# Patient Record
Sex: FEMALE | Race: WHITE | NOT HISPANIC OR LATINO | Employment: FULL TIME | URBAN - METROPOLITAN AREA
[De-identification: names, ages, dates, MRNs, and addresses within clinical notes are randomized per-mention and may not be internally consistent; named-entity substitution may affect disease eponyms.]

---

## 2017-02-20 ENCOUNTER — OFFICE VISIT (OUTPATIENT)
Dept: OBSTETRICS AND GYNECOLOGY | Facility: CLINIC | Age: 42
End: 2017-02-20
Attending: OBSTETRICS & GYNECOLOGY
Payer: COMMERCIAL

## 2017-02-20 VITALS
SYSTOLIC BLOOD PRESSURE: 116 MMHG | DIASTOLIC BLOOD PRESSURE: 80 MMHG | BODY MASS INDEX: 24.53 KG/M2 | HEIGHT: 65 IN | WEIGHT: 147.25 LBS

## 2017-02-20 DIAGNOSIS — N92.0 MENORRHAGIA WITH REGULAR CYCLE: Primary | ICD-10-CM

## 2017-02-20 DIAGNOSIS — N97.9 INFERTILITY, FEMALE: ICD-10-CM

## 2017-02-20 PROCEDURE — 99999 PR PBB SHADOW E&M-EST. PATIENT-LVL II: CPT | Mod: PBBFAC,,, | Performed by: OBSTETRICS & GYNECOLOGY

## 2017-02-20 PROCEDURE — 99213 OFFICE O/P EST LOW 20 MIN: CPT | Mod: S$GLB,,, | Performed by: OBSTETRICS & GYNECOLOGY

## 2017-02-20 RX ORDER — ALPRAZOLAM 0.5 MG/1
0.5 TABLET ORAL NIGHTLY PRN
Qty: 30 TABLET | Refills: 0 | Status: SHIPPED | OUTPATIENT
Start: 2017-02-20 | End: 2018-05-29 | Stop reason: SDUPTHER

## 2017-02-20 RX ORDER — ALPRAZOLAM 0.25 MG/1
0.25 TABLET ORAL 3 TIMES DAILY
COMMUNITY
End: 2017-02-20

## 2017-02-20 NOTE — MR AVS SNAPSHOT
[facility]  [address]  [address]  [facility] [address]  Phone: [phone]  Fax: [phone]                  Kim Padilla   2017 2:45 PM   Office Visit    Description:  Female : 1975   Provider:  Kun Pena MD   Department:  [facility]           Diagnoses this Visit        Comments    Menorrhagia with regular cycle    -  Primary     Infertility, female                To Do List           Future Appointments        Provider Department Dept Phone    2017 11:00 AM Kun Pena MD [facility] [phone]      Goals (5 Years of Data)     None       These Medications        Disp Refills Start End    norethindrone-e.estradiol-iron 1 mg-10 mcg (24)/10 mcg (2) Tab 84 tablet 3 2017    Take 1 tablet by mouth once daily. BIN#:[id number] CGP:[id number] PCN:CN ID:[id number] - Oral    [facility] [employer] [facility] [address] [place] [address] [address] Ph #: [phone]       alprazolam (XANAX) 0.5 MG tablet 30 tablet 0 2017    Take 1 tablet (0.5 mg total) by mouth nightly as needed for Insomnia or Anxiety. - Oral    Pharmacy: [facility] [employer] [facility] [address] [place] [address] [address] Ph #: [phone]         [facility]     [facility] Care [facility] -  Assistance  Registered nurses in the [facility] provide clinical advisement, health education, appointment booking, and other advisory services.  Call for this free service at [phone].             Medications           START taking these NEW medications        Refills    norethindrone-e.estradiol-iron 1 mg-10 mcg (24)/10 mcg (2) Tab 3    Sig: Take 1 tablet by mouth once daily. BIN#:[id number] CGP:[id number] PCN:CN ID:[id number]    Class: Normal    Route: Oral    alprazolam (XANAX) 0.5 MG tablet 0    Sig: Take 1 tablet (0.5 mg total) by mouth nightly as needed for Insomnia or  "Anxiety.    Class: Normal    Route: Oral      STOP taking these medications     phendimetrazine tartrate 35 mg Tab TK 1 T PO TID           Verify that the below list of medications is an accurate representation of the medications you are currently taking.  If none reported, the list may be blank. If incorrect, please contact your healthcare provider. Carry this list with you in case of emergency.           Current Medications     alprazolam (XANAX) 0.5 MG tablet Take 1 tablet (0.5 mg total) by mouth nightly as needed for Insomnia or Anxiety.    norethindrone-e.estradiol-iron 1 mg-10 mcg (24)/10 mcg (2) Tab Take 1 tablet by mouth once daily.    norethindrone-e.estradiol-iron 1 mg-10 mcg (24)/10 mcg (2) Tab Take 1 tablet by mouth once daily. BIN#:[id number] CGP:[id number] PCN:CN ID:[id number]           Clinical Reference Information           Your Vitals Were     BP Height Weight Last Period BMI    116/80 5' 5" (1.651 m) 66.8 kg (147 lb 4.3 oz) 02/16/2017 24.51 kg/m2      Blood Pressure          Most Recent Value    BP  116/80      Allergies as of 2/20/2017     Penicillins      Immunizations Administered on Date of Encounter - 2/20/2017     None      [facility] Sign-Up     Activating your [facility] account is as easy as 1-2-3!     1) Visit [email or web address], select Sign Up Now, enter this activation code and your date of birth, then select Next.  [email or web address]  Expires: 4/6/2017  3:50 PM      2) Create a username and password to use when you visit [facility] in the future and select a security question in case you lose your password and select Next.    3) Enter your e-mail address and click Sign Up!    Additional Information  If you have questions, please e-mail [email or web address] or call [phone] to talk to our [facility] staff. Remember, [facility] is NOT to be used for urgent needs. For medical emergencies, dial 911.         Language Assistance Services     ATTENTION: Language assistance services are available, " free of charge. Please call [phone].      ATENCIÓN: Si habla español, tiene a guthrie disposición servicios gratuitos de asistencia lingüística. Llame al [phone].     CHÚ Ý: N?u b?n nói Ti?ng Vi?t, có các d?ch v? h? tr? ngôn ng? mi?n phí dành cho b?n. G?i s? [phone].         [facility] complies with applicable Federal civil rights laws and does not discriminate on the basis of race, color, national origin, age, disability, or sex.

## 2017-02-20 NOTE — PROGRESS NOTES
"CC: follow up    Kim Padilla is a 41 y.o. female  follow up menorrhagia.  Didn't start the loloestrin due to cost menses are still heavy, saturating through super plus tampon.  Feels cueva week prior to menses. Tried prozac in the past but made her drowsy.  Having stress at work.     Past Medical History   Diagnosis Date    Infertility, female        Past Surgical History   Procedure Laterality Date    Breast lumpectomy Left      Dr Brown - benign    Dilation and curettage of uterus       for SAB       OB History    Para Term  AB SAB TAB Ectopic Multiple Living   1    1 1          # Outcome Date GA Lbr Calixto/2nd Weight Sex Delivery Anes PTL Lv   1 SAB                   History reviewed. No pertinent family history.    Social History   Substance Use Topics    Smoking status: Never Smoker    Smokeless tobacco: Never Used    Alcohol use No      Comment: rarely       Visit Vitals    /80    Ht 5' 5" (1.651 m)    Wt 66.8 kg (147 lb 4.3 oz)    LMP 2017    BMI 24.51 kg/m2       ROS:  GENERAL: Denies weight gain or weight loss. Feeling well overall.   SKIN: Denies rash or lesions.   HEAD: Denies head injury or headache.   NODES: Denies enlarged lymph nodes.   CHEST: Denies chest pain or shortness of breath.   CARDIOVASCULAR: Denies palpitations or left sided chest pain.   ABDOMEN: No abdominal pain, constipation, diarrhea, nausea, vomiting or rectal bleeding.   URINARY: No frequency, dysuria, hematuria, or burning on urination.  REPRODUCTIVE: See HPI.   BREASTS: denies pain, lumps, or nipple discharge.   HEMATOLOGIC: No easy bruisability or excessive bleeding.  MUSCULOSKELETAL: Denies joint pain or swelling.   NEUROLOGIC: Denies syncope or weakness.   PSYCHIATRIC: Denies depression, anxiety or mood swings.    Physical Exam:    APPEARANCE: Well nourished, well developed, in no acute distress.  AFFECT: WNL, alert and oriented x 3  SKIN: No acne or hirsutism  NECK: Neck " symmetric without masses or thyromegaly  NODES: No inguinal, cervical, axillary, or femoral lymph node enlargement  CHEST: Good respiratory effect  ABDOMEN: Soft.  No tenderness or masses.  No hepatosplenomegaly.  No hernias.  PELVIC: Normal external genitalia without lesions.  Normal hair distribution.  Adequate perineal body, normal urethral meatus.  Vagina moist and well rugated without lesions or discharge.  Cervix pink, without lesions, discharge or tenderness.  No significant cystocele or rectocele.  Bimanual exam shows uterus to be normal size, regular, mobile and nontender.  Adnexa without masses or tenderness.    EXTREMITIES: No edema.    ASSESSMENT AND PLAN    Diagnoses and all orders for this visit:    Menorrhagia with regular cycle  -     norethindrone-e.estradiol-iron 1 mg-10 mcg (24)/10 mcg (2) Tab; Take 1 tablet by mouth once daily. BIN#:[id number] CGP:[id number] PCN:CN ID:[id number]  -     alprazolam (XANAX) 0.5 MG tablet; Take 1 tablet (0.5 mg total) by mouth nightly as needed for Insomnia or Anxiety.    Infertility, female    try loloestrin as she is having menstrual migraines and pms.  If that doesn't work can do taytulla vs lysteda.  Wants lower dose options.    No Follow-up on file.

## 2017-02-21 ENCOUNTER — TELEPHONE (OUTPATIENT)
Dept: OBSTETRICS AND GYNECOLOGY | Facility: CLINIC | Age: 42
End: 2017-02-21

## 2017-02-21 RX ORDER — NORETHINDRONE ACETATE AND ETHINYL ESTRADIOL .02; 1 MG/1; MG/1
1 TABLET ORAL DAILY
Qty: 30 TABLET | Refills: 11 | Status: SHIPPED | OUTPATIENT
Start: 2017-02-21 | End: 2017-06-28

## 2017-02-21 NOTE — TELEPHONE ENCOUNTER
Dr Pena pt calling, came in yesterday got b.c. Pills sent into the pharmacy and insurance is not covering rx. 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

## 2017-03-22 ENCOUNTER — TELEPHONE (OUTPATIENT)
Dept: OBSTETRICS AND GYNECOLOGY | Facility: CLINIC | Age: 42
End: 2017-03-22

## 2017-03-22 NOTE — TELEPHONE ENCOUNTER
Pt states she started Microgestin on Thursday when she started her period;   on Monday she started with a migraine that continued until yesterday.  Her normal migraine medication wouldn't help with the pain and she was on the verge of having to go to the ER.  She said it was different than her normal migraines; first time she had an aura.   She has a history of hormonal headaches right before her period but said this one felt different.  Dr. Pena wanted to try her on Lo loestrin for irregular periods because she is sensitive to OCPs but it is not covered by her insurance.  She did not take it last night and nervous to take it again.      I advised her that the longer she takes them the less the side effects should be.      Should she stop the OCPs for now and restart again in the summer when she doesn't have to go to work (she is )?     She is aware Dr. Pena is off today, she is requesting I ask someone else so she doesn't get too off track with pills.

## 2017-03-22 NOTE — TELEPHONE ENCOUNTER
I would probably not take the pill if the HA was so severe that she almost went to ER. Send this message to Wax to look at later to decide on another option for menorrhagia. So basically stop OCP until she hears back from us.

## 2017-03-22 NOTE — TELEPHONE ENCOUNTER
Dr Pena pt calling, she just switched birth control pills and started them on Thursday. Pt is just now getting over a 2 day migraine and not sure what to do and wanted to touch base.Pt # [phone]

## 2017-03-23 ENCOUNTER — TELEPHONE (OUTPATIENT)
Dept: OBSTETRICS AND GYNECOLOGY | Facility: CLINIC | Age: 42
End: 2017-03-23

## 2017-03-23 RX ORDER — TRANEXAMIC ACID 650 MG/1
1300 TABLET ORAL 3 TIMES DAILY
Qty: 30 TABLET | Refills: 11 | Status: SHIPPED | OUTPATIENT
Start: 2017-03-23 | End: 2017-03-28

## 2017-03-23 NOTE — TELEPHONE ENCOUNTER
She takes the meds only when she starts her cycles it's 2 pills TID up to 5 days max.  Can stop at 3 days if that is sufficient.  Can turn menstrual blood black like color but that is normal.  rx sent

## 2017-03-23 NOTE — TELEPHONE ENCOUNTER
Call patient, if has developed migraine with AURA absolutely needs to stop ocps because she could have a stroke.  Her only other option for contraception/heavy menses is progestin only of which the best one is IUD.  Let her know.  See what she wants to do or she can come in and discuss with me

## 2017-03-23 NOTE — TELEPHONE ENCOUNTER
Pt has stopped the birth control.  Contraception is not a problem for her, just the heavy periods.  She would like to try the Lysteda.   Offered her an appt to discuss options/answer questions, declines for now.     Allergies and pharmacy UTD

## 2017-06-28 ENCOUNTER — OFFICE VISIT (OUTPATIENT)
Dept: OBSTETRICS AND GYNECOLOGY | Facility: CLINIC | Age: 42
End: 2017-06-28
Payer: COMMERCIAL

## 2017-06-28 VITALS
BODY MASS INDEX: 26.35 KG/M2 | SYSTOLIC BLOOD PRESSURE: 108 MMHG | HEIGHT: 65 IN | DIASTOLIC BLOOD PRESSURE: 72 MMHG | WEIGHT: 158.19 LBS

## 2017-06-28 DIAGNOSIS — Z11.51 SCREENING FOR HUMAN PAPILLOMAVIRUS (HPV): ICD-10-CM

## 2017-06-28 DIAGNOSIS — Z01.419 ROUTINE GYNECOLOGICAL EXAMINATION: Primary | ICD-10-CM

## 2017-06-28 PROCEDURE — 99999 PR PBB SHADOW E&M-EST. PATIENT-LVL III: CPT | Mod: PBBFAC,,, | Performed by: NURSE PRACTITIONER

## 2017-06-28 PROCEDURE — 99396 PREV VISIT EST AGE 40-64: CPT | Mod: S$GLB,,, | Performed by: NURSE PRACTITIONER

## 2017-06-28 RX ORDER — ELETRIPTAN HYDROBROMIDE 40 MG/1
TABLET, FILM COATED ORAL
Refills: 2 | COMMUNITY
Start: 2017-06-06 | End: 2018-06-05

## 2017-06-28 NOTE — PROGRESS NOTES
Chief Complaint: Well Woman Exam     HPI:     (Wax pt)    Last Pap:  2015 (normal, HPV neg)  Last Mammo:  2017 (per pt at [facility]) - ordered/managed by Dr. Ruiz; states her mammograms are now done every year, and u/s of breasts done every 6 months.    Kim Padilla is a 41 y.o.  who presents today for well woman exam.    LMP: Patient's last menstrual period was 2017.  States for past several months, her menses have been every 28 days, lasting 4 days; she is NOT taking OCP's as they cause her to have intense migraines.  She sees neurologist/headache specialist who monitors & treats her migraines.  No issues, problems, or complaints. Specifically, patient denies abnormal vaginal bleeding, discharge, pelvic pain, urinary problems, or changes in appetite.   Ms. Padilla is currently  & sexually active with a single male partner.   She declines STD screening today.  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      Past Medical History:   Diagnosis Date    Infertility, female      Past Surgical History:   Procedure Laterality Date    BREAST LUMPECTOMY Left     Dr Brown - benign    DILATION AND CURETTAGE OF UTERUS      for SAB     Social History     Social History    Marital status:      Spouse name: N/A    Number of children: N/A    Years of education: N/A     Occupational History    Not on file.     Social History Main Topics    Smoking status: Never Smoker    Smokeless tobacco: Never Used    Alcohol use No      Comment: rarely    Drug use: No    Sexual activity: Yes     Partners: Male     Birth control/ protection: None     Other Topics Concern    Not on file     Social History Narrative    No narrative on file     Family History   Problem Relation Age of Onset    Breast cancer Neg Hx     Colon cancer Neg Hx     Ovarian cancer Neg Hx      OB History      Para Term  AB Living    1       1      SAB TAB Ectopic Multiple Live Births    1  "                 ROS:     GENERAL: Denies unintentional weight gain or weight loss. Feeling well overall.   SKIN: Denies rash or lesions.   HEENT: Denies headaches, or vision changes.   CARDIOVASCULAR: Denies palpitations or chest pain.   RESPIRATORY: Denies shortness of breath or dyspnea on exertion.  BREASTS: Denies pain, lumps, or nipple discharge.   ABDOMEN: Denies abdominal pain, constipation, diarrhea, nausea, vomiting, change in appetite.  URINARY: Denies frequency, dysuria, hematuria.  NEUROLOGIC: Denies syncope or weakness.   PSYCHIATRIC: Denies depression, anxiety or mood swings.      Physical Exam:      PHYSICAL EXAM:  /72   Ht 5' 5" (1.651 m)   Wt 71.7 kg (158 lb 2.9 oz)   LMP 06/24/2017   BMI 26.32 kg/m²   Body mass index is 26.32 kg/m².     APPEARANCE: Well nourished, well developed, in no acute distress.  PSYCH: Appropriate mood and affect.  SKIN: No acne or hirsutism.  NECK: Neck symmetric without masses or thyromegaly  NODES: No inguinal, axillary, or supraclavicular lymph node enlargement  CHEST: Normal respiratory effort.  ABDOMEN: Soft.  No tenderness or masses.    BREASTS: Symmetrical, no skin changes or visible lesions.  No palpable masses or nipple discharge bilaterally.  PELVIC: Normal external genitalia without lesions.  Normal hair distribution.  Adequate perineal body, normal urethral meatus.  Vagina moist and well rugated without lesions or discharge.  Cervix pink, without lesions, discharge or tenderness.  No significant cystocele or rectocele.  Bimanual exam shows uterus to be normal size, regular, mobile and nontender.  Adnexa without masses or tenderness.    EXTREMITIES: No edema.    Assessment/Plan:     Routine gynecological examination  -     Cancel: Liquid-based pap smear, screening    Screening for human papillomavirus (HPV)  -     Cancel: HPV High Risk Genotypes, PCR    **Due for next pap:  03/2018      Counseling:     Patient was counseled today on current ASCCP pap " guidelines, the recommendation for yearly pelvic exams, healthy diet and exercise routines, annual mammograms and breast self awareness. She is to see her PCP for other health maintenance.     Return in about 1 year (around 6/28/2018) for Annual.

## 2017-09-28 ENCOUNTER — TELEPHONE (OUTPATIENT)
Dept: OBSTETRICS AND GYNECOLOGY | Facility: CLINIC | Age: 42
End: 2017-09-28

## 2017-09-28 RX ORDER — FLUOXETINE 10 MG/1
10 CAPSULE ORAL DAILY
Qty: 30 CAPSULE | Refills: 11 | Status: SHIPPED | OUTPATIENT
Start: 2017-09-28 | End: 2018-06-05

## 2017-09-28 NOTE — TELEPHONE ENCOUNTER
Pt thinks she needs to go back on Prozac the week before her period or the whole month.  1 week before her period starts she has a short temper and insomnia.  States she doesn't like the person she has become.  She has a rx for Xanax but states she can't remember the last time she took one.      Prozac 10mg pended    I saw that she was last prescribed Prozac 20mg only once in 2015.

## 2017-09-28 NOTE — TELEPHONE ENCOUNTER
Dr Pena pt calling, pt was on Prozac years ago and thinks she needs to get back on it. Pt said she would get crazy right before her period would start.William # [phone] Mikaela # [phone]

## 2017-11-15 ENCOUNTER — OFFICE VISIT (OUTPATIENT)
Dept: URGENT CARE | Facility: CLINIC | Age: 42
End: 2017-11-15
Payer: COMMERCIAL

## 2017-11-15 VITALS
HEIGHT: 65 IN | BODY MASS INDEX: 25.83 KG/M2 | RESPIRATION RATE: 18 BRPM | TEMPERATURE: 98 F | WEIGHT: 155 LBS | OXYGEN SATURATION: 98 % | SYSTOLIC BLOOD PRESSURE: 130 MMHG | HEART RATE: 93 BPM | DIASTOLIC BLOOD PRESSURE: 90 MMHG

## 2017-11-15 DIAGNOSIS — J32.9 SINUSITIS, UNSPECIFIED CHRONICITY, UNSPECIFIED LOCATION: Primary | ICD-10-CM

## 2017-11-15 PROCEDURE — 96372 THER/PROPH/DIAG INJ SC/IM: CPT | Mod: S$GLB,,, | Performed by: FAMILY MEDICINE

## 2017-11-15 PROCEDURE — 99203 OFFICE O/P NEW LOW 30 MIN: CPT | Mod: 25,S$GLB,, | Performed by: FAMILY MEDICINE

## 2017-11-15 RX ORDER — AZITHROMYCIN 250 MG/1
TABLET, FILM COATED ORAL
Qty: 6 TABLET | Refills: 0 | Status: SHIPPED | OUTPATIENT
Start: 2017-11-15 | End: 2018-06-05

## 2017-11-15 RX ORDER — FLUTICASONE PROPIONATE 50 MCG
2 SPRAY, SUSPENSION (ML) NASAL DAILY
Qty: 1 BOTTLE | Refills: 0 | Status: SHIPPED | OUTPATIENT
Start: 2017-11-15 | End: 2017-12-15

## 2017-11-15 RX ORDER — BETAMETHASONE SODIUM PHOSPHATE AND BETAMETHASONE ACETATE 3; 3 MG/ML; MG/ML
9 INJECTION, SUSPENSION INTRA-ARTICULAR; INTRALESIONAL; INTRAMUSCULAR; SOFT TISSUE
Status: COMPLETED | OUTPATIENT
Start: 2017-11-15 | End: 2017-11-15

## 2017-11-15 RX ADMIN — BETAMETHASONE SODIUM PHOSPHATE AND BETAMETHASONE ACETATE 9 MG: 3; 3 INJECTION, SUSPENSION INTRA-ARTICULAR; INTRALESIONAL; INTRAMUSCULAR; SOFT TISSUE at 04:11

## 2017-11-15 NOTE — PROGRESS NOTES
"Subjective:       Patient ID: Kim Padilla is a 42 y.o. female.    Vitals:  height is 5' 5" (1.651 m) and weight is 70.3 kg (155 lb). Her temperature is 98.2 °F (36.8 °C). Her blood pressure is 130/90 (abnormal) and her pulse is 93. Her respiration is 18 and oxygen saturation is 98%.     Chief Complaint: Sinus Problem    Sinus Problem   This is a new problem. The current episode started in the past 7 days. The problem has been gradually worsening since onset. There has been no fever. She is experiencing no pain. Associated symptoms include chills, congestion, ear pain, headaches, sinus pressure, sneezing and a sore throat. Pertinent negatives include no coughing, hoarse voice or shortness of breath. Treatments tried: advil sinus and flonase. The treatment provided mild relief.     Review of Systems   Constitution: Positive for chills. Negative for fever and malaise/fatigue.   HENT: Positive for congestion, ear pain, sinus pressure, sneezing and sore throat. Negative for hoarse voice.    Eyes: Negative for discharge and redness.   Cardiovascular: Negative for chest pain, dyspnea on exertion and leg swelling.   Respiratory: Negative for cough, shortness of breath, sputum production and wheezing.    Musculoskeletal: Negative for myalgias.   Gastrointestinal: Positive for nausea. Negative for abdominal pain.   Neurological: Positive for headaches.       Objective:      Physical Exam   Constitutional: She is oriented to person, place, and time. She appears well-developed and well-nourished.   HENT:   Head: Normocephalic and atraumatic.   Right Ear: External ear normal.   Left Ear: External ear normal.   Bilateral frontal sinus tenderness   Eyes: EOM are normal. Pupils are equal, round, and reactive to light.   Neck: Normal range of motion. Neck supple. No JVD present. No tracheal deviation present. No thyromegaly present.   Cardiovascular: Normal rate, regular rhythm and normal heart sounds.  Exam reveals no gallop and " no friction rub.    No murmur heard.  Pulmonary/Chest: Breath sounds normal. No respiratory distress. She has no wheezes. She has no rales. She exhibits no tenderness.   Abdominal: Soft. Bowel sounds are normal. She exhibits no distension and no mass. There is no tenderness. There is no rebound and no guarding. No hernia.   Musculoskeletal: Normal range of motion. She exhibits no edema, tenderness or deformity.   Lymphadenopathy:     She has no cervical adenopathy.   Neurological: She is alert and oriented to person, place, and time. She displays normal reflexes. No cranial nerve deficit. She exhibits normal muscle tone. Coordination normal.   Skin: Skin is warm. Capillary refill takes less than 2 seconds. No rash noted. No erythema. No pallor.   Psychiatric: She has a normal mood and affect. Her behavior is normal. Judgment and thought content normal.   Vitals reviewed.      Assessment:       1. Sinusitis, unspecified chronicity, unspecified location        Plan:         Sinusitis, unspecified chronicity, unspecified location  -     azithromycin (Z-SUSHMA) 250 MG tablet; Take 2 tablets by mouth x 1 for day 1 Then take 1 tablet by mouth daily for day 2 - 5  Dispense: 6 tablet; Refill: 0  -     betamethasone acetate-betamethasone sodium phosphate injection 9 mg; Inject 1.5 mLs (9 mg total) into the muscle one time.  -     fluticasone (FLONASE) 50 mcg/actuation nasal spray; 2 sprays by Each Nare route once daily.  Dispense: 1 Bottle; Refill: 0      Follow up with your doctor in a few days as needed.  Return to the urgent care or go to the ER if symptoms get worse.    Willian Palm MD

## 2017-11-15 NOTE — PATIENT INSTRUCTIONS
Sinusitis (Antibiotic Treatment)    The sinuses are air-filled spaces within the bones of the face. They connect to the inside of the nose. Sinusitis is an inflammation of the tissue lining the sinus cavity. Sinus inflammation can occur during a cold. It can also be due to allergies to pollens and other particles in the air. Sinusitis can cause symptoms of sinus congestion and fullness. A sinus infection causes fever, headache and facial pain. There is often green or yellow drainage from the nose or into the back of the throat (post-nasal drip). You have been given antibiotics to treat this condition.  Home care:  · Take the full course of antibiotics as instructed. Do not stop taking them, even if you feel better.  · Drink plenty of water, hot tea, and other liquids. This may help thin mucus. It also may promote sinus drainage.  · Heat may help soothe painful areas of the face. Use a towel soaked in hot water. Or,  the shower and direct the hot spray onto your face. Using a vaporizer along with a menthol rub at night may also help.   · An expectorant containing guaifenesin may help thin the mucus and promote drainage from the sinuses.  · Over-the-counter decongestants may be used unless a similar medicine was prescribed. Nasal sprays work the fastest. Use one that contains phenylephrine or oxymetazoline. First blow the nose gently. Then use the spray. Do not use these medicines more often than directed on the label or symptoms may get worse. You may also use tablets containing pseudoephedrine. Avoid products that combine ingredients, because side effects may be increased. Read labels. You can also ask the pharmacist for help. (NOTE: Persons with high blood pressure should not use decongestants. They can raise blood pressure.)  · Over-the-counter antihistamines may help if allergies contributed to your sinusitis.    · Do not use nasal rinses or irrigation during an acute sinus infection, unless told to by  your health care provider. Rinsing may spread the infection to other sinuses.  · Use acetaminophen or ibuprofen to control pain, unless another pain medicine was prescribed. (If you have chronic liver or kidney disease or ever had a stomach ulcer, talk with your doctor before using these medicines. Aspirin should never be used in anyone under 18 years of age who is ill with a fever. It may cause severe liver damage.)  · Don't smoke. This can worsen symptoms.  Follow-up care  Follow up with your healthcare provider or our staff if you are not improving within the next week.  When to seek medical advice  Call your healthcare provider if any of these occur:  · Facial pain or headache becoming more severe  · Stiff neck  · Unusual drowsiness or confusion  · Swelling of the forehead or eyelids  · Vision problems, including blurred or double vision  · Fever of 100.4ºF (38ºC) or higher, or as directed by your healthcare provider  · Seizure  · Breathing problems  · Symptoms not resolving within 10 days  Date Last Reviewed: 4/13/2015  © 3657-5421 [employer]. [address] [place] [address]. All rights reserved. This information is not intended as a substitute for professional medical care. Always follow your healthcare professional's instructions.      Follow up with your doctor in a few days as needed.  Return to the urgent care or go to the ER if symptoms get worse.    Willian Palm MD

## 2018-05-03 ENCOUNTER — HOSPITAL ENCOUNTER (OUTPATIENT)
Dept: RADIOLOGY | Facility: HOSPITAL | Age: 43
Discharge: HOME | End: 2018-05-03
Attending: PHYSICIAN ASSISTANT
Payer: COMMERCIAL

## 2018-05-03 ENCOUNTER — OFFICE VISIT (OUTPATIENT)
Dept: SPORTS MEDICINE | Facility: CLINIC | Age: 43
End: 2018-05-03
Payer: COMMERCIAL

## 2018-05-03 VITALS
HEART RATE: 99 BPM | SYSTOLIC BLOOD PRESSURE: 124 MMHG | DIASTOLIC BLOOD PRESSURE: 93 MMHG | HEIGHT: 65 IN | BODY MASS INDEX: 25.83 KG/M2 | WEIGHT: 155 LBS

## 2018-05-03 DIAGNOSIS — M25.512 LEFT SHOULDER PAIN, UNSPECIFIED CHRONICITY: Primary | ICD-10-CM

## 2018-05-03 DIAGNOSIS — M25.512 LEFT SHOULDER PAIN, UNSPECIFIED CHRONICITY: ICD-10-CM

## 2018-05-03 PROCEDURE — 99203 OFFICE O/P NEW LOW 30 MIN: CPT | Mod: S$GLB,,, | Performed by: PHYSICIAN ASSISTANT

## 2018-05-03 PROCEDURE — 73030 X-RAY EXAM OF SHOULDER: CPT | Mod: 26,LT,, | Performed by: RADIOLOGY

## 2018-05-03 PROCEDURE — 99999 PR PBB SHADOW E&M-EST. PATIENT-LVL III: CPT | Mod: PBBFAC,,, | Performed by: PHYSICIAN ASSISTANT

## 2018-05-03 PROCEDURE — 3008F BODY MASS INDEX DOCD: CPT | Mod: CPTII,S$GLB,, | Performed by: PHYSICIAN ASSISTANT

## 2018-05-03 PROCEDURE — 73030 X-RAY EXAM OF SHOULDER: CPT | Mod: TC,FY,PO,LT

## 2018-05-03 RX ORDER — SPIRONOLACTONE 100 MG/1
100 TABLET, FILM COATED ORAL DAILY
COMMUNITY
End: 2018-10-24 | Stop reason: ALTCHOICE

## 2018-05-03 RX ORDER — DOXYCYCLINE HYCLATE 100 MG
100 TABLET ORAL 2 TIMES DAILY
COMMUNITY
End: 2018-06-05

## 2018-05-03 RX ORDER — ISOTRETINOIN 10 MG/1
10 CAPSULE ORAL 2 TIMES DAILY
COMMUNITY
End: 2018-08-16

## 2018-05-03 NOTE — Clinical Note
May 3, 2018      [facility]            [facility]  [address]  [facility] [address]  Phone: [phone]          Patient: Kim Padilla   MR Number: [id number]   YOB: 1975   Date of Visit: 5/3/2018       Dear [facility] :    Thank you for referring Kim Padilla to me for evaluation. Attached you will find relevant portions of my assessment and plan of care.    If you have questions, please do not hesitate to call me. I look forward to following Kim Padilla along with you.    Sincerely,    Louise Reed PA-C    Enclosure  CC:  No Recipients    If you would like to receive this communication electronically, please contact [email or web address] or [phone] to request more information on [employer] [facility] access.    For providers and/or their staff who would like to refer a patient to [facility], please contact us through our one-stop-shop provider referral line, James Gallagher, at [phone].    If you feel you have received this communication in error or would no longer like to receive these types of communications, please e-mail [email or web address]

## 2018-05-03 NOTE — PROGRESS NOTES
CC: LEFT shoulder pain     42 y.o. Female RHD with a history of left shoulder pain x 1.5 years. She describes the pain as burning and sharp, intermittent. She has pain with taking a shirt off over her head. Pain wakes her up almost every night. She has started dropping things due to pain. She can't lift things up in to the top cabinets overhead.  She states that the pain is severe and not responding to any conservative care.  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    She reports that the pain and weakness is worse with overhead activity. It also bothers her at night.    Is affecting ADLs.  Pain is 6/10 at it's worst.      Past Medical History:   Diagnosis Date    Infertility, female        Past Surgical History:   Procedure Laterality Date    BREAST LUMPECTOMY Left 2014    Dr Brown - benign    DILATION AND CURETTAGE OF UTERUS      for SAB       Family History   Problem Relation Age of Onset    Breast cancer Neg Hx     Colon cancer Neg Hx     Ovarian cancer Neg Hx          Current Outpatient Prescriptions:     azithromycin (Z-SUSHMA) 250 MG tablet, Take 2 tablets by mouth x 1 for day 1 Then take 1 tablet by mouth daily for day 2 - 5, Disp: 6 tablet, Rfl: 0    doxycycline (VIBRA-TABS) 100 MG tablet, Take 100 mg by mouth 2 (two) times daily., Disp: , Rfl:     fluoxetine (PROZAC) 10 MG capsule, Take 1 capsule (10 mg total) by mouth once daily., Disp: 30 capsule, Rfl: 11    ISOtretinoin (ACCUTANE) 10 MG capsule, Take 10 mg by mouth 2 (two) times daily., Disp: , Rfl:     RELPAX 40 mg tablet, TK 1 T PO PRN  Q 2 TO 4 H. MAX OF 2 PER DAY OR 6 PER WEEK., Disp: , Rfl: 2    spironolactone (ALDACTONE) 100 MG tablet, Take 100 mg by mouth once daily., Disp: , Rfl:     alprazolam (XANAX) 0.5 MG tablet, Take 1 tablet (0.5 mg total) by mouth nightly as needed for Insomnia or Anxiety., Disp: 30 tablet, Rfl: 0    Review of patient's allergies indicates:   Allergen Reactions    Penicillins  "Hives          REVIEW OF SYSTEMS:  Constitution: Negative. Negative for chills, fever and night sweats.   HENT: Negative for congestion and headaches.    Eyes: Negative for blurred vision, left vision loss and right vision loss.   Cardiovascular: Negative for chest pain and syncope.   Respiratory: Negative for cough and shortness of breath.    Endocrine: Negative for polydipsia, polyphagia and polyuria.   Hematologic/Lymphatic: Negative for bleeding problem. Does not bruise/bleed easily.   Skin: Negative for dry skin, itching and rash.   Musculoskeletal: Negative for falls.  Positive for left shoulder pain and muscle weakness.   Gastrointestinal: Negative for abdominal pain and bowel incontinence.   Genitourinary: Negative for bladder incontinence and nocturia.   Neurological: Negative for disturbances in coordination, loss of balance and seizures.   Psychiatric/Behavioral: Negative for depression. The patient does not have insomnia.    Allergic/Immunologic: Negative for hives and persistent infections.      PHYSICAL EXAMINATION:  Vitals:  BP (!) 124/93   Pulse 99   Ht 5' 5" (1.651 m)   Wt 70.3 kg (155 lb)   LMP 04/14/2018   BMI 25.79 kg/m²    General: The patient is alert and oriented x 3.  Mood is pleasant.  Observation of ears, eyes and nose reveal no gross abnormalities.  No labored breathing observed.  Gait is coordinated. Patient can toe walk and heel walk without difficulty.      LEFT Shoulder / Upper Extremity Exam    OBSERVATION:     Swelling  none  Deformity  none   Discoloration  none   Scapular winging none   Scars   none  Atrophy  none    TENDERNESS / CREPITUS (T/C):          T/C      T/C   Clavicle   -/-  SUPRAspinatus    -/-     AC Jt.    -/-  INFRAspinatus  -/-    SC Jt.    -/-  Deltoid    -/-      G. Tuberosity  -/-  LH BICEP groove  -/-   Acromion:  -/-  Midline Neck   -/-     Scapular Spine -/-  Trapezium   -/-   SMA Scapula  -/-  GH jt. line - post  -/-     Scapulothoracic "  -/-         ROM: (* = with pain)  Right shoulder   Left shoulder        AROM (PROM)   AROM (PROM)   FE    170° (175°)     170° (175°)     ER at 0°    80°  (85°)    70°  (75°)   ER at 90° ABD  90°  (90°)    90°  (90°)   IR at 90°  ABD   NA  (40°)     NA  (40°)      IR (spine level)   T10     T10    STRENGTH: (* = with pain) Right shoulder   Left shoulder    SCAPTION   5/5    4/5    IR    5/5    4/5   ER    5/5    4/5   BICEPS   5/5    4/5   Deltoid    5/5    4/5     SIGNS:  Painful side       NEER   +    ODAVONS  neg    GARCAI   +   SPEEDS  neg     DROP ARM   -   BELLY PRESS neg   Superior escape none    LIFT-OFF  neg   X-Body ADD    neg    MOVING VALGUS neg        EXTREMITY NEURO-VASCULAR EXAM:    Sensation grossly intact to light touch all dermatomal regions.    DTR 2+ Biceps, Triceps, BR and Negative Jacks sign   Grossly intact motor function at Elbow, Wrist and Hand   Distal pulses radial and ulnar 2+, brisk cap refill, symmetric.      NECK:  Painless FROM and spinous processes non-tender. Negative Spurlings sign.      OTHER FINDINGS:  Pain with left lateral bending of neck  Painful arc    XRAYS:  Xrays including AP, Outlet and Axillary Lateral of Left shoulder are ordered / images reviewed by me:   No fracture dislocation or other pathology   Proximal migration of humeral head: None   GH arthritis: None          ASSESSMENT:   Left shoulder pain, r/o rotator cuff tear    PLAN:      1. MRI to rule out rotator cuff tear  2. RTC after MRI to review results.    All questions were answered, patient will contact us for questions or concerns in the interim.

## 2018-05-09 ENCOUNTER — TELEPHONE (OUTPATIENT)
Dept: SPORTS MEDICINE | Facility: CLINIC | Age: 43
End: 2018-05-09

## 2018-05-09 NOTE — TELEPHONE ENCOUNTER
Called patient and got her appointment rescheduled. I let her know the reason we had to reschedule was because the results won't be in at the time her original appointment was scheduled for.

## 2018-05-09 NOTE — TELEPHONE ENCOUNTER
----- Message from Germán Duran sent at 5/9/2018 11:59 AM CDT -----  Contact: Pt  Returning Maria R call regarding rescheduling appt,call

## 2018-05-10 ENCOUNTER — HOSPITAL ENCOUNTER (OUTPATIENT)
Dept: RADIOLOGY | Facility: HOSPITAL | Age: 43
Discharge: HOME | End: 2018-05-10
Attending: PHYSICIAN ASSISTANT
Payer: COMMERCIAL

## 2018-05-10 DIAGNOSIS — M25.512 LEFT SHOULDER PAIN, UNSPECIFIED CHRONICITY: ICD-10-CM

## 2018-05-10 PROCEDURE — 73221 MRI JOINT UPR EXTREM W/O DYE: CPT | Mod: 26,LT,, | Performed by: RADIOLOGY

## 2018-05-10 PROCEDURE — 73221 MRI JOINT UPR EXTREM W/O DYE: CPT | Mod: TC,LT

## 2018-05-11 ENCOUNTER — OFFICE VISIT (OUTPATIENT)
Dept: SPORTS MEDICINE | Facility: CLINIC | Age: 43
End: 2018-05-11
Payer: COMMERCIAL

## 2018-05-11 VITALS
HEART RATE: 78 BPM | BODY MASS INDEX: 25.83 KG/M2 | HEIGHT: 65 IN | SYSTOLIC BLOOD PRESSURE: 124 MMHG | WEIGHT: 155 LBS | DIASTOLIC BLOOD PRESSURE: 82 MMHG

## 2018-05-11 DIAGNOSIS — M25.512 LEFT SHOULDER PAIN, UNSPECIFIED CHRONICITY: Primary | ICD-10-CM

## 2018-05-11 PROCEDURE — 3008F BODY MASS INDEX DOCD: CPT | Mod: CPTII,S$GLB,, | Performed by: PHYSICIAN ASSISTANT

## 2018-05-11 PROCEDURE — 20610 DRAIN/INJ JOINT/BURSA W/O US: CPT | Mod: LT,S$GLB,, | Performed by: PHYSICIAN ASSISTANT

## 2018-05-11 PROCEDURE — 99214 OFFICE O/P EST MOD 30 MIN: CPT | Mod: 25,S$GLB,, | Performed by: PHYSICIAN ASSISTANT

## 2018-05-11 PROCEDURE — 99999 PR PBB SHADOW E&M-EST. PATIENT-LVL III: CPT | Mod: PBBFAC,,, | Performed by: PHYSICIAN ASSISTANT

## 2018-05-11 RX ORDER — BUPIVACAINE HYDROCHLORIDE 2.5 MG/ML
3 INJECTION, SOLUTION INFILTRATION; PERINEURAL
Status: COMPLETED | OUTPATIENT
Start: 2018-05-11 | End: 2018-05-11

## 2018-05-11 RX ORDER — TRIAMCINOLONE ACETONIDE 40 MG/ML
40 INJECTION, SUSPENSION INTRA-ARTICULAR; INTRAMUSCULAR
Status: COMPLETED | OUTPATIENT
Start: 2018-05-11 | End: 2018-05-11

## 2018-05-11 RX ADMIN — BUPIVACAINE HYDROCHLORIDE 7.5 MG: 2.5 INJECTION, SOLUTION INFILTRATION; PERINEURAL at 04:05

## 2018-05-11 RX ADMIN — TRIAMCINOLONE ACETONIDE 40 MG: 40 INJECTION, SUSPENSION INTRA-ARTICULAR; INTRAMUSCULAR at 04:05

## 2018-05-11 NOTE — PROGRESS NOTES
CC: LEFT shoulder pain     42 y.o. Female RHD with a history of left shoulder pain x 2 years. She describes the pain as burning and sharp, intermittent. She has pain with taking a shirt off over her head. Pain wakes her up almost every night. She has started dropping things due to pain. She can't lift things up in to the top cabinets overhead.  She states that the pain is severe and not responding to any conservative care.  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    She reports that the pain and weakness is worse with overhead activity. It also bothers her at night.    Is affecting ADLs.  Pain is 6/10 at it's worst.      Past Medical History:   Diagnosis Date    Infertility, female        Past Surgical History:   Procedure Laterality Date    BREAST LUMPECTOMY Left 2014    Dr Brown - benign    DILATION AND CURETTAGE OF UTERUS      for SAB       Family History   Problem Relation Age of Onset    Breast cancer Neg Hx     Colon cancer Neg Hx     Ovarian cancer Neg Hx          Current Outpatient Prescriptions:     azithromycin (Z-SUSHMA) 250 MG tablet, Take 2 tablets by mouth x 1 for day 1 Then take 1 tablet by mouth daily for day 2 - 5, Disp: 6 tablet, Rfl: 0    doxycycline (VIBRA-TABS) 100 MG tablet, Take 100 mg by mouth 2 (two) times daily., Disp: , Rfl:     fluoxetine (PROZAC) 10 MG capsule, Take 1 capsule (10 mg total) by mouth once daily., Disp: 30 capsule, Rfl: 11    ISOtretinoin (ACCUTANE) 10 MG capsule, Take 10 mg by mouth 2 (two) times daily., Disp: , Rfl:     RELPAX 40 mg tablet, TK 1 T PO PRN  Q 2 TO 4 H. MAX OF 2 PER DAY OR 6 PER WEEK., Disp: , Rfl: 2    spironolactone (ALDACTONE) 100 MG tablet, Take 100 mg by mouth once daily., Disp: , Rfl:     alprazolam (XANAX) 0.5 MG tablet, Take 1 tablet (0.5 mg total) by mouth nightly as needed for Insomnia or Anxiety., Disp: 30 tablet, Rfl: 0    Review of patient's allergies indicates:   Allergen Reactions    Penicillins  "Hives          REVIEW OF SYSTEMS:  Constitution: Negative. Negative for chills, fever and night sweats.   HENT: Negative for congestion and headaches.    Eyes: Negative for blurred vision, left vision loss and right vision loss.   Cardiovascular: Negative for chest pain and syncope.   Respiratory: Negative for cough and shortness of breath.    Endocrine: Negative for polydipsia, polyphagia and polyuria.   Hematologic/Lymphatic: Negative for bleeding problem. Does not bruise/bleed easily.   Skin: Negative for dry skin, itching and rash.   Musculoskeletal: Negative for falls.  Positive for left shoulder pain and muscle weakness.   Gastrointestinal: Negative for abdominal pain and bowel incontinence.   Genitourinary: Negative for bladder incontinence and nocturia.   Neurological: Negative for disturbances in coordination, loss of balance and seizures.   Psychiatric/Behavioral: Negative for depression. The patient does not have insomnia.    Allergic/Immunologic: Negative for hives and persistent infections.      PHYSICAL EXAMINATION:  Vitals:  /82   Pulse 78   Ht 5' 5" (1.651 m)   Wt 70.3 kg (155 lb)   LMP 04/14/2018   BMI 25.79 kg/m²    General: The patient is alert and oriented x 3.  Mood is pleasant.  Observation of ears, eyes and nose reveal no gross abnormalities.  No labored breathing observed.  Gait is coordinated. Patient can toe walk and heel walk without difficulty.      LEFT Shoulder / Upper Extremity Exam    OBSERVATION:     Swelling  none  Deformity  none   Discoloration  none   Scapular winging none   Scars   none  Atrophy  none    TENDERNESS / CREPITUS (T/C):          T/C      T/C   Clavicle   -/-  SUPRAspinatus    -/-     AC Jt.    -/-  INFRAspinatus  -/-    SC Jt.    -/-  Deltoid    -/-      G. Tuberosity  -/-  LH BICEP groove  -/-   Acromion:  -/-  Midline Neck   -/-     Scapular Spine -/-  Trapezium   -/-   SMA Scapula  -/-  GH jt. line - post  -/-     Scapulothoracic  -/-         ROM: (* " = with pain)  Right shoulder   Left shoulder        AROM (PROM)   AROM (PROM)   FE    170° (175°)     170° (175°)     ER at 0°    80°  (85°)    70°  (75°)   ER at 90° ABD  90°  (90°)    90°  (90°)   IR at 90°  ABD   NA  (40°)     NA  (40°)      IR (spine level)   T10     T10    STRENGTH: (* = with pain) Right shoulder   Left shoulder    SCAPTION   5/5    4/5    IR    5/5    4/5   ER    5/5    4/5   BICEPS   5/5    4/5   Deltoid    5/5    4/5     SIGNS:  Painful side       NEER   +    ODAVONS  neg    GARCIA   +   SPEEDS  neg     DROP ARM   -   BELLY PRESS neg   Superior escape none    LIFT-OFF  neg   X-Body ADD    neg    MOVING VALGUS neg        EXTREMITY NEURO-VASCULAR EXAM:    Sensation grossly intact to light touch all dermatomal regions.    DTR 2+ Biceps, Triceps, BR and Negative Jacks sign   Grossly intact motor function at Elbow, Wrist and Hand   Distal pulses radial and ulnar 2+, brisk cap refill, symmetric.      NECK:  Painless FROM and spinous processes non-tender. Negative Spurlings sign.      OTHER FINDINGS:  Pain with left lateral bending of neck  Painful arc    XRAYS:  Xrays including AP, Outlet and Axillary Lateral of Left shoulder are ordered / images reviewed by me:   No fracture dislocation or other pathology   Proximal migration of humeral head: None   GH arthritis: None    MRI LEFT SHOULDER 5/10/18  1. Moderate supraspinatus and mild subscapularis and infraspinatus tendinosis without partial or full-thickness tear.  2. Mild degenerative changes of the anterior-superior and posterior-superior segments of the glenoid labrum without definite focal tear noting limitation due to non-arthrographic technique.  3. Biceps tendinosis without partial or full-thickness tear.      ASSESSMENT:   Left shoulder pain, tendinosis    PLAN:      1. PT at [facility]  2. CSI left subacromial space  Injection Procedure  A time out was performed, including verification of patient ID, procedure, site and  side, availability of information and equipment, review of safety issues, and agreement with consent, the procedure site was marked.    After time out was performed, the patient was prepped aseptically with povidone-iodine swabsticks. A diagnostic and therapeutic injection of 3:1cc Marcaine:Kenalog was given under sterile technique using a 22.5 gauge needle from the Posterior aspect of the left Subacromial in the sitting position.      Kim Padilla had no adverse reactions to the medication. Pain decreased. She was instructed to apply ice to the joint for 20 minutes and avoid strenuous activities for 24-36 hours following the injection. She was warned of possible blood sugar and/or blood pressure changes during that time. Following that time, she can resume regular activities.    She was reminded to call the clinic immediately for any adverse side effects as explained in clinic today.    3. RTC in 2 months for follow up      All questions were answered, patient will contact us for questions or concerns in the interim.

## 2018-05-29 ENCOUNTER — CLINICAL SUPPORT (OUTPATIENT)
Dept: REHABILITATION | Facility: HOSPITAL | Age: 43
End: 2018-05-29
Attending: PHYSICIAN ASSISTANT
Payer: COMMERCIAL

## 2018-05-29 DIAGNOSIS — N92.0 MENORRHAGIA WITH REGULAR CYCLE: ICD-10-CM

## 2018-05-29 DIAGNOSIS — R53.1 WEAKNESS: ICD-10-CM

## 2018-05-29 DIAGNOSIS — R29.3 POSTURE IMBALANCE: ICD-10-CM

## 2018-05-29 PROCEDURE — 97162 PT EVAL MOD COMPLEX 30 MIN: CPT | Mod: PO

## 2018-05-29 PROCEDURE — 97110 THERAPEUTIC EXERCISES: CPT | Mod: PO

## 2018-05-29 RX ORDER — ALPRAZOLAM 0.5 MG/1
0.5 TABLET ORAL NIGHTLY PRN
Qty: 30 TABLET | Refills: 1 | Status: SHIPPED | OUTPATIENT
Start: 2018-05-29 | End: 2022-02-21 | Stop reason: SDUPTHER

## 2018-05-29 NOTE — PLAN OF CARE
Physical Therapy Evaluation    Name: Kim Padilla  Clinic Number: [id number]    Diagnosis:   Encounter Diagnoses   Name Primary?    Weakness     Posture imbalance      Physician: Louise Reed PA-C  Treatment Orders: PT Eval and Treat    History     Past Medical History:   Diagnosis Date    Infertility, female      Current Outpatient Prescriptions   Medication Sig    alprazolam (XANAX) 0.5 MG tablet Take 1 tablet (0.5 mg total) by mouth nightly as needed for Insomnia or Anxiety.    azithromycin (Z-SUSHMA) 250 MG tablet Take 2 tablets by mouth x 1 for day 1 Then take 1 tablet by mouth daily for day 2 - 5    doxycycline (VIBRA-TABS) 100 MG tablet Take 100 mg by mouth 2 (two) times daily.    fluoxetine (PROZAC) 10 MG capsule Take 1 capsule (10 mg total) by mouth once daily.    ISOtretinoin (ACCUTANE) 10 MG capsule Take 10 mg by mouth 2 (two) times daily.    RELPAX 40 mg tablet TK 1 T PO PRN  Q 2 TO 4 H. MAX OF 2 PER DAY OR 6 PER WEEK.    spironolactone (ALDACTONE) 100 MG tablet Take 100 mg by mouth once daily.     No current facility-administered medications for this visit.      Review of patient's allergies indicates:   Allergen Reactions    Penicillins Hives       Evaluation Date: 5/29/18  Visit # authorized: 1/  Authorization period: 12/3/18  Plan of care expiration: 7/28/18    Subjective     History of present condition:  Kim is a 42 y.o. female that presents to [facility] secondary to left shoulder pain. Injury/surgery occurred approximately: ~2 years. Pt. presents with the following co-morbidities and personal factors that directly impact her plan of care: cervical dysfunction and chronicity of her condition.        Precautions: PMH of chronic migraines (environmental and hormonal); PMH of chronic neck stiffness and bilateral UE paresthesia ulnar/median nerve distribution    Onset/ESTHER: sudden: ~ 2 years ago pt. woke up with left shoulder pain of insidious onset. Pt. went for  chiropractic tx at that time for neck and shoulder with temporary relief. Approximately two months pt. noted her greatest limitation when she couldn't remove her shirt. Pain has gotten progressively worse in the last three weeks noting that she couldn't lift a binder OH. Pt. had an Orthopedic consult on 5/10/18: imaging and injection noting a great reduction in pain. Pt.'s greatest concern at this time is shoulder weakness.   Red Flags:  · Bowel/bladder symptoms (urinary retention/fecal incontinence)? No  · Recent weight loss? No.  · Constant/Night pain that is unchanging with change of position? No.  · PMH of CA? No.   · Numbness or Tingling? bilateral UE paresthesia median/ulnar distribution.    Aggravating factors: reaching OH, lifting OH, dressing: donning/doffing shirt, decreased left  strength, unloading , and lifting/pulling backwards at chest height, driving (turning head and neck behind her), reaching to the back seat of car  Relieving factors: kenalog injection  Pain Scale: Kim rates pain on a scale of 0-10 to be 3 at currently; 2 at best; 9 at worst.    Diagnostic tests: MRI revealed 1. Moderate supraspinatus and mild subscapularis and infraspinatus tendinosis without partial or full-thickness tear.  2. Mild degenerative changes of the anterior-superior and posterior-superior segments of the glenoid labrum without definite focal tear noting limitation due to non-arthrographic technique.  3. Biceps tendinosis without partial or full-thickness tear.    PLOF: prolonged sitting, computer/phone usage, short distance walking, caring for her son (11 years old), and household activities  DME own/use: none  Hand dominance: right handed  Occupation: Pt works as a  () and job related duties include prolonged sitting/typing and occasional lifting binders/files    Previous treatment: chiropractor for neck and TENS unit for shoulder, kenalog.  ADLs: Pt has a  decrease ability to perform ADLs such as see above.  Patient Goals: Pt would like to decrease pain and increase function so she can return to her normal daily activities such as that she can perform ADLs with increased strength    Objective     Observation: see posture    Posture: subcranial hyperextension, cervical flattened lordosis, FHP, mild Tspine kyphosis, rolled anterior/IR shoulders, shoulder protraction, and excessive hip/knee flexion    Sensation: Intact bilateral UE    Cervical ROM: (measured in degrees)    Degrees Quality   Flexion 45 left cervical ERP   Right SB 30 left cervical ERP   Left SB 30 left cervical ERP   Right rotation 60 left cervical ERP   Left rotation 55 left cervical ERP     Active/Passive ROM: (measured in degrees)   Shoulder RUE LUE   Flexion 180/ 145/140   Abduction 180/ 125 left neck/superior shoulder pain/90   ER C7/100 C2/60   IR T12/65 T12 with pain and difficulty/65     Scapular Control/Dyskinesis:        Normal / Subtle / Obvious Comments   Left     Right       Strength: (measured 1 out of 5)   Right UE Left UE   Shoulder Flexion: 4+/5 3-/5   Shoulder Abduction: 4+/5 3-/5   Serratus Anterior 4+/5 3-/5   Shoulder ER: by side 4+/5 3-/5   Shoulder IR: by side 4+/5 3/5        Elbow Flexion: 4+/5 3-/5   Elbow Extension: 4+/5 3-/5         70# 15#    65# 20#    70# 15#   Lower Trap: seated 4/5 NT   Middle Trap: seated 4/5 3-/5   Rhomboids: seated 4/5 3-/5     Cervical Spine Special Tests: ((+): positive; (-): negative)   Compression +   Spurling's A + left   Spurling's B + left   Distraction + increased pain left   Deep neck flexor test +   First Rib + left     Special Tests:((+) pos.; (-): neg.)  Test ANAE   Lamine's Ramez -   Neer's Impingment -       Full Can/Empty Can Test (supraspinatus) +   Drop Arm test -   Dropping sign (infraspinatus) -   Belly Press (subscap) -   Lift Off test (subscap) +       Speed's test (biceps) -       Horizontal adduction test -   Joint Mobility:  "(graded 0-6 out of 6) left lower cervical/first rib: 2/6    Palpation: TTP left UT, levator scapula, and paraspinals    Flexibility: mod. restriction of the above musculature    Functional Status Measures:    Intake Score     Pts Physical FS Primary Measure      55                          Risk Adjustment Statistical FOTO     57        PT reviewed FOTO scores for Kim Padilla on 05/29/2018.   FOTO scores were entered into [employer] - see media section.  History  Co-morbidities and personal factors that may impact the plan of care Examination  Body Structures and Functions, activity limitations and participation restrictions that may impact the plan of care Clinical Presentation   Decision Making/ Complexity Score   Co-morbidities:   cervical mechanical dysfunction and chronicity of condition            Personal Factors:   low pain tolerance Body Regions: cervical/shoulder    Body Systems: Decreased cervical and bilateral shoulder AROM; shoulder/yuniel-scapular weakness; poor posture; pain with transitional activities, decrease exercise ability, and pain with ADLs.     Activity limitations: prolonged sitting, lifting/reaching OH, household activities, self care skills, sleep disturbance, and work/leisur activities    Participation Restrictions: leisure work out   evolving with changing clinical characteristics   moderate     Clinical Presentation/complexity category  Moderate complexity category: pt. has 1-2 personal factors and/or co-morbidities directly  impacting POC, 3 or more body system impairments/functional limitations/participation restrictions; as well as, condition is evolving with changing clinical characteristics.    PT Evaluation Completed? Yes  Discussed Plan of Care with patient: Yes    TREATMENT:  Therapeutic exercise: Kim received therapeutic exercises to develop strength and endurance, flexibility for 10 minutes including:View at "[employer]" using code: [id number]  *Pt verbally understood the " instructions and demonstrated proper form/technique. Pt was advised to perform these exercises free of pain and to discontinue use if symptoms persist/worsen.    Pt. education:   Instructed pt. regarding: Posture reeducation, body mechanics, activity modification/avoidance, POC/goals/outcomes, and proper technique with all exercises. Kim julia good understanding and return demonstration of the education provided. No cultural, environmental, or spiritual barriers identified to treatment or learning.    Medical necessity is demonstrated by the following IMPAIRMENTS/PROBLEM LIST:   1) Function is limited by pain   2) Posture dysfunction   3) RC/yuniel-scapular/serratus anterior weakness   4) Pectoralis major/minor tightness   5) Decreased shoulder A/PROM   6) Lack of HEP    GOALS:   Short Term Goals:  4 weeks  1. Pt. to report decreased cervical/shoulder pain </= 5/10 at worst to increase tolerance for self care skills  2. Pt. to demonstrate proper cervical and scapula retraction requiring min. to no verbal cues from PT  3. Pt. to demonstrate an increase in left sh. elevation A/PROM to 160 deg. to promote greater ease with reaching tasks.  4. Pt. to demonstrate an increase in left sh. ER AROM to 80 deg. to promote greater ease self care skills  5. Pt. to demonstrate increased MMT for left shoulder flexion to 3/5 to improve tolerance for OH reach  6. Pt. to demonstrate increased MMT to serratus anterior to 3/5 to improve scapula stability during repetitive UE tasks  7. Pt to tolerate HEP to improve ROM and independence with ADL's    Long Term Goals: 8 weeks  1. Pt. to report decreased cervical/shoulder pain </=2/10 at worst to increase tolerance for self care skills/OH reach  2. Pt. to demonstrate proper cervical and scapula retraction requiring no verbal cues from PT  3. Pt. to demonstrate an increase in left sh. elevation AROM to 160 deg. to promote greater ease with reaching tasks.  4. Pt. to demonstrate an increase in  left sh. ER/IR AROM to 90/70 deg. to promote greater ease self care skills  5. Pt. to demonstrate increased MMT for left shoulder abduction to 4/5 to improve tolerance for ADL and work activities.  6. Pt. to demonstrate increased MMT for mid-trap/lower trap strength to 3+/5 to improve scapula stability during OH reach/lifting tasks.   7. Pt. to demonstrate increased MMT to serratus anterior to 4/5 to improve scapula stability during OH UE tasks  8. Pt to be Independent with HEP to improve ROM and independence with ADL's    Assessment     This is a 42 y.o. female referred to outpatient physical therapy who presents with a medical diagnosis of chronic left shoulder pain and PT diagnosis of weakness and posture imbalance demonstrating joint dysfunction and functional limitation as described above. Level of complexity is moderate;  based on patient's past medical history including the above co-morbidities and personal factors; functional limitations, and clinical presentation directly impacting his/her plan of care. Pt demonstrates good rehab potential.    Patient's signs and symptoms are consistent with left cervical mechanical dysfunction with concomitant shoulder tendonosis. The goals were discussed and agreed upon with the patient. Pt will benefit from physcial therapy services in order to maximize painfree functional independence.    Plan     Pt will be treated by physical therapy 1-3 times a week for 8 weeks for Pt education, HEP, therapeutic exercises, neuromuscular re-education, soft tissue and joint mobilizations, modalities, including but not exclusive to dry needling, prn to achieve established goals. Kim may at times be seen by a PTA as part of the Rehab Team.     I certify the need for these services furnished under this plan of treatment and while under my care.______________________________ Physician/Referring Practitioner  Date of Signature

## 2018-06-04 ENCOUNTER — CLINICAL SUPPORT (OUTPATIENT)
Dept: REHABILITATION | Facility: HOSPITAL | Age: 43
End: 2018-06-04
Attending: PHYSICIAN ASSISTANT
Payer: COMMERCIAL

## 2018-06-04 DIAGNOSIS — R29.3 POSTURE IMBALANCE: ICD-10-CM

## 2018-06-04 DIAGNOSIS — R53.1 WEAKNESS: Primary | ICD-10-CM

## 2018-06-04 PROCEDURE — 97110 THERAPEUTIC EXERCISES: CPT | Mod: PO

## 2018-06-04 NOTE — PROGRESS NOTES
Physical Therapy Progress Note     Name: Kim Padilla  Clinic Number: [id number]  Diagnosis:   Encounter Diagnoses   Name Primary?    Weakness Yes    Posture imbalance      Physician: Louise Reed PA-C     Past Medical History:   Diagnosis Date    Infertility, female        Precautions: standard    Evaluation Date: 5/29/18  Visit # authorized: 2/20  Authorization period: 12/3/18  Plan of care expiration: 7/28/18  Time in: 8:05  Time out:9:00  Treatment time: 50      Subjective     Pt reports: chronic (L) shoulder pain. States that the symptoms are always there but worse at night and with attempts at overhead lifting. SHe reports slight decrease in symptoms since recent injection.     Pain Scale: before treatment: 3 /10currently; after treatment: 1-2/10    Objective       Therapeutic exercise: Kim received therapeutic exercises to develop UE/Postural strength, endurance, ROM, flexibility and posture for 45 minutes including:      Standing:  · Corner pec stretch 3 x 30 sec  · brueggers ex 2 x 10 ( No resistance)  · Scap retract with YTB 2 x 8  · SHoulder ext with YTB 2 x 8  · Isometric ER/IR walk outs with YTB x 10 each  · scaption to 90 degrees 2 x 8      Sitting:  · 1st rib mobilization with belt assist ( L) 3 x 30 sec    Supine:   · Supine pec stretch over towel roll x 2 minutes  · Scap protract 2 x 10    Sidelying:   · sidelying ER 2 x 10    ( Next visit: Prone row, shoulder ext with ER, h-shoulder abd with er, sidelying flex to 90 degrees)     Manual therapy: Kim received the following manual therapy techniques: Joint mobilizations were applied to: (L) GH joint for 5 minutes.    Manual techniques include:  · Soft tissue mobilization:  · Joint mobilization:  GH oscillations, Posterior/inferior GH glides grade ll       Written Home Exercises Provided: Patient educated to continue with previously issued HEP to tolerance along with updated copy of HEP to  include:  See patient instructions Exercise [id number]  Pt demo good understanding of the education provided. Kim demonstrated good return demonstration of activities.     Pt. education:  · Posture reeducation, body mechanics, HEP,    · No spiritual or educational barriers to learning provided  · Pt has no cultural, educational or language barriers to learning      Assessment     Patient kash TX fairly well. She was able to perform the above ex's/activities within tolerable level of muscular fatigue and discomfort. Min verbal and tactile cues requires for scap engagement and postural awareness with ex's. WIll monitor and attempt to progress as tolerated.  Pt will continue to benefit from skilled outpatient physical therapy to address the remaining functional deficits, provide pt/family education, and to maximize pt's level of independence in the home and community environment.        GOALS:   Short Term Goals:  4 weeks  1. Pt. to report decreased cervical/shoulder pain </= 5/10 at worst to increase tolerance for self care skills  2. Pt. to demonstrate proper cervical and scapula retraction requiring min. to no verbal cues from PT  3. Pt. to demonstrate an increase in left sh. elevation A/PROM to 160 deg. to promote greater ease with reaching tasks.  4. Pt. to demonstrate an increase in left sh. ER AROM to 80 deg. to promote greater ease self care skills  5. Pt. to demonstrate increased MMT for left shoulder flexion to 3/5 to improve tolerance for OH reach  6. Pt. to demonstrate increased MMT to serratus anterior to 3/5 to improve scapula stability during repetitive UE tasks  7. Pt to tolerate HEP to improve ROM and independence with ADL's     Long Term Goals: 8 weeks  1. Pt. to report decreased cervical/shoulder pain </=2/10 at worst to increase tolerance for self care skills/OH reach  2. Pt. to demonstrate proper cervical and scapula retraction requiring no verbal cues from PT  3. Pt. to demonstrate an increase in  left sh. elevation AROM to 160 deg. to promote greater ease with reaching tasks.  4. Pt. to demonstrate an increase in left sh. ER/IR AROM to 90/70 deg. to promote greater ease self care skills  5. Pt. to demonstrate increased MMT for left shoulder abduction to 4/5 to improve tolerance for ADL and work activities.  6. Pt. to demonstrate increased MMT for mid-trap/lower trap strength to 3+/5 to improve scapula stability during OH reach/lifting tasks.   7. Pt. to demonstrate increased MMT to serratus anterior to 4/5 to improve scapula stability during OH UE tasks  8. Pt to be Independent with HEP to improve ROM and independence with ADL's       Plan   Continue with established Plan of Care towards PT goals.

## 2018-06-05 ENCOUNTER — OFFICE VISIT (OUTPATIENT)
Dept: OBSTETRICS AND GYNECOLOGY | Facility: CLINIC | Age: 43
End: 2018-06-05
Payer: COMMERCIAL

## 2018-06-05 VITALS
HEIGHT: 65 IN | DIASTOLIC BLOOD PRESSURE: 70 MMHG | SYSTOLIC BLOOD PRESSURE: 110 MMHG | WEIGHT: 157.63 LBS | BODY MASS INDEX: 26.26 KG/M2

## 2018-06-05 DIAGNOSIS — Z12.4 ENCOUNTER FOR PAPANICOLAOU SMEAR FOR CERVICAL CANCER SCREENING: ICD-10-CM

## 2018-06-05 DIAGNOSIS — Z01.419 ENCOUNTER FOR GYNECOLOGICAL EXAMINATION: Primary | ICD-10-CM

## 2018-06-05 DIAGNOSIS — Z11.51 SCREENING FOR HPV (HUMAN PAPILLOMAVIRUS): ICD-10-CM

## 2018-06-05 PROCEDURE — 87624 HPV HI-RISK TYP POOLED RSLT: CPT

## 2018-06-05 PROCEDURE — 88175 CYTOPATH C/V AUTO FLUID REDO: CPT

## 2018-06-05 PROCEDURE — 99396 PREV VISIT EST AGE 40-64: CPT | Mod: S$GLB,,, | Performed by: NURSE PRACTITIONER

## 2018-06-05 PROCEDURE — 99999 PR PBB SHADOW E&M-EST. PATIENT-LVL III: CPT | Mod: PBBFAC,,, | Performed by: NURSE PRACTITIONER

## 2018-06-05 RX ORDER — DICLOFENAC POTASSIUM 50 MG/1
POWDER, FOR SOLUTION ORAL
COMMUNITY
End: 2018-10-24 | Stop reason: ALTCHOICE

## 2018-06-05 RX ORDER — LAMOTRIGINE 25 MG/1
TABLET ORAL
COMMUNITY
End: 2018-10-24 | Stop reason: ALTCHOICE

## 2018-06-05 RX ORDER — BACLOFEN 10 MG/1
TABLET ORAL
COMMUNITY
End: 2018-08-16

## 2018-06-05 RX ORDER — ELETRIPTAN HYDROBROMIDE 40 MG/1
TABLET, FILM COATED ORAL
COMMUNITY

## 2018-06-05 RX ORDER — ZOLMITRIPTAN 5 MG/1
SPRAY NASAL
COMMUNITY
End: 2022-02-21

## 2018-06-12 ENCOUNTER — CLINICAL SUPPORT (OUTPATIENT)
Dept: REHABILITATION | Facility: HOSPITAL | Age: 43
End: 2018-06-12
Attending: PHYSICIAN ASSISTANT
Payer: COMMERCIAL

## 2018-06-12 DIAGNOSIS — R29.3 POSTURE IMBALANCE: ICD-10-CM

## 2018-06-12 DIAGNOSIS — R53.1 WEAKNESS: ICD-10-CM

## 2018-06-12 LAB
HPV HR 12 DNA CVX QL NAA+PROBE: NEGATIVE
HPV16 AG SPEC QL: NEGATIVE
HPV18 DNA SPEC QL NAA+PROBE: NEGATIVE

## 2018-06-12 PROCEDURE — 97110 THERAPEUTIC EXERCISES: CPT | Mod: PO

## 2018-06-12 NOTE — PROGRESS NOTES
Good news Kim!    Your pap smear came back and it was normal.  I also tested it for HPV - Human Papilloma Virus - and that came back normal also.  The new recommendations are to check everyone over the age of 30 for HPV.  Because both of these came back negative, you have a very low risk for developing cervical cancer.  Based on current guidelines you don't need another pap smear for years.   We do still recommend that you come back to clinic each year for your annual exam. That gives us a chance to make sure you're doing well and no problems have developed since we saw you last.     Let me know if you have any questions,  IDA Ko

## 2018-06-12 NOTE — PROGRESS NOTES
Physical Therapy Progress Note     Name: Kim Padilla  Clinic Number: [id number]  Diagnosis:   Encounter Diagnoses   Name Primary?    Weakness     Posture imbalance      Physician: Louise Reed PA-C     Past Medical History:   Diagnosis Date    Infertility, female        Precautions: standard    Evaluation Date: 5/29/18  Visit # authorized: 3/20  Authorization period: 12/3/18  Plan of care expiration: 7/28/18  Time in: 8:00  Time out:8:50  Treatment time: 45    Subjective     Pt reports: no problems after her last session. States that symptoms appear improved and she has better awareness of her posture now.    Pain Scale: before treatment: 1-2 /10currently; after treatment:0/10    Objective     Therapeutic exercise: Kim received therapeutic exercises to develop UE/Postural strength, endurance, ROM, flexibility and posture for 40 minutes including:      Standing:  · Corner pec stretch 3 x 30 sec  · brueggers ex 2 x 10 with YTB   · Scap retract with YTB 2 x 10  · SHoulder ext with YTB 2 x 10  · Isometric ER/IR walk outs with YTB x 10 each  · scaption to 90 degrees 2 x 10    Sitting:  · 1st rib mobilization with belt assist ( L) 3 x 30 sec    Supine:   · Supine pec stretch over towel roll x 3 minutes  · Scap protract with 1# 2 x 10  · Rhythmic stabilization 2 x 30 sec    Sidelying:   · sidelying ER with 1# 2 x 10  · Shoulder flex to 90 degrees 2 x 8    Prone ex's  · Prone row 2 x 10  · Prone shoulder ext with ER 2 x 10  · Prone H- abd with er 2 x 10        Manual therapy: Kim received the following manual therapy techniques: Joint mobilizations were applied to: (L) GH joint for 5 minutes.    Manual techniques include:  · Soft tissue mobilization:  · Joint mobilization:  GH oscillations, Posterior/inferior GH glides grade ll       Written Home Exercises Provided: Patient educated to continue with previously issued HEP to tolerance along with updated copy of HEP to  include:  See patient instructions Exercise code [id number]  Pt demo good understanding of the education provided. Kim demonstrated good return demonstration of activities.     Pt. education:  · Posture reeducation, body mechanics, HEP,    · No spiritual or educational barriers to learning provided  · Pt has no cultural, educational or language barriers to learning      Assessment     Patient kash TX well. She was able to perform and progress slightly with ex's/activities within tolerable level of muscular fatigue and without increased pain symptoms. . Min weakness evident with rhythmic stabilization ex.  Good performance of ex's and improved overall postural awareness. . WIll monitor and attempt to progress as tolerated.  Pt will continue to benefit from skilled outpatient physical therapy to address the remaining functional deficits, provide pt/family education, and to maximize pt's level of independence in the home and community environment.        GOALS:   Short Term Goals:  4 weeks  1. Pt. to report decreased cervical/shoulder pain </= 5/10 at worst to increase tolerance for self care skills  2. Pt. to demonstrate proper cervical and scapula retraction requiring min. to no verbal cues from PT  3. Pt. to demonstrate an increase in left sh. elevation A/PROM to 160 deg. to promote greater ease with reaching tasks.  4. Pt. to demonstrate an increase in left sh. ER AROM to 80 deg. to promote greater ease self care skills  5. Pt. to demonstrate increased MMT for left shoulder flexion to 3/5 to improve tolerance for OH reach  6. Pt. to demonstrate increased MMT to serratus anterior to 3/5 to improve scapula stability during repetitive UE tasks  7. Pt to tolerate HEP to improve ROM and independence with ADL's     Long Term Goals: 8 weeks  1. Pt. to report decreased cervical/shoulder pain </=2/10 at worst to increase tolerance for self care skills/OH reach  2. Pt. to demonstrate proper cervical and scapula retraction  requiring no verbal cues from PT  3. Pt. to demonstrate an increase in left sh. elevation AROM to 160 deg. to promote greater ease with reaching tasks.  4. Pt. to demonstrate an increase in left sh. ER/IR AROM to 90/70 deg. to promote greater ease self care skills  5. Pt. to demonstrate increased MMT for left shoulder abduction to 4/5 to improve tolerance for ADL and work activities.  6. Pt. to demonstrate increased MMT for mid-trap/lower trap strength to 3+/5 to improve scapula stability during OH reach/lifting tasks.   7. Pt. to demonstrate increased MMT to serratus anterior to 4/5 to improve scapula stability during OH UE tasks  8. Pt to be Independent with HEP to improve ROM and independence with ADL's       Plan   Continue with established Plan of Care towards PT goals.

## 2018-06-14 ENCOUNTER — CLINICAL SUPPORT (OUTPATIENT)
Dept: REHABILITATION | Facility: HOSPITAL | Age: 43
End: 2018-06-14
Attending: PHYSICIAN ASSISTANT
Payer: COMMERCIAL

## 2018-06-14 DIAGNOSIS — R53.1 WEAKNESS: ICD-10-CM

## 2018-06-14 DIAGNOSIS — R29.3 POSTURE IMBALANCE: ICD-10-CM

## 2018-06-14 PROCEDURE — 97110 THERAPEUTIC EXERCISES: CPT | Mod: PO

## 2018-06-14 NOTE — PROGRESS NOTES
Physical Therapy Progress Note     Name: Kim Padilla  Clinic Number: [id number]  Diagnosis:   Encounter Diagnoses   Name Primary?    Weakness     Posture imbalance      Physician: Louise Reed PA-C     Past Medical History:   Diagnosis Date    Infertility, female        Precautions: standard    Evaluation Date: 5/29/18  Visit # authorized: 4/20  Authorization period: 12/3/18  Plan of care expiration: 7/28/18  Time in: 9:00  Time out:9:50  Treatment time: 50    Subjective     Pt reports  that symptoms are improved and she has better awareness of her posture now.    Pain Scale: before treatment: 0 /10currently; after treatment:0/10    Objective     Therapeutic exercise: Kim received therapeutic exercises to develop UE/Postural strength, endurance, ROM, flexibility and posture for 45 minutes including:      UBE x 4 minutes level 1 ( 2 mintues forward/backward)     Standing:  · Corner pec stretch 3 x 30 sec  · brueggers ex 2 x 10 with OTB  · Scap retract with OTB 2 x 10  · SHoulder ext with OTB 2 x 10  · Isometric ER/IR walk outs with OTB x 10 each  · scaption to 90 degrees with 1# 2 x 10  · Serratus wall slides 2 x 10  · Closed chain scap stabilization with ABC's on the wall with a ball    Sitting:  · 1st rib mobilization with belt assist ( L) 3 x 30 sec    Supine:   · Supine pec stretch over 1/2 foam roll x 3 minutes  · Scap protract with 2# 2 x 10  · Rhythmic stabilization 2 x 30 sec--NP    Sidelying:   · sidelying ER with 1# 2 x 12  · Shoulder flex to 90 degrees 2 x 10    Prone ex's  · Prone row with 2# 2 x 10  · Prone shoulder ext with ER   2 x 10  · Prone H- abd with er with 1# 2 x 10        Manual therapy: Kim received the following manual therapy techniques: Joint mobilizations were applied to: (L) GH joint for 5 minutes.    Manual techniques include:  · Soft tissue mobilization:  · Joint mobilization:  GH oscillations, Posterior/inferior GH glides grade  ll       Written Home Exercises Provided: Patient educated to continue with previously issued HEP to tolerance. Patient provided with an upgraded OTB for home use with Tband ex's.   Pt demo good understanding of the education provided. Kim demonstrated good return demonstration of activities.     Pt. education:  · Posture reeducation, body mechanics, HEP,    · No spiritual or educational barriers to learning provided  · Pt has no cultural, educational or language barriers to learning      Assessment     Patient kash [place] well. She was able to perform and progress slightly with ex's/activities within tolerable level of muscular fatigue and without increased pain symptoms. . She was challenged with new serratus wall slides and closed chain scap stabilization on the wall but able to perform without increased pain.  Good performance of ex's and improved overall postural awareness. . WIll monitor and attempt to progress as tolerated.  Pt will continue to benefit from skilled outpatient physical therapy to address the remaining functional deficits, provide pt/family education, and to maximize pt's level of independence in the home and community environment.        GOALS:   Short Term Goals:  4 weeks  1. Pt. to report decreased cervical/shoulder pain </= 5/10 at worst to increase tolerance for self care skills  2. Pt. to demonstrate proper cervical and scapula retraction requiring min. to no verbal cues from PT  3. Pt. to demonstrate an increase in left sh. elevation A/PROM to 160 deg. to promote greater ease with reaching tasks.  4. Pt. to demonstrate an increase in left sh. ER AROM to 80 deg. to promote greater ease self care skills  5. Pt. to demonstrate increased MMT for left shoulder flexion to 3/5 to improve tolerance for OH reach  6. Pt. to demonstrate increased MMT to serratus anterior to 3/5 to improve scapula stability during repetitive UE tasks  7. Pt to tolerate HEP to improve ROM and independence with  ADL's     Long Term Goals: 8 weeks  1. Pt. to report decreased cervical/shoulder pain </=2/10 at worst to increase tolerance for self care skills/OH reach  2. Pt. to demonstrate proper cervical and scapula retraction requiring no verbal cues from PT  3. Pt. to demonstrate an increase in left sh. elevation AROM to 160 deg. to promote greater ease with reaching tasks.  4. Pt. to demonstrate an increase in left sh. ER/IR AROM to 90/70 deg. to promote greater ease self care skills  5. Pt. to demonstrate increased MMT for left shoulder abduction to 4/5 to improve tolerance for ADL and work activities.  6. Pt. to demonstrate increased MMT for mid-trap/lower trap strength to 3+/5 to improve scapula stability during OH reach/lifting tasks.   7. Pt. to demonstrate increased MMT to serratus anterior to 4/5 to improve scapula stability during OH UE tasks  8. Pt to be Independent with HEP to improve ROM and independence with ADL's       Plan   Continue with established Plan of Care towards PT goals.

## 2018-06-19 NOTE — PROGRESS NOTES
Chief Complaint: Well Woman Exam     (Dr. Pena patient)    Last Pap:  2015 Normal,  HPV negative    Last Mammo:  Done yearly per Dr. Ruiz    HPI:      Kim Padilla is a 42 y.o.  who presents today for well woman exam.    LMP: Patient's last menstrual period was 2018.   No issues, problems, or complaints. Specifically, patient denies abnormal vaginal bleeding, discharge, pelvic pain, urinary problems, or changes in appetite.   Ms. Padilla is currently sexually active with a single male partner.  She declines STD screening today.      Past Medical History:   Diagnosis Date    Infertility, female      Past Surgical History:   Procedure Laterality Date    BREAST LUMPECTOMY Left     Dr Brown - benign    DILATION AND CURETTAGE OF UTERUS      for SAB     Social History     Social History    Marital status:      Spouse name: N/A    Number of children: N/A    Years of education: N/A     Occupational History    Not on file.     Social History Main Topics    Smoking status: Never Smoker    Smokeless tobacco: Never Used    Alcohol use No      Comment: rarely    Drug use: No    Sexual activity: Yes     Partners: Male     Birth control/ protection: None     Other Topics Concern    Not on file     Social History Narrative    No narrative on file     Family History   Problem Relation Age of Onset    Breast cancer Maternal Cousin     Colon cancer Neg Hx     Ovarian cancer Neg Hx      OB History      Para Term  AB Living    1       1      SAB TAB Ectopic Multiple Live Births    1                  ROS:     GENERAL: Denies unintentional weight gain or weight loss. Feeling well overall.   SKIN: Denies rash or lesions.   HEENT: Denies headaches, or vision changes.   CARDIOVASCULAR: Denies palpitations or chest pain.   RESPIRATORY: Denies shortness of breath or dyspnea on exertion.  BREASTS: The patient performs breast self-examination and denies pain, lumps, or nipple  "discharge.   ABDOMEN: Denies abdominal pain, constipation, diarrhea, nausea, vomiting, change in appetite.  URINARY: Denies frequency, dysuria, hematuria.  NEUROLOGIC: Denies syncope or weakness.   PSYCHIATRIC: Denies depression, anxiety or mood swings.  VULVAR: No pain, no lesions and no itching.  VAGINAL: No relaxation, no itching, no abnormal bleeding and no lesions.    Physical Exam:     PHYSICAL EXAM:  /70   Ht 5' 5" (1.651 m)   Wt 71.5 kg (157 lb 10.1 oz)   LMP 06/04/2018   BMI 26.23 kg/m²   Body mass index is 26.23 kg/m².     APPEARANCE: Well nourished, well developed, in no acute distress.  PSYCH: Appropriate mood and affect.  SKIN: No acne or hirsutism.  NECK: Neck symmetric without masses or thyromegaly  NODES: No inguinal, axillary, or supraclavicular lymph node enlargement  CHEST: Normal respiratory effort.  ABDOMEN: Soft.  No tenderness or masses.    BREASTS: Symmetrical, no skin changes or visible lesions.  No palpable masses or nipple discharge bilaterally.  PELVIC: External genitalia: normal external genitalia, no erythema, no discharge  and urethra: normal appearing urethra with no masses, tenderness or lesions.  Normal hair distribution.  Vagina normal appearing vagina with normal color and scant amount bloody discharge (c/w menses), no lesions.  Cervix normal appearing cervix without discharge or lesions, cervical motion tenderness absent.  No significant cystocele or rectocele.  Bimanual exam shows uterus to be uterus is normal size, shape, consistency and nontender, mobile.  Adnexa normal adnexa and no mass, fullness, tenderness.    EXTREMITIES: No edema.       Assessment/Plan:     Encounter for gynecological examination    Encounter for Papanicolaou smear for cervical cancer screening  -     Liquid-based pap smear, screening  -     HPV High Risk Genotypes, PCR    Screening for HPV (human papillomavirus)          Counseling:     Patient was counseled today on current ASCCP pap " guidelines, the recommendation for yearly pelvic exams, healthy diet and exercise routines, annual mammograms starting at age 40, and breast self awareness. She is to see her PCP for other health maintenance.     Use of the [facility] Portal discussed and encouraged during today's visit.     Follow-up in about 1 year (around 6/5/2019) for Annual.

## 2018-06-21 ENCOUNTER — CLINICAL SUPPORT (OUTPATIENT)
Dept: REHABILITATION | Facility: HOSPITAL | Age: 43
End: 2018-06-21
Attending: PHYSICIAN ASSISTANT
Payer: COMMERCIAL

## 2018-06-21 DIAGNOSIS — R29.3 POSTURE IMBALANCE: ICD-10-CM

## 2018-06-21 DIAGNOSIS — R53.1 WEAKNESS: ICD-10-CM

## 2018-06-21 PROCEDURE — 97110 THERAPEUTIC EXERCISES: CPT | Mod: PO

## 2018-06-21 NOTE — PROGRESS NOTES
Physical Therapy Progress Note     Name: Kim Padilla  Clinic Number: [id number]  Diagnosis:   Encounter Diagnoses   Name Primary?    Weakness     Posture imbalance      Physician: Louise Reed PA-C     Past Medical History:   Diagnosis Date    Infertility, female        Precautions: standard    Evaluation Date: 5/29/18  Visit # authorized: 5/20  Authorization period: 12/3/18  Plan of care expiration: 7/28/18  Time in: 9:00  Time out:9:50  Treatment time: 50    Subjective     Pt reports  that symptoms are improved and she has better awareness of her posture now.    Pain Scale: before treatment: 0 /10currently; after treatment:0/10    Objective   Functional Status Measures:    Intake Score   6/21/18  Pts Physical FS Primary Measure     55    58                           Risk Adjustment Statistical FOTO  57        PT reviewed FOTO scores for Kim Padilla on 6/21/18.   FOTO scores were entered into [employer] - see media section.    Therapeutic exercise: Kim received therapeutic exercises to develop UE/Postural strength, endurance, ROM, flexibility and posture for 45 minutes including:      UBE x 4 minutes level 1 ( 2 mintues forward/backward)     Standing:  · Corner pec stretch 3 x 30 sec  · Scap retract with OTB 2 x 10  · SHoulder ext with OTB 2 x 10  · Isometric ER/IR walk outs with OTB x 10 each  · brueggers ex 2 x 10 with OTB  · scaption to 90 degrees with 1# 2 x 10  · Serratus wall slides 2 x 10  · Closed chain scap stabilization with ABC's on the wall with a ball  Sitting:  · 1st rib mobilization with belt assist ( L) 3 x 30 sec    Supine:   · Supine pec stretch over 1/2 foam roll x 3 minutes  · Scap protract with 2# 2 x 10  · Rhythmic stabilization 2 x 30 sec--NP    Sidelying:   · sidelying ER with 1# 2 x 12  · Shoulder flex to 90 degrees 2 x 10    Prone ex's  · Prone row with 2# 2 x 10 NP  · Prone shoulder ext with ER   2 x 10 NP  · Prone H- abd with er with  1# 2 x 10 NP        Manual therapy: Kim received the following manual therapy techniques: Joint mobilizations were applied to: (L) GH joint for 15 minutes.    Manual techniques include:  Soft tissue mobilization:  · MFR left pectoralis major/UT  · SOR  Joint mobilization:  · left GH posterolateral glides  · mid to lower cervical upslides/downslides (general/specific)  · left first rib mobilization       Written Home Exercises Provided: Patient educated to continue with previously issued HEP to tolerance. Patient provided with an upgraded OTB for home use with Melissa sousa's.   Pt demo good understanding of the education provided. Kim demonstrated good return demonstration of activities.     Pt. education:  · Posture reeducation, body mechanics, HEP,    · No spiritual or educational barriers to learning provided  · Pt has no cultural, educational or language barriers to learning      Assessment     Patient had good tolerance to manual therapy and exercise denying increased pain. Pt. denied lingering pain only muscle soreness due to exercise. Pt. notes regular functional improvement. Pt. is progressing well towards goals. WIll monitor and attempt to progress as tolerated.  Pt will continue to benefit from skilled outpatient physical therapy to address the remaining functional deficits, provide pt/family education, and to maximize pt's level of independence in the home and community environment.        GOALS:   Short Term Goals:  4 weeks  1. Pt. to report decreased cervical/shoulder pain </= 5/10 at worst to increase tolerance for self care skills  2. Pt. to demonstrate proper cervical and scapula retraction requiring min. to no verbal cues from PT  3. Pt. to demonstrate an increase in left sh. elevation A/PROM to 160 deg. to promote greater ease with reaching tasks.  4. Pt. to demonstrate an increase in left sh. ER AROM to 80 deg. to promote greater ease self care skills  5. Pt. to demonstrate increased MMT for left  shoulder flexion to 3/5 to improve tolerance for OH reach  6. Pt. to demonstrate increased MMT to serratus anterior to 3/5 to improve scapula stability during repetitive UE tasks  7. Pt to tolerate HEP to improve ROM and independence with ADL's     Long Term Goals: 8 weeks  1. Pt. to report decreased cervical/shoulder pain </=2/10 at worst to increase tolerance for self care skills/OH reach  2. Pt. to demonstrate proper cervical and scapula retraction requiring no verbal cues from PT  3. Pt. to demonstrate an increase in left sh. elevation AROM to 160 deg. to promote greater ease with reaching tasks.  4. Pt. to demonstrate an increase in left sh. ER/IR AROM to 90/70 deg. to promote greater ease self care skills  5. Pt. to demonstrate increased MMT for left shoulder abduction to 4/5 to improve tolerance for ADL and work activities.  6. Pt. to demonstrate increased MMT for mid-trap/lower trap strength to 3+/5 to improve scapula stability during OH reach/lifting tasks.   7. Pt. to demonstrate increased MMT to serratus anterior to 4/5 to improve scapula stability during OH UE tasks  8. Pt to be Independent with HEP to improve ROM and independence with ADL's       Plan   Continue with established Plan of Care towards PT goals.

## 2018-06-25 ENCOUNTER — CLINICAL SUPPORT (OUTPATIENT)
Dept: REHABILITATION | Facility: HOSPITAL | Age: 43
End: 2018-06-25
Attending: PHYSICIAN ASSISTANT
Payer: COMMERCIAL

## 2018-06-25 DIAGNOSIS — R53.1 WEAKNESS: ICD-10-CM

## 2018-06-25 DIAGNOSIS — R29.3 POSTURE IMBALANCE: ICD-10-CM

## 2018-06-25 PROCEDURE — 97110 THERAPEUTIC EXERCISES: CPT | Mod: PO

## 2018-06-25 PROCEDURE — 97140 MANUAL THERAPY 1/> REGIONS: CPT | Mod: PO

## 2018-06-25 NOTE — PROGRESS NOTES
Physical Therapy Progress Note     Name: Kim Padilla  Clinic Number: [id number]  Diagnosis:   Encounter Diagnoses   Name Primary?    Weakness     Posture imbalance      Physician: Louise Reed PA-C     Past Medical History:   Diagnosis Date    Infertility, female        Precautions: standard    Evaluation Date: 5/29/18  Visit # authorized: 6/20  Authorization period: 12/3/18  Plan of care expiration: 7/28/18  Time in: 10  Time out:11  Treatment time: 50    Subjective     Pt reports  that she is feeling a lot better; did a lot of physical work this weekend moving boxes etc. and notes muscle fatigue and not neck/shoulder pain.     Pain Scale: before treatment: 0 /10currently; after treatment:0/10    Objective   Functional Status Measures:    Intake Score   6/21/18  Pts Physical FS Primary Measure     55    58                           Risk Adjustment Statistical FOTO  57        PT reviewed FOTO scores for Kim Padilla on 6/21/18.   FOTO scores were entered into [employer] - see media section.    Therapeutic exercise: Kim received therapeutic exercises to develop UE/Postural strength, endurance, ROM, flexibility and posture for 45 minutes including:      UBE x 4 minutes level 1 ( 2 mintues forward/backward)     Standing:  · Corner pec stretch 3 x 30 sec  · Scap retract with OTB 2 x 10 NP  · SHoulder ext with OTB 2 x 10 NP  · Isometric ER/IR walk outs with OTB x 10 each  · brueggers ex 2 x 10 with OTB  · scaption to 90 degrees with 1# 2 x 10 held due to pain  · Serratus wall slides 3x8  · Closed chain scap stabilization with ABC's on the wall with a ball  Sitting:  · 1st rib mobilization with belt assist ( L) 3 x 30 sec    Supine:   · Supine pec stretch over 1/2 foam roll x 5 minutes  · Rhythmic stabilization 2 x 30 sec--NP    Sidelying:   · sidelying ER with 1# 2 x 15  · Shoulder flex to 90 degrees 3x8    Prone ex's  · ANY with serratus press up with a plus:  2x8  · Prone row with scapula retraction: 2# 2 x 10   · Prone shoulder ext with ER: 1# 2 x 8   · Prone H- abd with er with 1# 2 x 10         Manual therapy: Kim received the following manual therapy techniques: Joint mobilizations were applied to: (L) GH joint for 15 minutes.    Manual techniques include:  Soft tissue mobilization:  · SOR  Joint mobilization:  · left GH posterolateral glides  · mid to lower cervical upslides/downslides (general/specific) bilaterally  · left first rib mobilization     Written Home Exercises Provided: Patient educated to continue with previously issued HEP to tolerance. Patient provided with an upgraded OTB for home use with Tband ex's.   Pt demo good understanding of the education provided. Kim demonstrated good return demonstration of activities.     Pt. education:  · Posture reeducation, body mechanics, HEP,  activity modification/avoidance  · No spiritual or educational barriers to learning provided  · Pt has no cultural, educational or language barriers to learning      Assessment     Patient had good tolerance to manual therapy and exercise denying any pain only fatigue. Pt. had some clicking in her right shoulder after performing serratus slides. PT had pt. perform scaption with her back supported without weight and with decreased speed of repetition. Pt. noted resolution of popping and elimination of pain. Will continue to focus on yuniel-scapular recruitment/retraction during exercises to improve scapula/posture stability. Pt. is progressing well towards goals. WIll perform a formal reassessment next visit. Pt will continue to benefit from skilled outpatient physical therapy to address the remaining functional deficits, provide pt/family education, and to maximize pt's level of independence in the home and community environment.        GOALS:   Short Term Goals:  4 weeks  1. Pt. to report decreased cervical/shoulder pain </= 5/10 at worst to increase tolerance for self care  skills  2. Pt. to demonstrate proper cervical and scapula retraction requiring min. to no verbal cues from PT  3. Pt. to demonstrate an increase in left sh. elevation A/PROM to 160 deg. to promote greater ease with reaching tasks.  4. Pt. to demonstrate an increase in left sh. ER AROM to 80 deg. to promote greater ease self care skills  5. Pt. to demonstrate increased MMT for left shoulder flexion to 3/5 to improve tolerance for OH reach  6. Pt. to demonstrate increased MMT to serratus anterior to 3/5 to improve scapula stability during repetitive UE tasks  7. Pt to tolerate HEP to improve ROM and independence with ADL's     Long Term Goals: 8 weeks  1. Pt. to report decreased cervical/shoulder pain </=2/10 at worst to increase tolerance for self care skills/OH reach  2. Pt. to demonstrate proper cervical and scapula retraction requiring no verbal cues from PT  3. Pt. to demonstrate an increase in left sh. elevation AROM to 160 deg. to promote greater ease with reaching tasks.  4. Pt. to demonstrate an increase in left sh. ER/IR AROM to 90/70 deg. to promote greater ease self care skills  5. Pt. to demonstrate increased MMT for left shoulder abduction to 4/5 to improve tolerance for ADL and work activities.  6. Pt. to demonstrate increased MMT for mid-trap/lower trap strength to 3+/5 to improve scapula stability during OH reach/lifting tasks.   7. Pt. to demonstrate increased MMT to serratus anterior to 4/5 to improve scapula stability during OH UE tasks  8. Pt to be Independent with HEP to improve ROM and independence with ADL's       Plan   Continue with established Plan of Care towards PT goals.

## 2018-06-27 ENCOUNTER — CLINICAL SUPPORT (OUTPATIENT)
Dept: REHABILITATION | Facility: HOSPITAL | Age: 43
End: 2018-06-27
Attending: PHYSICIAN ASSISTANT
Payer: COMMERCIAL

## 2018-06-27 DIAGNOSIS — R29.3 POSTURE IMBALANCE: ICD-10-CM

## 2018-06-27 DIAGNOSIS — R53.1 WEAKNESS: ICD-10-CM

## 2018-06-27 PROCEDURE — 97110 THERAPEUTIC EXERCISES: CPT | Mod: PO

## 2018-06-27 PROCEDURE — 97140 MANUAL THERAPY 1/> REGIONS: CPT | Mod: PO

## 2018-06-27 NOTE — PROGRESS NOTES
Physical Therapy Reassessment     Name: Kim Padilla  Clinic Number: [id number]  Diagnosis:   Encounter Diagnoses   Name Primary?    Weakness     Posture imbalance      Physician: Louise Reed PA-C     Past Medical History:   Diagnosis Date    Infertility, female        Precautions: standard    Evaluation Date: 5/29/18  Visit # authorized: 6/20  Authorization period: 12/3/18  Plan of care expiration: 7/28/18  Time in: 10  Time out:11:15  Treatment time: 60    Subjective     Pt reports  that she is feeling pretty good; denies any pain in left shoulder since last tx session. Noted she had mild right shoulder pain afterwards that has since resolved. Mild neck stiffness in the AM.    Pain Scale: before treatment: 0 /10currently; after treatment:0/10    Objective   Functional Status Measures:    Intake Score   6/21/18  Pts Physical FS Primary Measure     55    58                           Risk Adjustment Statistical FOTO  57        PT reviewed FOTO scores for Kim Padilla on 6/21/18.   FOTO scores were entered into [employer] - see media section.    Cervical ROM: (measured in degrees)     Degrees Quality   Flexion 56    Right SB 35    Left SB 40    Right rotation 65    Left rotation 63       Active/Passive ROM: (measured in degrees)   Shoulder RUE LUE   Flexion 180/ 180/140   Abduction 180/ 180/90   ER C7/100 C7/60   IR T12/65 C7/65      Strength: (measured 1 out of 5)    Right UE Left UE   Shoulder Flexion: 5/5 4+/5   Shoulder Abduction: 5/5 4+/5   Serratus Anterior 4+/5 5/5   Shoulder ER: by side 5/5 5/5   Shoulder IR: by side 5/5 5/5           Elbow Flexion: 5/5 5/5   Elbow Extension: 5/5 4+/5            70# 80#     65# 76#     66# 75#   Lower Trap: seated 4+/5 4+/5   Middle Trap: seated 4+/5 4+/5   Rhomboids: seated 4+/5 4+/5      Cervical Spine Special Tests: ((+): positive; (-): negative)   Compression +   Spurling's A -   Spurling's B + R & L   Distraction +  for relief   Deep neck flexor test +   First Rib + left      Special Tests:((+) pos.; (-): neg.)  Test LUE   Hawkin's Ramez -   Neer's Impingment -         Full Can/Empty Can Test (supraspinatus) -   Drop Arm test -   Dropping sign (infraspinatus) -   Belly Press (subscap) -   Lift Off test (subscap) -         Speed's test (biceps) -         Horizontal adduction test -   Joint Mobility: (graded 0-6 out of 6) left lower cervical/first rib: 2/6     Palpation: TTP left UT, levator scapula, and paraspinals    Therapeutic exercise: Kim received therapeutic exercises to develop UE/Postural strength, endurance, ROM, flexibility and posture for 45 minutes including:      UBE x 4 minutes level 1 ( 2 mintues forward/backward)     Standing:  · brueggers ex 2 x 10 with OTB  · scaption to full range with back against wall for scapula stability: 2 x 10   · Serratus wall slides 3x9  · Closed chain scap stabilization with ABC's on the wall with a ball  Sitting:  · 1st rib mobilization with belt assist ( L) 3 x 30 sec    Supine:   · Supine pec stretch over 1/2 foam roll x 5 minutes  · Rhythmic stabilization 2 x 30 sec--NP    Sidelying:   · sidelying ER with 1# 2 x 15    Prone ex's  · ANY with serratus press up with a plus: 2x8  · Prone row with scapula retraction: 2# 2 x 10   · Prone T and I on SB place on 2nd tallest plyobox: 1#: 2x8      Manual therapy: Kim received the following manual therapy techniques: Joint mobilizations were applied to: (L) GH joint for 15 minutes.    Manual techniques include:  Soft tissue mobilization:  · SOR  Joint mobilization:  · mid to lower cervical upslides/downslides (general/specific) left  · left first rib mobilization     Written Home Exercises Provided: Patient educated to continue with previously issued HEP to tolerance. Patient provided with an upgraded OTB for home use with Tband ex's.   Pt demo good understanding of the education provided. Kim demonstrated good return demonstration of  activities.     Pt. education:  · Posture reeducation, body mechanics, HEP,  activity modification/avoidance  · No spiritual or educational barriers to learning provided  · Pt has no cultural, educational or language barriers to learning      Assessment     Patient has made significant improvement in AROM, strength, and function denying any left shoulder pain. She is occasionally experiencing right shoulder pain and neck stiffness/pain upon waking. Pt. agreed with plan to focus attention to right shoulder and neck with continuance of current exercise regimen with progression scapula stability and cervicothoracic paraspinal strength/endurance.     Pt. will continue to benefit from skilled outpatient physical therapy to address the remaining functional deficits, provide pt/family education, and to maximize pt's level of independence in the home and community environment.        GOALS:   Short Term Goals:  4 weeks  1. Pt. to report decreased cervical/shoulder pain </= 5/10 at worst to increase tolerance for self care skills MET  2. Pt. to demonstrate proper cervical and scapula retraction requiring min. to no verbal cues from PT  3. Pt. to demonstrate an increase in left sh. elevation A/PROM to 160 deg. to promote greater ease with reaching tasks.  4. Pt. to demonstrate an increase in left sh. ER AROM to 80 deg. to promote greater ease self care skills  5. Pt. to demonstrate increased MMT for left shoulder flexion to 3/5 to improve tolerance for OH reach  6. Pt. to demonstrate increased MMT to serratus anterior to 3/5 to improve scapula stability during repetitive UE tasks  7. Pt to tolerate HEP to improve ROM and independence with ADL's     Long Term Goals: 8 weeks  1. Pt. to report decreased cervical/shoulder pain </=2/10 at worst to increase tolerance for self care skills/OH reach  2. Pt. to demonstrate proper cervical and scapula retraction requiring no verbal cues from PT  3. Pt. to demonstrate an increase in left  sh. elevation AROM to 160 deg. to promote greater ease with reaching tasks.  4. Pt. to demonstrate an increase in left sh. ER/IR AROM to 90/70 deg. to promote greater ease self care skills  5. Pt. to demonstrate increased MMT for left shoulder abduction to 4/5 to improve tolerance for ADL and work activities.  6. Pt. to demonstrate increased MMT for mid-trap/lower trap strength to 3+/5 to improve scapula stability during OH reach/lifting tasks.   7. Pt. to demonstrate increased MMT to serratus anterior to 4/5 to improve scapula stability during OH UE tasks  8. Pt to be Independent with HEP to improve ROM and independence with ADL's       Plan   Continue with established Plan of Care towards PT goals.

## 2018-07-11 ENCOUNTER — CLINICAL SUPPORT (OUTPATIENT)
Dept: REHABILITATION | Facility: HOSPITAL | Age: 43
End: 2018-07-11
Attending: PHYSICIAN ASSISTANT
Payer: COMMERCIAL

## 2018-07-11 DIAGNOSIS — R53.1 WEAKNESS: ICD-10-CM

## 2018-07-11 DIAGNOSIS — R29.3 POSTURE IMBALANCE: ICD-10-CM

## 2018-07-11 PROCEDURE — 97110 THERAPEUTIC EXERCISES: CPT | Mod: PO

## 2018-07-11 NOTE — PROGRESS NOTES
CC: LEFT shoulder pain     42 y.o. Female RHD with a history of left shoulder pain x 2 years. She has been doing PT at [facility] and received a CSI subacromial on 5/11/18.  She reports great improvement in her shoulder. She is now pain free but wishes to continue PT.  She works a desk job.    Pain no longer affecting ADLs.  Pain is 0/10 at it's worst.      Past Medical History:   Diagnosis Date    Infertility, female        Past Surgical History:   Procedure Laterality Date    BREAST LUMPECTOMY Left 2014    Dr Brown - benign    DILATION AND CURETTAGE OF UTERUS      for SAB       Family History   Problem Relation Age of Onset    Breast cancer Maternal Cousin     Colon cancer Neg Hx     Ovarian cancer Neg Hx          Current Outpatient Prescriptions:     ALPRAZolam (XANAX) 0.5 MG tablet, Take 1 tablet (0.5 mg total) by mouth nightly as needed for Insomnia or Anxiety. KEEP APPT FOR FURTHER REFILLS., Disp: 30 tablet, Rfl: 1    baclofen (LIORESAL) 10 MG tablet, baclofen 10 mg tablet  Take 1 tablet twice a day by oral route as directed for 30 days., Disp: , Rfl:     diclofenac potassium (CAMBIA) 50 mg PwPk, Cambia 50 mg oral powder packet  Take 1 packet as needed by oral route as needed for 30 days., Disp: , Rfl:     eletriptan (RELPAX) 40 MG tablet, Relpax 40 mg tablet  Take 1 tablet as needed by oral route as needed for 30 days., Disp: , Rfl:     ISOtretinoin (ACCUTANE) 10 MG capsule, Take 10 mg by mouth 2 (two) times daily., Disp: , Rfl:     lamoTRIgine (LAMICTAL) 25 MG tablet, lamotrigine 25 mg tablet  TAKE 1 TABLET BY MOUTH EVERY EVENING FOR 10 DAYS, 2 TABLETS EVERY EVENING FOR 10 DAYS, THEN 3 TABLETS EVERY EVENING, Disp: , Rfl:     spironolactone (ALDACTONE) 100 MG tablet, Take 100 mg by mouth once daily., Disp: , Rfl:     ZOLMitriptan (ZOMIG) 5 mg Spry, Zomig 5 mg nasal spray  Take by nasal route @ onset of headache, may repeat in 2-4hrs if needed, not more than 2/d or 6/wk, Disp: , Rfl:      Review of patient's allergies indicates:   Allergen Reactions    Penicillins Hives          REVIEW OF SYSTEMS:  Constitution: Negative. Negative for chills, fever and night sweats.   HENT: Negative for congestion and headaches.    Eyes: Negative for blurred vision, left vision loss and right vision loss.   Cardiovascular: Negative for chest pain and syncope.   Respiratory: Negative for cough and shortness of breath.    Endocrine: Negative for polydipsia, polyphagia and polyuria.   Hematologic/Lymphatic: Negative for bleeding problem. Does not bruise/bleed easily.   Skin: Negative for dry skin, itching and rash.   Musculoskeletal: Negative for falls.  Positive for left shoulder pain and muscle weakness.   Gastrointestinal: Negative for abdominal pain and bowel incontinence.   Genitourinary: Negative for bladder incontinence and nocturia.   Neurological: Negative for disturbances in coordination, loss of balance and seizures.   Psychiatric/Behavioral: Negative for depression. The patient does not have insomnia.    Allergic/Immunologic: Negative for hives and persistent infections.      PHYSICAL EXAMINATION:  Vitals:  There were no vitals taken for this visit.   General: The patient is alert and oriented x 3.  Mood is pleasant.  Observation of ears, eyes and nose reveal no gross abnormalities.  No labored breathing observed.  Gait is coordinated. Patient can toe walk and heel walk without difficulty.      LEFT Shoulder / Upper Extremity Exam    OBSERVATION:     Swelling  none  Deformity  none   Discoloration  none   Scapular winging none   Scars   none  Atrophy  none    TENDERNESS / CREPITUS (T/C):          T/C      T/C   Clavicle   -/-  SUPRAspinatus    -/-     AC Jt.    -/-  INFRAspinatus  -/-    SC Jt.    -/-  Deltoid    -/-      G. Tuberosity  -/-  LH BICEP groove  -/-   Acromion:  -/-  Midline Neck   -/-     Scapular Spine -/-  Trapezium   -/-   SMA Scapula  -/-  GH jt. line - post  -/-     Scapulothoracic   -/-         ROM: (* = with pain)  Right shoulder   Left shoulder        AROM (PROM)   AROM (PROM)   FE    170° (175°)     170° (175°)     ER at 0°    80°  (85°)    80°  (85°)   ER at 90° ABD  90°  (90°)    90°  (90°)   IR at 90°  ABD   NA  (40°)     NA  (40°)      IR (spine level)   T10     T10    STRENGTH: (* = with pain) Right shoulder   Left shoulder     SCAPTION   5/5    5/5    IR    5/5    5/5   ER    5/5    5/5   BICEPS   5/5    5/5   Deltoid    5/5    5/5     SIGNS:  Painful side       NEER   -   ODAVONS  neg    GARCIA   -   SPEEDS  neg     DROP ARM   -   BELLY PRESS neg   Superior escape none    LIFT-OFF  neg   X-Body ADD    neg    MOVING VALGUS neg        EXTREMITY NEURO-VASCULAR EXAM:    Sensation grossly intact to light touch all dermatomal regions.    DTR 2+ Biceps, Triceps, BR and Negative Jacks sign   Grossly intact motor function at Elbow, Wrist and Hand   Distal pulses radial and ulnar 2+, brisk cap refill, symmetric.      NECK:  Painless FROM and spinous processes non-tender. Negative Spurlings sign.      XRAYS:  Xrays including AP, Outlet and Axillary Lateral of Left shoulder are ordered / images reviewed by me:   No fracture dislocation or other pathology   Proximal migration of humeral head: None   GH arthritis: None    MRI LEFT SHOULDER 5/10/18  1. Moderate supraspinatus and mild subscapularis and infraspinatus tendinosis without partial or full-thickness tear.  2. Mild degenerative changes of the anterior-superior and posterior-superior segments of the glenoid labrum without definite focal tear noting limitation due to non-arthrographic technique.  3. Biceps tendinosis without partial or full-thickness tear.      ASSESSMENT:   Left shoulder tendinosis, resolved    PLAN:      1. New Pt referral sent to [facility]  2. RTC as needed for follow up    All questions were answered, patient will contact us for questions or concerns in the interim.

## 2018-07-11 NOTE — PROGRESS NOTES
Physical Therapy Progress Note     Name: Kim Padilla  Clinic Number: [id number]  Diagnosis:   Encounter Diagnoses   Name Primary?    Weakness     Posture imbalance      Physician: Louise Reed PA-C     Past Medical History:   Diagnosis Date    Infertility, female        Precautions: standard    Evaluation Date: 5/29/18  Visit # authorized: 6/20  Authorization period: 12/3/18  Plan of care expiration: 7/28/18  Time in: 10  Time out:11:15  Treatment time: 60    Subjective     Pt reports  that she is feeling pretty good; denies any pain in left shoulder since last tx session. Noted she had mild right shoulder pain afterwards that has since resolved. Mild neck stiffness in the AM.    Pain Scale: before treatment: 0 /10currently; after treatment:0/10    Objective   Functional Status Measures:    Intake Score   6/21/18  Pts Physical FS Primary Measure     55    58                           Risk Adjustment Statistical FOTO  57        PT reviewed FOTO scores for Kim Padilla on 6/21/18.   FOTO scores were entered into [employer] - see media section.    Cervical ROM: (measured in degrees) measured 6/27/18    Degrees Quality   Flexion 56    Right SB 35    Left SB 40    Right rotation 65    Left rotation 63       Active/Passive ROM: (measured in degrees)   Shoulder RUE LUE   Flexion 180/ 180/140   Abduction 180/ 180/90   ER C7/100 C7/60   IR T12/65 C7/65      Strength: (measured 1 out of 5)    Right UE Left UE   Shoulder Flexion: 5/5 4+/5   Shoulder Abduction: 5/5 4+/5   Serratus Anterior 4+/5 5/5   Shoulder ER: by side 5/5 5/5   Shoulder IR: by side 5/5 5/5           Elbow Flexion: 5/5 5/5   Elbow Extension: 5/5 4+/5            70# 80#     65# 76#     66# 75#   Lower Trap: seated 4+/5 4+/5   Middle Trap: seated 4+/5 4+/5   Rhomboids: seated 4+/5 4+/5      Cervical Spine Special Tests: ((+): positive; (-): negative)   Compression +   Spurling's A -   Spurling's B + R & L    Distraction + for relief   Deep neck flexor test +   First Rib + left      Special Tests:((+) pos.; (-): neg.)  Test ANAE   Lamine's Ramez -   Neer's Impingment -         Full Can/Empty Can Test (supraspinatus) -   Drop Arm test -   Dropping sign (infraspinatus) -   Belly Press (subscap) -   Lift Off test (subscap) -         Speed's test (biceps) -         Horizontal adduction test -   Joint Mobility: (graded 0-6 out of 6) left lower cervical/first rib: 2/6     Palpation: TTP left UT, levator scapula, and paraspinals    Therapeutic exercise: Kim received therapeutic exercises to develop UE/Postural strength, endurance, ROM, flexibility and posture for 45 minutes including:      UBE x 4 minutes level 1 ( 5 minutes backward)     Standing:  · brueggers ex 2 x 10 with OTB  · scaption to full range with back against wall for scapula stability: 2 x 10   · Serratus wall slides 3x9  · Closed chain scap stabilization with ABC's on the wall with a ball  Sitting:  · 1st rib mobilization with belt assist ( L) 3 x 30 sec    Supine:   · Supine pec stretch over 1/2 foam roll x 5 minutes  · Rhythmic stabilization 2 x 30 sec--NP    Sidelying:   · sidelying ER with 1# 2 x 15    Prone ex's  · ANY with serratus press up with a plus: 2x8  · Prone row with scapula retraction: 2# 2 x 10   · Prone T and I on SB place on 2nd tallest plyobox: 1#: 2x8      Manual therapy: Kim received the following manual therapy techniques: Joint mobilizations were applied to: (L) GH joint for 15 minutes.    Manual techniques include:  Soft tissue mobilization:  · SOR  Joint mobilization:  · Thoracic p/a  · mid to lower cervical upslides/downslides (general/specific) left  · left first rib mobilization     Written Home Exercises Provided: Patient educated to continue with previously issued HEP to tolerance. Patient provided with an upgraded OTB for home use with Tband ex's.   Pt demo good understanding of the education provided. Kim demonstrated good  return demonstration of activities.     Pt. education:  · Posture reeducation, body mechanics, HEP,  activity modification/avoidance  · No spiritual or educational barriers to learning provided  · Pt has no cultural, educational or language barriers to learning      Assessment     Patient had neck stiffness again today's visit, which is more than likely related to sleeping on different pillows while out of town. Pt. had much improved joint mobility and reduction in pain after manual therapy and exercises. Pt. denies pain with shoulder/yuniel-scapular strengthening only fatigue. Pt. continues to progress well towards goals.     Pt. will continue to benefit from skilled outpatient physical therapy to address the remaining functional deficits, provide pt/family education, and to maximize pt's level of independence in the home and community environment.        GOALS:   Short Term Goals:  4 weeks  1. Pt. to report decreased cervical/shoulder pain </= 5/10 at worst to increase tolerance for self care skills MET  2. Pt. to demonstrate proper cervical and scapula retraction requiring min. to no verbal cues from PT MET  3. Pt. to demonstrate an increase in left sh. elevation A/PROM to 160 deg. to promote greater ease with reaching tasks. MET  4. Pt. to demonstrate an increase in left sh. ER AROM to 80 deg. to promote greater ease self care skills MET  5. Pt. to demonstrate increased MMT for left shoulder flexion to 3/5 to improve tolerance for OH reach MET  6. Pt. to demonstrate increased MMT to serratus anterior to 3/5 to improve scapula stability during repetitive UE tasks MET  7. Pt to tolerate HEP to improve ROM and independence with ADL's MET     Long Term Goals: 8 weeks  1. Pt. to report decreased cervical/shoulder pain </=2/10 at worst to increase tolerance for self care skills/OH reach MET  2. Pt. to demonstrate proper cervical and scapula retraction requiring no verbal cues from PT MET  3. Pt. to demonstrate an  increase in left sh. elevation AROM to 160 deg. to promote greater ease with reaching tasks. MET  4. Pt. to demonstrate an increase in left sh. ER/IR AROM to 90/70 deg. to promote greater ease self care skills   5. Pt. to demonstrate increased MMT for left shoulder abduction to 4/5 to improve tolerance for ADL and work activities.  6. Pt. to demonstrate increased MMT for mid-trap/lower trap strength to 3+/5 to improve scapula stability during OH reach/lifting tasks.   7. Pt. to demonstrate increased MMT to serratus anterior to 4/5 to improve scapula stability during OH UE tasks  8. Pt to be Independent with HEP to improve ROM and independence with ADL's    Plan   Continue with established Plan of Care towards PT goals.

## 2018-07-12 ENCOUNTER — OFFICE VISIT (OUTPATIENT)
Dept: SPORTS MEDICINE | Facility: CLINIC | Age: 43
End: 2018-07-12
Payer: COMMERCIAL

## 2018-07-12 VITALS
HEIGHT: 65 IN | BODY MASS INDEX: 26.16 KG/M2 | WEIGHT: 157 LBS | SYSTOLIC BLOOD PRESSURE: 116 MMHG | DIASTOLIC BLOOD PRESSURE: 79 MMHG | HEART RATE: 85 BPM

## 2018-07-12 DIAGNOSIS — M25.512 LEFT SHOULDER PAIN, UNSPECIFIED CHRONICITY: Primary | ICD-10-CM

## 2018-07-12 PROCEDURE — 99214 OFFICE O/P EST MOD 30 MIN: CPT | Mod: S$GLB,,, | Performed by: PHYSICIAN ASSISTANT

## 2018-07-12 PROCEDURE — 3008F BODY MASS INDEX DOCD: CPT | Mod: CPTII,S$GLB,, | Performed by: PHYSICIAN ASSISTANT

## 2018-07-12 PROCEDURE — 99999 PR PBB SHADOW E&M-EST. PATIENT-LVL III: CPT | Mod: PBBFAC,,, | Performed by: PHYSICIAN ASSISTANT

## 2018-07-16 ENCOUNTER — CLINICAL SUPPORT (OUTPATIENT)
Dept: REHABILITATION | Facility: HOSPITAL | Age: 43
End: 2018-07-16
Attending: PHYSICIAN ASSISTANT
Payer: COMMERCIAL

## 2018-07-16 DIAGNOSIS — R29.3 POSTURE IMBALANCE: ICD-10-CM

## 2018-07-16 DIAGNOSIS — R53.1 WEAKNESS: ICD-10-CM

## 2018-07-16 PROCEDURE — 97110 THERAPEUTIC EXERCISES: CPT | Mod: PO

## 2018-07-16 PROCEDURE — 97140 MANUAL THERAPY 1/> REGIONS: CPT | Mod: PO

## 2018-07-16 NOTE — PROGRESS NOTES
Physical Therapy Progress Note     Name: Kim Padilla  Clinic Number: [id number]  Diagnosis:   Encounter Diagnoses   Name Primary?    Weakness     Posture imbalance      Physician: Louise Reed PA-C     Past Medical History:   Diagnosis Date    Infertility, female        Precautions: standard    Evaluation Date: 5/29/18  Visit # authorized: 9/20  Authorization period: 12/3/18  Plan of care expiration: 7/28/18  Time in: 3  Time out:4:15  Treatment time: 60    Subjective     Pt reports  she is doing a lot better; just some neck/UT pain     Pain Scale: before treatment: 0 /10currently; after treatment:0/10    Objective   Functional Status Measures:    Intake Score   6/21/18  Pts Physical FS Primary Measure     55    58                           Risk Adjustment Statistical FOTO  57        PT reviewed FOTO scores for Kim Padilla on 6/21/18.   FOTO scores were entered into [employer] - see media section.    Therapeutic exercise: Kim received therapeutic exercises to develop UE/Postural strength, endurance, ROM, flexibility and posture for 45 minutes including:      UBE x 4 minutes level 1 ( 5 minutes backward)     Standing:  · brueggers ex 2 x 10 with OTB  · scaption to full range with back against wall for scapula stability: 2 x 10   · Serratus wall slides 3x9  · Closed chain scap stabilization with ABC's on the wall with a ball  Sitting:  · 1st rib mobilization with belt assist ( L) 3 x 30 sec    Supine:   · Supine pec stretch over 1/2 foam roll x 5 minutes  · Rhythmic stabilization 2 x 30 sec--NP    Sidelying:   · sidelying ER with 1# 2 x 15    Prone ex's  · ANY with serratus press up with a plus: 2x8  · Prone row with scapula retraction: 2# 2 x 10   · Prone T and I on SB place on 2nd tallest plyobox: 1#: 2x8      Manual therapy: Kim received the following manual therapy techniques: Joint mobilizations were applied to: neck joints for 15 minutes.    Manual techniques  include:  Soft tissue mobilization:  · SOR  Joint mobilization:  · Thoracic p/a  · mid to lower cervical upslides/downslides (general/specific) left  · left first rib mobilization     Written Home Exercises Provided: Patient educated to continue with previously issued HEP to tolerance.   Pt demo good understanding of the education provided. Kim demonstrated good return demonstration of activities.     Pt. education:  · Posture reeducation, body mechanics, HEP,  activity modification/avoidance  · No spiritual or educational barriers to learning provided  · Pt has no cultural, educational or language barriers to learning      Assessment     Patient had much improved cervical/thoracic joint mobility after manual therapy. Good tolerance to exercises denying increased pain. Pt. continues to progress well towards goals.     Pt. will continue to benefit from skilled outpatient physical therapy to address the remaining functional deficits, provide pt/family education, and to maximize pt's level of independence in the home and community environment.        GOALS:   Short Term Goals:  4 weeks  1. Pt. to report decreased cervical/shoulder pain </= 5/10 at worst to increase tolerance for self care skills MET  2. Pt. to demonstrate proper cervical and scapula retraction requiring min. to no verbal cues from PT MET  3. Pt. to demonstrate an increase in left sh. elevation A/PROM to 160 deg. to promote greater ease with reaching tasks. MET  4. Pt. to demonstrate an increase in left sh. ER AROM to 80 deg. to promote greater ease self care skills MET  5. Pt. to demonstrate increased MMT for left shoulder flexion to 3/5 to improve tolerance for OH reach MET  6. Pt. to demonstrate increased MMT to serratus anterior to 3/5 to improve scapula stability during repetitive UE tasks MET  7. Pt to tolerate HEP to improve ROM and independence with ADL's MET     Long Term Goals: 8 weeks  1. Pt. to report decreased cervical/shoulder pain  </=2/10 at worst to increase tolerance for self care skills/OH reach MET  2. Pt. to demonstrate proper cervical and scapula retraction requiring no verbal cues from PT MET  3. Pt. to demonstrate an increase in left sh. elevation AROM to 160 deg. to promote greater ease with reaching tasks. MET  4. Pt. to demonstrate an increase in left sh. ER/IR AROM to 90/70 deg. to promote greater ease self care skills   5. Pt. to demonstrate increased MMT for left shoulder abduction to 4/5 to improve tolerance for ADL and work activities.  6. Pt. to demonstrate increased MMT for mid-trap/lower trap strength to 3+/5 to improve scapula stability during OH reach/lifting tasks.   7. Pt. to demonstrate increased MMT to serratus anterior to 4/5 to improve scapula stability during OH UE tasks  8. Pt to be Independent with HEP to improve ROM and independence with ADL's    Plan   Continue with established Plan of Care towards PT goals.

## 2018-07-24 ENCOUNTER — CLINICAL SUPPORT (OUTPATIENT)
Dept: REHABILITATION | Facility: HOSPITAL | Age: 43
End: 2018-07-24
Attending: PHYSICIAN ASSISTANT
Payer: COMMERCIAL

## 2018-07-24 DIAGNOSIS — R53.1 WEAKNESS: Primary | ICD-10-CM

## 2018-07-24 DIAGNOSIS — R29.3 POSTURE IMBALANCE: ICD-10-CM

## 2018-07-24 PROCEDURE — 97140 MANUAL THERAPY 1/> REGIONS: CPT | Mod: PO

## 2018-07-24 PROCEDURE — 97110 THERAPEUTIC EXERCISES: CPT | Mod: PO

## 2018-07-24 NOTE — PROGRESS NOTES
Physical Therapy Progress Note     Name: Kim Padilla  Clinic Number: [id number]  Diagnosis:   Encounter Diagnoses   Name Primary?    Weakness Yes    Posture imbalance      Physician: Louise Reed PA-C     Past Medical History:   Diagnosis Date    Infertility, female        Precautions: standard    Evaluation Date: 5/29/18  Visit # authorized: 10/20  Authorization period: 12/3/18  Plan of care expiration: 7/28/18  Time in: 3:05  Time out:4:00  Treatment time:55    Subjective     Pt reports her (L) shoulder is doing well. Mild neck/upper back pain at times     Pain Scale: before treatment: 0 /10currently; after treatment:0/10    Objective        Therapeutic exercise: Kim received therapeutic exercises to develop UE/Postural strength, endurance, ROM, flexibility and posture for 45 minutes including:      UBE x 5 minutes level 1 ( Retro)    Standing:  · brueggers ex 2 x 10 with OTB  · scaption to full range with back against wall for scapula stability with 1# : 2 x 8   · Serratus wall slides 3x10  · Closed chain scap stabilization with ABC's on the wall with a ball  Sitting:  · 1st rib mobilization with belt assist ( L) 3 x 30 sec    Supine:   · Supine pec stretch over 1/2 foam roll x 5 minutes  · Rhythmic stabilization 2 x 30 sec--NP    Sidelying:   · sidelying ER with 1# 2 x 15    Prone ex's  · ANY with serratus press up with a plus: 2x10  · Prone row with scapula retraction: 2# 2 x 10   · Prone T and I on SB place on 2nd tallest plyobox: 1#: 2x10      Manual therapy: Kim received the following manual therapy techniques: Joint mobilizations were applied to: neck joints for 15 minutes.    Manual techniques include:  Soft tissue mobilization:  · STM to thoracic/cervical paraspinals  Joint mobilization: (NP)  · Thoracic p/a  · mid to lower cervical upslides/downslides (general/specific) left--NP  · left first rib mobilization--NP     Written Home Exercises  Provided: Patient educated to continue with previously issued HEP to tolerance.   Pt demo good understanding of the education provided. Kim demonstrated good return demonstration of activities.     Pt. education:  · Posture reeducation, body mechanics, HEP,  activity modification/avoidance  · No spiritual or educational barriers to learning provided  · Pt has no cultural, educational or language barriers to learning      Assessment     Patient kash Tx well. ABle to perform and progress slightly with ex's/activities without increased pain symptoms. Subjective report of improved pain control and activity tolerance overall. Pt. continues to progress well towards goals.     Pt. will continue to benefit from skilled outpatient physical therapy to address the remaining functional deficits, provide pt/family education, and to maximize pt's level of independence in the home and community environment.        GOALS:   Short Term Goals:  4 weeks  1. Pt. to report decreased cervical/shoulder pain </= 5/10 at worst to increase tolerance for self care skills MET  2. Pt. to demonstrate proper cervical and scapula retraction requiring min. to no verbal cues from PT MET  3. Pt. to demonstrate an increase in left sh. elevation A/PROM to 160 deg. to promote greater ease with reaching tasks. MET  4. Pt. to demonstrate an increase in left sh. ER AROM to 80 deg. to promote greater ease self care skills MET  5. Pt. to demonstrate increased MMT for left shoulder flexion to 3/5 to improve tolerance for OH reach MET  6. Pt. to demonstrate increased MMT to serratus anterior to 3/5 to improve scapula stability during repetitive UE tasks MET  7. Pt to tolerate HEP to improve ROM and independence with ADL's MET     Long Term Goals: 8 weeks  1. Pt. to report decreased cervical/shoulder pain </=2/10 at worst to increase tolerance for self care skills/OH reach MET  2. Pt. to demonstrate proper cervical and scapula retraction requiring no verbal  cues from PT MET  3. Pt. to demonstrate an increase in left sh. elevation AROM to 160 deg. to promote greater ease with reaching tasks. MET  4. Pt. to demonstrate an increase in left sh. ER/IR AROM to 90/70 deg. to promote greater ease self care skills   5. Pt. to demonstrate increased MMT for left shoulder abduction to 4/5 to improve tolerance for ADL and work activities.  6. Pt. to demonstrate increased MMT for mid-trap/lower trap strength to 3+/5 to improve scapula stability during OH reach/lifting tasks.   7. Pt. to demonstrate increased MMT to serratus anterior to 4/5 to improve scapula stability during OH UE tasks  8. Pt to be Independent with HEP to improve ROM and independence with ADL's    Plan   Continue with established Plan of Care towards PT goals.

## 2018-08-14 ENCOUNTER — PATIENT MESSAGE (OUTPATIENT)
Dept: OBSTETRICS AND GYNECOLOGY | Facility: CLINIC | Age: 43
End: 2018-08-14

## 2018-08-16 ENCOUNTER — OFFICE VISIT (OUTPATIENT)
Dept: OBSTETRICS AND GYNECOLOGY | Facility: CLINIC | Age: 43
End: 2018-08-16
Payer: COMMERCIAL

## 2018-08-16 VITALS
SYSTOLIC BLOOD PRESSURE: 100 MMHG | BODY MASS INDEX: 26.15 KG/M2 | HEIGHT: 65 IN | DIASTOLIC BLOOD PRESSURE: 60 MMHG | WEIGHT: 156.94 LBS

## 2018-08-16 DIAGNOSIS — R39.15 URINARY URGENCY: Primary | ICD-10-CM

## 2018-08-16 LAB
BILIRUB SERPL-MCNC: NORMAL MG/DL
BLOOD URINE, POC: NORMAL
COLOR, POC UA: YELLOW
GLUCOSE UR QL STRIP: NORMAL
KETONES UR QL STRIP: NORMAL
LEUKOCYTE ESTERASE URINE, POC: NORMAL
NITRITE, POC UA: NORMAL
PH, POC UA: NORMAL
PROTEIN, POC: NORMAL
SPECIFIC GRAVITY, POC UA: 1005
UROBILINOGEN, POC UA: NORMAL

## 2018-08-16 PROCEDURE — 99999 PR PBB SHADOW E&M-EST. PATIENT-LVL III: CPT | Mod: PBBFAC,,, | Performed by: OBSTETRICS & GYNECOLOGY

## 2018-08-16 PROCEDURE — 99213 OFFICE O/P EST LOW 20 MIN: CPT | Mod: 25,S$GLB,, | Performed by: OBSTETRICS & GYNECOLOGY

## 2018-08-16 PROCEDURE — 3008F BODY MASS INDEX DOCD: CPT | Mod: CPTII,S$GLB,, | Performed by: OBSTETRICS & GYNECOLOGY

## 2018-08-16 PROCEDURE — 81002 URINALYSIS NONAUTO W/O SCOPE: CPT | Mod: S$GLB,,, | Performed by: OBSTETRICS & GYNECOLOGY

## 2018-08-16 RX ORDER — SOLIFENACIN SUCCINATE 5 MG/1
5 TABLET, FILM COATED ORAL NIGHTLY
Qty: 30 TABLET | Refills: 1 | Status: SHIPPED | OUTPATIENT
Start: 2018-08-16 | End: 2019-04-25

## 2018-08-16 NOTE — PROGRESS NOTES
Subjective:       Patient ID: Kim Padilla is a 42 y.o. female.    Chief Complaint:  Advice Only (last pap 6/18 neg hpv neg  )      History of Present Illness  - patient presents with 4 - 5 month history of nocturia, spontaneous urine leakage upon standing, urgency, frequency. She reports that prior to then, she would have occasional leakage only with cough/sneeze. She denies dysuria, suprapubic pressure, hematuria.  - she reports that she voids fairly regularly during the day, the bathroom is outside her office.  - no history of glaucoma    Past Medical History:   Diagnosis Date    Infertility, female        Past Surgical History:   Procedure Laterality Date    BREAST LUMPECTOMY Left 2014    Dr Brown - benign    DILATION AND CURETTAGE OF UTERUS      for SAB         Current Outpatient Medications:     ALPRAZolam (XANAX) 0.5 MG tablet, Take 1 tablet (0.5 mg total) by mouth nightly as needed for Insomnia or Anxiety. KEEP APPT FOR FURTHER REFILLS., Disp: 30 tablet, Rfl: 1    diclofenac potassium (CAMBIA) 50 mg PwPk, Cambia 50 mg oral powder packet  Take 1 packet as needed by oral route as needed for 30 days., Disp: , Rfl:     eletriptan (RELPAX) 40 MG tablet, Relpax 40 mg tablet  Take 1 tablet as needed by oral route as needed for 30 days., Disp: , Rfl:     lamoTRIgine (LAMICTAL) 25 MG tablet, lamotrigine 25 mg tablet  TAKE 1 TABLET BY MOUTH EVERY EVENING FOR 10 DAYS, 2 TABLETS EVERY EVENING FOR 10 DAYS, THEN 3 TABLETS EVERY EVENING, Disp: , Rfl:     spironolactone (ALDACTONE) 100 MG tablet, Take 100 mg by mouth once daily., Disp: , Rfl:     ZOLMitriptan (ZOMIG) 5 mg Spry, Zomig 5 mg nasal spray  Take by nasal route @ onset of headache, may repeat in 2-4hrs if needed, not more than 2/d or 6/wk, Disp: , Rfl:     solifenacin (VESICARE) 5 MG tablet, Take 1 tablet (5 mg total) by mouth nightly., Disp: 30 tablet, Rfl: 1    Review of patient's allergies indicates:   Allergen Reactions    Penicillins Hives  "      GYN & OB History  Patient's last menstrual period was 2018 (exact date).   Date of Last Pap: 2018    OB History    Para Term  AB Living   1       1     SAB TAB Ectopic Multiple Live Births   1              # Outcome Date GA Lbr Calixto/2nd Weight Sex Delivery Anes PTL Lv   1 SAB                   Social History     Socioeconomic History    Marital status:      Spouse name: Not on file    Number of children: Not on file    Years of education: Not on file    Highest education level: Not on file   Social Needs    Financial resource strain: Not on file    Food insecurity - worry: Not on file    Food insecurity - inability: Not on file    Transportation needs - medical: Not on file    Transportation needs - non-medical: Not on file   Occupational History    Not on file   Tobacco Use    Smoking status: Never Smoker    Smokeless tobacco: Never Used   Substance and Sexual Activity    Alcohol use: No     Comment: rarely    Drug use: No    Sexual activity: Yes     Partners: Male     Birth control/protection: None   Other Topics Concern    Not on file   Social History Narrative    Not on file       Family History   Problem Relation Age of Onset    Breast cancer Maternal Cousin     Colon cancer Neg Hx     Ovarian cancer Neg Hx        Review of Systems  Review of Systems   All other systems reviewed and are negative.       Objective:     Vitals:    18 1054   BP: 100/60   Weight: 71.2 kg (156 lb 15.5 oz)   Height: 5' 5" (1.651 m)       Physical Exam:   Constitutional: She appears well-developed and well-nourished. She is cooperative. No distress.             Abdominal: Soft. Normal appearance. There is no tenderness.     Genitourinary: Vagina normal and uterus normal. There is no rash, tenderness or lesion on the right labia. There is no rash, tenderness or lesion on the left labia. Cervix is normal. Right adnexum displays no mass, no tenderness and no fullness. Left " adnexum displays no mass, no tenderness and no fullness.               Neurological: She is alert.          Assessment/ Plan:     Orders Placed This Encounter    solifenacin (VESICARE) 5 MG tablet       Kim was seen today for advice only.    Diagnoses and all orders for this visit:    Urinary urgency    Other orders  -     solifenacin (VESICARE) 5 MG tablet; Take 1 tablet (5 mg total) by mouth nightly.    - u/a: negative  - discussed bladder training and double voiding at length  - will start low dose med nightly for urinary urgency/nocturia. Discussed possible adverse effects including dry mouth and constipation. Recommend taking it at night. Patient will call with issues or, if is not having adverse effects, may need higher dose. Will call before refills to discuss.    Follow-up if symptoms worsen or fail to improve.

## 2018-08-21 ENCOUNTER — OFFICE VISIT (OUTPATIENT)
Dept: SPORTS MEDICINE | Facility: CLINIC | Age: 43
End: 2018-08-21
Payer: COMMERCIAL

## 2018-08-21 VITALS
DIASTOLIC BLOOD PRESSURE: 81 MMHG | SYSTOLIC BLOOD PRESSURE: 116 MMHG | HEIGHT: 65 IN | HEART RATE: 95 BPM | BODY MASS INDEX: 25.99 KG/M2 | WEIGHT: 156 LBS

## 2018-08-21 DIAGNOSIS — M54.2 NECK PAIN ON LEFT SIDE: Primary | ICD-10-CM

## 2018-08-21 PROCEDURE — 99214 OFFICE O/P EST MOD 30 MIN: CPT | Mod: S$GLB,,, | Performed by: PHYSICIAN ASSISTANT

## 2018-08-21 PROCEDURE — 3008F BODY MASS INDEX DOCD: CPT | Mod: CPTII,S$GLB,, | Performed by: PHYSICIAN ASSISTANT

## 2018-08-21 PROCEDURE — 99999 PR PBB SHADOW E&M-EST. PATIENT-LVL III: CPT | Mod: PBBFAC,,, | Performed by: PHYSICIAN ASSISTANT

## 2018-08-21 RX ORDER — METHYLPREDNISOLONE 4 MG/1
TABLET ORAL
Qty: 1 PACKAGE | Refills: 0 | Status: SHIPPED | OUTPATIENT
Start: 2018-08-21 | End: 2018-09-12 | Stop reason: ALTCHOICE

## 2018-08-21 NOTE — PROGRESS NOTES
"CC: LEFT shoulder pain     42 y.o. Female RHD with a history of left neck pain x 1 week. She was leaning forward on the washing machine with her arms pressing into it when she developed left sided neck pain. She reports a tingling sensation in her left neck and muscle tightness.    Previously she had left shoulder pain x 2 years. She has been doing PT at [facility] and received a CSI subacromial on 5/11/18.  She reports great improvement in her shoulder. She is now pain free in her shoulder. She works a desk job.    Vitals:    08/21/18 1525   BP: 116/81   Pulse: 95   Weight: 70.8 kg (156 lb)   Height: 5' 5" (1.651 m)   PainSc:   3       Past Medical History:   Diagnosis Date    Infertility, female        Past Surgical History:   Procedure Laterality Date    BREAST LUMPECTOMY Left 2014    Dr Brown - benign    DILATION AND CURETTAGE OF UTERUS      for SAB       Family History   Problem Relation Age of Onset    Breast cancer Maternal Cousin     Colon cancer Neg Hx     Ovarian cancer Neg Hx          Current Outpatient Medications:     ALPRAZolam (XANAX) 0.5 MG tablet, Take 1 tablet (0.5 mg total) by mouth nightly as needed for Insomnia or Anxiety. KEEP APPT FOR FURTHER REFILLS., Disp: 30 tablet, Rfl: 1    diclofenac potassium (CAMBIA) 50 mg PwPk, Cambia 50 mg oral powder packet  Take 1 packet as needed by oral route as needed for 30 days., Disp: , Rfl:     eletriptan (RELPAX) 40 MG tablet, Relpax 40 mg tablet  Take 1 tablet as needed by oral route as needed for 30 days., Disp: , Rfl:     lamoTRIgine (LAMICTAL) 25 MG tablet, lamotrigine 25 mg tablet  TAKE 1 TABLET BY MOUTH EVERY EVENING FOR 10 DAYS, 2 TABLETS EVERY EVENING FOR 10 DAYS, THEN 3 TABLETS EVERY EVENING, Disp: , Rfl:     solifenacin (VESICARE) 5 MG tablet, Take 1 tablet (5 mg total) by mouth nightly., Disp: 30 tablet, Rfl: 1    spironolactone (ALDACTONE) 100 MG tablet, Take 100 mg by mouth once daily., Disp: , Rfl:     ZOLMitriptan (ZOMIG) 5 " "mg Spry, Zomig 5 mg nasal spray  Take by nasal route @ onset of headache, may repeat in 2-4hrs if needed, not more than 2/d or 6/wk, Disp: , Rfl:     methylPREDNISolone (MEDROL DOSEPACK) 4 mg tablet, use as directed, Disp: 1 Package, Rfl: 0    Review of patient's allergies indicates:   Allergen Reactions    Penicillins Hives          REVIEW OF SYSTEMS:  Constitution: Negative. Negative for chills, fever and night sweats.   HENT: Negative for congestion and headaches.    Eyes: Negative for blurred vision, left vision loss and right vision loss.   Cardiovascular: Negative for chest pain and syncope.   Respiratory: Negative for cough and shortness of breath.    Endocrine: Negative for polydipsia, polyphagia and polyuria.   Hematologic/Lymphatic: Negative for bleeding problem. Does not bruise/bleed easily.   Skin: Negative for dry skin, itching and rash.   Musculoskeletal: Negative for falls.  Positive for left shoulder pain and muscle weakness.   Gastrointestinal: Negative for abdominal pain and bowel incontinence.   Genitourinary: Negative for bladder incontinence and nocturia.   Neurological: Negative for disturbances in coordination, loss of balance and seizures.   Psychiatric/Behavioral: Negative for depression. The patient does not have insomnia.    Allergic/Immunologic: Negative for hives and persistent infections.      PHYSICAL EXAMINATION:  Vitals:  /81   Pulse 95   Ht 5' 5" (1.651 m)   Wt 70.8 kg (156 lb)   LMP 08/02/2018 (Exact Date)   BMI 25.96 kg/m²    General: The patient is alert and oriented x 3.  Mood is pleasant.  Observation of ears, eyes and nose reveal no gross abnormalities.  No labored breathing observed.  Gait is coordinated. Patient can toe walk and heel walk without difficulty.      LEFT Shoulder / Upper Extremity Exam    OBSERVATION:     Swelling  none  Deformity  none   Discoloration  none   Scapular winging none   Scars   none  Atrophy  none    TENDERNESS / CREPITUS (T/C):      "     T/C      T/C   Clavicle   -/-  SUPRAspinatus    -/-     AC Jt.    -/-  INFRAspinatus  -/-    SC Jt.    -/-  Deltoid    -/-      G. Tuberosity  -/-  LH BICEP groove  -/-   Acromion:  -/-  Midline Neck   -/-     Scapular Spine -/-  Trapezium   -/-   SMA Scapula  -/-  GH jt. line - post  -/-     Scapulothoracic  -/-         ROM: (* = with pain)  Right shoulder   Left shoulder        AROM (PROM)   AROM (PROM)   FE    170° (175°)     170° (175°)     ER at 0°    80°  (85°)    80°  (85°)   ER at 90° ABD  90°  (90°)    90°  (90°)   IR at 90°  ABD   NA  (40°)     NA  (40°)      IR (spine level)   T10     T10    STRENGTH: (* = with pain) Right shoulder   Left shoulder     SCAPTION   5/5    5/5    IR    5/5    5/5   ER    5/5    5/5   BICEPS   5/5    5/5   Deltoid    5/5    5/5     SIGNS:  Painful side       NEER   -   ODAVONS  neg    GARCIA   -   SPEEDS  neg     DROP ARM   -   BELLY PRESS neg   Superior escape none    LIFT-OFF  neg   X-Body ADD    neg    MOVING VALGUS neg        EXTREMITY NEURO-VASCULAR EXAM:    Sensation grossly intact to light touch all dermatomal regions.    DTR 2+ Biceps, Triceps, BR and Negative Jacks sign   Grossly intact motor function at Elbow, Wrist and Hand   Distal pulses radial and ulnar 2+, brisk cap refill, symmetric.      NECK:  Painless FROM and spinous processes non-tender. + Spurlings sign.      XRAYS:  Xrays including AP, Outlet and Axillary Lateral of Left shoulder are ordered / images reviewed by me:   No fracture dislocation or other pathology   Proximal migration of humeral head: None   GH arthritis: None    MRI LEFT SHOULDER 5/10/18  1. Moderate supraspinatus and mild subscapularis and infraspinatus tendinosis without partial or full-thickness tear.  2. Mild degenerative changes of the anterior-superior and posterior-superior segments of the glenoid labrum without definite focal tear noting limitation due to non-arthrographic technique.  3. Biceps tendinosis without  partial or full-thickness tear.      ASSESSMENT:   Left shoulder tendinosis, resolved    PLAN:      1. Continue Pt at [facility]  2. Medrol dose shelia for cervical radiculopathy  3. Referral sent to spine center  4. Heat  5. RTC as needed for follow up    All questions were answered, patient will contact us for questions or concerns in the interim.

## 2018-08-22 DIAGNOSIS — M54.2 CERVICAL SPINE PAIN: Primary | ICD-10-CM

## 2018-09-04 ENCOUNTER — HOSPITAL ENCOUNTER (OUTPATIENT)
Dept: RADIOLOGY | Facility: HOSPITAL | Age: 43
Discharge: HOME | End: 2018-09-04
Attending: PHYSICIAN ASSISTANT
Payer: COMMERCIAL

## 2018-09-04 DIAGNOSIS — M54.2 CERVICAL SPINE PAIN: ICD-10-CM

## 2018-09-04 PROCEDURE — 72050 X-RAY EXAM NECK SPINE 4/5VWS: CPT | Mod: TC

## 2018-09-04 PROCEDURE — 72050 X-RAY EXAM NECK SPINE 4/5VWS: CPT | Mod: 26,,, | Performed by: RADIOLOGY

## 2018-09-12 ENCOUNTER — OFFICE VISIT (OUTPATIENT)
Dept: ORTHOPEDICS | Facility: CLINIC | Age: 43
End: 2018-09-12
Payer: COMMERCIAL

## 2018-09-12 VITALS
TEMPERATURE: 98 F | DIASTOLIC BLOOD PRESSURE: 74 MMHG | SYSTOLIC BLOOD PRESSURE: 115 MMHG | HEART RATE: 94 BPM | WEIGHT: 161.69 LBS | HEIGHT: 65 IN | BODY MASS INDEX: 26.94 KG/M2

## 2018-09-12 DIAGNOSIS — M54.12 CERVICAL RADICULOPATHY: Primary | ICD-10-CM

## 2018-09-12 PROCEDURE — 99204 OFFICE O/P NEW MOD 45 MIN: CPT | Mod: S$GLB,,, | Performed by: PHYSICIAN ASSISTANT

## 2018-09-12 PROCEDURE — 3008F BODY MASS INDEX DOCD: CPT | Mod: CPTII,S$GLB,, | Performed by: PHYSICIAN ASSISTANT

## 2018-09-12 PROCEDURE — 99999 PR PBB SHADOW E&M-EST. PATIENT-LVL III: CPT | Mod: PBBFAC,,, | Performed by: PHYSICIAN ASSISTANT

## 2018-09-12 RX ORDER — DIAZEPAM 5 MG/1
5 TABLET ORAL
Qty: 2 TABLET | Refills: 0 | Status: SHIPPED | OUTPATIENT
Start: 2018-09-12 | End: 2018-10-24 | Stop reason: ALTCHOICE

## 2018-09-12 RX ORDER — DICLOFENAC SODIUM 75 MG/1
75 TABLET, DELAYED RELEASE ORAL 2 TIMES DAILY
Qty: 60 TABLET | Refills: 0 | Status: SHIPPED | OUTPATIENT
Start: 2018-09-12 | End: 2018-10-08 | Stop reason: SDUPTHER

## 2018-09-12 RX ORDER — METHOCARBAMOL 750 MG/1
750 TABLET, FILM COATED ORAL 3 TIMES DAILY PRN
Qty: 30 TABLET | Refills: 0 | Status: SHIPPED | OUTPATIENT
Start: 2018-09-12 | End: 2019-04-25

## 2018-09-12 NOTE — PROGRESS NOTES
DATE: 9/12/2018  PATIENT: Kim Padilla    Supervising Physician: Manfred Boudreaux M.D.    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 The pain has been present for about a month. The patient describes the pain as aching. The pain is worse with sitting and lying down and improved by nothing in particular. There is associated numbness and tingling in the third and fourth fingers bilaterally. There is no subjective weakness. Prior treatments have included advil, a medrol dosepack, baclofen, a chiropractor and physical therapy, but no ESIs or surgery.     The patient denies myelopathic symptoms such as handwriting changes or difficulty with buttons/coins/keys. Denies perineal paresthesias, bowel/bladder dysfunction.    PAST MEDICAL/SURGICAL HISTORY:  Past Medical History:   Diagnosis Date    Infertility, female      Past Surgical History:   Procedure Laterality Date    BREAST LUMPECTOMY Left 2014    Dr Brown - benign    DILATION AND CURETTAGE OF UTERUS      for SAB       Medications:  Current Outpatient Medications on File Prior to Visit   Medication Sig Dispense Refill    ALPRAZolam (XANAX) 0.5 MG tablet Take 1 tablet (0.5 mg total) by mouth nightly as needed for Insomnia or Anxiety. KEEP APPT FOR FURTHER REFILLS. 30 tablet 1    diclofenac potassium (CAMBIA) 50 mg PwPk Cambia 50 mg oral powder packet   Take 1 packet as needed by oral route as needed for 30 days.      eletriptan (RELPAX) 40 MG tablet Relpax 40 mg tablet   Take 1 tablet as needed by oral route as needed for 30 days.      lamoTRIgine (LAMICTAL) 25 MG tablet lamotrigine 25 mg tablet   TAKE 1 TABLET BY MOUTH EVERY EVENING FOR 10 DAYS, 2 TABLETS EVERY EVENING FOR 10 DAYS, THEN 3 TABLETS EVERY EVENING      solifenacin (VESICARE) 5 MG tablet Take 1 tablet (5 mg total) by mouth nightly. 30 tablet 1    spironolactone (ALDACTONE) 100  MG tablet Take 100 mg by mouth once daily.      ZOLMitriptan (ZOMIG) 5 mg Spry Zomig 5 mg nasal spray   Take by nasal route @ onset of headache, may repeat in 2-4hrs if needed, not more than 2/d or 6/wk      [DISCONTINUED] methylPREDNISolone (MEDROL DOSEPACK) 4 mg tablet use as directed 1 Package 0     No current facility-administered medications on file prior to visit.        Social History:   Social History     Socioeconomic History    Marital status:      Spouse name: Not on file    Number of children: Not on file    Years of education: Not on file    Highest education level: Not on file   Social Needs    Financial resource strain: Not on file    Food insecurity - worry: Not on file    Food insecurity - inability: Not on file    Transportation needs - medical: Not on file    Transportation needs - non-medical: Not on file   Occupational History    Not on file   Tobacco Use    Smoking status: Never Smoker    Smokeless tobacco: Never Used   Substance and Sexual Activity    Alcohol use: No     Comment: rarely    Drug use: No    Sexual activity: Yes     Partners: Male     Birth control/protection: None   Other Topics Concern    Not on file   Social History Narrative    Not on file       REVIEW OF SYSTEMS:  Constitution: Negative. Negative for chills, fever and night sweats.   Cardiovascular: Negative for chest pain and syncope.   Respiratory: Negative for cough and shortness of breath.   Gastrointestinal: See HPI. Negative for nausea/vomiting. Negative for abdominal pain.  Genitourinary: See HPI. Negative for discoloration or dysuria.  Skin: Negative for dry skin, itching and rash.   Hematologic/Lymphatic: Negative for bleeding problem. Does not bruise/bleed easily.   Musculoskeletal: Negative for falls and muscle weakness.   Neurological: See HPI. No seizures.   Endocrine: Negative for polydipsia, polyphagia and polyuria.   Allergic/Immunologic: Negative for hives and persistent  "infections.  Psychiatric/Behavioral: Negative for depression and insomnia.         EXAM:  /74 (BP Location: Left arm, Patient Position: Sitting, BP Method: Medium (Automatic))   Pulse 94   Temp 97.6 °F (36.4 °C) (Oral)   Ht 5' 5" (1.651 m)   Wt 73.4 kg (161 lb 11.3 oz)   BMI 26.91 kg/m²     General: The patient is a very pleasant 42 y.o. female in no apparent distress, the patient is oriented to person, place and time.  Psych: Normal mood and affect  HEENT: Vision grossly intact, hearing intact to the spoken word.  Lungs: Respirations unlabored.  Gait: Normal station and gait, no difficulty with toe or heel walk.   Skin: Cervical skin negative for rashes, lesions, hairy patches and surgical scars.  Range of motion: Cervical range of motion is acceptable. There is mild tenderness to palpation.  There is pain with cervical rotation toward the left.   Spinal Balance: Global saggital and coronal spinal balance acceptable, no significant for scoliosis and kyphosis.  Musculoskeletal: No pain with the range of motion of the bilateral shoulders and elbows. Normal bulk and contour of the bilateral hands.  Vascular: Bilateral hands warm and well perfused, radial pulses 2+ bilaterally.  Neurological: Normal strength and tone in all major motor groups in the bilateral upper and lower extremities. Normal sensation to light touch in the C5-T1 and L2-S1 dermatomes bilaterally with the exception of decreased sensation in he C6 dermatome on the left.  Deep tendon reflexes symmetric 2+ in the bilateral upper and lower extremities.  Negative Inverted Radial Reflex and Scott's bilaterally. Negative Babinski bilaterally.     IMAGING:   Today I personally reviewed AP, Lat and Flex/Ex  upright C-spine films that demonstrate mild C5/6 and C6/7 disc space narrowing.       Body mass index is 26.91 kg/m².    No results found for: HGBA1C        ASSESSMENT/PLAN:    Diagnoses and all orders for this visit:    Cervical " radiculopathy  -     MRI Cervical Spine Without Contrast; Future    Other orders  -     diclofenac (VOLTAREN) 75 MG EC tablet; Take 1 tablet (75 mg total) by mouth 2 (two) times daily.  -     methocarbamol (ROBAXIN) 750 MG Tab; Take 1 tablet (750 mg total) by mouth 3 (three) times daily as needed. As needed for muscle spasms  -     diazePAM (VALIUM) 5 MG tablet; Take 1 tablet (5 mg total) by mouth On call Procedure for Anxiety (take 30 min prior to MRI.  may take second if needed).        The patient's pain has failed to improve with medications and physical therapy.  MRI cervical spine.  Follow up after the MRI to discuss results and further treatment including possibly ESIs.      Follow-up if symptoms worsen or fail to improve.

## 2018-09-28 ENCOUNTER — PATIENT MESSAGE (OUTPATIENT)
Dept: ORTHOPEDICS | Facility: CLINIC | Age: 43
End: 2018-09-28

## 2018-09-28 DIAGNOSIS — M54.12 CERVICAL RADICULOPATHY: Primary | ICD-10-CM

## 2018-10-03 ENCOUNTER — TELEPHONE (OUTPATIENT)
Dept: ORTHOPEDICS | Facility: CLINIC | Age: 43
End: 2018-10-03

## 2018-10-08 ENCOUNTER — HOSPITAL ENCOUNTER (OUTPATIENT)
Dept: RADIOLOGY | Facility: HOSPITAL | Age: 43
Discharge: HOME | End: 2018-10-08
Attending: PHYSICIAN ASSISTANT
Payer: COMMERCIAL

## 2018-10-08 DIAGNOSIS — M54.12 CERVICAL RADICULOPATHY: ICD-10-CM

## 2018-10-08 PROCEDURE — 72141 MRI NECK SPINE W/O DYE: CPT | Mod: 26,,, | Performed by: RADIOLOGY

## 2018-10-08 PROCEDURE — 72141 MRI NECK SPINE W/O DYE: CPT | Mod: TC

## 2018-10-08 RX ORDER — DICLOFENAC SODIUM 75 MG/1
TABLET, DELAYED RELEASE ORAL
Qty: 60 TABLET | Refills: 0 | Status: SHIPPED | OUTPATIENT
Start: 2018-10-08 | End: 2018-10-24

## 2018-10-16 ENCOUNTER — CLINICAL SUPPORT (OUTPATIENT)
Dept: REHABILITATION | Facility: HOSPITAL | Age: 43
End: 2018-10-16
Attending: PHYSICIAN ASSISTANT
Payer: COMMERCIAL

## 2018-10-16 DIAGNOSIS — M54.2 NECK PAIN: ICD-10-CM

## 2018-10-16 PROCEDURE — 97530 THERAPEUTIC ACTIVITIES: CPT | Mod: PO

## 2018-10-16 PROCEDURE — 97161 PT EVAL LOW COMPLEX 20 MIN: CPT | Mod: PO

## 2018-10-16 NOTE — PROGRESS NOTES
[facility]  Physical Therapy Initial Evaluation    Name: Kim Padilla  Clinic Number: [id number]    Therapy Diagnosis:   Encounter Diagnosis   Name Primary?    Neck pain      Physician: Mel Rondon PA-C    Physician Orders: PT Eval and Treat   Medical Diagnosis: Cx radiculopathy  Evaluation Date: 10/16/2018  Authorization period Expiration: 12/31/2018  Plan of Care Certification Period: 12/16/2018    Visit #: 1/ Visits authorized: 40  Time In:8:00  Time Out: 9:15  Total Billable Time: 60 minutes    Precautions: Standard  Subjective   Date of onset: August of '18 reaching into the dryer began having pain shooting across her shoulders.  Numbness down the middle of her back  History of current condition - Kim reports: Pt reported 2 months of neck and upper back pain       Past Medical History:   Diagnosis Date    Infertility, female      Kim Padilla  has a past surgical history that includes Breast lumpectomy (Left, 2014) and Dilation and curettage of uterus.    Kim has a current medication list which includes the following prescription(s): alprazolam, diazepam, diclofenac, diclofenac potassium, eletriptan, lamotrigine, methocarbamol, solifenacin, spironolactone, and zolmitriptan.    Review of patient's allergies indicates:   Allergen Reactions    Penicillins Hives        Imaging, MRI studies:     Prior Therapy: yes [facility]OGI dankjie  Social History: Pt with one son who is 10 y/o.  Pt lives with their family  Occupation:   Prior Level of Function: Independent in all ADL's with L shoudler pain  Current Level of Function:  Independent in all ADL's with neck and upper back pain    Pain:  Current 5/10, worst 5/10, best 2/10   Location: back  and neck  bilateral  Description: Tingling  Aggravating Factors: sittine standing  Easing Factors: not sure    Pts goals: Decrease L upper 1/4 pain        Objective     Mental status :alert  Posture Alignment :WNL  Sensation: Light  Touch: C4 on R,  C6 B, C7 on L             ROM:  UPPER EXTREMITY--AROM/PROM  (R) UE: WFLs  (L) UE: WFLs           RANGE OF MOTION--LOWER EXTREMITIES - nt     Cx spine  Flexion WNL  Extension 75%  Rotation 60% B  Sdie bending  75%B  Upper Extremity Strength  (R) UE  (L) UE    Shoulder flexion: 4+/5 Shoulder flexion: 4/5   Shoulder Abduction: 4+/5 Shoulder abduction: 4/5   Elbow flexion: 4+/5 Elbow flexion: 4+/5   Elbow extension: 4/5 Elbow extension: 4+/5   Wrist flexion: 4+/5 Wrist flexion: 4+/5   Wrist extension: 4+/5 Wrist extension: 4+/5    4+/5 : 4+/5       Lower Extremity Strength - nt  GAIT: iKm ambulates 200 feet with no assistive device independently.     GAIT DEVIATIONS: Kim displays steady gait        TREATMENT   Treatment Time In: 8:30  Treatment Time Out: 9:00  Total Treatment time separate from Evaluation time:30    Kim received therapeutic exercises to develop strength, endurance, posture and core stabilization for 10 minutes including:  Scapular retractions  Chin retractions    Kim received the following manual therapy techniques: Joint mobilizations, Manual traction, Soft tissue Mobilization and dry needling were applied to the: Cx and Thoracic spine for 45  minutes.   STM L  Uppertrap, Cx and Thoracic paraspinals  Passive mobilization Thoracic spine and rib cage  Cx Traction - manual  Dry needling   Greater Occipital - B  R C6-7  R Spinal Accessory  R Dorsal scapular    Home Exercises and Patient Education Provided    Education provided re: yes  - progress towards goals   - role of therapy in multi - disciplinary team, goals for therapy  No spiritual or educational barriers to learning provided    Written Home Exercises Provided: no.  Exercises were reviewed and Kim was able to demonstrate them prior to the end of the session.   Pt received a written copy of exercises to perform at home. Kim demonstrated good  understanding of the education provided.       Assessment   Kim is a 42  y.o. female referred to outpatient Physical Therapy with a medical diagnosis of Cx rqdiculopathy. Pt presents with neck and upper back tingling and numbness    Pt prognosis is Good.   Pt will benefit from skilled outpatient Physical Therapy to address the deficits stated above and in the chart below, provide pt/family education, and to maximize pt's level of independence.     Plan of care discussed with patient: Yes  Pt's spiritual, cultural and educational needs considered and pt agreeable to plan of care and goals as stated below:     Anticipated Barriers for therapy: none    Medical Necessity is demonstrated by the following  History  Co-morbidities and personal factors that may impact the plan of care Examination  Body Structures and Functions, activity limitations and participation restrictions that may impact the plan of care    Clinical Presentation   Co-morbidities:   Lumpectomy        Personal Factors:   no deficits Body Regions:   neck  upper extremities  trunk    Body Systems:    ROM  strength  sensation            Participation Restrictions:   lifting     Activity limitations:   Learning and applying knowledge  no deficits    General Tasks and Commands  no deficits    Communication  no deficits    Mobility  lifting and carrying objects    Self care  no deficits    Domestic Life  doing house work (cleaning house, washing dishes, laundry)    Interactions/Relationships  no deficits    Life Areas  no deficits    Community and Social Life  no deficits         stable and uncomplicated                      low   low  low Decision Making/ Complexity Score:  low       Short Term GOALS: 3 weeks. Pt agrees with goals set.  Pt independent in a HEP to address deficits  Pt with reports of decreased L upper 1/4   Sx with ADL's  Pt to report decreased pain in L upper 1/4 at a level of >/= 4/10 at worse    Long Term GOALS: 6  weeks. Pt agrees with goals set.  Pt to report decreased pain in L upper 1/4 at a level of >/=  2/10 at worse  Pt with full passive ROM in Cx spine  Pt to report that she is able to manage her L upper 1/4 sx through a HEP      Plan     Certification Period: 10/16/2018 to 12/16/2018.    Outpatient Physical Therapy 2 times weekly for 6 weeks to include the following interventions: Cervical/Lumbar Traction, Electrical Stimulation IFC, Manual Therapy, Moist Heat/ Ice, Patient Education, Therapeutic Activites, Therapeutic Exercise and Dry Needling.     Bienvenido Fam, PT

## 2018-10-18 ENCOUNTER — CLINICAL SUPPORT (OUTPATIENT)
Dept: REHABILITATION | Facility: HOSPITAL | Age: 43
End: 2018-10-18
Attending: PHYSICIAN ASSISTANT
Payer: COMMERCIAL

## 2018-10-18 DIAGNOSIS — M54.2 NECK PAIN: ICD-10-CM

## 2018-10-18 PROCEDURE — 97140 MANUAL THERAPY 1/> REGIONS: CPT | Mod: PO

## 2018-10-18 PROCEDURE — 97110 THERAPEUTIC EXERCISES: CPT | Mod: PO

## 2018-10-18 NOTE — PLAN OF CARE
[facility]  Physical Therapy Initial Evaluation    Name: Kim Padilla  Clinic Number: [id number]    Therapy Diagnosis:   Encounter Diagnosis   Name Primary?    Neck pain      Physician: Mel Rondon PA-C    Physician Orders: PT Eval and Treat   Medical Diagnosis: Cx radiculopathy  Evaluation Date: 10/16/2018  Authorization period Expiration: 12/31/2018  Plan of Care Certification Period: 12/16/2018    Visit #: 1/ Visits authorized: 40  Time In:8:00  Time Out: 9:15  Total Billable Time: 60 minutes    Precautions: Standard  Subjective   Date of onset: August of '18 reaching into the dryer began having pain shooting across her shoulders.  Numbness down the middle of her back  History of current condition - Kim reports: Pt reported 2 months of neck and upper back pain       Past Medical History:   Diagnosis Date    Infertility, female      Kim Padilla  has a past surgical history that includes Breast lumpectomy (Left, 2014) and Dilation and curettage of uterus.    Kim has a current medication list which includes the following prescription(s): alprazolam, diazepam, diclofenac, diclofenac potassium, eletriptan, lamotrigine, methocarbamol, solifenacin, spironolactone, and zolmitriptan.    Review of patient's allergies indicates:   Allergen Reactions    Penicillins Hives        Imaging, MRI studies:     Prior Therapy: yes [facility] dankjie  Social History: Pt with one son who is 10 y/o.  Pt lives with their family  Occupation:   Prior Level of Function: Independent in all ADL's with L shoudler pain  Current Level of Function:  Independent in all ADL's with neck and upper back pain    Pain:  Current 5/10, worst 5/10, best 2/10   Location: back  and neck  bilateral  Description: Tingling  Aggravating Factors: sittine standing  Easing Factors: not sure    Pts goals: Decrease L upper 1/4 pain        Objective     Mental status :alert  Posture Alignment :WNL  Sensation: Light  Touch: C4 on R,  C6 B, C7 on L             ROM:  UPPER EXTREMITY--AROM/PROM  (R) UE: WFLs  (L) UE: WFLs           RANGE OF MOTION--LOWER EXTREMITIES - nt     Cx spine  Flexion WNL  Extension 75%  Rotation 60% B  Sdie bending  75%B  Upper Extremity Strength  (R) UE  (L) UE    Shoulder flexion: 4+/5 Shoulder flexion: 4/5   Shoulder Abduction: 4+/5 Shoulder abduction: 4/5   Elbow flexion: 4+/5 Elbow flexion: 4+/5   Elbow extension: 4/5 Elbow extension: 4+/5   Wrist flexion: 4+/5 Wrist flexion: 4+/5   Wrist extension: 4+/5 Wrist extension: 4+/5    4+/5 : 4+/5       Lower Extremity Strength - nt  GAIT: Kim ambulates 200 feet with no assistive device independently.     GAIT DEVIATIONS: Kim displays steady gait        TREATMENT   Treatment Time In: 8:30  Treatment Time Out: 9:00  Total Treatment time separate from Evaluation time:30    Kim received therapeutic exercises to develop strength, endurance, posture and core stabilization for 10 minutes including:  Scapular retractions  Chin retractions    Kim received the following manual therapy techniques: Joint mobilizations, Manual traction, Soft tissue Mobilization and dry needling were applied to the: Cx and Thoracic spine for 45  minutes.   STM L  Uppertrap, Cx and Thoracic paraspinals  Passive mobilization Thoracic spine and rib cage  Cx Traction - manual  Dry needling   Greater Occipital - B  R C6-7  R Spinal Accessory  R Dorsal scapular    Home Exercises and Patient Education Provided    Education provided re: yes  - progress towards goals   - role of therapy in multi - disciplinary team, goals for therapy  No spiritual or educational barriers to learning provided    Written Home Exercises Provided: no.  Exercises were reviewed and Kim was able to demonstrate them prior to the end of the session.   Pt received a written copy of exercises to perform at home. Kim demonstrated good  understanding of the education provided.       Assessment   Kim is a 42  y.o. female referred to outpatient Physical Therapy with a medical diagnosis of Cx rqdiculopathy. Pt presents with neck and upper back tingling and numbness    Pt prognosis is Good.   Pt will benefit from skilled outpatient Physical Therapy to address the deficits stated above and in the chart below, provide pt/family education, and to maximize pt's level of independence.     Plan of care discussed with patient: Yes  Pt's spiritual, cultural and educational needs considered and pt agreeable to plan of care and goals as stated below:     Anticipated Barriers for therapy: none    Medical Necessity is demonstrated by the following  History  Co-morbidities and personal factors that may impact the plan of care Examination  Body Structures and Functions, activity limitations and participation restrictions that may impact the plan of care    Clinical Presentation   Co-morbidities:   Lumpectomy        Personal Factors:   no deficits Body Regions:   neck  upper extremities  trunk    Body Systems:    ROM  strength  sensation            Participation Restrictions:   lifting     Activity limitations:   Learning and applying knowledge  no deficits    General Tasks and Commands  no deficits    Communication  no deficits    Mobility  lifting and carrying objects    Self care  no deficits    Domestic Life  doing house work (cleaning house, washing dishes, laundry)    Interactions/Relationships  no deficits    Life Areas  no deficits    Community and Social Life  no deficits         stable and uncomplicated                      low   low  low Decision Making/ Complexity Score:  low       Short Term GOALS: 3 weeks. Pt agrees with goals set.  Pt independent in a HEP to address deficits  Pt with reports of decreased L upper 1/4   Sx with ADL's  Pt to report decreased pain in L upper 1/4 at a level of >/= 4/10 at worse    Long Term GOALS: 6  weeks. Pt agrees with goals set.  Pt to report decreased pain in L upper 1/4 at a level of >/=  2/10 at worse  Pt with full passive ROM in Cx spine  Pt to report that she is able to manage her L upper 1/4 sx through a HEP      Plan     Certification Period: 10/16/2018 to 12/16/2018.    Outpatient Physical Therapy 2 times weekly for 6 weeks to include the following interventions: Cervical/Lumbar Traction, Electrical Stimulation IFC, Manual Therapy, Moist Heat/ Ice, Patient Education, Therapeutic Activites, Therapeutic Exercise and Dry Needling.     Bienvenido Fam, PT

## 2018-10-18 NOTE — PROGRESS NOTES
[facility]  Physical Therapy Daily Note    Name: Kim Padilla  Clinic Number: [id number]    Therapy Diagnosis:   Encounter Diagnosis   Name Primary?    Neck pain      Physician: Mel Rondon PA-C    Physician Orders: PT Eval and Treat   Medical Diagnosis: Cx radiculopathy  Evaluation Date: 10/18/2018  Authorization period Expiration: 12/31/2018  Plan of Care Certification Period: 12/16/2018    Visit #: 1/ Visits authorized: 40  Time In:8:00  Time Out: 9:15  Total Billable Time: 60 minutes    Precautions: Standard  Subjective   Date of onset: August of '18 reaching into the dryer began having pain shooting across her shoulders.  Numbness down the middle of her back  History of current condition - Kim reports: Pt reported 2 months of neck and upper back pain       Past Medical History:   Diagnosis Date    Infertility, female      Kim Padilla  has a past surgical history that includes Breast lumpectomy (Left, 2014) and Dilation and curettage of uterus.    Kim has a current medication list which includes the following prescription(s): alprazolam, diazepam, diclofenac, diclofenac potassium, eletriptan, lamotrigine, methocarbamol, solifenacin, spironolactone, and zolmitriptan.    Review of patient's allergies indicates:   Allergen Reactions    Penicillins Hives        Imaging, MRI studies:     Prior Therapy: yes [facility]OGI clark  Social History: Pt with one son who is 10 y/o.  Pt lives with their family  Occupation:   Prior Level of Function: Independent in all ADL's with L shoudler pain  Current Level of Function:  Independent in all ADL's with neck and upper back pain    Pain:  Current 5/10, worst 5/10, best 2/10   Location: back  and neck  bilateral  Description: Tingling  Aggravating Factors: sittine standing  Easing Factors: not sure    Pts goals: Decrease L upper 1/4 pain  Pt reports that she is feeling much better.  She continues to c/o tingling into her lower  thoracic region.  She also reported feeling fatigued following last Tx    Objective     Mental status :alert  Posture Alignment :WNL  Sensation: Light Touch: C4 on R,  C6 B, C7 on L             ROM:  UPPER EXTREMITY--AROM/PROM  (R) UE: WFLs  (L) UE: WFLs           RANGE OF MOTION--LOWER EXTREMITIES - nt     Cx spine  Flexion WNL  Extension 75%  Rotation 60% B  Sdie bending  75%B  Upper Extremity Strength  (R) UE  (L) UE    Shoulder flexion: 4+/5 Shoulder flexion: 4/5   Shoulder Abduction: 4+/5 Shoulder abduction: 4/5   Elbow flexion: 4+/5 Elbow flexion: 4+/5   Elbow extension: 4/5 Elbow extension: 4+/5   Wrist flexion: 4+/5 Wrist flexion: 4+/5   Wrist extension: 4+/5 Wrist extension: 4+/5    4+/5 : 4+/5       Lower Extremity Strength - nt  GAIT: Kim ambulates 200 feet with no assistive device independently.     GAIT DEVIATIONS: Kim displays steady gait        TREATMENT       Kim received therapeutic exercises to develop strength, endurance, posture and core stabilization for 20 minutes including:  Prone L shoudler horizontal abd (reverse flys) 10 x 3  Prone B shoulder extension 10 x 3  UBE x 6 min      Kim received the following manual therapy techniques: Joint mobilizations, Manual traction, Soft tissue Mobilization and dry needling were applied to the: Cx and Thoracic spine for 30  minutes.   STM L  Uppertrap, Cx and Thoracic paraspinals  Passive mobilization Thoracic spine and rib cage  Cx Traction - manual    Tx not performed today  Dry needling   Greater Occipital - B  R C6-7  R Spinal Accessory  R Dorsal scapular    Home Exercises and Patient Education Provided    Education provided re: yes  - progress towards goals   - role of therapy in multi - disciplinary team, goals for therapy  No spiritual or educational barriers to learning provided    Written Home Exercises Provided: no.  Exercises were reviewed and Kim was able to demonstrate them prior to the end of the session.   Pt received a written  copy of exercises to perform at home. Kim demonstrated good  understanding of the education provided.       Assessment   Kim is a 42 y.o. female referred to outpatient Physical Therapy with a medical diagnosis of Cx rqdiculopathy. Pt presents with neck and upper back tingling and numbness.  Pt tolerated all exercises and manual therapy activities well today.  Overall, her condition is improving.    Pt prognosis is Good.   Pt will benefit from skilled outpatient Physical Therapy to address the deficits stated above and in the chart below, provide pt/family education, and to maximize pt's level of independence.     Plan of care discussed with patient: Yes  Pt's spiritual, cultural and educational needs considered and pt agreeable to plan of care and goals as stated below:     Anticipated Barriers for therapy: none    Medical Necessity is demonstrated by the following  History  Co-morbidities and personal factors that may impact the plan of care Examination  Body Structures and Functions, activity limitations and participation restrictions that may impact the plan of care    Clinical Presentation   Co-morbidities:   Lumpectomy        Personal Factors:   no deficits Body Regions:   neck  upper extremities  trunk    Body Systems:    ROM  strength  sensation            Participation Restrictions:   lifting     Activity limitations:   Learning and applying knowledge  no deficits    General Tasks and Commands  no deficits    Communication  no deficits    Mobility  lifting and carrying objects    Self care  no deficits    Domestic Life  doing house work (cleaning house, washing dishes, laundry)    Interactions/Relationships  no deficits    Life Areas  no deficits    Community and Social Life  no deficits         stable and uncomplicated                      low   low  low Decision Making/ Complexity Score:  low       Short Term GOALS: 3 weeks. Pt agrees with goals set.  Pt independent in a HEP to address deficits  Pt with  reports of decreased L upper 1/4   Sx with ADL's  Pt to report decreased pain in L upper 1/4 at a level of >/= 4/10 at worse    Long Term GOALS: 6  weeks. Pt agrees with goals set.  Pt to report decreased pain in L upper 1/4 at a level of >/= 2/10 at worse  Pt with full passive ROM in Cx spine  Pt to report that she is able to manage her L upper 1/4 sx through a HEP      Plan     Certification Period: 10/18/2018 to 12/16/2018.    Outpatient Physical Therapy 2 times weekly for 6 weeks to include the following interventions: Cervical/Lumbar Traction, Electrical Stimulation IFC, Manual Therapy, Moist Heat/ Ice, Patient Education, Therapeutic Activites, Therapeutic Exercise and Dry Needling.     Bienvenido Fam, PT

## 2018-10-24 ENCOUNTER — OFFICE VISIT (OUTPATIENT)
Dept: ORTHOPEDICS | Facility: CLINIC | Age: 43
End: 2018-10-24
Payer: COMMERCIAL

## 2018-10-24 VITALS — HEIGHT: 65 IN | BODY MASS INDEX: 26.96 KG/M2 | WEIGHT: 161.81 LBS

## 2018-10-24 DIAGNOSIS — M54.2 NECK PAIN: Primary | ICD-10-CM

## 2018-10-24 DIAGNOSIS — M54.12 CERVICAL RADICULOPATHY: ICD-10-CM

## 2018-10-24 PROCEDURE — 99213 OFFICE O/P EST LOW 20 MIN: CPT | Mod: S$GLB,,, | Performed by: PHYSICIAN ASSISTANT

## 2018-10-24 PROCEDURE — 99999 PR PBB SHADOW E&M-EST. PATIENT-LVL III: CPT | Mod: PBBFAC,,, | Performed by: PHYSICIAN ASSISTANT

## 2018-10-24 PROCEDURE — 3008F BODY MASS INDEX DOCD: CPT | Mod: CPTII,S$GLB,, | Performed by: PHYSICIAN ASSISTANT

## 2018-10-24 RX ORDER — MELOXICAM 15 MG/1
15 TABLET ORAL DAILY
Qty: 30 TABLET | Refills: 0 | Status: SHIPPED | OUTPATIENT
Start: 2018-10-24 | End: 2018-11-29 | Stop reason: SDUPTHER

## 2018-10-24 NOTE — PROGRESS NOTES
"DATE: 10/24/2018  PATIENT: Kim Padilla    Attending Physician: Manfred Boudreaux M.D.    HISTORY:  Kim Padilla is a 42 y.o. female who returns to me today for MRI results.  She was last seen by me 9/12/2018.  Today she is doing well but notes she started PT and is doing well.  She thinks it is helping.    The Patient denies myelopathic symptoms such as handwriting changes or difficulty with buttons/coins/keys. Denies perineal paresthesias, bowel/bladder dysfunction.    PMH/PSH/FamHx/SocHx:  Unchanged from prior visit    ROS:  REVIEW OF SYSTEMS:  Constitution: Negative. Negative for chills, fever and night sweats.   HENT: Negative for congestion and headaches.    Eyes: Negative for blurred vision, left vision loss and right vision loss.   Cardiovascular: Negative for chest pain and syncope.   Respiratory: Negative for cough and shortness of breath.    Endocrine: Negative for polydipsia, polyphagia and polyuria.   Hematologic/Lymphatic: Negative for bleeding problem. Does not bruise/bleed easily.   Skin: Negative for dry skin, itching and rash.   Musculoskeletal: Negative for falls and muscle weakness.   Gastrointestinal: Negative for abdominal pain and bowel incontinence.   Allergic/Immunologic: Negative for hives and persistent infections.  Genitourinary: Negative for urinary retention/incontinence and nocturia.   Neurological: Negative for disturbances in coordination, no myelopathic symptoms such as handwriting changes or difficulty with buttons, coins, keys or small objects. No loss of balance and seizures.   Psychiatric/Behavioral: Negative for depression. The patient does not have insomnia.   Denies myelopathic symptoms, perineal paresthesias, bowel or bladder incontinence    EXAM:  Ht 5' 5" (1.651 m)   Wt 73.4 kg (161 lb 13.1 oz)   BMI 26.93 kg/m²     Physical exam stable.  Neuro exam stable.       IMAGING:  No new imaging today.    Today I personally re-reviewed AP, Lat and Flex/Ex  upright C-spine " films that demonstrate mild C5/6 and C6/7 disc space narrowing.    MRI cervical spine demonstrates mild spondylosis at C6/7 without significant stenosis.      Body mass index is 26.93 kg/m².    No results found for: HGBA1C      ASSESSMENT/PLAN:    Diagnoses and all orders for this visit:    Neck pain  -     Ambulatory Referral to Physical/Occupational Therapy    Cervical radiculopathy    Other orders  -     meloxicam (MOBIC) 15 MG tablet; Take 1 tablet (15 mg total) by mouth once daily.        There is no surgical intervention indicated.  Continue PT.  Will switch to mobic.  Follow up as needed.      Follow-up if symptoms worsen or fail to improve.

## 2018-10-31 ENCOUNTER — CLINICAL SUPPORT (OUTPATIENT)
Dept: REHABILITATION | Facility: HOSPITAL | Age: 43
End: 2018-10-31
Attending: PHYSICIAN ASSISTANT
Payer: COMMERCIAL

## 2018-10-31 DIAGNOSIS — M54.2 NECK PAIN: ICD-10-CM

## 2018-10-31 PROCEDURE — 97140 MANUAL THERAPY 1/> REGIONS: CPT | Mod: PO

## 2018-10-31 PROCEDURE — 97110 THERAPEUTIC EXERCISES: CPT | Mod: PO

## 2018-10-31 NOTE — PROGRESS NOTES
[facility]  Physical Therapy Daily Note    Name: Kim Padilla  Clinic Number: [id number]    Therapy Diagnosis:   Encounter Diagnosis   Name Primary?    Neck pain      Physician: Mel Rondon PA-C    Physician Orders: PT Eval and Treat   Medical Diagnosis: Cx radiculopathy  Evaluation Date: 10/31/2018  Authorization period Expiration: 12/31/2018  Plan of Care Certification Period: 12/16/2018    Visit #: 1/ Visits authorized: 40  Time In:7:30  Time Out: 8:30  Total Billable Time: 45 minutes    Precautions: Standard  Subjective   Date of onset: August of '18 reaching into the dryer began having pain shooting across her shoulders.  Numbness down the middle of her back  History of current condition - Kim reports: Pt reported 2 months of neck and upper back pain       Past Medical History:   Diagnosis Date    Infertility, female      Kim Padilla  has a past surgical history that includes Breast lumpectomy (Left, 2014) and Dilation and curettage of uterus.    Kim has a current medication list which includes the following prescription(s): alprazolam, eletriptan, meloxicam, methocarbamol, solifenacin, and zolmitriptan.    Review of patient's allergies indicates:   Allergen Reactions    Penicillins Hives        Imaging, MRI studies:     Prior Therapy: yes [facility]  Social History: Pt with one son who is 10 y/o.  Pt lives with their family  Occupation:   Prior Level of Function: Independent in all ADL's with L shoudler pain  Current Level of Function:  Independent in all ADL's with neck and upper back pain    Pain:  Current 5/10, worst 5/10, best 2/10   Location: back  and neck  bilateral  Description: Tingling  Aggravating Factors: sittine standing  Easing Factors: not sure    Pts goals: Decrease L upper 1/4 pain  Pt reports that she is feeling much better.  She continues with tingling in her mid back    Objective     Mental status :alert  Posture Alignment  :WNL  Sensation: Light Touch: C4 on R,  C6 B, C7 on L             ROM:  UPPER EXTREMITY--AROM/PROM  (R) UE: WFLs  (L) UE: WFLs           RANGE OF MOTION--LOWER EXTREMITIES - nt     Cx spine  Flexion WNL  Extension 75%  Rotation 60% B  Sdie bending  75%B  Upper Extremity Strength  (R) UE  (L) UE    Shoulder flexion: 4+/5 Shoulder flexion: 4/5   Shoulder Abduction: 4+/5 Shoulder abduction: 4/5   Elbow flexion: 4+/5 Elbow flexion: 4+/5   Elbow extension: 4/5 Elbow extension: 4+/5   Wrist flexion: 4+/5 Wrist flexion: 4+/5   Wrist extension: 4+/5 Wrist extension: 4+/5    4+/5 : 4+/5       Lower Extremity Strength - nt  GAIT: Kim ambulates 200 feet with no assistive device independently.     GAIT DEVIATIONS: Kim displays steady gait        TREATMENT       Kim received therapeutic exercises to develop strength, endurance, posture and core stabilization for 20 minutes including:  Prone L shoudler horizontal abd (reverse flys) 10 x 3 - np  Prone B shoulder extension 10 x 3 - np  UBE x 6 min - np  Scapular bridges 10 x 2  Isometric Cx spine side bending and rotation 5 x 5 sec to R & L    Kim received the following manual therapy techniques: Joint mobilizations, Manual traction, Soft tissue Mobilization and dry needling were applied to the: Cx and Thoracic spine for 30  minutes.   STM L  Uppertrap, Cx and Thoracic paraspinals  Passive mobilization Thoracic spine and rib cage  Cx Traction - manual      Dry needling   Greater Occipital - B  C 1-2-3, 6-7  R Spinal Accessory  R Dorsal scapular  T 7  B T6  Home Exercises and Patient Education Provided    Education provided re: yes  - progress towards goals   - role of therapy in multi - disciplinary team, goals for therapy  No spiritual or educational barriers to learning provided    Written Home Exercises Provided: no.  Exercises were reviewed and Kim was able to demonstrate them prior to the end of the session.   Pt received a written copy of exercises to perform at  home. Kim demonstrated good  understanding of the education provided.       Assessment   Kim is a 42 y.o. female referred to outpatient Physical Therapy with a medical diagnosis of Cx rqdiculopathy. Pt presents with neck and upper back tingling and numbness.  Pt tolerated all exercises and manual therapy activities well as well dry needling today.  Overall, her condition is improving.    Pt prognosis is Good.   Pt will benefit from skilled outpatient Physical Therapy to address the deficits stated above and in the chart below, provide pt/family education, and to maximize pt's level of independence.     Plan of care discussed with patient: Yes  Pt's spiritual, cultural and educational needs considered and pt agreeable to plan of care and goals as stated below:     Anticipated Barriers for therapy: none    Medical Necessity is demonstrated by the following  History  Co-morbidities and personal factors that may impact the plan of care Examination  Body Structures and Functions, activity limitations and participation restrictions that may impact the plan of care    Clinical Presentation   Co-morbidities:   Lumpectomy        Personal Factors:   no deficits Body Regions:   neck  upper extremities  trunk    Body Systems:    ROM  strength  sensation            Participation Restrictions:   lifting     Activity limitations:   Learning and applying knowledge  no deficits    General Tasks and Commands  no deficits    Communication  no deficits    Mobility  lifting and carrying objects    Self care  no deficits    Domestic Life  doing house work (cleaning house, washing dishes, laundry)    Interactions/Relationships  no deficits    Life Areas  no deficits    Community and Social Life  no deficits         stable and uncomplicated                      low   low  low Decision Making/ Complexity Score:  low       Short Term GOALS: 3 weeks. Pt agrees with goals set.  Pt independent in a HEP to address deficits  Pt with reports of  decreased L upper 1/4   Sx with ADL's  Pt to report decreased pain in L upper 1/4 at a level of >/= 4/10 at worse    Long Term GOALS: 6  weeks. Pt agrees with goals set.  Pt to report decreased pain in L upper 1/4 at a level of >/= 2/10 at worse  Pt with full passive ROM in Cx spine  Pt to report that she is able to manage her L upper 1/4 sx through a HEP      Plan     Certification Period: 10/31/2018 to 12/16/2018.    Outpatient Physical Therapy 2 times weekly for 6 weeks to include the following interventions: Cervical/Lumbar Traction, Electrical Stimulation IFC, Manual Therapy, Moist Heat/ Ice, Patient Education, Therapeutic Activites, Therapeutic Exercise and Dry Needling.     Bienvenido Fam, PT

## 2018-11-01 ENCOUNTER — CLINICAL SUPPORT (OUTPATIENT)
Dept: REHABILITATION | Facility: HOSPITAL | Age: 43
End: 2018-11-01
Attending: PHYSICIAN ASSISTANT
Payer: COMMERCIAL

## 2018-11-01 DIAGNOSIS — M54.2 NECK PAIN: ICD-10-CM

## 2018-11-01 PROCEDURE — 97140 MANUAL THERAPY 1/> REGIONS: CPT | Mod: PO

## 2018-11-01 PROCEDURE — 97110 THERAPEUTIC EXERCISES: CPT | Mod: PO

## 2018-11-01 NOTE — PROGRESS NOTES
[facility]  Physical Therapy Daily Note    Name: Kim Padilla  Clinic Number: [id number]    Therapy Diagnosis:   Encounter Diagnosis   Name Primary?    Neck pain      Physician: Mel Rondon PA-C    Physician Orders: PT Eval and Treat   Medical Diagnosis: Cx radiculopathy  Evaluation Date: 11/1/2018  Authorization period Expiration: 12/31/2018  Plan of Care Certification Period: 12/16/2018    Visit #: 3/ Visits authorized: 40  Time In:8:10  Time Out: 9:00  Total Billable Time: 45 minutes    Precautions: Standard  Subjective   Date of onset: August of '18 reaching into the dryer began having pain shooting across her shoulders.  Numbness down the middle of her back  History of current condition - Kim reports: Pt reported 2 months of neck and upper back pain       Past Medical History:   Diagnosis Date    Infertility, female      Kim Padilla  has a past surgical history that includes Breast lumpectomy (Left, 2014) and Dilation and curettage of uterus.    Kim has a current medication list which includes the following prescription(s): alprazolam, eletriptan, meloxicam, methocarbamol, solifenacin, and zolmitriptan.    Review of patient's allergies indicates:   Allergen Reactions    Penicillins Hives        Imaging, MRI studies:     Prior Therapy: yes [facility]  Social History: Pt with one son who is 10 y/o.  Pt lives with their family  Occupation:   Prior Level of Function: Independent in all ADL's with L shoudler pain  Current Level of Function:  Independent in all ADL's with neck and upper back pain    Pain:  Current 5/10, worst 5/10, best 2/10   Location: back  and neck  bilateral  Description: Tingling  Aggravating Factors: sittine standing  Easing Factors: not sure    Pts goals: Decrease L upper 1/4 pain  Pt reports that she feels tight on the L side of her neck this morning.  She continues with tingling in her mid back    Objective     Mental status  :alert  Posture Alignment :WNL  Sensation: Light Touch: C4 on R,  C6 B, C7 on L             ROM:  UPPER EXTREMITY--AROM/PROM  (R) UE: WFLs  (L) UE: WFLs           RANGE OF MOTION--LOWER EXTREMITIES - nt     Cx spine  Flexion WNL  Extension 75%  Rotation 60% B  Sdie bending  75%B  Upper Extremity Strength  (R) UE  (L) UE    Shoulder flexion: 4+/5 Shoulder flexion: 4/5   Shoulder Abduction: 4+/5 Shoulder abduction: 4/5   Elbow flexion: 4+/5 Elbow flexion: 4+/5   Elbow extension: 4/5 Elbow extension: 4+/5   Wrist flexion: 4+/5 Wrist flexion: 4+/5   Wrist extension: 4+/5 Wrist extension: 4+/5    4+/5 : 4+/5       Lower Extremity Strength - nt  GAIT: Kim ambulates 200 feet with no assistive device independently.     GAIT DEVIATIONS: Kim displays steady gait        TREATMENT       Kim received therapeutic exercises to develop strength, endurance, posture and core stabilization for 20 minutes including:  Prone L shoudler horizontal abd (reverse flys) 10 x 3 - np  Prone B shoulder extension 10 x 3 - np  UBE x 6 min   Scapular bridges 10 x 2 - np  Isometric Cx spine side bending and rotation 5 x 5 sec to R & L - np  Chin retractions 5 x 2  Subociptal stretch 10 sec x 5    Kim received the following manual therapy techniques: Joint mobilizations, Manual traction, Soft tissue Mobilization and dry needling were applied to the: Cx and Thoracic spine for 30  minutes.   STM L  Uppertrap, Cx and Thoracic paraspinals  Passive mobilization Thoracic spine and rib cage  Cx Traction - manual      Dry needling - not performed today  Greater Occipital - B  C 1-2-3, 6-7  R Spinal Accessory  R Dorsal scapular  T 7  B T6  Home Exercises and Patient Education Provided    Education provided re: yes  - progress towards goals   - role of therapy in multi - disciplinary team, goals for therapy  No spiritual or educational barriers to learning provided    Written Home Exercises Provided: no.  Exercises were reviewed and Kim was able  to demonstrate them prior to the end of the session.   Pt received a written copy of exercises to perform at home. Kim demonstrated good  understanding of the education provided.       Assessment   Kim is a 42 y.o. female referred to outpatient Physical Therapy with a medical diagnosis of Cx rqdiculopathy. Pt presents with neck and upper back tingling and numbness.  Pt tolerated all exercises and manual therapy activities well.  Decreased Sx of L side tightness and tingling in her mid back reported following Tx today.    Pt prognosis is Good.   Pt will benefit from skilled outpatient Physical Therapy to address the deficits stated above and in the chart below, provide pt/family education, and to maximize pt's level of independence.     Plan of care discussed with patient: Yes  Pt's spiritual, cultural and educational needs considered and pt agreeable to plan of care and goals as stated below:     Anticipated Barriers for therapy: none    Medical Necessity is demonstrated by the following  History  Co-morbidities and personal factors that may impact the plan of care Examination  Body Structures and Functions, activity limitations and participation restrictions that may impact the plan of care    Clinical Presentation   Co-morbidities:   Lumpectomy        Personal Factors:   no deficits Body Regions:   neck  upper extremities  trunk    Body Systems:    ROM  strength  sensation            Participation Restrictions:   lifting     Activity limitations:   Learning and applying knowledge  no deficits    General Tasks and Commands  no deficits    Communication  no deficits    Mobility  lifting and carrying objects    Self care  no deficits    Domestic Life  doing house work (cleaning house, washing dishes, laundry)    Interactions/Relationships  no deficits    Life Areas  no deficits    Community and Social Life  no deficits         stable and uncomplicated                      low   low  low Decision Making/ Complexity  Score:  low       Short Term GOALS: 3 weeks. Pt agrees with goals set.  Pt independent in a HEP to address deficits  Pt with reports of decreased L upper 1/4   Sx with ADL's  Pt to report decreased pain in L upper 1/4 at a level of >/= 4/10 at worse    Long Term GOALS: 6  weeks. Pt agrees with goals set.  Pt to report decreased pain in L upper 1/4 at a level of >/= 2/10 at worse  Pt with full passive ROM in Cx spine  Pt to report that she is able to manage her L upper 1/4 sx through a HEP      Plan     Certification Period: 11/1/2018 to 12/16/2018.    Outpatient Physical Therapy 2 times weekly for 6 weeks to include the following interventions: Cervical/Lumbar Traction, Electrical Stimulation IFC, Manual Therapy, Moist Heat/ Ice, Patient Education, Therapeutic Activites, Therapeutic Exercise and Dry Needling.     Bienvenido Fam, PT

## 2018-11-06 ENCOUNTER — CLINICAL SUPPORT (OUTPATIENT)
Dept: REHABILITATION | Facility: HOSPITAL | Age: 43
End: 2018-11-06
Attending: PHYSICIAN ASSISTANT
Payer: COMMERCIAL

## 2018-11-06 DIAGNOSIS — M54.2 NECK PAIN: ICD-10-CM

## 2018-11-06 PROCEDURE — 97530 THERAPEUTIC ACTIVITIES: CPT | Mod: PO

## 2018-11-06 PROCEDURE — 97110 THERAPEUTIC EXERCISES: CPT | Mod: PO

## 2018-11-06 NOTE — PROGRESS NOTES
"[facility]  Physical Therapy Daily Note    Name: Kim Padilla  Clinic Number: [id number]    Therapy Diagnosis:   Encounter Diagnosis   Name Primary?    Neck pain      Physician: Mel Rondon PA-C    Physician Orders: PT Eval and Treat   Medical Diagnosis: Cx radiculopathy  Evaluation Date: 11/6/2018  Authorization period Expiration: 12/31/2018  Plan of Care Certification Period: 12/16/2018    Visit #: 3/ Visits authorized: 40  Time In:8:00  Time Out: 8:45  Total Billable Time: 30 minutes    Precautions: Standard  Subjective   Date of onset: August of '18 reaching into the dryer began having pain shooting across her shoulders.  Numbness down the middle of her back  History of current condition - Kim reports: Pt reported 2 months of neck and upper back pain       Past Medical History:   Diagnosis Date    Infertility, female      Kim Padilla  has a past surgical history that includes Breast lumpectomy (Left, 2014) and Dilation and curettage of uterus.    Kim has a current medication list which includes the following prescription(s): alprazolam, eletriptan, meloxicam, methocarbamol, solifenacin, and zolmitriptan.    Review of patient's allergies indicates:   Allergen Reactions    Penicillins Hives        Imaging, MRI studies:     Prior Therapy: yes [facility]  Social History: Pt with one son who is 10 y/o.  Pt lives with their family  Occupation:   Prior Level of Function: Independent in all ADL's with L shoudler pain  Current Level of Function:  Independent in all ADL's with neck and upper back pain    Pain:  Current 5/10, worst 5/10, best 2/10   Location: back  and neck  bilateral  Description: Tingling  Aggravating Factors: sittine standing  Easing Factors: not sure    Pts goals: Decrease L upper 1/4 pain  Pt reports that she is feeling good today with no "tingling" in her mid back.  She is reporting lower back discomfort this mornging.    Objective "     Mental status :alert  Posture Alignment :WNL  Sensation: Light Touch: C4 on R,  C6 B, C7 on L             ROM:  UPPER EXTREMITY--AROM/PROM  (R) UE: WFLs  (L) UE: WFLs           RANGE OF MOTION--LOWER EXTREMITIES - nt     Cx spine  Flexion WNL  Extension 75%  Rotation 60% B  Sdie bending  75%B  Upper Extremity Strength  (R) UE  (L) UE    Shoulder flexion: 4+/5 Shoulder flexion: 4/5   Shoulder Abduction: 4+/5 Shoulder abduction: 4/5   Elbow flexion: 4+/5 Elbow flexion: 4+/5   Elbow extension: 4/5 Elbow extension: 4+/5   Wrist flexion: 4+/5 Wrist flexion: 4+/5   Wrist extension: 4+/5 Wrist extension: 4+/5    4+/5 : 4+/5       Lower Extremity Strength - nt  GAIT: Kim ambulates 200 feet with no assistive device independently.     GAIT DEVIATIONS: Kim displays steady gait        TREATMENT       Kim received therapeutic exercises to develop strength, endurance, posture and core stabilization for 30 minutes including:  UBE x 6 min   Chin retractions 5 x 2  Subociptal stretch 10 sec x 5  Cable cross B shoulder extension 10 x 3 w 3#  Cable cross rows 10 x 3 with 3#  Supine Cx spine rotation x 5 to L & R  Side lying shoulder horizontal abd 10 x 3 to L & R    Tx not performed today:  Scapular bridges 10 x 2 - np  Prone L shoudler horizontal abd (reverse flys) 10 x 3 - np  Prone B shoulder extension 10 x 3 - np  Isometric Cx spine side bending and rotation 5 x 5 sec to R & L - np  Kim received the following manual therapy techniques: Joint mobilizations, Manual traction, Soft tissue Mobilization and dry needling were applied to the: Cx and Thoracic spine for 30  minutes.   STM L  Uppertrap, Cx and Thoracic paraspinals  Passive mobilization Thoracic spine and rib cage  Cx Traction - manual      Dry needling - not performed today  Greater Occipital - B  C 1-2-3, 6-7  R Spinal Accessory  R Dorsal scapular  T 7  B T6  Home Exercises and Patient Education Provided    Education provided re: yes  - progress towards  goals   - role of therapy in multi - disciplinary team, goals for therapy  No spiritual or educational barriers to learning provided    Written Home Exercises Provided: no.  Exercises were reviewed and Kim was able to demonstrate them prior to the end of the session.   Pt received a written copy of exercises to perform at home. Kim demonstrated good  understanding of the education provided.       Assessment   Kim is a 42 y.o. female referred to outpatient Physical Therapy with a medical diagnosis of Cx rqdiculopathy. Pt presents with neck and upper back tingling and numbness.  Pt tolerated all exercises activities well.  Pt with improved soft tissue mobilization in B upper traps.    Pt prognosis is Good.   Pt will benefit from skilled outpatient Physical Therapy to address the deficits stated above and in the chart below, provide pt/family education, and to maximize pt's level of independence.     Plan of care discussed with patient: Yes  Pt's spiritual, cultural and educational needs considered and pt agreeable to plan of care and goals as stated below:     Anticipated Barriers for therapy: none    Medical Necessity is demonstrated by the following  History  Co-morbidities and personal factors that may impact the plan of care Examination  Body Structures and Functions, activity limitations and participation restrictions that may impact the plan of care    Clinical Presentation   Co-morbidities:   Lumpectomy        Personal Factors:   no deficits Body Regions:   neck  upper extremities  trunk    Body Systems:    ROM  strength  sensation            Participation Restrictions:   lifting     Activity limitations:   Learning and applying knowledge  no deficits    General Tasks and Commands  no deficits    Communication  no deficits    Mobility  lifting and carrying objects    Self care  no deficits    Domestic Life  doing house work (cleaning house, washing dishes, laundry)    Interactions/Relationships  no  deficits    Life Areas  no deficits    Community and Social Life  no deficits         stable and uncomplicated                      low   low  low Decision Making/ Complexity Score:  low       Short Term GOALS: 3 weeks. Pt agrees with goals set.  Pt independent in a HEP to address deficits  Pt with reports of decreased L upper 1/4   Sx with ADL's  Pt to report decreased pain in L upper 1/4 at a level of >/= 4/10 at worse    Long Term GOALS: 6  weeks. Pt agrees with goals set.  Pt to report decreased pain in L upper 1/4 at a level of >/= 2/10 at worse  Pt with full passive ROM in Cx spine  Pt to report that she is able to manage her L upper 1/4 sx through a HEP      Plan     Certification Period: 11/6/2018 to 12/16/2018.    Outpatient Physical Therapy 2 times weekly for 6 weeks to include the following interventions: Cervical/Lumbar Traction, Electrical Stimulation IFC, Manual Therapy, Moist Heat/ Ice, Patient Education, Therapeutic Activites, Therapeutic Exercise and Dry Needling.     Bienvenido Fam, PT

## 2018-11-08 ENCOUNTER — CLINICAL SUPPORT (OUTPATIENT)
Dept: REHABILITATION | Facility: HOSPITAL | Age: 43
End: 2018-11-08
Attending: PHYSICIAN ASSISTANT
Payer: COMMERCIAL

## 2018-11-08 DIAGNOSIS — M54.2 NECK PAIN: ICD-10-CM

## 2018-11-08 PROCEDURE — 97110 THERAPEUTIC EXERCISES: CPT | Mod: PO

## 2018-11-08 NOTE — PROGRESS NOTES
[facility]  Physical Therapy Daily Note    Name: Kim Padilla  Clinic Number: [id number]    Therapy Diagnosis:   Encounter Diagnosis   Name Primary?    Neck pain      Physician: Mel Rondon PA-C    Physician Orders: PT Eval and Treat   Medical Diagnosis: Cx radiculopathy  Evaluation Date: 11/8/2018  Authorization period Expiration: 12/31/2018  Plan of Care Certification Period: 12/16/2018    Visit #: 3/ Visits authorized: 40  Time In:8:00  Time Out: 8:45  Total Billable Time: 30 minutes    Precautions: Standard  Subjective   Date of onset: August of '18 reaching into the dryer began having pain shooting across her shoulders.  Numbness down the middle of her back  History of current condition - Kim reports: Pt reported 2 months of neck and upper back pain       Past Medical History:   Diagnosis Date    Infertility, female      Kim Padilla  has a past surgical history that includes Breast lumpectomy (Left, 2014) and Dilation and curettage of uterus.    Kim has a current medication list which includes the following prescription(s): alprazolam, eletriptan, meloxicam, methocarbamol, solifenacin, and zolmitriptan.    Review of patient's allergies indicates:   Allergen Reactions    Penicillins Hives        Imaging, MRI studies:     Prior Therapy: yes YOGI dankjie  Social History: Pt with one son who is 12 y/o.  Pt lives with their family  Occupation:   Prior Level of Function: Independent in all ADL's with L shoudler pain  Current Level of Function:  Independent in all ADL's with neck and upper back pain    Pain:  Current 5/10, worst 5/10, best 2/10   Location: back  and neck  bilateral  Description: Tingling  Aggravating Factors: sittine standing  Easing Factors: not sure    Pts goals: Decrease L upper 1/4 pain  Pt reports no pain and no tingling in her neck or upper back today    Objective     Mental status :alert  Posture Alignment :WNL  Sensation: Light Touch:  C4 on R,  C6 B, C7 on L             ROM:  UPPER EXTREMITY--AROM/PROM  (R) UE: WFLs  (L) UE: WFLs           RANGE OF MOTION--LOWER EXTREMITIES - nt     Cx spine  Flexion WNL  Extension WNL  Rotation 80% B  Sdie bending  75%B  Upper Extremity Strength  (R) UE  (L) UE    Shoulder flexion: 4+/5 Shoulder flexion: 4/5   Shoulder Abduction: 4+/5 Shoulder abduction: 4/5   Elbow flexion: 4+/5 Elbow flexion: 4+/5   Elbow extension: 4/5 Elbow extension: 4+/5   Wrist flexion: 4+/5 Wrist flexion: 4+/5   Wrist extension: 4+/5 Wrist extension: 4+/5    4+/5 : 4+/5       Lower Extremity Strength - nt  GAIT: Kim ambulates 200 feet with no assistive device independently.     GAIT DEVIATIONS: Kim displays steady gait        TREATMENT       Kim received therapeutic exercises to develop strength, endurance, posture and core stabilization for 30 minutes including:  UBE x 6 min   Chin retractions 5 x 2  Subociptal stretch 10 sec x 5  Cable cross B shoulder extension 10 x 3 w 3#  Cable cross rows 10 x 3 with 3#  Supine Cx spine rotation x 5 to L & R  Side lying shoulder horizontal abd 10 x 3 to L & R    Tx not performed today:  Scapular bridges 10 x 2 - np  Prone L shoudler horizontal abd (reverse flys) 10 x 3 - np  Prone B shoulder extension 10 x 3 - np  Isometric Cx spine side bending and rotation 5 x 5 sec to R & L - np  Kim received the following manual therapy techniques: Joint mobilizations, Manual traction, Soft tissue Mobilization and dry needling were applied to the: Cx and Thoracic spine for 30  minutes.   STM L  Uppertrap, Cx and Thoracic paraspinals  Passive mobilization Thoracic spine and rib cage  Cx Traction - manual      Dry needling - not performed today  Greater Occipital - B  C 1-2-3, 6-7  R Spinal Accessory  R Dorsal scapular  T 7  B T6  Home Exercises and Patient Education Provided    Education provided re: yes  - progress towards goals   - role of therapy in multi - disciplinary team, goals for therapy  No  spiritual or educational barriers to learning provided    Written Home Exercises Provided: no.  Exercises were reviewed and Kim was able to demonstrate them prior to the end of the session.   Pt received a written copy of exercises to perform at home. Kim demonstrated good  understanding of the education provided.       Assessment   Kim is a 42 y.o. female referred to outpatient Physical Therapy with a medical diagnosis of Cx rqdiculopathy. Pt presents with neck and upper back tingling and numbness.  Pt tolerated all exercises activities well.  Pt with improved soft tissue mobilization in B upper traps.  Improved Cx spine flexion, extension and B rotation.  She has limited mobility in L side glide of the mid and lower Cx spine without pain    Pt prognosis is Good.   Pt will benefit from skilled outpatient Physical Therapy to address the deficits stated above and in the chart below, provide pt/family education, and to maximize pt's level of independence.     Plan of care discussed with patient: Yes  Pt's spiritual, cultural and educational needs considered and pt agreeable to plan of care and goals as stated below:     Anticipated Barriers for therapy: none    Medical Necessity is demonstrated by the following  History  Co-morbidities and personal factors that may impact the plan of care Examination  Body Structures and Functions, activity limitations and participation restrictions that may impact the plan of care    Clinical Presentation   Co-morbidities:   Lumpectomy        Personal Factors:   no deficits Body Regions:   neck  upper extremities  trunk    Body Systems:    ROM  strength  sensation            Participation Restrictions:   lifting     Activity limitations:   Learning and applying knowledge  no deficits    General Tasks and Commands  no deficits    Communication  no deficits    Mobility  lifting and carrying objects    Self care  no deficits    Domestic Life  doing house work (cleaning house, washing  dishes, laundry)    Interactions/Relationships  no deficits    Life Areas  no deficits    Community and Social Life  no deficits         stable and uncomplicated                      low   low  low Decision Making/ Complexity Score:  low       Short Term GOALS: 3 weeks. Pt agrees with goals set.  Pt independent in a HEP to address deficits - met 11/8/18  Pt with reports of decreased L upper 1/4   Sx with ADL's - met 11/8/2018  Pt to report decreased pain in L upper 1/4 at a level of >/= 4/10 at worse - met 11/8/18    Long Term GOALS: 6  weeks. Pt agrees with goals set.  Pt to report decreased pain in L upper 1/4 at a level of >/= 2/10 at worse  Pt with full passive ROM in Cx spine  Pt to report that she is able to manage her L upper 1/4 sx through a HEP      Plan     Certification Period: 11/8/2018 to 12/16/2018.    Outpatient Physical Therapy 1 times weekly for 3 weeks to include the following interventions: Cervical/Lumbar Traction, Electrical Stimulation IFC, Manual Therapy, Moist Heat/ Ice, Patient Education, Therapeutic Activites, Therapeutic Exercise and Dry Needling.     Bienvenido Fam, PT

## 2018-11-29 RX ORDER — MELOXICAM 15 MG/1
TABLET ORAL
Qty: 30 TABLET | Refills: 0 | Status: SHIPPED | OUTPATIENT
Start: 2018-11-29 | End: 2020-03-17 | Stop reason: DRUGHIGH

## 2019-04-23 ENCOUNTER — PATIENT MESSAGE (OUTPATIENT)
Dept: OBSTETRICS AND GYNECOLOGY | Facility: CLINIC | Age: 44
End: 2019-04-23

## 2019-04-23 NOTE — TELEPHONE ENCOUNTER
L/M to call me back, would like to ask her , how is she feeling and to offer her an appt. Today at 1:30 pm with dr. Pena

## 2019-04-25 ENCOUNTER — OFFICE VISIT (OUTPATIENT)
Dept: OBSTETRICS AND GYNECOLOGY | Facility: CLINIC | Age: 44
End: 2019-04-25
Attending: OBSTETRICS & GYNECOLOGY
Payer: COMMERCIAL

## 2019-04-25 ENCOUNTER — TELEPHONE (OUTPATIENT)
Dept: OBSTETRICS AND GYNECOLOGY | Facility: CLINIC | Age: 44
End: 2019-04-25

## 2019-04-25 VITALS — HEIGHT: 65 IN | BODY MASS INDEX: 28.25 KG/M2 | WEIGHT: 169.56 LBS

## 2019-04-25 DIAGNOSIS — N93.8 DUB (DYSFUNCTIONAL UTERINE BLEEDING): Primary | ICD-10-CM

## 2019-04-25 PROCEDURE — 99999 PR PBB SHADOW E&M-EST. PATIENT-LVL III: CPT | Mod: PBBFAC,,, | Performed by: OBSTETRICS & GYNECOLOGY

## 2019-04-25 PROCEDURE — 58100 PR BIOPSY OF UTERUS LINING: ICD-10-PCS | Mod: S$GLB,,, | Performed by: OBSTETRICS & GYNECOLOGY

## 2019-04-25 PROCEDURE — 58100 BIOPSY OF UTERUS LINING: CPT | Mod: S$GLB,,, | Performed by: OBSTETRICS & GYNECOLOGY

## 2019-04-25 PROCEDURE — 3008F BODY MASS INDEX DOCD: CPT | Mod: CPTII,S$GLB,, | Performed by: OBSTETRICS & GYNECOLOGY

## 2019-04-25 PROCEDURE — 3008F PR BODY MASS INDEX (BMI) DOCUMENTED: ICD-10-PCS | Mod: CPTII,S$GLB,, | Performed by: OBSTETRICS & GYNECOLOGY

## 2019-04-25 PROCEDURE — 99213 PR OFFICE/OUTPT VISIT, EST, LEVL III, 20-29 MIN: ICD-10-PCS | Mod: 25,S$GLB,, | Performed by: OBSTETRICS & GYNECOLOGY

## 2019-04-25 PROCEDURE — 88305 TISSUE SPECIMEN TO PATHOLOGY, OBSTETRICS/GYNECOLOGY: ICD-10-PCS | Mod: 26,,, | Performed by: PATHOLOGY

## 2019-04-25 PROCEDURE — 99213 OFFICE O/P EST LOW 20 MIN: CPT | Mod: 25,S$GLB,, | Performed by: OBSTETRICS & GYNECOLOGY

## 2019-04-25 PROCEDURE — 88305 TISSUE EXAM BY PATHOLOGIST: CPT | Performed by: PATHOLOGY

## 2019-04-25 PROCEDURE — 99999 PR PBB SHADOW E&M-EST. PATIENT-LVL III: ICD-10-PCS | Mod: PBBFAC,,, | Performed by: OBSTETRICS & GYNECOLOGY

## 2019-04-25 RX ORDER — OXYCODONE AND ACETAMINOPHEN 10; 325 MG/1; MG/1
TABLET ORAL
COMMUNITY
Start: 2015-05-28 | End: 2019-07-09

## 2019-04-25 RX ORDER — OSELTAMIVIR PHOSPHATE 75 MG/1
CAPSULE ORAL
Refills: 0 | COMMUNITY
Start: 2019-02-25 | End: 2019-04-25

## 2019-04-25 RX ORDER — AZITHROMYCIN 250 MG/1
TABLET, FILM COATED ORAL
Refills: 0 | COMMUNITY
Start: 2019-02-25 | End: 2019-05-02

## 2019-04-25 RX ORDER — PROGESTERONE 200 MG/1
200 CAPSULE ORAL NIGHTLY
Qty: 12 CAPSULE | Refills: 11 | Status: SHIPPED | OUTPATIENT
Start: 2019-04-25 | End: 2019-07-09

## 2019-04-25 RX ORDER — PHENTERMINE HYDROCHLORIDE 37.5 MG/1
TABLET ORAL
Refills: 0 | COMMUNITY
Start: 2019-04-14 | End: 2019-04-25

## 2019-04-25 RX ORDER — FLUOXETINE 10 MG/1
CAPSULE ORAL
COMMUNITY
Start: 2015-05-19 | End: 2022-02-09

## 2019-04-25 NOTE — PROGRESS NOTES
"CC: heavy bleeding x one week    Kim Padilla is a 43 y.o. female  No cycle since January, spotted in Feb, then started bleeding one week ago was gushing,  Dysmenorhea.  Still going through super plus in two hours.  In  was bleeding every two weeks. Was given loloestrion in 2017 and was great no migraines, only took for one month but insurance would not cover it.  No aura.  Was given junel and had migraines.  So has been off since.     Past Medical History:   Diagnosis Date    Infertility, female        Past Surgical History:   Procedure Laterality Date    BREAST LUMPECTOMY Left     Dr Brown - benign    DILATION AND CURETTAGE OF UTERUS      for SAB       OB History    Para Term  AB Living   1       1     SAB TAB Ectopic Multiple Live Births   1              # Outcome Date GA Lbr Calixto/2nd Weight Sex Delivery Anes PTL Lv   1 SAB                Family History   Problem Relation Age of Onset    Breast cancer Maternal Cousin     Colon cancer Neg Hx     Ovarian cancer Neg Hx        Social History     Tobacco Use    Smoking status: Never Smoker    Smokeless tobacco: Never Used   Substance Use Topics    Alcohol use: No     Comment: rarely    Drug use: No       Ht 5' 5" (1.651 m)   Wt 76.9 kg (169 lb 8.5 oz)   LMP 2019   BMI 28.21 kg/m²     ROS:  GENERAL: Denies weight gain or weight loss. Feeling well overall.   SKIN: Denies rash or lesions.   HEAD: Denies head injury or headache.   NODES: Denies enlarged lymph nodes.   CHEST: Denies chest pain or shortness of breath.   CARDIOVASCULAR: Denies palpitations or left sided chest pain.   ABDOMEN: No abdominal pain, constipation, diarrhea, nausea, vomiting or rectal bleeding.   URINARY: No frequency, dysuria, hematuria, or burning on urination.  REPRODUCTIVE: See HPI.   BREASTS: denies pain, lumps, or nipple discharge.   HEMATOLOGIC: No easy bruisability or excessive bleeding.  MUSCULOSKELETAL: Denies joint pain or swelling. "   NEUROLOGIC: Denies syncope or weakness.   PSYCHIATRIC: Denies depression, anxiety or mood swings.    Physical Exam:    APPEARANCE: Well nourished, well developed, in no acute distress.  AFFECT: WNL, alert and oriented x 3  SKIN: No acne or hirsutism  NECK: Neck symmetric without masses or thyromegaly  NODES: No inguinal, cervical, axillary, or femoral lymph node enlargement  CHEST: Good respiratory effect  ABDOMEN: Soft.  No tenderness or masses.  No hepatosplenomegaly.  No hernias.  PELVIC: Normal external genitalia without lesions.  Normal hair distribution.  Adequate perineal body, normal urethral meatus.  Vagina moist and well rugated without lesions or discharge.  Cervix pink, without lesions, discharge or tenderness.  No significant cystocele or rectocele.  Bimanual exam shows uterus to be normal size, regular, mobile and nontender.  Adnexa without masses or tenderness.    EXTREMITIES: No edema.    ASSESSMENT AND PLAN    Kim was seen today for gynecologic exam.    Diagnoses and all orders for this visit:    DUB (dysfunctional uterine bleeding)  -     US Pelvis Comp with Transvag NON-OB (xpd; Future  -     TSH; Future  -     CBC auto differential; Future  -     Cancel: Tissue Specimen To Pathology, Obstetrics/Gynecology  -     Tissue Specimen To Pathology, Obstetrics/Gynecology  -     Tissue Specimen To Pathology, Obstetrics/Gynecology    Other orders  -     progesterone (PROMETRIUM) 200 MG capsule; Take 1 capsule (200 mg total) by mouth nightly.       UPT is negative.    PRE ENDOMETRIAL BIOPSY COUNSELING:  The patient was informed of the risk of bleeding, infection, uterine perforation and pain and that the test will rule-out endometrial cancer with accuracy greater than 95%. She was counseled on the alternatives to endometrial biopsy and agrees to proceed.    TIME OUT PERFORMED.  The cervix was visualized with a speculum.  A single tooth tenaculum was not placed on the anterior lip prior to the biopsy.  A  sterile endometrial pipelle was passed without difficulty to a depth of 10 cm.  3 passes were made and sufficient endometrial tissue was obtained.    The specimen was placed in formalyn and sent to Pathology for histology evaluation. The patient tolerated the procedure well    Unable to review ultrasound due to system down.  Not actively bleeding but was removing endometrial tissue in chunks.  Start prometrium, await path results and most likely needs D&C with myosure and ablation.  Informed pt she should not get pregnant after an ablation, she has an adopted child and fertility problems.

## 2019-04-25 NOTE — TELEPHONE ENCOUNTER
Spoke to patient regarding ultrasound, needs D&C Hscope with myosure and novasure ablation.  Tia please schedule soon, not emergent but soon Dx is menorrhagia.  Can do first thing on a Friday am then go to [place]

## 2019-04-30 ENCOUNTER — TELEPHONE (OUTPATIENT)
Dept: OBSTETRICS AND GYNECOLOGY | Facility: CLINIC | Age: 44
End: 2019-04-30

## 2019-04-30 ENCOUNTER — LAB VISIT (OUTPATIENT)
Dept: LAB | Facility: HOSPITAL | Age: 44
End: 2019-04-30
Attending: OBSTETRICS & GYNECOLOGY
Payer: COMMERCIAL

## 2019-04-30 DIAGNOSIS — N93.8 DUB (DYSFUNCTIONAL UTERINE BLEEDING): ICD-10-CM

## 2019-04-30 DIAGNOSIS — O03.9 SAB (SPONTANEOUS ABORTION): Primary | ICD-10-CM

## 2019-04-30 LAB
BASOPHILS # BLD AUTO: 0.05 K/UL (ref 0–0.2)
BASOPHILS NFR BLD: 0.5 % (ref 0–1.9)
DIFFERENTIAL METHOD: ABNORMAL
EOSINOPHIL # BLD AUTO: 0.2 K/UL (ref 0–0.5)
EOSINOPHIL NFR BLD: 1.6 % (ref 0–8)
ERYTHROCYTE [DISTWIDTH] IN BLOOD BY AUTOMATED COUNT: 12.5 % (ref 11.5–14.5)
HCT VFR BLD AUTO: 39.3 % (ref 37–48.5)
HGB BLD-MCNC: 12.9 G/DL (ref 12–16)
IMM GRANULOCYTES # BLD AUTO: 0.05 K/UL (ref 0–0.04)
IMM GRANULOCYTES NFR BLD AUTO: 0.5 % (ref 0–0.5)
LYMPHOCYTES # BLD AUTO: 2 K/UL (ref 1–4.8)
LYMPHOCYTES NFR BLD: 21.9 % (ref 18–48)
MCH RBC QN AUTO: 28.7 PG (ref 27–31)
MCHC RBC AUTO-ENTMCNC: 32.8 G/DL (ref 32–36)
MCV RBC AUTO: 87 FL (ref 82–98)
MONOCYTES # BLD AUTO: 0.6 K/UL (ref 0.3–1)
MONOCYTES NFR BLD: 6.8 % (ref 4–15)
NEUTROPHILS # BLD AUTO: 6.4 K/UL (ref 1.8–7.7)
NEUTROPHILS NFR BLD: 68.7 % (ref 38–73)
NRBC BLD-RTO: 0 /100 WBC
PLATELET # BLD AUTO: 294 K/UL (ref 150–350)
PMV BLD AUTO: 10.1 FL (ref 9.2–12.9)
RBC # BLD AUTO: 4.5 M/UL (ref 4–5.4)
TSH SERPL DL<=0.005 MIU/L-ACNC: 1.22 UIU/ML (ref 0.4–4)
WBC # BLD AUTO: 9.3 K/UL (ref 3.9–12.7)

## 2019-04-30 PROCEDURE — 84443 ASSAY THYROID STIM HORMONE: CPT

## 2019-04-30 PROCEDURE — 85025 COMPLETE CBC W/AUTO DIFF WBC: CPT

## 2019-05-01 ENCOUNTER — LAB VISIT (OUTPATIENT)
Dept: LAB | Facility: HOSPITAL | Age: 44
End: 2019-05-01
Attending: OBSTETRICS & GYNECOLOGY
Payer: COMMERCIAL

## 2019-05-01 ENCOUNTER — TELEPHONE (OUTPATIENT)
Dept: OBSTETRICS AND GYNECOLOGY | Facility: CLINIC | Age: 44
End: 2019-05-01

## 2019-05-01 DIAGNOSIS — O03.9 SAB (SPONTANEOUS ABORTION): ICD-10-CM

## 2019-05-01 LAB
BASOPHILS # BLD AUTO: 0.04 K/UL (ref 0–0.2)
BASOPHILS NFR BLD: 0.5 % (ref 0–1.9)
DIFFERENTIAL METHOD: NORMAL
EOSINOPHIL # BLD AUTO: 0.2 K/UL (ref 0–0.5)
EOSINOPHIL NFR BLD: 2.2 % (ref 0–8)
ERYTHROCYTE [DISTWIDTH] IN BLOOD BY AUTOMATED COUNT: 12.5 % (ref 11.5–14.5)
HCG INTACT+B SERPL-ACNC: 111 MIU/ML
HCT VFR BLD AUTO: 39.5 % (ref 37–48.5)
HGB BLD-MCNC: 13.1 G/DL (ref 12–16)
IMM GRANULOCYTES # BLD AUTO: 0.03 K/UL (ref 0–0.04)
IMM GRANULOCYTES NFR BLD AUTO: 0.4 % (ref 0–0.5)
LYMPHOCYTES # BLD AUTO: 1.9 K/UL (ref 1–4.8)
LYMPHOCYTES NFR BLD: 24 % (ref 18–48)
MCH RBC QN AUTO: 29.1 PG (ref 27–31)
MCHC RBC AUTO-ENTMCNC: 33.2 G/DL (ref 32–36)
MCV RBC AUTO: 88 FL (ref 82–98)
MONOCYTES # BLD AUTO: 0.5 K/UL (ref 0.3–1)
MONOCYTES NFR BLD: 6.6 % (ref 4–15)
NEUTROPHILS # BLD AUTO: 5.2 K/UL (ref 1.8–7.7)
NEUTROPHILS NFR BLD: 66.3 % (ref 38–73)
NRBC BLD-RTO: 0 /100 WBC
PLATELET # BLD AUTO: 289 K/UL (ref 150–350)
PMV BLD AUTO: 9.9 FL (ref 9.2–12.9)
RBC # BLD AUTO: 4.5 M/UL (ref 4–5.4)
WBC # BLD AUTO: 7.89 K/UL (ref 3.9–12.7)

## 2019-05-01 PROCEDURE — 84702 CHORIONIC GONADOTROPIN TEST: CPT

## 2019-05-01 PROCEDURE — 85025 COMPLETE CBC W/AUTO DIFF WBC: CPT

## 2019-05-01 NOTE — TELEPHONE ENCOUNTER
Pt said she spoke with  yest about her results and thinks she said she wanted to see her Friday morning for an appt but wanted to make sure. Scheduled pt robyn 5/2 at 10am but want to make sure that is correct.

## 2019-05-02 ENCOUNTER — OFFICE VISIT (OUTPATIENT)
Dept: OBSTETRICS AND GYNECOLOGY | Facility: CLINIC | Age: 44
End: 2019-05-02
Attending: OBSTETRICS & GYNECOLOGY
Payer: COMMERCIAL

## 2019-05-02 ENCOUNTER — PATIENT MESSAGE (OUTPATIENT)
Dept: OBSTETRICS AND GYNECOLOGY | Facility: CLINIC | Age: 44
End: 2019-05-02

## 2019-05-02 ENCOUNTER — LAB VISIT (OUTPATIENT)
Dept: LAB | Facility: OTHER | Age: 44
End: 2019-05-02
Attending: OBSTETRICS & GYNECOLOGY
Payer: COMMERCIAL

## 2019-05-02 VITALS
WEIGHT: 167 LBS | BODY MASS INDEX: 27.82 KG/M2 | SYSTOLIC BLOOD PRESSURE: 112 MMHG | HEIGHT: 65 IN | DIASTOLIC BLOOD PRESSURE: 74 MMHG

## 2019-05-02 DIAGNOSIS — Z32.02 ENCOUNTER FOR PREGNANCY TEST, RESULT NEGATIVE: ICD-10-CM

## 2019-05-02 DIAGNOSIS — N92.1 MENORRHAGIA WITH IRREGULAR CYCLE: ICD-10-CM

## 2019-05-02 DIAGNOSIS — O03.9 SAB (SPONTANEOUS ABORTION): ICD-10-CM

## 2019-05-02 DIAGNOSIS — Z30.430 ENCOUNTER FOR IUD INSERTION: ICD-10-CM

## 2019-05-02 DIAGNOSIS — O03.9 SAB (SPONTANEOUS ABORTION): Primary | ICD-10-CM

## 2019-05-02 LAB
B-HCG UR QL: NEGATIVE
CTP QC/QA: YES
HCG INTACT+B SERPL-ACNC: 82 MIU/ML

## 2019-05-02 PROCEDURE — 84702 CHORIONIC GONADOTROPIN TEST: CPT

## 2019-05-02 PROCEDURE — 3008F BODY MASS INDEX DOCD: CPT | Mod: CPTII,S$GLB,, | Performed by: OBSTETRICS & GYNECOLOGY

## 2019-05-02 PROCEDURE — 3008F PR BODY MASS INDEX (BMI) DOCUMENTED: ICD-10-PCS | Mod: CPTII,S$GLB,, | Performed by: OBSTETRICS & GYNECOLOGY

## 2019-05-02 PROCEDURE — 99213 OFFICE O/P EST LOW 20 MIN: CPT | Mod: S$GLB,,, | Performed by: OBSTETRICS & GYNECOLOGY

## 2019-05-02 PROCEDURE — 81025 URINE PREGNANCY TEST: CPT | Mod: S$GLB,,, | Performed by: OBSTETRICS & GYNECOLOGY

## 2019-05-02 PROCEDURE — 81025 POCT URINE PREGNANCY: ICD-10-PCS | Mod: S$GLB,,, | Performed by: OBSTETRICS & GYNECOLOGY

## 2019-05-02 PROCEDURE — 99999 PR PBB SHADOW E&M-EST. PATIENT-LVL III: ICD-10-PCS | Mod: PBBFAC,,, | Performed by: OBSTETRICS & GYNECOLOGY

## 2019-05-02 PROCEDURE — 36415 COLL VENOUS BLD VENIPUNCTURE: CPT

## 2019-05-02 PROCEDURE — 99213 PR OFFICE/OUTPT VISIT, EST, LEVL III, 20-29 MIN: ICD-10-PCS | Mod: S$GLB,,, | Performed by: OBSTETRICS & GYNECOLOGY

## 2019-05-02 PROCEDURE — 99999 PR PBB SHADOW E&M-EST. PATIENT-LVL III: CPT | Mod: PBBFAC,,, | Performed by: OBSTETRICS & GYNECOLOGY

## 2019-05-02 RX ORDER — MISOPROSTOL 200 UG/1
TABLET ORAL
Qty: 13 TABLET | Refills: 0 | Status: SHIPPED | OUTPATIENT
Start: 2019-05-02 | End: 2020-07-17

## 2019-05-02 NOTE — LETTER
Endocrine/General Surgery  [address] [place] [address]  Phone: [phone]  Fax: [phone] May 2, 2019     Patient: Kim Padilla   YOB: 1975   Date of Visit: 5/2/2019       To whom it may concern,    I saw Kim today in clinic. Kim  has been under my care since May 1, 2019.    She is clear to return to school/work on May 10, 2019.  She can perform all duties without restriction.     If you have any questions or concerns, please don't hesitate to contact my office. Thank you for accomodating her during this time of her treatment.        Regards,        Kun Pena MD        CC  No Recipients

## 2019-05-03 ENCOUNTER — TELEPHONE (OUTPATIENT)
Dept: OBSTETRICS AND GYNECOLOGY | Facility: CLINIC | Age: 44
End: 2019-05-03

## 2019-05-03 NOTE — TELEPHONE ENCOUNTER
Pt. Notified with decreased beta levels, she is to  her fmedication at her drug store, and go for next lab drawing on Tuesday.

## 2019-05-03 NOTE — TELEPHONE ENCOUNTER
----- Message from Kun Pena MD sent at 5/2/2019  5:42 PM CDT -----  Call patient pregnancy hormone level decreasing, definitely was a miscarriage.  I sent in rx for meds of which I want her to place the pills vaginally and we do repeat labs on Tuesday.

## 2019-05-03 NOTE — PROGRESS NOTES
"CC: f/u SAB    Kim Padilla is a 43 y.o. female  bleeding is lighter. Stopped prometrium when was informed of pathology. No pain.  Last time had intercourse was December.  Does not want children at this point.  Wants hyst but open to options.       Past Medical History:   Diagnosis Date    Infertility, female        Past Surgical History:   Procedure Laterality Date    BREAST LUMPECTOMY Left     Dr Brown - benign    DILATION AND CURETTAGE OF UTERUS      for SAB       OB History    Para Term  AB Living   1       1     SAB TAB Ectopic Multiple Live Births   1              # Outcome Date GA Lbr Calixto/2nd Weight Sex Delivery Anes PTL Lv   1 SAB                Family History   Problem Relation Age of Onset    Breast cancer Maternal Cousin     Colon cancer Neg Hx     Ovarian cancer Neg Hx        Social History     Tobacco Use    Smoking status: Never Smoker    Smokeless tobacco: Never Used   Substance Use Topics    Alcohol use: No     Comment: rarely    Drug use: No       /74   Ht 5' 5" (1.651 m)   Wt 75.8 kg (167 lb)   LMP 2019 (Exact Date)   BMI 27.79 kg/m²     ROS:  GENERAL: Denies weight gain or weight loss. Feeling well overall.   SKIN: Denies rash or lesions.   HEAD: Denies head injury or headache.   NODES: Denies enlarged lymph nodes.   CHEST: Denies chest pain or shortness of breath.   CARDIOVASCULAR: Denies palpitations or left sided chest pain.   ABDOMEN: No abdominal pain, constipation, diarrhea, nausea, vomiting or rectal bleeding.   URINARY: No frequency, dysuria, hematuria, or burning on urination.  REPRODUCTIVE: See HPI.   BREASTS: denies pain, lumps, or nipple discharge.   HEMATOLOGIC: No easy bruisability or excessive bleeding.  MUSCULOSKELETAL: Denies joint pain or swelling.   NEUROLOGIC: Denies syncope or weakness.   PSYCHIATRIC: Denies depression, anxiety or mood swings.    Physical Exam:    APPEARANCE: Well nourished, well developed, in no acute " distress.  AFFECT: WNL, alert and oriented x 3  SKIN: No acne or hirsutism  NECK: Neck symmetric without masses or thyromegaly  NODES: No inguinal, cervical, axillary, or femoral lymph node enlargement  CHEST: Good respiratory effect  ABDOMEN: Soft.  No tenderness or masses.  No hepatosplenomegaly.  No hernias.  PELVIC: Normal external genitalia without lesions.  Normal hair distribution.  Adequate perineal body, normal urethral meatus.  Vagina moist and well rugated without lesions or discharge.  Cervix pink, without lesions, discharge or tenderness.  No significant cystocele or rectocele.  Bimanual exam shows uterus to be normal size, regular, mobile and nontender.  Adnexa without masses or tenderness.    EXTREMITIES: No edema.    ASSESSMENT AND PLAN    Kim was seen today for follow-up.    Diagnoses and all orders for this visit:    SAB (spontaneous )  -     hCG, quantitative; Future  -     miSOPROStol (CYTOTEC) 200 MCG Tab; Place 4 tabs first dose vaginally then place 3 tabs vaginally every 8 hours for total of 4 doses  -     hCG, quantitative; Future    Encounter for pregnancy test, result negative  -     POCT urine pregnancy    needs to recoup from this, repeat hcg and if decrease take cytotec, serial hcg and once zero put in IUD. Pt agrees with plan. Is an  and only could do hyst if we did it in May.  Needs contraception so IUD better over ablation    Follow up if symptoms worsen or fail to improve.

## 2019-05-07 ENCOUNTER — PATIENT MESSAGE (OUTPATIENT)
Dept: OBSTETRICS AND GYNECOLOGY | Facility: CLINIC | Age: 44
End: 2019-05-07

## 2019-05-07 DIAGNOSIS — O02.1 MISSED AB: Primary | ICD-10-CM

## 2019-05-07 NOTE — TELEPHONE ENCOUNTER
Sherita mendoza note, can say miscarriage, we do not know how far along she was as she passed it at home.

## 2019-05-08 ENCOUNTER — TELEPHONE (OUTPATIENT)
Dept: OBSTETRICS AND GYNECOLOGY | Facility: CLINIC | Age: 44
End: 2019-05-08

## 2019-05-08 ENCOUNTER — LAB VISIT (OUTPATIENT)
Dept: LAB | Facility: HOSPITAL | Age: 44
End: 2019-05-08
Attending: OBSTETRICS & GYNECOLOGY
Payer: COMMERCIAL

## 2019-05-08 DIAGNOSIS — R53.81 MALAISE: ICD-10-CM

## 2019-05-08 DIAGNOSIS — R53.81 MALAISE: Primary | ICD-10-CM

## 2019-05-08 LAB
BASOPHILS # BLD AUTO: 0.04 K/UL (ref 0–0.2)
BASOPHILS NFR BLD: 0.4 % (ref 0–1.9)
DIFFERENTIAL METHOD: NORMAL
EOSINOPHIL # BLD AUTO: 0.2 K/UL (ref 0–0.5)
EOSINOPHIL NFR BLD: 2.3 % (ref 0–8)
ERYTHROCYTE [DISTWIDTH] IN BLOOD BY AUTOMATED COUNT: 12.6 % (ref 11.5–14.5)
HCT VFR BLD AUTO: 40.4 % (ref 37–48.5)
HGB BLD-MCNC: 13.1 G/DL (ref 12–16)
IMM GRANULOCYTES # BLD AUTO: 0.04 K/UL (ref 0–0.04)
IMM GRANULOCYTES NFR BLD AUTO: 0.4 % (ref 0–0.5)
LYMPHOCYTES # BLD AUTO: 3 K/UL (ref 1–4.8)
LYMPHOCYTES NFR BLD: 32.5 % (ref 18–48)
MCH RBC QN AUTO: 28.6 PG (ref 27–31)
MCHC RBC AUTO-ENTMCNC: 32.4 G/DL (ref 32–36)
MCV RBC AUTO: 88 FL (ref 82–98)
MONOCYTES # BLD AUTO: 0.6 K/UL (ref 0.3–1)
MONOCYTES NFR BLD: 6.6 % (ref 4–15)
NEUTROPHILS # BLD AUTO: 5.2 K/UL (ref 1.8–7.7)
NEUTROPHILS NFR BLD: 57.8 % (ref 38–73)
NRBC BLD-RTO: 0 /100 WBC
PLATELET # BLD AUTO: 307 K/UL (ref 150–350)
PMV BLD AUTO: 10.3 FL (ref 9.2–12.9)
RBC # BLD AUTO: 4.58 M/UL (ref 4–5.4)
WBC # BLD AUTO: 9.08 K/UL (ref 3.9–12.7)

## 2019-05-08 PROCEDURE — 85025 COMPLETE CBC W/AUTO DIFF WBC: CPT

## 2019-05-08 NOTE — TELEPHONE ENCOUNTER
Spoke with pt. Who stated she is having severe headaches and nausea, I asked NP Jeffry if I can add a cbc and she okay'd it, I will check her BP when she walks in for her blood work, pt. Aware dr. Pena is out today.

## 2019-05-09 ENCOUNTER — LAB VISIT (OUTPATIENT)
Dept: LAB | Facility: HOSPITAL | Age: 44
End: 2019-05-09
Attending: OBSTETRICS & GYNECOLOGY
Payer: COMMERCIAL

## 2019-05-09 ENCOUNTER — TELEPHONE (OUTPATIENT)
Dept: OBSTETRICS AND GYNECOLOGY | Facility: CLINIC | Age: 44
End: 2019-05-09

## 2019-05-09 DIAGNOSIS — O03.9 SAB (SPONTANEOUS ABORTION): ICD-10-CM

## 2019-05-09 LAB — HCG INTACT+B SERPL-ACNC: 15 MIU/ML

## 2019-05-09 PROCEDURE — 84702 CHORIONIC GONADOTROPIN TEST: CPT

## 2019-05-09 NOTE — TELEPHONE ENCOUNTER
See if we can add hcg to her cbc drawn, I don't think we can.  I am not sure why CBC was drawn without the hcg as the order is in there.  Please call pt and have come in for hcg and apologize

## 2019-05-09 NOTE — TELEPHONE ENCOUNTER
Spoke with pt. Who had her cbc yesterday but not her HCG, sending her back today for drawing, I did apologize to pt. For having to go back. She said no worries.

## 2019-05-10 NOTE — TELEPHONE ENCOUNTER
Letter ready for pt. /  Pt. Requesting beta results, and when to come back  for her next HCG draw:    Please advise:

## 2019-05-21 ENCOUNTER — LAB VISIT (OUTPATIENT)
Dept: LAB | Facility: HOSPITAL | Age: 44
End: 2019-05-21
Attending: OBSTETRICS & GYNECOLOGY
Payer: COMMERCIAL

## 2019-05-21 DIAGNOSIS — O02.1 MISSED AB: ICD-10-CM

## 2019-05-21 LAB — HCG INTACT+B SERPL-ACNC: 3.3 MIU/ML

## 2019-05-21 PROCEDURE — 84702 CHORIONIC GONADOTROPIN TEST: CPT

## 2019-05-24 ENCOUNTER — TELEPHONE (OUTPATIENT)
Dept: OBSTETRICS AND GYNECOLOGY | Facility: CLINIC | Age: 44
End: 2019-05-24

## 2019-05-24 NOTE — TELEPHONE ENCOUNTER
----- Message from Kun Pena MD sent at 5/24/2019  1:04 PM CDT -----  Call patient hcg down to non pregnant

## 2019-06-20 ENCOUNTER — PATIENT MESSAGE (OUTPATIENT)
Dept: OBSTETRICS AND GYNECOLOGY | Facility: CLINIC | Age: 44
End: 2019-06-20

## 2019-06-20 NOTE — TELEPHONE ENCOUNTER
Pt. Aware dr. Pena is out today :    Pt. Requesting Mirena to be ordered, she is ready for it, she said she had discussed this with dr. Pena, will you be okay for me to order it for her.      Please advise:

## 2019-06-24 ENCOUNTER — TELEPHONE (OUTPATIENT)
Dept: OBSTETRICS AND GYNECOLOGY | Facility: CLINIC | Age: 44
End: 2019-06-24

## 2019-06-24 DIAGNOSIS — Z30.430 ENCOUNTER FOR INSERTION OF INTRAUTERINE CONTRACEPTIVE DEVICE (IUD): Primary | ICD-10-CM

## 2019-06-26 ENCOUNTER — TELEPHONE (OUTPATIENT)
Dept: OBSTETRICS AND GYNECOLOGY | Facility: CLINIC | Age: 44
End: 2019-06-26

## 2019-06-26 NOTE — TELEPHONE ENCOUNTER
"...Dr. Pena , would you like to see her sooner then next available in August for Mirena insertion / will she need RX"cytotec     Would you like for me to add her on / double book, please let me know    Please advise:      "

## 2019-06-27 NOTE — TELEPHONE ENCOUNTER
If she has not been sexually active in over 2 weeks and she can come in whenever she wants to get inserted does not have to be on her period.

## 2019-06-28 ENCOUNTER — PATIENT MESSAGE (OUTPATIENT)
Dept: OBSTETRICS AND GYNECOLOGY | Facility: CLINIC | Age: 44
End: 2019-06-28

## 2019-07-09 ENCOUNTER — OFFICE VISIT (OUTPATIENT)
Dept: OBSTETRICS AND GYNECOLOGY | Facility: CLINIC | Age: 44
End: 2019-07-09
Payer: COMMERCIAL

## 2019-07-09 VITALS
DIASTOLIC BLOOD PRESSURE: 70 MMHG | BODY MASS INDEX: 29.49 KG/M2 | HEIGHT: 65 IN | SYSTOLIC BLOOD PRESSURE: 120 MMHG | WEIGHT: 177 LBS

## 2019-07-09 DIAGNOSIS — Z30.430 ENCOUNTER FOR IUD INSERTION: ICD-10-CM

## 2019-07-09 DIAGNOSIS — Z11.3 SCREENING EXAMINATION FOR STD (SEXUALLY TRANSMITTED DISEASE): Primary | ICD-10-CM

## 2019-07-09 PROCEDURE — 99499 NO LOS: ICD-10-PCS | Mod: S$GLB,,, | Performed by: OBSTETRICS & GYNECOLOGY

## 2019-07-09 PROCEDURE — 87491 CHLMYD TRACH DNA AMP PROBE: CPT

## 2019-07-09 PROCEDURE — 99999 PR PBB SHADOW E&M-EST. PATIENT-LVL III: CPT | Mod: PBBFAC,,, | Performed by: OBSTETRICS & GYNECOLOGY

## 2019-07-09 PROCEDURE — 99499 UNLISTED E&M SERVICE: CPT | Mod: S$GLB,,, | Performed by: OBSTETRICS & GYNECOLOGY

## 2019-07-09 PROCEDURE — 99999 PR PBB SHADOW E&M-EST. PATIENT-LVL III: ICD-10-PCS | Mod: PBBFAC,,, | Performed by: OBSTETRICS & GYNECOLOGY

## 2019-07-09 PROCEDURE — 58300 INSERT INTRAUTERINE DEVICE: CPT | Mod: S$GLB,,, | Performed by: OBSTETRICS & GYNECOLOGY

## 2019-07-09 PROCEDURE — 58300 PR INSERT INTRAUTERINE DEVICE: ICD-10-PCS | Mod: S$GLB,,, | Performed by: OBSTETRICS & GYNECOLOGY

## 2019-07-09 NOTE — PROGRESS NOTES
IUD INSERT    REASON FOR VISIT    Contraceptive management.        CURRENT MEDICATION     Intra-uterine device      TESTS   Laboratory-based Chemistry:  Urine Tests:    An HCG pregnancy test was negative.      ASSESSMENT     Pregnancy test was negative     Insertion of intrauterine device (IUD)       PLAN     Follow-up for re-examination in 6 weeks to assess Mirena strings       THERAPY     Brief operative note free text: Bimanual exam confirmed uterine position.  Vaginal speculum inserted.  Cervix prepped with betadine.  Cervix grasped with tenaculum at 12 o'clock.  Mirena device fitted to sounded depth and inserted without difficulty.   IUD strings cut 3-4 cm from cervical os.  Tenaculum removed.  Tenaculum sites cauterized with nitrous stick.      COUNSELING/EDUCATION     Pre-procedure checklist: informed consent was not obtained Patient was counseled of risks to procedure including but not limited to pain, bleeding, infection, IUD perforation requiring abdominal surgery for removal, explusion, migration, need for hysteroscopic removal, pregnancy and risk of ectopic. Patient consents and signed Mirena form.    Counseling lasted approximately 15 minutes and all her questions were answered.

## 2019-07-10 LAB
C TRACH DNA SPEC QL NAA+PROBE: NOT DETECTED
N GONORRHOEA DNA SPEC QL NAA+PROBE: NOT DETECTED

## 2019-07-13 ENCOUNTER — PATIENT MESSAGE (OUTPATIENT)
Dept: OBSTETRICS AND GYNECOLOGY | Facility: CLINIC | Age: 44
End: 2019-07-13

## 2019-07-13 DIAGNOSIS — G47.30 SLEEP APNEA, UNSPECIFIED TYPE: Primary | ICD-10-CM

## 2019-07-15 NOTE — TELEPHONE ENCOUNTER
Pt. Requesting sleep apnea referral to see Dr. Barb Johnston at [facility]/ [place], will you approve this.     Review message:

## 2019-07-16 ENCOUNTER — TELEPHONE (OUTPATIENT)
Dept: OBSTETRICS AND GYNECOLOGY | Facility: CLINIC | Age: 44
End: 2019-07-16

## 2019-07-16 NOTE — TELEPHONE ENCOUNTER
Referral in system for sleep disorder /  will set up appt. For pt. In [place] office in [place]

## 2019-07-24 ENCOUNTER — PATIENT MESSAGE (OUTPATIENT)
Dept: OBSTETRICS AND GYNECOLOGY | Facility: CLINIC | Age: 44
End: 2019-07-24

## 2019-07-25 NOTE — TELEPHONE ENCOUNTER
Spoke with pt. Who has not been called from Sleep Study office / referral in system Pt, has their phone number and she will call to schedule appt. And problems she will call me back.

## 2019-08-26 ENCOUNTER — OFFICE VISIT (OUTPATIENT)
Dept: URGENT CARE | Facility: CLINIC | Age: 44
End: 2019-08-26
Payer: COMMERCIAL

## 2019-08-26 VITALS
RESPIRATION RATE: 16 BRPM | TEMPERATURE: 98 F | HEART RATE: 85 BPM | DIASTOLIC BLOOD PRESSURE: 78 MMHG | HEIGHT: 65 IN | OXYGEN SATURATION: 100 % | WEIGHT: 160 LBS | BODY MASS INDEX: 26.66 KG/M2 | SYSTOLIC BLOOD PRESSURE: 110 MMHG

## 2019-08-26 DIAGNOSIS — J06.9 UPPER RESPIRATORY TRACT INFECTION, UNSPECIFIED TYPE: Primary | ICD-10-CM

## 2019-08-26 DIAGNOSIS — R09.81 SINUS CONGESTION: ICD-10-CM

## 2019-08-26 PROCEDURE — 99214 OFFICE O/P EST MOD 30 MIN: CPT | Mod: 25,S$GLB,, | Performed by: FAMILY MEDICINE

## 2019-08-26 PROCEDURE — 99214 PR OFFICE/OUTPT VISIT, EST, LEVL IV, 30-39 MIN: ICD-10-PCS | Mod: 25,S$GLB,, | Performed by: FAMILY MEDICINE

## 2019-08-26 PROCEDURE — 3008F BODY MASS INDEX DOCD: CPT | Mod: CPTII,S$GLB,, | Performed by: FAMILY MEDICINE

## 2019-08-26 PROCEDURE — 96372 PR INJECTION,THERAP/PROPH/DIAG2ST, IM OR SUBCUT: ICD-10-PCS | Mod: S$GLB,,, | Performed by: FAMILY MEDICINE

## 2019-08-26 PROCEDURE — 3008F PR BODY MASS INDEX (BMI) DOCUMENTED: ICD-10-PCS | Mod: CPTII,S$GLB,, | Performed by: FAMILY MEDICINE

## 2019-08-26 PROCEDURE — 96372 THER/PROPH/DIAG INJ SC/IM: CPT | Mod: S$GLB,,, | Performed by: FAMILY MEDICINE

## 2019-08-26 RX ORDER — BETAMETHASONE SODIUM PHOSPHATE AND BETAMETHASONE ACETATE 3; 3 MG/ML; MG/ML
9 INJECTION, SUSPENSION INTRA-ARTICULAR; INTRALESIONAL; INTRAMUSCULAR; SOFT TISSUE
Status: COMPLETED | OUTPATIENT
Start: 2019-08-26 | End: 2019-08-26

## 2019-08-26 RX ORDER — AZELASTINE 1 MG/ML
1 SPRAY, METERED NASAL 2 TIMES DAILY
Qty: 30 ML | Refills: 0 | Status: SHIPPED | OUTPATIENT
Start: 2019-08-26 | End: 2019-09-25

## 2019-08-26 RX ADMIN — BETAMETHASONE SODIUM PHOSPHATE AND BETAMETHASONE ACETATE 9 MG: 3; 3 INJECTION, SUSPENSION INTRA-ARTICULAR; INTRALESIONAL; INTRAMUSCULAR; SOFT TISSUE at 10:08

## 2019-08-26 NOTE — PROGRESS NOTES
"Subjective:       Patient ID: Kim Padilla is a 43 y.o. female.    Vitals:  height is 5' 5" (1.651 m) and weight is 72.6 kg (160 lb). Her temperature is 97.5 °F (36.4 °C). Her blood pressure is 110/78 and her pulse is 85. Her respiration is 16 and oxygen saturation is 100%.     Chief Complaint: URI    strted on friday    Sore Throat    This is a new problem. The current episode started in the past 7 days. The problem has been gradually worsening. Neither side of throat is experiencing more pain than the other. There has been no fever. The pain is at a severity of 2/10. The pain is mild. Associated symptoms include congestion, coughing and neck pain. Pertinent negatives include no ear pain, shortness of breath, stridor or vomiting. Treatments tried: sudafed,flonase. The treatment provided no relief.       Constitution: Negative for chills, sweating, fatigue and fever.   HENT: Positive for congestion, sinus pain, sinus pressure and sore throat. Negative for ear pain and voice change.    Neck: Positive for neck pain. Negative for painful lymph nodes.   Eyes: Negative for eye redness.   Respiratory: Positive for cough. Negative for chest tightness, sputum production, bloody sputum, COPD, shortness of breath, stridor, wheezing and asthma.    Gastrointestinal: Negative for nausea and vomiting.   Musculoskeletal: Negative for muscle ache.   Skin: Negative for rash and erythema.   Allergic/Immunologic: Negative for seasonal allergies and asthma.   Hematologic/Lymphatic: Negative for swollen lymph nodes.       Objective:      Physical Exam   Constitutional: She is oriented to person, place, and time. She appears well-developed and well-nourished.   HENT:   Head: Normocephalic and atraumatic.   Right Ear: External ear normal.   Left Ear: External ear normal.   Mild bilateral frontal and maxillary sinus pressure discomfort.     Eyes: Pupils are equal, round, and reactive to light. EOM are normal.   Neck: Normal range of " motion. Neck supple.   Cardiovascular: Normal rate, regular rhythm and normal heart sounds. Exam reveals no gallop and no friction rub.   No murmur heard.  Pulmonary/Chest: Breath sounds normal. No respiratory distress. She has no wheezes. She has no rales. She exhibits no tenderness.   Abdominal: Soft. Bowel sounds are normal. She exhibits no distension. There is no tenderness. There is no rebound and no guarding.   Musculoskeletal: Normal range of motion. She exhibits no edema, tenderness or deformity.   Lymphadenopathy:     She has no cervical adenopathy.   Neurological: She is alert and oriented to person, place, and time. No cranial nerve deficit or sensory deficit. She exhibits normal muscle tone. Coordination normal.   Skin: Skin is warm. Capillary refill takes less than 2 seconds. No rash noted. No erythema. No pallor.   Psychiatric: She has a normal mood and affect. Her behavior is normal. Judgment and thought content normal.   Vitals reviewed.      Assessment:       1. Upper respiratory tract infection, unspecified type    2. Sinus congestion        Plan:         Upper respiratory tract infection, unspecified type    Sinus congestion  -     betamethasone acetate-betamethasone sodium phosphate injection 9 mg  -     azelastine (ASTELIN) 137 mcg (0.1 %) nasal spray; 1 spray (137 mcg total) by Nasal route 2 (two) times daily.  Dispense: 30 mL; Refill: 0          Patient Instructions     Viral Upper Respiratory Illness (Adult)  You have a viral upper respiratory illness (URI), which is another term for the common cold. This illness is contagious during the first few days. It is spread through the air by coughing and sneezing. It may also be spread by direct contact (touching the sick person and then touching your own eyes, nose, or mouth). Frequent handwashing will decrease risk of spread. Most viral illnesses go away within 7 to 10 days with rest and simple home remedies. Sometimes the illness may last for  several weeks. Antibiotics will not kill a virus, and they are generally not prescribed for this condition.    Home care  · If symptoms are severe, rest at home for the first 2 to 3 days. When you resume activity, don't let yourself get too tired.  · Avoid being exposed to cigarette smoke (yours or others).  · You may use acetaminophen or ibuprofen to control pain and fever, unless another medicine was prescribed. (Note: If you have chronic liver or kidney disease, have ever had a stomach ulcer or gastrointestinal bleeding, or are taking blood-thinning medicines, talk with your healthcare provider before using these medicines.) Aspirin should never be given to anyone under 18 years of age who is ill with a viral infection or fever. It may cause severe liver or brain damage.  · Your appetite may be poor, so a light diet is fine. Avoid dehydration by drinking 6 to 8 glasses of fluids per day (water, soft drinks, juices, tea, or soup). Extra fluids will help loosen secretions in the nose and lungs.  · Over-the-counter cold medicines will not shorten the length of time youre sick, but they may be helpful for the following symptoms: cough, sore throat, and nasal and sinus congestion. (Note: Do not use decongestants if you have high blood pressure.)  Follow-up care  Follow up with your healthcare provider, or as advised.  When to seek medical advice  Call your healthcare provider right away if any of these occur:  · Cough with lots of colored sputum (mucus)  · Severe headache; face, neck, or ear pain  · Difficulty swallowing due to throat pain  · Fever of 100.4°F (38°C)  Call 911, or get immediate medical care  Call emergency services right away if any of these occur:  · Chest pain, shortness of breath, wheezing, or difficulty breathing  · Coughing up blood  · Inability to swallow due to throat pain  Date Last Reviewed: 9/13/2015  © 3649-7834 [employer]. [address] [place] [address], [place] [address]. All  rights reserved. This information is not intended as a substitute for professional medical care. Always follow your healthcare professional's instructions.      Follow up with your doctor in a few days as needed.  Return to the urgent care or go to the ER if symptoms get worse.    Willian Palm MD

## 2019-08-26 NOTE — PATIENT INSTRUCTIONS
Viral Upper Respiratory Illness (Adult)  You have a viral upper respiratory illness (URI), which is another term for the common cold. This illness is contagious during the first few days. It is spread through the air by coughing and sneezing. It may also be spread by direct contact (touching the sick person and then touching your own eyes, nose, or mouth). Frequent handwashing will decrease risk of spread. Most viral illnesses go away within 7 to 10 days with rest and simple home remedies. Sometimes the illness may last for several weeks. Antibiotics will not kill a virus, and they are generally not prescribed for this condition.    Home care  · If symptoms are severe, rest at home for the first 2 to 3 days. When you resume activity, don't let yourself get too tired.  · Avoid being exposed to cigarette smoke (yours or others).  · You may use acetaminophen or ibuprofen to control pain and fever, unless another medicine was prescribed. (Note: If you have chronic liver or kidney disease, have ever had a stomach ulcer or gastrointestinal bleeding, or are taking blood-thinning medicines, talk with your healthcare provider before using these medicines.) Aspirin should never be given to anyone under 18 years of age who is ill with a viral infection or fever. It may cause severe liver or brain damage.  · Your appetite may be poor, so a light diet is fine. Avoid dehydration by drinking 6 to 8 glasses of fluids per day (water, soft drinks, juices, tea, or soup). Extra fluids will help loosen secretions in the nose and lungs.  · Over-the-counter cold medicines will not shorten the length of time youre sick, but they may be helpful for the following symptoms: cough, sore throat, and nasal and sinus congestion. (Note: Do not use decongestants if you have high blood pressure.)  Follow-up care  Follow up with your healthcare provider, or as advised.  When to seek medical advice  Call your healthcare provider right away if any  of these occur:  · Cough with lots of colored sputum (mucus)  · Severe headache; face, neck, or ear pain  · Difficulty swallowing due to throat pain  · Fever of 100.4°F (38°C)  Call 911, or get immediate medical care  Call emergency services right away if any of these occur:  · Chest pain, shortness of breath, wheezing, or difficulty breathing  · Coughing up blood  · Inability to swallow due to throat pain  Date Last Reviewed: 9/13/2015 © 2000-2017 [employer]. [address] [place] [address]. All rights reserved. This information is not intended as a substitute for professional medical care. Always follow your healthcare professional's instructions.      Follow up with your doctor in a few days as needed.  Return to the urgent care or go to the ER if symptoms get worse.    Willian Palm MD

## 2019-09-16 ENCOUNTER — PATIENT MESSAGE (OUTPATIENT)
Dept: OBSTETRICS AND GYNECOLOGY | Facility: CLINIC | Age: 44
End: 2019-09-16

## 2019-09-17 RX ORDER — FLUOXETINE 10 MG/1
10 CAPSULE ORAL DAILY
Qty: 90 CAPSULE | Refills: 3 | Status: SHIPPED | OUTPATIENT
Start: 2019-09-17 | End: 2020-07-17

## 2019-11-13 ENCOUNTER — OFFICE VISIT (OUTPATIENT)
Dept: PODIATRY | Facility: CLINIC | Age: 44
End: 2019-11-13
Payer: COMMERCIAL

## 2019-11-13 VITALS — BODY MASS INDEX: 26.66 KG/M2 | WEIGHT: 160 LBS | HEIGHT: 65 IN

## 2019-11-13 DIAGNOSIS — M72.2 PLANTAR FASCIITIS: Primary | ICD-10-CM

## 2019-11-13 PROCEDURE — 20550 NJX 1 TENDON SHEATH/LIGAMENT: CPT | Mod: RT,S$GLB,, | Performed by: PODIATRIST

## 2019-11-13 PROCEDURE — 99999 PR PBB SHADOW E&M-EST. PATIENT-LVL III: ICD-10-PCS | Mod: PBBFAC,,, | Performed by: PODIATRIST

## 2019-11-13 PROCEDURE — 3008F BODY MASS INDEX DOCD: CPT | Mod: CPTII,S$GLB,, | Performed by: PODIATRIST

## 2019-11-13 PROCEDURE — 99203 OFFICE O/P NEW LOW 30 MIN: CPT | Mod: 25,S$GLB,, | Performed by: PODIATRIST

## 2019-11-13 PROCEDURE — 20550 PR INJECT TENDON SHEATH/LIGAMENT: ICD-10-PCS | Mod: RT,S$GLB,, | Performed by: PODIATRIST

## 2019-11-13 PROCEDURE — 3008F PR BODY MASS INDEX (BMI) DOCUMENTED: ICD-10-PCS | Mod: CPTII,S$GLB,, | Performed by: PODIATRIST

## 2019-11-13 PROCEDURE — 99999 PR PBB SHADOW E&M-EST. PATIENT-LVL III: CPT | Mod: PBBFAC,,, | Performed by: PODIATRIST

## 2019-11-13 PROCEDURE — 99203 PR OFFICE/OUTPT VISIT, NEW, LEVL III, 30-44 MIN: ICD-10-PCS | Mod: 25,S$GLB,, | Performed by: PODIATRIST

## 2019-11-13 RX ORDER — TRIAMCINOLONE ACETONIDE 40 MG/ML
40 INJECTION, SUSPENSION INTRA-ARTICULAR; INTRAMUSCULAR
Status: COMPLETED | OUTPATIENT
Start: 2019-11-13 | End: 2019-11-13

## 2019-11-13 RX ADMIN — TRIAMCINOLONE ACETONIDE 40 MG: 40 INJECTION, SUSPENSION INTRA-ARTICULAR; INTRAMUSCULAR at 11:11

## 2019-11-13 NOTE — PROGRESS NOTES
Subjective:      Patient ID: Kim Padilla is a 44 y.o. female.    Chief Complaint: Heel Pain (right heel )    44 y.o. female presenting with right heel pain. Points plantar heel for pain.  Ambulating in casual boot today. 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 First time having this type of pain. Noticed pain 3 weeks ago. No trauma noted. Pain is worse in the morning/coming out of her bed. Pain is worse when she starts walking after driving her car. Taking advil with minimal pain relief. Describes pain as sharp. Pain gets better with ambulation.       Review of Systems   Constitution: Negative for chills, decreased appetite, fever and malaise/fatigue.   HENT: Negative for congestion, ear discharge and sore throat.    Eyes: Negative for discharge and pain.   Cardiovascular: Negative for chest pain, claudication and leg swelling.   Respiratory: Negative for cough and shortness of breath.    Skin: Negative for color change, nail changes and rash.   Musculoskeletal: Negative for arthritis, joint pain, joint swelling and muscle weakness.        R heel pain    Gastrointestinal: Negative for bloating, abdominal pain, diarrhea, nausea and vomiting.   Genitourinary: Negative for flank pain and hematuria.   Neurological: Negative for headaches, numbness and weakness.   Psychiatric/Behavioral: Negative for altered mental status.             Past Medical History:   Diagnosis Date    Infertility, female        Past Surgical History:   Procedure Laterality Date    BREAST LUMPECTOMY Left 2014    Dr Brown - benign    DILATION AND CURETTAGE OF UTERUS      for SAB       Family History   Problem Relation Age of Onset    Breast cancer Maternal Cousin     Colon cancer Neg Hx     Ovarian cancer Neg Hx        Social History     Socioeconomic History    Marital status:      Spouse name: Not on file    Number of children: Not on file    Years of education: Not on file    Highest education level: Not on file    Occupational History    Not on file   Social Needs    Financial resource strain: Not on file    Food insecurity:     Worry: Not on file     Inability: Not on file    Transportation needs:     Medical: Not on file     Non-medical: Not on file   Tobacco Use    Smoking status: Never Smoker    Smokeless tobacco: Never Used   Substance and Sexual Activity    Alcohol use: No     Comment: rarely    Drug use: No    Sexual activity: Yes     Partners: Male     Birth control/protection: None   Lifestyle    Physical activity:     Days per week: Not on file     Minutes per session: Not on file    Stress: Not on file   Relationships    Social connections:     Talks on phone: Not on file     Gets together: Not on file     Attends Mandaen service: Not on file     Active member of club or organization: Not on file     Attends meetings of clubs or organizations: Not on file     Relationship status: Not on file   Other Topics Concern    Not on file   Social History Narrative    Not on file       Current Outpatient Medications   Medication Sig Dispense Refill    eletriptan (RELPAX) 40 MG tablet Relpax 40 mg tablet   Take 1 tablet as needed by oral route as needed for 30 days.      FLUoxetine 10 MG capsule Take by mouth.      FLUoxetine 10 MG capsule Take 1 capsule (10 mg total) by mouth once daily. 90 capsule 3    meloxicam (MOBIC) 15 MG tablet TAKE 1 TABLET(15 MG) BY MOUTH EVERY DAY 30 tablet 0    ZOLMitriptan (ZOMIG) 5 mg Spry Zomig 5 mg nasal spray   Take by nasal route @ onset of headache, may repeat in 2-4hrs if needed, not more than 2/d or 6/wk      ALPRAZolam (XANAX) 0.5 MG tablet Take 1 tablet (0.5 mg total) by mouth nightly as needed for Insomnia or Anxiety. KEEP APPT FOR FURTHER REFILLS. 30 tablet 1    azelastine (ASTELIN) 137 mcg (0.1 %) nasal spray 1 spray (137 mcg total) by Nasal route 2 (two) times daily. 30 mL 0    miSOPROStol (CYTOTEC) 200 MCG Tab Place 4 tabs first dose vaginally then place 3  "tabs vaginally every 8 hours for total of 4 doses (Patient not taking: Reported on 11/13/2019) 13 tablet 0     No current facility-administered medications for this visit.        Review of patient's allergies indicates:   Allergen Reactions    Penicillins Hives       Vitals:    11/13/19 0812   Weight: 72.6 kg (160 lb)   Height: 5' 5" (1.651 m)   PainSc:   6       Objective:      Physical Exam   Constitutional: She is oriented to person, place, and time. She appears well-developed and well-nourished. No distress.   HENT:   Nose: Nose normal.   Eyes: Conjunctivae are normal.   Neck: Normal range of motion.   Pulmonary/Chest: Effort normal. She exhibits no tenderness.   Abdominal: There is no tenderness.   Neurological: She is alert and oriented to person, place, and time.   Psychiatric: She has a normal mood and affect. Her behavior is normal.       Vascular: Distal DP/PT pulses palpable 2/4. CRT < 3 sec to tips of toes. No vericosities noted to LEs. Hair growth present LE, warm to touch LE, No edema noted to LE.    Dermatologic: No open lesions, lacerations or wounds. Interdigital spaces clean, dry and intact. No erythema, rubor, calor noted LE    Musculoskeletal: MMT 5/5 in DF/PF/Inv/Ev resistance with no reproduction of pain in any direction. Passive range of motion of ankle and pedal joints is painless. No calf tenderness LE, Compartments soft/compressible.   R foot: ttp plantar heel at PF insertion. No pain at calc upon medial and lateral squeezing.     Neurological: Light touch, proprioception, and sharp/dull sensation are all intact. Protective threshold with the Glendale-Wienstein monofilament is intact. Vibratory sensation intact.         Assessment:       Encounter Diagnosis   Name Primary?    Plantar fasciitis - Right Foot Yes         Plan:       Kim was seen today for heel pain.    Diagnoses and all orders for this visit:    Plantar fasciitis - Right Foot      I counseled the patient on her conditions, " their implications and medical management.    44 y.o. female with R foot PF.     -s/p steroid injection. Pt tolerated well. See procedure note.  -c/w stretching and water bottle exercise. Instructions for both given to patient.  -Discussed different treatment options for heel pain. including conservative and surgical.  I gave written and verbal instructions on heel cord stretching and this was demonstrated for the patient. Patient expressed understanding. Discussed wearing appropriate shoe gear and avoiding flats, slippers, sandals, barefoot, and sockfeet. Recommended arch supports. My recommendation for OTC supports is Spenco polysorb replacement insoles or patient may elect more aggressive treatment with prescription arch supports.  -c/w OTC anti inflammatory medication  -The nature of the condition, options for management, as well as potential risks and complications were discussed in detail with patient. Patient was amenable to my recommendations and left my office fully informed and will follow up as instructed or sooner if necessary.    -Patient was advised of signs and symptoms of infection including redness, drainage, purulence, odor, streaking, fever, chills and I advised patient to seek medical attention (ER or urgent care) if these symptoms arise.   -f/u prn      Note dictated with voice recognition software, please excuse any grammatical errors.

## 2019-11-13 NOTE — PROCEDURES
Procedures     Injection consisted of total of 2 cc of kenalog 40 with 0.5% marcarine plain injected into the plantar medial right heel. Skin was prepped with alcohol and ethyl chloride spray. Patient tolerated well with no complications and related relief following injection. Bandaid applied to injection site.     Patient is to use combination of conservative treatment and return for follow up/reassessment at that time as needed. Timeout was performed with pt in the room.

## 2019-11-21 ENCOUNTER — PATIENT MESSAGE (OUTPATIENT)
Dept: SLEEP MEDICINE | Facility: CLINIC | Age: 44
End: 2019-11-21

## 2019-11-26 ENCOUNTER — OFFICE VISIT (OUTPATIENT)
Dept: SLEEP MEDICINE | Facility: CLINIC | Age: 44
End: 2019-11-26
Attending: OBSTETRICS & GYNECOLOGY
Payer: COMMERCIAL

## 2019-11-26 VITALS
WEIGHT: 173.5 LBS | BODY MASS INDEX: 28.91 KG/M2 | HEIGHT: 65 IN | DIASTOLIC BLOOD PRESSURE: 79 MMHG | HEART RATE: 86 BPM | SYSTOLIC BLOOD PRESSURE: 125 MMHG

## 2019-11-26 DIAGNOSIS — G47.30 SLEEP APNEA, UNSPECIFIED TYPE: Primary | ICD-10-CM

## 2019-11-26 PROCEDURE — 3008F BODY MASS INDEX DOCD: CPT | Mod: CPTII,S$GLB,, | Performed by: NURSE PRACTITIONER

## 2019-11-26 PROCEDURE — 99999 PR PBB SHADOW E&M-EST. PATIENT-LVL III: CPT | Mod: PBBFAC,,, | Performed by: NURSE PRACTITIONER

## 2019-11-26 PROCEDURE — 99999 PR PBB SHADOW E&M-EST. PATIENT-LVL III: ICD-10-PCS | Mod: PBBFAC,,, | Performed by: NURSE PRACTITIONER

## 2019-11-26 PROCEDURE — 99203 OFFICE O/P NEW LOW 30 MIN: CPT | Mod: S$GLB,,, | Performed by: NURSE PRACTITIONER

## 2019-11-26 PROCEDURE — 99203 PR OFFICE/OUTPT VISIT, NEW, LEVL III, 30-44 MIN: ICD-10-PCS | Mod: S$GLB,,, | Performed by: NURSE PRACTITIONER

## 2019-11-26 PROCEDURE — 3008F PR BODY MASS INDEX (BMI) DOCUMENTED: ICD-10-PCS | Mod: CPTII,S$GLB,, | Performed by: NURSE PRACTITIONER

## 2019-11-26 NOTE — PROGRESS NOTES
Kim Padilla  was seen as a new patient at the request of Kun Pena MD for the evaluation of obstructive sleep apnea.    CHIEF COMPLAINT:  Sleep apnea    11/26/2019 JAZMYN Nolasco NP: Initial HISTORY OF PRESENT ILLNESS: Kim Padilla is a 44 y.o. female is here for sleep evaluation.       Patient complaints include: snoring, fragmented sleep, unrefreshing sleep, and daytime sleepiness.   Denies difficulty falling asleep, but sleep interrupted.   Denies symptoms of restless legs or kicking during sleep.    Mom and Dad + ANNA on CPAP    on CPAP    Occupation: principal, days only     EPWORTH SLEEPINESS SCALE 11/26/2019   Sitting and reading 2   Watching TV 2   Sitting, inactive in a public place (e.g. a theatre or a meeting) 2   As a passenger in a car for an hour without a break 2   Lying down to rest in the afternoon when circumstances permit 2   Sitting and talking to someone 1   Sitting quietly after a lunch without alcohol 2   In a car, while stopped for a few minutes in traffic 1   Total score 14       SLEEP ROUTINE:  Sleep Clinic New Patient 11/26/2019   What time do you go to bed on a week day? (Give a range) 9:30-10   What time do you go to bed on a day off? (Give a range) 8-8:30   How long does it take you to fall asleep? (Give a range) 10 minutes   On average, how many times per night do you wake up? 8-10   How long does it take you to fall back into sleep? (Give a range) 20-30   What time do you wake up to start your day on a week day? (Give a range) 5:00-5:30   What time do you wake up to start your day on a day off? (Give a range) 6-6:30   What time do you get out of bed? (Give a range) 6:30-7   On average, how many hours do you sleep? 5-6   On average, how many naps do you take per day? 1   Rate your sleep quality from 0 to 5 (0-poor, 5-great). 2   Have you experienced:  Weight gain?   How much weight have you lost or gained (in lbs.) in the last year? 20-25 pounds   On average, how many times  per night do you go to the bathroom?  1   Have you ever had a sleep study/CPAP machine/surgery for sleep apnea? No   Have you ever had a CPAP machine for sleep apnea? No   Have you ever had surgery for sleep apnea? No        PAST MEDICAL HISTORY:    Active Ambulatory Problems     Diagnosis Date Noted    NAHUM aff fetus/brst milk 09/17/2013    Infertility, female 02/20/2017    Weakness 05/29/2018    Posture imbalance 05/29/2018    Neck pain 10/16/2018     Resolved Ambulatory Problems     Diagnosis Date Noted    No Resolved Ambulatory Problems     No Additional Past Medical History                PAST SURGICAL HISTORY:    Past Surgical History:   Procedure Laterality Date    BREAST LUMPECTOMY Left 2014    Dr Brown - benign    DILATION AND CURETTAGE OF UTERUS      for SAB         FAMILY HISTORY:                Family History   Problem Relation Age of Onset    Breast cancer Maternal Cousin     Colon cancer Neg Hx     Ovarian cancer Neg Hx        SOCIAL HISTORY:          Tobacco:   Social History     Tobacco Use   Smoking Status Never Smoker   Smokeless Tobacco Never Used       Alcohol use:    Social History     Substance and Sexual Activity   Alcohol Use No    Comment: rarely                 ALLERGIES:    Review of patient's allergies indicates:   Allergen Reactions    Penicillins Hives       CURRENT MEDICATIONS:    Current Outpatient Medications   Medication Sig Dispense Refill    eletriptan (RELPAX) 40 MG tablet Relpax 40 mg tablet   Take 1 tablet as needed by oral route as needed for 30 days.      FLUoxetine 10 MG capsule Take by mouth.      FLUoxetine 10 MG capsule Take 1 capsule (10 mg total) by mouth once daily. 90 capsule 3    meloxicam (MOBIC) 15 MG tablet TAKE 1 TABLET(15 MG) BY MOUTH EVERY DAY 30 tablet 0    ZOLMitriptan (ZOMIG) 5 mg Spry Zomig 5 mg nasal spray   Take by nasal route @ onset of headache, may repeat in 2-4hrs if needed, not more than 2/d or 6/wk      ALPRAZolam (XANAX) 0.5 MG  "tablet Take 1 tablet (0.5 mg total) by mouth nightly as needed for Insomnia or Anxiety. KEEP APPT FOR FURTHER REFILLS. 30 tablet 1    azelastine (ASTELIN) 137 mcg (0.1 %) nasal spray 1 spray (137 mcg total) by Nasal route 2 (two) times daily. 30 mL 0    miSOPROStol (CYTOTEC) 200 MCG Tab Place 4 tabs first dose vaginally then place 3 tabs vaginally every 8 hours for total of 4 doses (Patient not taking: Reported on 11/13/2019) 13 tablet 0     No current facility-administered medications for this visit.                   REVIEW OF SYSTEMS:     Sleep related symptoms as per HPI.  Sleep Clinic ROS  11/26/2019   Difficulty breathing through the nose?  Sometimes   Sore throat or dry mouth in the morning? Sometimes   Irregular or very fast heart beat?  No   Shortness of breath?  Sometimes   Acid reflux? Sometimes   Body aches and pains?  Yes   Morning headaches? Sometimes   Dizziness? Sometimes   Mood changes?  Yes   Do you exercise?  Sometimes   Do you feel like moving your legs a lot?  No       Otherwise, a balance of 10 systems reviewed is negative.          PHYSICAL EXAM:  /79   Pulse 86   Ht 5' 5" (1.651 m)   Wt 78.7 kg (173 lb 8 oz)   BMI 28.87 kg/m²   GENERAL: Normal development, well groomed  HEENT:  Conjunctivae are non-erythematous; Pupils equal, round, and reactive to light; Nose is symmetrical; Nasal mucosa is pink and moist; Septum is midline; Inferior turbinates are normal; Nasal airflow is normal; Posterior pharynx is pink; Modified Mallampati: IV; Posterior palate is normal; Tonsils +1; Uvula is normal and pink;Tongue is normal; Dentition is fair; No TMJ tenderness; Jaw opening and protrusion without click and without discomfort.  NECK: Supple. Neck circumference is 12.5 inches. No thyromegaly. No palpable nodes.    SKIN: On face and neck: No abrasions, no rashes, no lesions.  No subcutaneous nodules are palpable.  RESPIRATORY: Chest is clear to auscultation.  Normal chest expansion and " non-labored breathing at rest.  CARDIOVASCULAR: Normal S1, S2.  No murmurs, gallops or rubs. No carotid bruits bilaterally.  EXTREMITIES: No edema. No clubbing. No cyanosis. Station normal. Gait normal.        NEURO/PSYCH: Oriented to time, place and person. Normal attention span and concentration. Affect is full. Mood is normal.                                              ASSESSMENT:    Sleep apnea, unspecified. The patient symptomatically has snoring, fragmented sleep, unrefreshing sleep, and daytime sleepiness with findings of crowded oral airway and elevated body mass index. Medical co-morbidities: anxiety/depression and overweight BMI.  This warrants further investigation for possible obstructive sleep apnea.      PLAN:    Diagnostic: HST. The nature of this procedure and its indication was discussed with the patient. Discussed with patient that if HST is negative or depending on severity of positive HST, in-lab sleep study may be necessary. Patient will be contacted after sleep study is done.  Email results, RTC prn.     Education: During our discussion today, we talked about the etiology of obstructive sleep apnea as well as the potential ramifications of untreated sleep apnea, which could include daytime sleepiness, hypertension, heart disease and/or stroke. We discussed potential treatment options, which could include weight loss, body positioning (pillow or Rhea NightBalance), continuous positive airway pressure (CPAP), OA, EPAP, or referral for surgical consideration.     Precautions: The patient was advised to abstain from driving should they feel sleepy  or drowsy.     Thank you for allowing me the opportunity to participate in the care of your patient.

## 2019-11-26 NOTE — PATIENT INSTRUCTIONS
Anil or Stefanie will contact you to schedule your sleep study. Their number is [phone] (ext 2). The [facility] is located on 7th floor of the [facility].    If you have not been contacted regarding scheduling your sleep test after one week from today, please notify me via [facility] or by phone by calling [phone] (ext 1).     We will call you when the sleep study results are ready - if you have not heard from us by 2 weeks from the date of the study, please call [phone] (ext 1).    You are advised to abstain from driving should you feel sleepy or drowsy.

## 2019-11-26 NOTE — LETTER
November 26, 2019      Kun Pena MD  [address]  [address]  [facility] [address]           [facility] [place] [address] [facility]  [address]  [facility] [address]  Phone: [phone]          Patient: Kim Padilla   MR Number: [id number]   YOB: 1975   Date of Visit: 11/26/2019       Dear Dr. Kun Pena:    Thank you for referring Kim Padilla to me for evaluation. Attached you will find relevant portions of my assessment and plan of care.    If you have questions, please do not hesitate to call me. I look forward to following Kim Padilla along with you.    Sincerely,    Peter Nolasco NP    Enclosure  CC:  No Recipients    If you would like to receive this communication electronically, please contact [email or web address] or [phone] to request more information on [employer] access.    For providers and/or their staff who would like to refer a patient to [facility], please contact us through our one-stop-shop provider referral line, [facility], at [phone].    If you feel you have received this communication in error or would no longer like to receive these types of communications, please e-mail [email or web address]

## 2019-12-03 ENCOUNTER — TELEPHONE (OUTPATIENT)
Dept: SLEEP MEDICINE | Facility: OTHER | Age: 44
End: 2019-12-03

## 2019-12-17 ENCOUNTER — TELEPHONE (OUTPATIENT)
Dept: SLEEP MEDICINE | Facility: OTHER | Age: 44
End: 2019-12-17

## 2019-12-18 ENCOUNTER — HOSPITAL ENCOUNTER (OUTPATIENT)
Dept: SLEEP MEDICINE | Facility: OTHER | Age: 44
Discharge: HOME | End: 2019-12-18
Attending: NURSE PRACTITIONER
Payer: COMMERCIAL

## 2019-12-18 DIAGNOSIS — G47.30 SLEEP APNEA, UNSPECIFIED TYPE: ICD-10-CM

## 2019-12-18 PROCEDURE — 95800 SLP STDY UNATTENDED: CPT

## 2019-12-20 PROCEDURE — 95800 SLP STDY UNATTENDED: CPT | Mod: 26,,, | Performed by: PSYCHIATRY & NEUROLOGY

## 2019-12-20 PROCEDURE — 95800 PR SLEEP STUDY, UNATTENDED, RECORD HEART RATE/O2 SAT/RESP ANAL/SLEEP TIME: ICD-10-PCS | Mod: 26,,, | Performed by: PSYCHIATRY & NEUROLOGY

## 2019-12-20 NOTE — PROCEDURES
"Dear Referring Provider,    The sleep study that you ordered is complete. You have ordered sleep LAB services to perform the sleep study for Kim Padilla.     Please find Sleep Study result in "Chart Review" under the "Media tab."     As the ordering provider, you are responsible for reviewing the results and implementing a treatment plan with your patient. If you need a Sleep Medicine provider to explain the sleep study findings and arrange treatment for the patient, please refer patient for consultation to our Sleep Clinic via [facility] with Ambulatory Consult Sleep.    To do that please place an order for an "Ambulatory Consult Sleep" - it will go to our clinic work queue for our Medical Assistant to contact the patient for an appointment.     For any questions, please contact our clinic staff at [phone] to talk to clinical staff.      "

## 2019-12-23 ENCOUNTER — PATIENT MESSAGE (OUTPATIENT)
Dept: SLEEP MEDICINE | Facility: CLINIC | Age: 44
End: 2019-12-23

## 2019-12-23 DIAGNOSIS — G47.30 SLEEP APNEA, UNSPECIFIED TYPE: Primary | ICD-10-CM

## 2019-12-23 NOTE — TELEPHONE ENCOUNTER
12/18/2019  lb. The overall AHI was 3  and overall RDI was 11. The oxygen arnel was 90.4 % and % time < 90% SpO2 was 0.0 %.

## 2019-12-29 ENCOUNTER — PATIENT MESSAGE (OUTPATIENT)
Dept: SLEEP MEDICINE | Facility: CLINIC | Age: 44
End: 2019-12-29

## 2019-12-30 NOTE — TELEPHONE ENCOUNTER
Logan,    1) please send pt copy of HST   2) please check with auth dept status of PSG ordered 12/23 and update pt

## 2020-01-02 ENCOUNTER — TELEPHONE (OUTPATIENT)
Dept: SLEEP MEDICINE | Facility: OTHER | Age: 45
End: 2020-01-02

## 2020-01-14 ENCOUNTER — OFFICE VISIT (OUTPATIENT)
Dept: SPORTS MEDICINE | Facility: CLINIC | Age: 45
End: 2020-01-14
Payer: COMMERCIAL

## 2020-01-14 ENCOUNTER — HOSPITAL ENCOUNTER (OUTPATIENT)
Dept: RADIOLOGY | Facility: HOSPITAL | Age: 45
Discharge: HOME | End: 2020-01-14
Attending: ORTHOPAEDIC SURGERY
Payer: COMMERCIAL

## 2020-01-14 VITALS
SYSTOLIC BLOOD PRESSURE: 140 MMHG | WEIGHT: 173 LBS | DIASTOLIC BLOOD PRESSURE: 96 MMHG | BODY MASS INDEX: 28.82 KG/M2 | HEART RATE: 88 BPM | HEIGHT: 65 IN

## 2020-01-14 DIAGNOSIS — M99.04 SOMATIC DYSFUNCTION OF SACRAL REGION: ICD-10-CM

## 2020-01-14 DIAGNOSIS — M54.50 LOW BACK PAIN: ICD-10-CM

## 2020-01-14 DIAGNOSIS — M99.05 SOMATIC DYSFUNCTION OF PELVIS REGION: ICD-10-CM

## 2020-01-14 DIAGNOSIS — M99.06 SOMATIC DYSFUNCTION OF LOWER EXTREMITY: ICD-10-CM

## 2020-01-14 DIAGNOSIS — M99.03 SOMATIC DYSFUNCTION OF LUMBAR REGION: ICD-10-CM

## 2020-01-14 DIAGNOSIS — M54.41 ACUTE RIGHT-SIDED LOW BACK PAIN WITH RIGHT-SIDED SCIATICA: ICD-10-CM

## 2020-01-14 DIAGNOSIS — M25.551 RIGHT HIP PAIN: ICD-10-CM

## 2020-01-14 DIAGNOSIS — M99.02 SOMATIC DYSFUNCTION OF THORACIC REGION: ICD-10-CM

## 2020-01-14 DIAGNOSIS — M25.551 RIGHT HIP PAIN: Primary | ICD-10-CM

## 2020-01-14 PROCEDURE — 72110 X-RAY EXAM L-2 SPINE 4/>VWS: CPT | Mod: 26,,, | Performed by: RADIOLOGY

## 2020-01-14 PROCEDURE — 3008F PR BODY MASS INDEX (BMI) DOCUMENTED: ICD-10-PCS | Mod: CPTII,S$GLB,, | Performed by: ORTHOPAEDIC SURGERY

## 2020-01-14 PROCEDURE — 99213 OFFICE O/P EST LOW 20 MIN: CPT | Mod: 25,S$GLB,, | Performed by: ORTHOPAEDIC SURGERY

## 2020-01-14 PROCEDURE — 73502 X-RAY EXAM HIP UNI 2-3 VIEWS: CPT | Mod: TC,RT

## 2020-01-14 PROCEDURE — 97110 PR THERAPEUTIC EXERCISES: ICD-10-PCS | Mod: GP,S$GLB,, | Performed by: ORTHOPAEDIC SURGERY

## 2020-01-14 PROCEDURE — 99999 PR PBB SHADOW E&M-EST. PATIENT-LVL III: ICD-10-PCS | Mod: PBBFAC,,, | Performed by: ORTHOPAEDIC SURGERY

## 2020-01-14 PROCEDURE — 98927 PR OSTEOPATHIC MANIP,5-6 BODY REGN: ICD-10-PCS | Mod: S$GLB,,, | Performed by: ORTHOPAEDIC SURGERY

## 2020-01-14 PROCEDURE — 73502 X-RAY EXAM HIP UNI 2-3 VIEWS: CPT | Mod: 26,RT,, | Performed by: RADIOLOGY

## 2020-01-14 PROCEDURE — 3008F BODY MASS INDEX DOCD: CPT | Mod: CPTII,S$GLB,, | Performed by: ORTHOPAEDIC SURGERY

## 2020-01-14 PROCEDURE — 97110 THERAPEUTIC EXERCISES: CPT | Mod: GP,S$GLB,, | Performed by: ORTHOPAEDIC SURGERY

## 2020-01-14 PROCEDURE — 73502 XR HIP 2 VIEW RIGHT: ICD-10-PCS | Mod: 26,RT,, | Performed by: RADIOLOGY

## 2020-01-14 PROCEDURE — 98927 OSTEOPATH MANJ 5-6 REGIONS: CPT | Mod: S$GLB,,, | Performed by: ORTHOPAEDIC SURGERY

## 2020-01-14 PROCEDURE — 72110 X-RAY EXAM L-2 SPINE 4/>VWS: CPT | Mod: TC

## 2020-01-14 PROCEDURE — 72110 XR LUMBAR SPINE COMPLETE 5 VIEW: ICD-10-PCS | Mod: 26,,, | Performed by: RADIOLOGY

## 2020-01-14 PROCEDURE — 99213 PR OFFICE/OUTPT VISIT, EST, LEVL III, 20-29 MIN: ICD-10-PCS | Mod: 25,S$GLB,, | Performed by: ORTHOPAEDIC SURGERY

## 2020-01-14 PROCEDURE — 99999 PR PBB SHADOW E&M-EST. PATIENT-LVL III: CPT | Mod: PBBFAC,,, | Performed by: ORTHOPAEDIC SURGERY

## 2020-01-14 NOTE — PROGRESS NOTES
CC: right hip pain    44 y.o. Female presents today for evaluation of her right hip pain. Patient is unable to recall a specific mechanism of injury and denies prior history of right hip/low back injury. When asked where her pain is located she gestures to the posterior hip and her right low back. She describes the quality of her pain as a constant, sharp and stabbing pain which has caused her to be concerned she is suffering from sciatica. Patient states she also has right foot plantar fasciitis and is wondering if this has caused her gait to shift causing her increase in pain. 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 She denies recent falls or trauma.   How long: Patient reports she has been experiencing right hip pain for the past 3-4 weeks.   What makes it better: Patient reports her pain is improved when she is standing.   What makes it worse: Patient reports her pain is increased with laying down, sitting and getting up from a seated position. She also admits to increased pain with sitting in the bathtub when she attempts to straighten out her legs.  Does it radiate: Patient also states her pain will radiate down the posterior aspect of her right leg. She denies numbness and tingling.   Attempted treatments: Patient states she has been taking ibuprofen and using a heating pad and states this has not helped to provide pain relief. She denies formal physical therapy, history of injection or surgery.   Pain score: 4/10   Any mechanical symptoms: Patient states she has experienced painless popping in her hip.   Feelings of instability: Patient denies feelings of instability.   Affecting ADLs: Patient admits to this problem affecting her ability to perform ADLs. Patient is a principal at [employer].     REVIEW OF SYSTEMS:    Constitution: Patient denies fever, chills, night sweats, and weight changes.  Eyes: Patient denies eye pain or vision changes.  HENT: Patient denies headache, ear pain, sore throat, or nasal discharge.  CVS: Patient denies chest pain.  Lungs: Patient denies shortness of breath or cough.  Abd: Patient denies stomach pain, nausea, or vomiting.  Skin: Patient denies skin rash or itching.    Hematologic/Lymphatic: Patient denies easy bruising.   Musculoskeletal: Patient denies recent falls. See HPI.  Psych: Patient denies any current anxiety or nervousness.    PAST MEDICAL HISTORY:   Past Medical History:   Diagnosis Date    Infertility, female        PAST SURGICAL HISTORY:   Past Surgical History:   Procedure Laterality Date    BREAST LUMPECTOMY Left 2014    Dr Brown - benign    DILATION AND CURETTAGE OF UTERUS      for SAB       FAMILY HISTORY:   Family History   Problem Relation Age of Onset    Breast cancer Maternal Cousin     Colon cancer Neg Hx     Ovarian cancer Neg Hx        SOCIAL HISTORY:   Social History     Socioeconomic History    Marital status:      Spouse name: Not on file    Number of children: Not on file    Years of education: Not on file    Highest education level: Not on file   Occupational History    Not on file   Social Needs    Financial resource strain: Not on file    Food insecurity:     Worry: Not on file     Inability: Not on file    Transportation needs:     Medical: Not on file     Non-medical: Not on file   Tobacco Use    Smoking status: Never Smoker    Smokeless tobacco: Never Used   Substance and Sexual Activity    Alcohol use: No     Comment: rarely    Drug use: No    Sexual activity: Yes     Partners: Male     Birth control/protection: None   Lifestyle    Physical activity:     Days per week: Not on file     Minutes per session: Not on file    Stress: Not on file   Relationships    Social connections:     Talks on phone: Not on file     Gets together:  "Not on file     Attends Worship service: Not on file     Active member of club or organization: Not on file     Attends meetings of clubs or organizations: Not on file     Relationship status: Not on file   Other Topics Concern    Not on file   Social History Narrative    Not on file       MEDICATIONS:     Current Outpatient Medications:     eletriptan (RELPAX) 40 MG tablet, Relpax 40 mg tablet  Take 1 tablet as needed by oral route as needed for 30 days., Disp: , Rfl:     FLUoxetine 10 MG capsule, Take by mouth., Disp: , Rfl:     FLUoxetine 10 MG capsule, Take 1 capsule (10 mg total) by mouth once daily., Disp: 90 capsule, Rfl: 3    meloxicam (MOBIC) 15 MG tablet, TAKE 1 TABLET(15 MG) BY MOUTH EVERY DAY, Disp: 30 tablet, Rfl: 0    miSOPROStol (CYTOTEC) 200 MCG Tab, Place 4 tabs first dose vaginally then place 3 tabs vaginally every 8 hours for total of 4 doses, Disp: 13 tablet, Rfl: 0    ZOLMitriptan (ZOMIG) 5 mg Spry, Zomig 5 mg nasal spray  Take by nasal route @ onset of headache, may repeat in 2-4hrs if needed, not more than 2/d or 6/wk, Disp: , Rfl:     ALPRAZolam (XANAX) 0.5 MG tablet, Take 1 tablet (0.5 mg total) by mouth nightly as needed for Insomnia or Anxiety. KEEP APPT FOR FURTHER REFILLS., Disp: 30 tablet, Rfl: 1    azelastine (ASTELIN) 137 mcg (0.1 %) nasal spray, 1 spray (137 mcg total) by Nasal route 2 (two) times daily., Disp: 30 mL, Rfl: 0    ALLERGIES:   Review of patient's allergies indicates:   Allergen Reactions    Penicillins Hives        PHYSICAL EXAMINATION:  BP (!) 140/96   Pulse 88   Ht 5' 5" (1.651 m)   Wt 78.5 kg (173 lb)   BMI 28.79 kg/m²   Vitals signs and nursing note have been reviewed.  General: In no acute distress, well developed, well nourished, no diaphoresis  Eyes: EOM full and smooth, no eye redness or discharge  HENT: normocephalic and atraumatic, neck supple, trachea midline, no nasal discharge, no external ear redness or discharge  Cardiovascular: no LE " edema  Lungs: respirations non-labored, no conversational dyspnea   Abd: non-distended, no rigidity  MSK: no amputation or deformity, no swelling of extremities  Neuro: AAOx3, CN2-12 grossly intact  Skin: No rashes, warm and dry  Psychiatric: cooperative, pleasant, mood and affect appropriate for age    HIP: RIGHT  The affected hip is compared to the contralateral hip.    Observation:    There is no edema, erythema, or ecchymosis in the lumbosacral region.   There is no Trendelenburg sign on either side  No obvious pelvic obliquity while standing.    No thoracolumbar scoliosis observed.    No midline skin abnormalities.  No atrophy noted in the lower limbs.    ROM:   Passive hip flexion to 120° on left and 120° on right.    Passive hip internal rotation to 45° on left and 45° on right.    Passive hip external rotation to 45° on left and 45° on right.    Passive hip abduction to 45° on left and 45° on right.    All motions above are without pain.    Tenderness To Palpation:  No tenderness at the ASIS, AIIS, PSIS, PIIS, iliac crest, pubic bones, ischial tuberosity.  + tenderness at L4-L5 paraspinal muscles  + tenderness over the right posterior pelvis.  + tenderness over the sacral base.  No tenderness over the piriformis  No tenderness over the greater trochanteric bursa or greater/lesser trochanters.  No tenderness at the glut attachments on the greater trochanter  No tenderness over proximal IT band or hip flexor musculature.    Strength Testing:  Hip flexion - 5/5 on left and 5/5 on right  Hip extension - 5/5 on left and 5/5 on right  Hip adduction - 5/5 on left and 5/5 on right  Hip abduction - 5/5 on left and 5/5 on right  Knee flexion - 5/5 on left and 5/5 on right  Knee extension - 5/5 on left and 5/5 on right    Special Tests:  Standing Trendelenburg test - negative    Seated straight leg raise - positive on the right for buttock pain, improved after OMT  Supine straight leg raise - negative  Hamstring  flexibility symmetric    Log roll - negative  NOLAN - negative  FADIR - negative  Scour test - negative  No pain with posterior hip capsule compression    ASIS compression test - negative  SI drawer test - negative   Thigh thrust test - negative   Quadriceps flexibility symmetric.  Carline test - negative  Bhavin compression test - negative    Fulcrum test - negative    Posture:  Upright  Gait: Right antalgic with Over pronation on right ankle mechanics    TART (Tissue texture abnormality, Asymmetry,  Restriction of motion and/or Tenderness) changes:       Cervical Spine  Thoracic Spine  Lumbar Spine   C1  T1  L1 TTAR   C2  T2  L2 Neutral   C3  T3  L3 Neutral   C4  T4  L4 ERSR   C5  T5  L5 FRSR   C6  T6 TTABL     C7  T7 TTABL       T8 TTABL       T9 TTABL       T10 TTAR       T11 TTAR       T12 TTAR       Pelvis:  · Innominate:Right superior shear  · Pubic bone:Right superior pubic shear    Sacrum:Right unilateral     Lower extremity:  Fascial herniated trigger point at the medial aspect of the right glute  Myofascial restriction of right thigh posterior      Key   F= Flexed   E = Extended   R = Rotated   S = Sidebent   TTA = tissue texture abnormality       Neurovascular Exam:  Normal gait without Trendelenburg.  2+ femoral, DP, and PT pulses BL.  No skin changes, no abnormal hair distribution.  Sensation intact to light touch throughout the obturator and medial/lateral/posterior femoral cutaneous nerves.  Capillary refill intact to <2 seconds in all lower limb digits.      IMAGIN. X-ray ordered due to right hip pain   2. X-ray images were reviewed personally by me and then directly with patient.  3. FINDINGS: X-ray images obtained demonstrate no acute fracture of visualized lower lumbar spine, bony pelvis, or proximal femurs. There is minimal spurring about the right acetabular roof.    4. IMPRESSION: No acute fracture, findings as described.    1. X-ray ordered due to right low back pain   2. X-ray images were  reviewed personally by me and then directly with patient.  3. FINDINGS: X-ray images obtained demonstrate mild narrowing of the lumbosacral disc space. There is no fracture, spondylolisthesis or bone destruction identified.  4. IMPRESSION: As above.       ASSESSMENT:      ICD-10-CM ICD-9-CM   1. Right hip pain M25.551 719.45   2. Acute right-sided low back pain with right-sided sciatica M54.41 724.2     724.3   3. Somatic dysfunction of lumbar region M99.03 739.3   4. Somatic dysfunction of pelvis region M99.05 739.5   5. Somatic dysfunction of lower extremity M99.06 739.6   6. Somatic dysfunction of sacral region M99.04 739.4   7. Somatic dysfunction of thoracic region M99.02 739.2       PLAN:  1-2. Right hip pain/LBP -     - Kim admits to right posterior and ride sided low back pain for the past 3-4 weeks without any mechanism of injury. She admits to her pain radiating down the posterior aspect of her right leg. She denies numbness/tingling, denies loss of bladder/bowel control. When asked where she hurts she points to her right sacrum and glute area.    - XRs ordered in the office today and images were personally reviewed with the patient. See above for further detail.    - HEP for pelvic clocks, diaphragmatic breathing, ant marches, single/double knee to chest prescribed today. Handout provided, explained, and exercises were demonstrated as needed. Encouraged to do daily.  [address] HOME EXERCISE PROGRAM (HEP):  The patient was taught a homegoing physical therapy regimen as described above. The patient demonstrated understanding of the exercises and proper technique of their execution. This interaction took 15 minutes.     - Based on her description/body language of pain and somatic dysfunction identified on exam, I discussed osteopathic manipulation as a treatment option today.  She consents to evaluation and treatment.  See below.      3-7. Somatic dysfunction of the lumbar, pelvic, sacral, LE, thoracic regions  -     - OMT 5-6 regions. Oral consent obtained. Reviewed benefits and potential side effects. OMT indicated today due to signs and symptoms as well as local and remote somatic dysfunction findings and their related neurokinetic, lymphatic, fascial and/or arteriovenous body connections. OMT techniques used: Soft Tissue, Myofascial Release, Muscle Energy, High Velocity Low Amplitude and Fascial Distortion Model. Treatment was tolerated well. Improvement noted in segmental mobility post-treatment in dysfunctional regions. There were no adverse events and no complications immediately following treatment. Advised plenty of water to help alleviate soreness.      Future planning includes - possibly more OMT if helpful and if indicated, consider PT if symptoms continue even with OMT, consider MRI as well if no improvement/symptoms worsen    All questions were answered to the best of my ability and all concerns were addressed at this time.    Follow up in 2 weeks for above, or sooner if needed.      This note is dictated using the M*Modal Fluency Direct word recognition program. There are word recognition mistakes that are occasionally missed on review.

## 2020-01-17 ENCOUNTER — HOSPITAL ENCOUNTER (OUTPATIENT)
Dept: SLEEP MEDICINE | Facility: HOSPITAL | Age: 45
Discharge: HOME | End: 2020-01-17
Attending: NURSE PRACTITIONER
Payer: COMMERCIAL

## 2020-01-17 DIAGNOSIS — G47.30 SLEEP APNEA, UNSPECIFIED TYPE: ICD-10-CM

## 2020-01-17 DIAGNOSIS — G47.33 OSA (OBSTRUCTIVE SLEEP APNEA): Primary | ICD-10-CM

## 2020-01-17 PROCEDURE — 95810 POLYSOM 6/> YRS 4/> PARAM: CPT | Mod: 26,,, | Performed by: INTERNAL MEDICINE

## 2020-01-17 PROCEDURE — 95810 POLYSOM 6/> YRS 4/> PARAM: CPT

## 2020-01-17 PROCEDURE — 95810 PR POLYSOMNOGRAPHY, 4 OR MORE: ICD-10-PCS | Mod: 26,,, | Performed by: INTERNAL MEDICINE

## 2020-01-18 NOTE — PROGRESS NOTES
Education was done via explanation of sleep study process and procedure. All questions were answered.    Pt. did not meet criteria for CPAP. Few respiratory events were observed. REM sleep was obtained in side position. Supine sleep was requested and encouraged.    Low sat of 92% was observed. EKG revealed NSR. Soft snoring was heard. Thank you letter was given in a.m.

## 2020-01-20 NOTE — PROCEDURES
[facility]  Tel: [phone]    Kim Padilla  1975  [birth year]  [id number]      Diagnostic polysomnogram  Hypopnea definition used AASM 1a    Sleep parameters:  Total recording time 468.4 minutes, total sleep time 362 minutes and sleep efficiency 77.3%.   Sleep latency 16.1 minutes and REM sleep latency 206 minutes.   Stage NREM1 16%, stage NREM2 62.2%, stage NREM 3 20% and REM 1.8% of total sleep time.   Wake after sleep onset 107 minutes.    Respiratory parameters:  60 obstructive respiratory events were present with AHI 9.9 events per hour of sleep.   Oxygen saturation arnel with events was 91%.   Baseline oxygen saturation ranged from 95% to 98%.   Baseline hypoxemia was not present.   mild snoring was present.   The Respiratory Effort Related Arousal Index was 0.0 events per hour of sleep.    Events were not more frequent during REM sleep, with REM AHI 9.2 events per hour of REM sleep.   Sleep in the supine position was present.    The supine AHI was 10 and lateral AHI 9.2.    Respiratory Summary     By Sleep Stage By Body Position TOTAL    NREM REM SUPINE NON-SUPINE    Time (min) 355.5 6.5 55.5 306.5 362.0           Hypopneas   59     1 14 46 60   Obstructive Apnea - - - - -   Mixed Apnea - - - - -   Central Apnea - - - - -   Central Apnea Index     -     Total Apneas - - - - -   Total Apnea Index - - - - -           AHI 10.0 9.2 15.1 9.0 9.9           RERAs - - - - -   RERA Index - - - - -                 EM periodic limb movements during sleep were present with Periodic Limb Movement Index 0 per hour of sleep.   0 were associated with arousal, with Periodic Limb Movement Arousal Index 0 per hour of sleep.       ECG:  Mean pulse rate during sleep was 74 beats per minute with No significant arrhythmias noted    EEG:  Expanded seizure montage was not used.   No seizure activity was noted.    Impression:  mild Obstructive Sleep Apnea         Recommendations:  Patient has follow up  with Sleep Medicine.   Therapeutic options could include CPAP or mandibular repositioning device.         (This Sleep Study was interpreted by a Board Certified Sleep Specialist who conducted an epoch-by-epoch review of the entire raw data recording.)     (The indication for this sleep study was reviewed and deemed appropriate by AASM Practice Parameters or other reasons by a Board Certified Sleep Specialist.)    See media for tech notes

## 2020-01-22 ENCOUNTER — PATIENT MESSAGE (OUTPATIENT)
Dept: SLEEP MEDICINE | Facility: CLINIC | Age: 45
End: 2020-01-22

## 2020-01-24 ENCOUNTER — OFFICE VISIT (OUTPATIENT)
Dept: SPORTS MEDICINE | Facility: CLINIC | Age: 45
End: 2020-01-24
Payer: COMMERCIAL

## 2020-01-24 ENCOUNTER — PATIENT MESSAGE (OUTPATIENT)
Dept: SPORTS MEDICINE | Facility: CLINIC | Age: 45
End: 2020-01-24

## 2020-01-24 VITALS
WEIGHT: 173 LBS | HEIGHT: 65 IN | SYSTOLIC BLOOD PRESSURE: 127 MMHG | HEART RATE: 101 BPM | DIASTOLIC BLOOD PRESSURE: 91 MMHG | BODY MASS INDEX: 28.82 KG/M2

## 2020-01-24 DIAGNOSIS — G47.33 OBSTRUCTIVE SLEEP APNEA: Primary | ICD-10-CM

## 2020-01-24 DIAGNOSIS — M25.551 RIGHT HIP PAIN: Primary | ICD-10-CM

## 2020-01-24 DIAGNOSIS — M99.05 SOMATIC DYSFUNCTION OF PELVIS REGION: ICD-10-CM

## 2020-01-24 DIAGNOSIS — M54.41 ACUTE RIGHT-SIDED LOW BACK PAIN WITH RIGHT-SIDED SCIATICA: ICD-10-CM

## 2020-01-24 DIAGNOSIS — M99.02 SOMATIC DYSFUNCTION OF THORACIC REGION: ICD-10-CM

## 2020-01-24 DIAGNOSIS — M99.04 SOMATIC DYSFUNCTION OF SACRAL REGION: ICD-10-CM

## 2020-01-24 DIAGNOSIS — M99.03 SOMATIC DYSFUNCTION OF LUMBAR REGION: ICD-10-CM

## 2020-01-24 PROCEDURE — 98926 PR OSTEOPATHIC MANIP,3-4 BODY REGN: ICD-10-PCS | Mod: S$GLB,,, | Performed by: ORTHOPAEDIC SURGERY

## 2020-01-24 PROCEDURE — 3008F BODY MASS INDEX DOCD: CPT | Mod: CPTII,S$GLB,, | Performed by: ORTHOPAEDIC SURGERY

## 2020-01-24 PROCEDURE — 3008F PR BODY MASS INDEX (BMI) DOCUMENTED: ICD-10-PCS | Mod: CPTII,S$GLB,, | Performed by: ORTHOPAEDIC SURGERY

## 2020-01-24 PROCEDURE — 98926 OSTEOPATH MANJ 3-4 REGIONS: CPT | Mod: S$GLB,,, | Performed by: ORTHOPAEDIC SURGERY

## 2020-01-24 PROCEDURE — 99999 PR PBB SHADOW E&M-EST. PATIENT-LVL III: ICD-10-PCS | Mod: PBBFAC,,, | Performed by: ORTHOPAEDIC SURGERY

## 2020-01-24 PROCEDURE — 99213 PR OFFICE/OUTPT VISIT, EST, LEVL III, 20-29 MIN: ICD-10-PCS | Mod: 25,S$GLB,, | Performed by: ORTHOPAEDIC SURGERY

## 2020-01-24 PROCEDURE — 99213 OFFICE O/P EST LOW 20 MIN: CPT | Mod: 25,S$GLB,, | Performed by: ORTHOPAEDIC SURGERY

## 2020-01-24 PROCEDURE — 99999 PR PBB SHADOW E&M-EST. PATIENT-LVL III: CPT | Mod: PBBFAC,,, | Performed by: ORTHOPAEDIC SURGERY

## 2020-01-24 NOTE — PROGRESS NOTES
CC: right hip pain    Ms. Padilla is here today for follow up evaluation of her right hip and sacral pain. Patient reports she is feeling up to 70% improved at this time and believes her benefits from OMT have lasted from her first appointment. Patient reports her pain is the greatest first thing in the moring and after sitting for long periods of time. She also states she is unable to straighten her leg out fully in the bath tub without pain. She admits to performing her HEP five times since her first visit. She denies falls or trauma since her last visit.  She is pleased that she is feeling much better and notice a big difference immediately following OMT.    Recall from visit on 1/14/2020  44 y.o. Female presents today for evaluation of her right hip pain. Patient is unable to recall a specific mechanism of injury and denies prior history of right hip/low back injury. When asked where her pain is located she gestures to the posterior hip and her right low back. She describes the quality of her pain as a constant, sharp and stabbing pain which has caused her to be concerned she is suffering from sciatica. Patient states she also has right foot plantar fasciitis and is wondering if this has caused her gait to shift causing her increase in pain. 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 She denies recent falls or trauma.   How long: Patient reports she has been experiencing right hip pain for the past 3-4 weeks.   What makes it better: Patient reports her pain is improved when she is standing.   What makes it worse: Patient reports her pain is increased with laying down, sitting and getting up from a seated position. She also admits to increased pain with sitting in the bathtub when she attempts to straighten out her legs.  Does it  radiate: Patient also states her pain will radiate down the posterior aspect of her right leg. She denies numbness and tingling.   Attempted treatments: Patient states she has been taking ibuprofen and using a heating pad and states this has not helped to provide pain relief. She denies formal physical therapy, history of injection or surgery.   Pain score: 4/10   Any mechanical symptoms: Patient states she has experienced painless popping in her hip.   Feelings of instability: Patient denies feelings of instability.   Affecting ADLs: Patient admits to this problem affecting her ability to perform ADLs. Patient is a principal at [employer].     REVIEW OF SYSTEMS:   Constitution: Patient denies fever, chills, night sweats, and weight changes.  Eyes: Patient denies eye pain or vision changes.  HENT: Patient denies headache, ear pain, sore throat, or nasal discharge.  CVS: Patient denies chest pain.  Lungs: Patient denies shortness of breath or cough.  Abd: Patient denies stomach pain, nausea, or vomiting.  Skin: Patient denies skin rash or itching.    Hematologic/Lymphatic: Patient denies easy bruising.   Musculoskeletal: Patient denies recent falls. See HPI.  Psych: Patient denies any current anxiety or nervousness.    PAST MEDICAL HISTORY:   Past Medical History:   Diagnosis Date    Infertility, female        PAST SURGICAL HISTORY:   Past Surgical History:   Procedure Laterality Date    BREAST LUMPECTOMY Left 2014    Dr Brown - benign    DILATION AND CURETTAGE OF UTERUS      for SAB       FAMILY HISTORY:   Family History   Problem Relation Age of Onset    Breast cancer Maternal Cousin     Colon cancer Neg Hx     Ovarian cancer Neg Hx        SOCIAL HISTORY:   Social History     Socioeconomic History    Marital status:      Spouse name: Not on file    Number of children: Not on file    Years of education: Not on file    Highest education level: Not on file   Occupational History    Not on file    Social Needs    Financial resource strain: Not on file    Food insecurity:     Worry: Not on file     Inability: Not on file    Transportation needs:     Medical: Not on file     Non-medical: Not on file   Tobacco Use    Smoking status: Never Smoker    Smokeless tobacco: Never Used   Substance and Sexual Activity    Alcohol use: No     Comment: rarely    Drug use: No    Sexual activity: Yes     Partners: Male     Birth control/protection: None   Lifestyle    Physical activity:     Days per week: Not on file     Minutes per session: Not on file    Stress: Not on file   Relationships    Social connections:     Talks on phone: Not on file     Gets together: Not on file     Attends [employer] service: Not on file     Active member of club or organization: Not on file     Attends meetings of clubs or organizations: Not on file     Relationship status: Not on file   Other Topics Concern    Not on file   Social History Narrative    Not on file       MEDICATIONS:     Current Outpatient Medications:     eletriptan (RELPAX) 40 MG tablet, Relpax 40 mg tablet  Take 1 tablet as needed by oral route as needed for 30 days., Disp: , Rfl:     FLUoxetine 10 MG capsule, Take by mouth., Disp: , Rfl:     FLUoxetine 10 MG capsule, Take 1 capsule (10 mg total) by mouth once daily., Disp: 90 capsule, Rfl: 3    meloxicam (MOBIC) 15 MG tablet, TAKE 1 TABLET(15 MG) BY MOUTH EVERY DAY, Disp: 30 tablet, Rfl: 0    miSOPROStol (CYTOTEC) 200 MCG Tab, Place 4 tabs first dose vaginally then place 3 tabs vaginally every 8 hours for total of 4 doses, Disp: 13 tablet, Rfl: 0    ZOLMitriptan (ZOMIG) 5 mg Spry, Zomig 5 mg nasal spray  Take by nasal route @ onset of headache, may repeat in 2-4hrs if needed, not more than 2/d or 6/wk, Disp: , Rfl:     ALPRAZolam (XANAX) 0.5 MG tablet, Take 1 tablet (0.5 mg total) by mouth nightly as needed for Insomnia or Anxiety. KEEP APPT FOR FURTHER REFILLS., Disp: 30 tablet, Rfl: 1    azelastine  "(ASTELIN) 137 mcg (0.1 %) nasal spray, 1 spray (137 mcg total) by Nasal route 2 (two) times daily., Disp: 30 mL, Rfl: 0    ALLERGIES:   Review of patient's allergies indicates:   Allergen Reactions    Penicillins Hives        PHYSICAL EXAMINATION:  BP (!) 127/91   Pulse 101   Ht 5' 5" (1.651 m)   Wt 78.5 kg (173 lb)   BMI 28.79 kg/m²   Vitals signs and nursing note have been reviewed.  General: In no acute distress, well developed, well nourished, no diaphoresis  Eyes: EOM full and smooth, no eye redness or discharge  HENT: normocephalic and atraumatic, neck supple, trachea midline, no nasal discharge, no external ear redness or discharge  Cardiovascular: no LE edema  Lungs: respirations non-labored, no conversational dyspnea   Abd: non-distended, no rigidity  MSK: no amputation or deformity, no swelling of extremities  Neuro: AAOx3, CN2-12 grossly intact  Skin: No rashes, warm and dry  Psychiatric: cooperative, pleasant, mood and affect appropriate for age    HIP: RIGHT  The affected hip is compared to the contralateral hip.    Observation:    There is no edema, erythema, or ecchymosis in the lumbosacral region.   There is no Trendelenburg sign on either side  No obvious pelvic obliquity while standing.    No thoracolumbar scoliosis observed.    No midline skin abnormalities.  No atrophy noted in the lower limbs.    ROM:   Passive hip flexion to 120° on left and 120° on right.    Passive hip internal rotation to 45° on left and 45° on right.    Passive hip external rotation to 45° on left and 45° on right.    Passive hip abduction to 45° on left and 45° on right.    All motions above are without pain.    Tenderness To Palpation:  No tenderness at the ASIS, AIIS, PSIS, PIIS, iliac crest, pubic bones, ischial tuberosity.  No tenderness at L4-L5 paraspinal muscles  + tenderness over the right posterior pelvis.  + tenderness over the right sacral base.  No tenderness over the piriformis  No tenderness over the " greater trochanteric bursa or greater/lesser trochanters.  No tenderness at the glut attachments on the greater trochanter  No tenderness over proximal IT band or hip flexor musculature.    Strength Testing:  Hip flexion - 5/5 on left and 5/5 on right  Hip extension - 5/5 on left and 5/5 on right  Hip adduction - 5/5 on left and 5/5 on right  Hip abduction - 5/5 on left and 5/5 on right  Knee flexion - 5/5 on left and 5/5 on right  Knee extension - 5/5 on left and 5/5 on right    Special Tests:  Standing Trendelenburg test - negative    Seated straight leg raise - negative  Supine straight leg raise - negative  Hamstring flexibility symmetric    Log roll - negative  NOLAN - negative  FADIR - negative  Scour test - negative  No pain with posterior hip capsule compression    ASIS compression test - negative  SI drawer test - negative   Thigh thrust test - negative   Quadriceps flexibility symmetric.  Carline test - negative  Bhavin compression test - negative    Fulcrum test - negative    Posture:  Upright  Gait: Right antalgic with Over pronation on right ankle mechanics    TART (Tissue texture abnormality, Asymmetry,  Restriction of motion and/or Tenderness) changes:       Cervical Spine  Thoracic Spine  Lumbar Spine   C1  T1 Neutral L1 TTAR   C2  T2 Neutral L2 TTAR   C3  T3 Neutral L3 Neutral   C4  T4 Neutral L4 Neutral   C5  T5 Neutral L5 ERSR   C6  T6 Neutral     C7  T7 Neutral       T8 Neutral       T9 TTAR       T10 TTAR       T11 TTAR       T12 TTAR       Pelvis:  · Innominate:Right superior shear  · Pubic bone:Right superior pubic shear    Sacrum:Left on Left sacral torsion   Fascial herniated trigger point at the lateral aspect of right sacral base      Key   F= Flexed   E = Extended   R = Rotated   S = Sidebent   TTA = tissue texture abnormality       Neurovascular Exam:  Normal gait without Trendelenburg.  2+ femoral, DP, and PT pulses BL.  No skin changes, no abnormal hair distribution.  Sensation intact to  light touch throughout the obturator and medial/lateral/posterior femoral cutaneous nerves.  Capillary refill intact to <2 seconds in all lower limb digits.      IMAGIN. X-ray ordered due to right hip pain   2. X-ray images were reviewed personally by me and then directly with patient.  3. FINDINGS: X-ray images obtained demonstrate no acute fracture of visualized lower lumbar spine, bony pelvis, or proximal femurs. There is minimal spurring about the right acetabular roof.    4. IMPRESSION: No acute fracture, findings as described.    1. X-ray ordered due to right low back pain   2. X-ray images were reviewed personally by me and then directly with patient.  3. FINDINGS: X-ray images obtained demonstrate mild narrowing of the lumbosacral disc space. There is no fracture, spondylolisthesis or bone destruction identified.  4. IMPRESSION: As above.       ASSESSMENT:      ICD-10-CM ICD-9-CM   1. Right hip pain M25.551 719.45   2. Acute right-sided low back pain with right-sided sciatica M54.41 724.2     724.3   3. Somatic dysfunction of lumbar region M99.03 739.3   4. Somatic dysfunction of pelvis region M99.05 739.5   5. Somatic dysfunction of thoracic region M99.02 739.2   6. Somatic dysfunction of sacral region M99.04 739.4       PLAN:  1-2. Right hip pain/LBP - improved    - Recall that Kim admits to right posterior and ride sided low back pain for the past 1-2 months without any mechanism of injury.  Her pain was radiating down the posterior aspect of her right leg. She denies numbness/tingling, denies loss of bladder/bowel control. When asked where she hurts she points to her right sacrum.    - After last visit she admits to approximately 70% overall improvement.  She felt immediately better following OMT and is interested in further evaluation for this today as it was very beneficial for her.    - Based on her description/body language of pain and somatic dysfunction identified on exam, I discussed  osteopathic manipulation as a treatment option today.  She consents to evaluation and treatment.  See below.    - HEP for pelvic clocks, diaphragmatic breathing, ant marches, single/double knee to chest prescribed at last visit. Handout provided, explained, and exercises were demonstrated as needed. Encouraged to do daily.      3-6. Somatic dysfunction of the lumbar, pelvic, sacral, thoracic regions -     - OMT 3-4 regions. Oral consent obtained. Reviewed benefits and potential side effects. OMT indicated today due to signs and symptoms as well as local and remote somatic dysfunction findings and their related neurokinetic, lymphatic, fascial and/or arteriovenous body connections. OMT techniques used: Soft Tissue, Myofascial Release, Muscle Energy, High Velocity Low Amplitude and Fascial Distortion Model. Treatment was tolerated well. Improvement noted in segmental mobility post-treatment in dysfunctional regions. There were no adverse events and no complications immediately following treatment. Advised plenty of water to help alleviate soreness.      Future planning includes - possibly more OMT if helpful and if indicated    All questions were answered to the best of my ability and all concerns were addressed at this time.    Follow up in 2-3 weeks for above, or sooner if needed.      This note is dictated using the M*Modal Fluency Direct word recognition program. There are word recognition mistakes that are occasionally missed on review.

## 2020-02-04 ENCOUNTER — PATIENT MESSAGE (OUTPATIENT)
Dept: SLEEP MEDICINE | Facility: CLINIC | Age: 45
End: 2020-02-04

## 2020-02-09 ENCOUNTER — PATIENT MESSAGE (OUTPATIENT)
Dept: SPORTS MEDICINE | Facility: CLINIC | Age: 45
End: 2020-02-09

## 2020-02-13 ENCOUNTER — OFFICE VISIT (OUTPATIENT)
Dept: ORTHOPEDICS | Facility: CLINIC | Age: 45
End: 2020-02-13
Payer: COMMERCIAL

## 2020-02-13 VITALS
HEIGHT: 65 IN | DIASTOLIC BLOOD PRESSURE: 92 MMHG | SYSTOLIC BLOOD PRESSURE: 120 MMHG | BODY MASS INDEX: 28.82 KG/M2 | WEIGHT: 173 LBS

## 2020-02-13 DIAGNOSIS — M54.41 ACUTE RIGHT-SIDED LOW BACK PAIN WITH RIGHT-SIDED SCIATICA: Primary | ICD-10-CM

## 2020-02-13 DIAGNOSIS — M99.08 SOMATIC DYSFUNCTION OF RIB CAGE REGION: ICD-10-CM

## 2020-02-13 DIAGNOSIS — M99.05 SOMATIC DYSFUNCTION OF PELVIS REGION: ICD-10-CM

## 2020-02-13 DIAGNOSIS — M99.01 SOMATIC DYSFUNCTION OF CERVICAL REGION: ICD-10-CM

## 2020-02-13 DIAGNOSIS — M25.551 RIGHT HIP PAIN: ICD-10-CM

## 2020-02-13 DIAGNOSIS — M99.07 SOMATIC DYSFUNCTION OF UPPER EXTREMITY: ICD-10-CM

## 2020-02-13 DIAGNOSIS — M54.2 NECK PAIN ON LEFT SIDE: ICD-10-CM

## 2020-02-13 PROCEDURE — 99999 PR PBB SHADOW E&M-EST. PATIENT-LVL II: ICD-10-PCS | Mod: PBBFAC,,, | Performed by: ORTHOPAEDIC SURGERY

## 2020-02-13 PROCEDURE — 99214 OFFICE O/P EST MOD 30 MIN: CPT | Mod: 25,S$GLB,, | Performed by: ORTHOPAEDIC SURGERY

## 2020-02-13 PROCEDURE — 98926 PR OSTEOPATHIC MANIP,3-4 BODY REGN: ICD-10-PCS | Mod: S$GLB,,, | Performed by: ORTHOPAEDIC SURGERY

## 2020-02-13 PROCEDURE — 98926 OSTEOPATH MANJ 3-4 REGIONS: CPT | Mod: S$GLB,,, | Performed by: ORTHOPAEDIC SURGERY

## 2020-02-13 PROCEDURE — 99999 PR PBB SHADOW E&M-EST. PATIENT-LVL II: CPT | Mod: PBBFAC,,, | Performed by: ORTHOPAEDIC SURGERY

## 2020-02-13 PROCEDURE — 99214 PR OFFICE/OUTPT VISIT, EST, LEVL IV, 30-39 MIN: ICD-10-PCS | Mod: 25,S$GLB,, | Performed by: ORTHOPAEDIC SURGERY

## 2020-02-13 PROCEDURE — 3008F PR BODY MASS INDEX (BMI) DOCUMENTED: ICD-10-PCS | Mod: CPTII,S$GLB,, | Performed by: ORTHOPAEDIC SURGERY

## 2020-02-13 PROCEDURE — 3008F BODY MASS INDEX DOCD: CPT | Mod: CPTII,S$GLB,, | Performed by: ORTHOPAEDIC SURGERY

## 2020-02-13 NOTE — PROGRESS NOTES
"CC: right hip pain    Ms. Padilla is here today for follow up evaluation of her right hip and sacral pain. Patient reports she had pain relief following OMT for approximately one week following her last visit. Patient reports 2/10/2020 she had to "wrangle" a young child at work due to behavioral issues and believes this may have exacerbated her pain. She also reports to left sided neck pain that began following this event. When asked where her pain is located today she gestures to the right posterior hip at the gluteal fold. Patient states she is going on a cruise for [employer] and she is concerned about this as she still cannot lay flat comfortably. Patient admits to performing her HEP several times throughout the day and believes this has been helpful to her. She denies falls or trauma since her last visit.      Recall from visit on 1/24/2020  Ms. Padilla is here today for follow up evaluation of her right hip and sacral pain. Patient reports she is feeling up to 70% improved at this time and believes her benefits from OMT have lasted from her first appointment. Patient reports her pain is the greatest first thing in the moring and after sitting for long periods of time. She also states she is unable to straighten her leg out fully in the bath tub without pain. She admits to performing her HEP five times since her first visit. She denies falls or trauma since her last visit.  She is pleased that she is feeling much better and notice a big difference immediately following OMT.    Recall from visit on 1/14/2020  44 y.o. Female presents today for evaluation of her right hip pain. Patient is unable to recall a specific mechanism of injury and denies prior history of right hip/low back injury. When asked where her pain is located she gestures to the posterior hip and her right low back. She describes the quality of her pain as a constant, sharp and stabbing pain which has caused her to be concerned she is suffering " from sciatica. Patient states she also has right foot plantar fasciitis and is wondering if this has caused her gait to shift causing her increase in pain. 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 She denies recent falls or trauma.   How long: Patient reports she has been experiencing right hip pain for the past 3-4 weeks.   What makes it better: Patient reports her pain is improved when she is standing.   What makes it worse: Patient reports her pain is increased with laying down, sitting and getting up from a seated position. She also admits to increased pain with sitting in the bathtub when she attempts to straighten out her legs.  Does it radiate: Patient also states her pain will radiate down the posterior aspect of her right leg. She denies numbness and tingling.   Attempted treatments: Patient states she has been taking ibuprofen and using a heating pad and states this has not helped to provide pain relief. She denies formal physical therapy, history of injection or surgery.   Pain score: 4/10   Any mechanical symptoms: Patient states she has experienced painless popping in her hip.   Feelings of instability: Patient denies feelings of instability.   Affecting ADLs: Patient admits to this problem affecting her ability to perform ADLs. Patient is a principal at [employer].     REVIEW OF SYSTEMS:   Constitution: Patient denies fever, chills, night sweats, and weight changes.  Eyes: Patient denies eye pain or vision changes.  HENT: Patient denies headache, ear pain, sore throat, or nasal discharge.  CVS: Patient denies chest pain.  Lungs: Patient denies shortness of breath or cough.  Abd: Patient denies stomach pain, nausea, or vomiting.  Skin: Patient denies skin rash or itching.    Hematologic/Lymphatic: Patient denies  easy bruising.   Musculoskeletal: Patient denies recent falls. See HPI.  Psych: Patient denies any current anxiety or nervousness.    PAST MEDICAL HISTORY:   Past Medical History:   Diagnosis Date    Infertility, female        PAST SURGICAL HISTORY:   Past Surgical History:   Procedure Laterality Date    BREAST LUMPECTOMY Left 2014    Dr Borwn - benign    DILATION AND CURETTAGE OF UTERUS      for SAB       FAMILY HISTORY:   Family History   Problem Relation Age of Onset    Breast cancer Maternal Cousin     Colon cancer Neg Hx     Ovarian cancer Neg Hx        SOCIAL HISTORY:   Social History     Socioeconomic History    Marital status:      Spouse name: Not on file    Number of children: Not on file    Years of education: Not on file    Highest education level: Not on file   Occupational History    Not on file   Social Needs    Financial resource strain: Not on file    Food insecurity:     Worry: Not on file     Inability: Not on file    Transportation needs:     Medical: Not on file     Non-medical: Not on file   Tobacco Use    Smoking status: Never Smoker    Smokeless tobacco: Never Used   Substance and Sexual Activity    Alcohol use: No     Comment: rarely    Drug use: No    Sexual activity: Yes     Partners: Male     Birth control/protection: None   Lifestyle    Physical activity:     Days per week: Not on file     Minutes per session: Not on file    Stress: Not on file   Relationships    Social connections:     Talks on phone: Not on file     Gets together: Not on file     Attends Druze service: Not on file     Active member of club or organization: Not on file     Attends meetings of clubs or organizations: Not on file     Relationship status: Not on file   Other Topics Concern    Not on file   Social History Narrative    Not on file       MEDICATIONS:     Current Outpatient Medications:     ALPRAZolam (XANAX) 0.5 MG tablet, Take 1 tablet (0.5 mg total) by mouth nightly as  "needed for Insomnia or Anxiety. KEEP APPT FOR FURTHER REFILLS., Disp: 30 tablet, Rfl: 1    azelastine (ASTELIN) 137 mcg (0.1 %) nasal spray, 1 spray (137 mcg total) by Nasal route 2 (two) times daily., Disp: 30 mL, Rfl: 0    eletriptan (RELPAX) 40 MG tablet, Relpax 40 mg tablet  Take 1 tablet as needed by oral route as needed for 30 days., Disp: , Rfl:     FLUoxetine 10 MG capsule, Take by mouth., Disp: , Rfl:     FLUoxetine 10 MG capsule, Take 1 capsule (10 mg total) by mouth once daily., Disp: 90 capsule, Rfl: 3    meloxicam (MOBIC) 15 MG tablet, TAKE 1 TABLET(15 MG) BY MOUTH EVERY DAY, Disp: 30 tablet, Rfl: 0    miSOPROStol (CYTOTEC) 200 MCG Tab, Place 4 tabs first dose vaginally then place 3 tabs vaginally every 8 hours for total of 4 doses, Disp: 13 tablet, Rfl: 0    ZOLMitriptan (ZOMIG) 5 mg Spry, Zomig 5 mg nasal spray  Take by nasal route @ onset of headache, may repeat in 2-4hrs if needed, not more than 2/d or 6/wk, Disp: , Rfl:     ALLERGIES:   Review of patient's allergies indicates:   Allergen Reactions    Penicillins Hives        PHYSICAL EXAMINATION:  BP (!) 120/92   Ht 5' 5" (1.651 m)   Wt 78.5 kg (173 lb)   BMI 28.79 kg/m²   Vitals signs and nursing note have been reviewed.  General: In no acute distress, well developed, well nourished, no diaphoresis  Eyes: EOM full and smooth, no eye redness or discharge  HENT: normocephalic and atraumatic, neck supple, trachea midline, no nasal discharge, no external ear redness or discharge  Cardiovascular: no LE edema  Lungs: respirations non-labored, no conversational dyspnea   Abd: non-distended, no rigidity  MSK: no amputation or deformity, no swelling of extremities  Neuro: AAOx3, CN2-12 grossly intact  Skin: No rashes, warm and dry  Psychiatric: cooperative, pleasant, mood and affect appropriate for age    HIP: RIGHT  The affected hip is compared to the contralateral hip.    Observation:    There is no edema, erythema, or ecchymosis in the " lumbosacral region.   There is no Trendelenburg sign on either side  No obvious pelvic obliquity while standing.    No thoracolumbar scoliosis observed.    No midline skin abnormalities.  No atrophy noted in the lower limbs.    ROM:   Passive hip flexion to 120° on left and 120° on right.    Passive hip internal rotation to 45° on left and 45° on right.    Passive hip external rotation to 45° on left and 45° on right.    Passive hip abduction to 45° on left and 45° on right.    All motions above are without pain.    Tenderness To Palpation:  No tenderness at the ASIS, AIIS, PSIS, PIIS, iliac crest, pubic bones, ischial tuberosity.  No tenderness at L4-L5 paraspinal muscles  No tenderness over the right posterior pelvis.  + tenderness over the right sacral base.  No tenderness over the piriformis  No tenderness over the greater trochanteric bursa or greater/lesser trochanters.  No tenderness at the glut attachments on the greater trochanter  No tenderness over proximal IT band or hip flexor musculature.    Strength Testing:  Hip flexion - 5/5 on left and 5/5 on right  Hip extension - 5/5 on left and 5/5 on right  Hip adduction - 5/5 on left and 5/5 on right  Hip abduction - 5/5 on left and 5/5 on right  Knee flexion - 5/5 on left and 5/5 on right  Knee extension - 5/5 on left and 5/5 on right    Special Tests:  Standing Trendelenburg test - negative    Seated straight leg raise - negative  Supine straight leg raise - negative  Hamstring flexibility symmetric    Log roll - negative  NOLAN - negative  FADIR - negative  Scour test - negative  No pain with posterior hip capsule compression    ASIS compression test - negative  SI drawer test - negative   Thigh thrust test - negative   Quadriceps flexibility symmetric.  Carline test - negative  Bhavin compression test - negative    Fulcrum test - negative    Posture:  Upright  Gait: Right antalgic with Over pronation on right ankle mechanics    TART (Tissue texture  abnormality, Asymmetry,  Restriction of motion and/or Tenderness) changes:       Cervical Spine  Thoracic Spine  Lumbar Spine   C1 TTAL T1 Neutral L1 Neutral   C2 TTAL T2 Neutral L2 Neutral   C3 TTAL T3 Neutral L3 Neutral   C4 TTAL T4 Neutral L4 Neutral   C5 TTAL T5 Neutral L5 Neutral   C6 TTAL T6 Neutral     C7 TTAL T7 Neutral       T8 Neutral       T9 Neutral       T10 Neutral       T11 Neutral       T12 Neutral     Fascial trigger band from base of neck on the left along the paraspinal muscles to the posterior ear    Ribs  Superior rib 1 on the left    Pelvis:  · Innominate:Right posterior rotation  · Pubic bone:Neutral    Sacrum:Neutral   Fascial herniated trigger point at the lateral aspect of the mid portion of the right sacral base       Key   F= Flexed   E = Extended   R = Rotated   S = Sidebent   TTA = tissue texture abnormality       Neurovascular Exam:  Normal gait without Trendelenburg.  2+ femoral, DP, and PT pulses BL.  No skin changes, no abnormal hair distribution.  Sensation intact to light touch throughout the obturator and medial/lateral/posterior femoral cutaneous nerves.  Capillary refill intact to <2 seconds in all lower limb digits.      IMAGIN. X-ray ordered due to right hip pain   2. X-ray images were reviewed personally by me and then directly with patient.  3. FINDINGS: X-ray images obtained demonstrate no acute fracture of visualized lower lumbar spine, bony pelvis, or proximal femurs. There is minimal spurring about the right acetabular roof.    4. IMPRESSION: No acute fracture, findings as described.    1. X-ray obtained on 2020 due to right low back pain   2. X-ray images were reviewed personally by me and then directly with patient.  3. FINDINGS: X-ray images obtained demonstrate mild narrowing of the lumbosacral disc space. There is no fracture, spondylolisthesis or bone destruction identified.  4. IMPRESSION: As above.       ASSESSMENT:      ICD-10-CM ICD-9-CM   1. Acute  right-sided low back pain with right-sided sciatica M54.41 724.2     724.3   2. Right hip pain M25.551 719.45   3. Neck pain on left side M54.2 723.1   4. Somatic dysfunction of cervical region M99.01 739.1   5. Somatic dysfunction of rib cage region M99.08 739.8   6. Somatic dysfunction of pelvis region M99.05 739.5   7. Somatic dysfunction of upper extremity M99.07 739.7         PLAN:  1-2. Right hip pain/LBP - improved, then deteriorated    - Recall that Kim admits to right posterior and ride sided low back pain for the past 2 months without any mechanism of injury.  She states that she was doing well with OMT, but suffered an exacerbation when needing to restrain a student at work for behavioral issues.  She states that she started to experience left-sided neck pain and a return of her right low back symptoms at that time. She has done well with OMT in the past and until this exacerbation was doing very well in regard to her pain. She has been doing her exercises as prescribed.    - Based on her description/body language of pain and somatic dysfunction identified on exam, I discussed osteopathic manipulation as a treatment option today.  She consents to evaluation and treatment.  See below.    - Continue with previously prescribed home exercise program for core and pelvic strengthening and stabilizing.      3. Left sided neck pain - new complaint    - she suffered this injury during the restraining a student for behavioral issues.  She has noticed that the left side of her neck as a knot and feels as though symptoms are radiating her neck to her head. She has tried to stretch the area with little improvement in symptoms.    - HEP for neck stretching prescribed today. Handout provided, explained, and exercises were demonstrated as needed. Encouraged to do daily.      4-7. Somatic dysfunction of the cervical, upper extremity, ribcage, pelvis regions -     - OMT 3-4 regions. Oral consent obtained. Reviewed  benefits and potential side effects. OMT indicated today due to signs and symptoms as well as local and remote somatic dysfunction findings and their related neurokinetic, lymphatic, fascial and/or arteriovenous body connections. OMT techniques used: Soft Tissue, Myofascial Release, Muscle Energy and Fascial Distortion Model. Treatment was tolerated well. Improvement noted in segmental mobility post-treatment in dysfunctional regions. There were no adverse events and no complications immediately following treatment. Advised plenty of water to help alleviate soreness.      Future planning includes - possibly more OMT if helpful and if indicated    All questions were answered to the best of my ability and all concerns were addressed at this time.    Follow up in 1-2 weeks for above, or sooner if needed.      This note is dictated using the M*Modal Fluency Direct word recognition program. There are word recognition mistakes that are occasionally missed on review.

## 2020-02-18 ENCOUNTER — PATIENT MESSAGE (OUTPATIENT)
Dept: SLEEP MEDICINE | Facility: CLINIC | Age: 45
End: 2020-02-18

## 2020-03-05 ENCOUNTER — PATIENT MESSAGE (OUTPATIENT)
Dept: SLEEP MEDICINE | Facility: CLINIC | Age: 45
End: 2020-03-05

## 2020-03-17 ENCOUNTER — TELEPHONE (OUTPATIENT)
Dept: SPORTS MEDICINE | Facility: CLINIC | Age: 45
End: 2020-03-17

## 2020-03-17 ENCOUNTER — OFFICE VISIT (OUTPATIENT)
Dept: SPORTS MEDICINE | Facility: CLINIC | Age: 45
End: 2020-03-17
Payer: COMMERCIAL

## 2020-03-17 VITALS
WEIGHT: 173 LBS | SYSTOLIC BLOOD PRESSURE: 123 MMHG | DIASTOLIC BLOOD PRESSURE: 86 MMHG | HEART RATE: 93 BPM | BODY MASS INDEX: 28.79 KG/M2

## 2020-03-17 DIAGNOSIS — M99.03 SOMATIC DYSFUNCTION OF LUMBAR REGION: ICD-10-CM

## 2020-03-17 DIAGNOSIS — M99.02 SOMATIC DYSFUNCTION OF THORACIC REGION: ICD-10-CM

## 2020-03-17 DIAGNOSIS — M25.551 RIGHT HIP PAIN: Primary | ICD-10-CM

## 2020-03-17 DIAGNOSIS — M99.05 SOMATIC DYSFUNCTION OF PELVIS REGION: ICD-10-CM

## 2020-03-17 DIAGNOSIS — M99.04 SOMATIC DYSFUNCTION OF SACRAL REGION: ICD-10-CM

## 2020-03-17 DIAGNOSIS — M54.41 ACUTE RIGHT-SIDED LOW BACK PAIN WITH RIGHT-SIDED SCIATICA: ICD-10-CM

## 2020-03-17 PROCEDURE — 98926 PR OSTEOPATHIC MANIP,3-4 BODY REGN: ICD-10-PCS | Mod: S$GLB,,, | Performed by: ORTHOPAEDIC SURGERY

## 2020-03-17 PROCEDURE — 98926 OSTEOPATH MANJ 3-4 REGIONS: CPT | Mod: S$GLB,,, | Performed by: ORTHOPAEDIC SURGERY

## 2020-03-17 PROCEDURE — 3008F BODY MASS INDEX DOCD: CPT | Mod: CPTII,S$GLB,, | Performed by: ORTHOPAEDIC SURGERY

## 2020-03-17 PROCEDURE — 99214 PR OFFICE/OUTPT VISIT, EST, LEVL IV, 30-39 MIN: ICD-10-PCS | Mod: 25,S$GLB,, | Performed by: ORTHOPAEDIC SURGERY

## 2020-03-17 PROCEDURE — 99999 PR PBB SHADOW E&M-EST. PATIENT-LVL III: ICD-10-PCS | Mod: PBBFAC,,, | Performed by: ORTHOPAEDIC SURGERY

## 2020-03-17 PROCEDURE — 99214 OFFICE O/P EST MOD 30 MIN: CPT | Mod: 25,S$GLB,, | Performed by: ORTHOPAEDIC SURGERY

## 2020-03-17 PROCEDURE — 99999 PR PBB SHADOW E&M-EST. PATIENT-LVL III: CPT | Mod: PBBFAC,,, | Performed by: ORTHOPAEDIC SURGERY

## 2020-03-17 PROCEDURE — 3008F PR BODY MASS INDEX (BMI) DOCUMENTED: ICD-10-PCS | Mod: CPTII,S$GLB,, | Performed by: ORTHOPAEDIC SURGERY

## 2020-03-17 RX ORDER — MELOXICAM 15 MG/1
15 TABLET ORAL DAILY
Qty: 14 TABLET | Refills: 0 | Status: SHIPPED | OUTPATIENT
Start: 2020-03-17 | End: 2020-09-23

## 2020-03-17 NOTE — TELEPHONE ENCOUNTER
Contacted patient to move her appointment to an earlier time. Patient was in agreement to her new appointment time and states she is pleased with the earlier appointment due to her severe pain and numbness.    Beverly Davis MS, OTC  Clinical Assistant to Dr. Levy Gao

## 2020-03-17 NOTE — PROGRESS NOTES
"CC: right hip pain    Ms. Padilla is here today for follow up evaluation of her right hip and sacral pain. Patient reports her pain is 4/10 today. Patient reports her pain had been significantly improved up until the past 1-2 weeks. She states she recently returned from a cruise over [employer] and states she did a lot of walking. She states she was still unable to lay flat and she was disappointed by this. Patient reports her new symptom developed approximately 1-2 weeks ago and has become more severe over the past several days. Patient admits to experiencing numbness affecting the bottom of her right foot. She states she believes her pain is correlated with when she is sitting. Patient reports she sat for several hours at work yesterday and also babysat her young niece for a day and she believes this contributed to her recent pain exacerbation. Patient admits to remaining compliant with her HEP and states her pain is improved when she is walking. She also admits to approximately 1.5 hours of improvement in her pain after her  treated her with a Hypervolt.    Recall from visit on 2/13/2020  Ms. Padilla is here today for follow up evaluation of her right hip and sacral pain. Patient reports she had pain relief following OMT for approximately one week following her last visit. Patient reports 2/10/2020 she had to "wrangle" a young child at work due to behavioral issues and believes this may have exacerbated her pain. She also reports to left sided neck pain that began following this event. When asked where her pain is located today she gestures to the right posterior hip at the gluteal fold. Patient states she is going on a cruise for [employer] and she is concerned about this as she still cannot lay flat comfortably. Patient admits to performing her HEP several times throughout the day and believes this has been helpful to her. She denies falls or trauma since her last visit.      Recall from visit on " 1/24/2020  Ms. Padilla is here today for follow up evaluation of her right hip and sacral pain. Patient reports she is feeling up to 70% improved at this time and believes her benefits from OMT have lasted from her first appointment. Patient reports her pain is the greatest first thing in the moring and after sitting for long periods of time. She also states she is unable to straighten her leg out fully in the bath tub without pain. She admits to performing her HEP five times since her first visit. She denies falls or trauma since her last visit.  She is pleased that she is feeling much better and notice a big difference immediately following OMT.    Recall from visit on 1/14/2020  44 y.o. Female presents today for evaluation of her right hip pain. Patient is unable to recall a specific mechanism of injury and denies prior history of right hip/low back injury. When asked where her pain is located she gestures to the posterior hip and her right low back. She describes the quality of her pain as a constant, sharp and stabbing pain which has caused her to be concerned she is suffering from sciatica. Patient states she also has right foot plantar fasciitis and is wondering if this has caused her gait to shift causing her increase in pain. 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 She denies recent falls or trauma.   How long: Patient reports she has been experiencing right hip pain for the past 3-4 weeks.   What makes it better: Patient reports her pain is improved when she is standing.   What makes it worse: Patient reports her pain is increased with laying down, sitting and getting up from a seated position. She also admits to increased pain with sitting in the bathtub when she attempts to straighten out her legs.  Does it radiate:  Patient also states her pain will radiate down the posterior aspect of her right leg. She denies numbness and tingling.   Attempted treatments: Patient states she has been taking ibuprofen and using a heating pad and states this has not helped to provide pain relief. She denies formal physical therapy, history of injection or surgery.   Pain score: 4/10   Any mechanical symptoms: Patient states she has experienced painless popping in her hip.   Feelings of instability: Patient denies feelings of instability.   Affecting ADLs: Patient admits to this problem affecting her ability to perform ADLs. Patient is a principal at [employer].     REVIEW OF SYSTEMS:   Constitution: Patient denies fever, chills, night sweats, and weight changes.  Eyes: Patient denies eye pain or vision changes.  HENT: Patient denies headache, ear pain, sore throat, or nasal discharge.  CVS: Patient denies chest pain.  Lungs: Patient denies shortness of breath or cough.  Abd: Patient denies stomach pain, nausea, or vomiting.  Skin: Patient denies skin rash or itching.    Hematologic/Lymphatic: Patient denies easy bruising.   Musculoskeletal: Patient denies recent falls. See HPI.  Psych: Patient denies any current anxiety or nervousness.    PAST MEDICAL HISTORY:   Past Medical History:   Diagnosis Date    Infertility, female        PAST SURGICAL HISTORY:   Past Surgical History:   Procedure Laterality Date    BREAST LUMPECTOMY Left 2014    Dr Brown - benign    DILATION AND CURETTAGE OF UTERUS      for SAB       FAMILY HISTORY:   Family History   Problem Relation Age of Onset    Breast cancer Maternal Cousin     Colon cancer Neg Hx     Ovarian cancer Neg Hx        SOCIAL HISTORY:   Social History     Socioeconomic History    Marital status:      Spouse name: Not on file    Number of children: Not on file    Years of education: Not on file    Highest education level: Not on file   Occupational History    Not on file   Social  Needs    Financial resource strain: Not on file    Food insecurity:     Worry: Not on file     Inability: Not on file    Transportation needs:     Medical: Not on file     Non-medical: Not on file   Tobacco Use    Smoking status: Never Smoker    Smokeless tobacco: Never Used   Substance and Sexual Activity    Alcohol use: No     Comment: rarely    Drug use: No    Sexual activity: Yes     Partners: Male     Birth control/protection: None   Lifestyle    Physical activity:     Days per week: Not on file     Minutes per session: Not on file    Stress: Not on file   Relationships    Social connections:     Talks on phone: Not on file     Gets together: Not on file     Attends Restorationism service: Not on file     Active member of club or organization: Not on file     Attends meetings of clubs or organizations: Not on file     Relationship status: Not on file   Other Topics Concern    Not on file   Social History Narrative    Not on file       MEDICATIONS:     Current Outpatient Medications:     eletriptan (RELPAX) 40 MG tablet, Relpax 40 mg tablet  Take 1 tablet as needed by oral route as needed for 30 days., Disp: , Rfl:     FLUoxetine 10 MG capsule, Take by mouth., Disp: , Rfl:     miSOPROStol (CYTOTEC) 200 MCG Tab, Place 4 tabs first dose vaginally then place 3 tabs vaginally every 8 hours for total of 4 doses, Disp: 13 tablet, Rfl: 0    ZOLMitriptan (ZOMIG) 5 mg Spry, Zomig 5 mg nasal spray  Take by nasal route @ onset of headache, may repeat in 2-4hrs if needed, not more than 2/d or 6/wk, Disp: , Rfl:     ALPRAZolam (XANAX) 0.5 MG tablet, Take 1 tablet (0.5 mg total) by mouth nightly as needed for Insomnia or Anxiety. KEEP APPT FOR FURTHER REFILLS., Disp: 30 tablet, Rfl: 1    azelastine (ASTELIN) 137 mcg (0.1 %) nasal spray, 1 spray (137 mcg total) by Nasal route 2 (two) times daily., Disp: 30 mL, Rfl: 0    FLUoxetine 10 MG capsule, Take 1 capsule (10 mg total) by mouth once daily., Disp: 90  capsule, Rfl: 3    meloxicam (MOBIC) 15 MG tablet, Take 1 tablet (15 mg total) by mouth once daily., Disp: 14 tablet, Rfl: 0    ALLERGIES:   Review of patient's allergies indicates:   Allergen Reactions    Penicillins Hives        PHYSICAL EXAMINATION:  /86   Pulse 93   Wt 78.5 kg (173 lb)   BMI 28.79 kg/m²   Vitals signs and nursing note have been reviewed.  General: In no acute distress, well developed, well nourished, no diaphoresis  Eyes: EOM full and smooth, no eye redness or discharge  HENT: normocephalic and atraumatic, neck supple, trachea midline, no nasal discharge, no external ear redness or discharge  Cardiovascular: no LE edema  Lungs: respirations non-labored, no conversational dyspnea   Abd: non-distended, no rigidity  MSK: no amputation or deformity, no swelling of extremities  Neuro: AAOx3, CN2-12 grossly intact  Skin: No rashes, warm and dry  Psychiatric: cooperative, pleasant, mood and affect appropriate for age    HIP: RIGHT  The affected hip is compared to the contralateral hip.    Observation:    There is no edema, erythema, or ecchymosis in the lumbosacral region.   There is no Trendelenburg sign on either side  No obvious pelvic obliquity while standing.    No thoracolumbar scoliosis observed.    No midline skin abnormalities.  No atrophy noted in the lower limbs.    ROM:   Passive hip flexion to 120° on left and 120° on right.    Passive hip internal rotation to 45° on left and 45° on right.    Passive hip external rotation to 45° on left and 45° on right.    Passive hip abduction to 45° on left and 45° on right.    All motions above are without pain.    Tenderness To Palpation:  No tenderness at the ASIS, AIIS, PSIS, PIIS, iliac crest, pubic bones, ischial tuberosity.  + tenderness at L4-L5 paraspinal muscles  + tenderness over the right posterior pelvis.  + tenderness over the right sacral base.  No tenderness over the piriformis  No tenderness over the greater trochanteric  bursa or greater/lesser trochanters.  No tenderness at the glut attachments on the greater trochanter  No tenderness over proximal IT band or hip flexor musculature.    Strength Testing:  Hip flexion - 5/5 on left and 5/5 on right  Hip extension - 5/5 on left and 5/5 on right  Hip adduction - 5/5 on left and 5/5 on right  Hip abduction - 5/5 on left and 5/5 on right  Knee flexion - 5/5 on left and 5/5 on right  Knee extension - 5/5 on left and 5/5 on right    Special Tests:  Standing Trendelenburg test - negative    Seated straight leg raise - negative  Supine straight leg raise - negative  Hamstring flexibility symmetric    Log roll - negative  NOLAN - negative  FADIR - negative  Scour test - negative  No pain with posterior hip capsule compression    ASIS compression test - negative  SI drawer test - negative   Thigh thrust test - negative   Quadriceps flexibility symmetric.  Carline test - negative  Bhavin compression test - negative    Fulcrum test - negative    Posture:  Upright  Gait: Right antalgic with Over pronation on right ankle mechanics    TART (Tissue texture abnormality, Asymmetry,  Restriction of motion and/or Tenderness) changes:       Cervical Spine  Thoracic Spine  Lumbar Spine   C1  T1 Neutral L1 Neutral   C2  T2 Neutral L2 Neutral   C3  T3 Neutral L3 ERSL   C4  T4 Neutral L4 FRSR   C5  T5 Neutral L5 FRSR   C6  T6 Neutral     C7  T7 Neutral       T8 TTAR       T9 TTAR       T10 TTAR       T11 TTAR       T12 TTAR       Pelvis:  · Innominate:Right anterior rotation  · Pubic bone:Neutral    Sacrum:Right unilateral extension     Key   F= Flexed   E = Extended   R = Rotated   S = Sidebent   TTA = tissue texture abnormality       Neurovascular Exam:  Antalgic gait due to low back pain  2+ femoral, DP, and PT pulses BL.  No skin changes, no abnormal hair distribution.  Sensation intact to light touch throughout the obturator and medial/lateral/posterior femoral cutaneous nerves.  Capillary refill intact to  <2 seconds in all lower limb digits.      IMAGIN. X-ray ordered due to right hip pain   2. X-ray images were reviewed personally by me and then directly with patient.  3. FINDINGS: X-ray images obtained demonstrate no acute fracture of visualized lower lumbar spine, bony pelvis, or proximal femurs. There is minimal spurring about the right acetabular roof.    4. IMPRESSION: No acute fracture, findings as described.    1. X-ray obtained on 2020 due to right low back pain   2. X-ray images were reviewed personally by me and then directly with patient.  3. FINDINGS: X-ray images obtained demonstrate mild narrowing of the lumbosacral disc space. There is no fracture, spondylolisthesis or bone destruction identified.  4. IMPRESSION: As above.       ASSESSMENT:      ICD-10-CM ICD-9-CM   1. Right hip pain M25.551 719.45   2. Acute right-sided low back pain with right-sided sciatica M54.41 724.2     724.3   3. Somatic dysfunction of lumbar region M99.03 739.3   4. Somatic dysfunction of pelvis region M99.05 739.5   5. Somatic dysfunction of sacral region M99.04 739.4   6. Somatic dysfunction of thoracic region M99.02 739.2       PLAN:  1-2. Right hip pain/LBP - improved, then deteriorated    - Recall that Kim admits to right posterior and ride sided low back pain for the past 2-3 months without any mechanism of injury.  She admits to great improvement in her pain until approximately 2 weeks ago when she started to experience severe right-sided low back pain with some radicular symptoms going down the right leg.  She is concerned that she is feeling tingling on the bottom of her right foot.    - Based on her description/body language of pain and somatic dysfunction identified on exam, I discussed osteopathic manipulation as a treatment option today.  She consents to evaluation and treatment.  See below.  Of note, improvement noted after OMT performed today.    - Continue with previously prescribed home exercise  program for core and pelvic strengthening and stabilizing.    - Mobic 15 mg daily for 2 weeks followed by his needed.  This has been refilled and sent to her pharmacy      3-6. Somatic dysfunction of the pelvic, lumbar, thoracic, sacral regions -     - OMT 3-4 regions. Oral consent obtained. Reviewed benefits and potential side effects. OMT indicated today due to signs and symptoms as well as local and remote somatic dysfunction findings and their related neurokinetic, lymphatic, fascial and/or arteriovenous body connections. OMT techniques used: Soft Tissue, Myofascial Release and Muscle Energy. Treatment was tolerated well. Improvement noted in segmental mobility post-treatment in dysfunctional regions. There were no adverse events and no complications immediately following treatment. Advised plenty of water to help alleviate soreness.      Future planning includes - possibly more OMT if helpful and if indicated    All questions were answered to the best of my ability and all concerns were addressed at this time.    Follow up in 6-8 weeks for above, or sooner if needed.      This note is dictated using the M*Modal Fluency Direct word recognition program. There are word recognition mistakes that are occasionally missed on review.

## 2020-03-26 ENCOUNTER — OFFICE VISIT (OUTPATIENT)
Dept: SLEEP MEDICINE | Facility: CLINIC | Age: 45
End: 2020-03-26
Payer: COMMERCIAL

## 2020-03-26 ENCOUNTER — DOCUMENTATION ONLY (OUTPATIENT)
Dept: REHABILITATION | Facility: HOSPITAL | Age: 45
End: 2020-03-26

## 2020-03-26 ENCOUNTER — TELEPHONE (OUTPATIENT)
Dept: SLEEP MEDICINE | Facility: CLINIC | Age: 45
End: 2020-03-26

## 2020-03-26 VITALS
HEART RATE: 83 BPM | SYSTOLIC BLOOD PRESSURE: 115 MMHG | DIASTOLIC BLOOD PRESSURE: 80 MMHG | HEIGHT: 65 IN | BODY MASS INDEX: 29.87 KG/M2 | WEIGHT: 179.25 LBS

## 2020-03-26 DIAGNOSIS — R53.1 WEAKNESS: ICD-10-CM

## 2020-03-26 DIAGNOSIS — R29.3 POSTURE IMBALANCE: ICD-10-CM

## 2020-03-26 DIAGNOSIS — G47.33 OSA (OBSTRUCTIVE SLEEP APNEA): Primary | ICD-10-CM

## 2020-03-26 PROCEDURE — 3008F PR BODY MASS INDEX (BMI) DOCUMENTED: ICD-10-PCS | Mod: CPTII,S$GLB,, | Performed by: INTERNAL MEDICINE

## 2020-03-26 PROCEDURE — 99212 OFFICE O/P EST SF 10 MIN: CPT | Mod: S$GLB,,, | Performed by: INTERNAL MEDICINE

## 2020-03-26 PROCEDURE — 99999 PR PBB SHADOW E&M-EST. PATIENT-LVL III: ICD-10-PCS | Mod: PBBFAC,,, | Performed by: INTERNAL MEDICINE

## 2020-03-26 PROCEDURE — 99212 PR OFFICE/OUTPT VISIT, EST, LEVL II, 10-19 MIN: ICD-10-PCS | Mod: S$GLB,,, | Performed by: INTERNAL MEDICINE

## 2020-03-26 PROCEDURE — 99999 PR PBB SHADOW E&M-EST. PATIENT-LVL III: CPT | Mod: PBBFAC,,, | Performed by: INTERNAL MEDICINE

## 2020-03-26 PROCEDURE — 3008F BODY MASS INDEX DOCD: CPT | Mod: CPTII,S$GLB,, | Performed by: INTERNAL MEDICINE

## 2020-03-26 RX ORDER — ZOLPIDEM TARTRATE 10 MG/1
10 TABLET ORAL NIGHTLY PRN
Qty: 30 TABLET | Refills: 1 | Status: SHIPPED | OUTPATIENT
Start: 2020-03-26 | End: 2021-11-09

## 2020-03-26 NOTE — PROGRESS NOTES
PHYSICAL THERAPY DISCHARGE SUMMARY     Name: Kim Padilla  Clinic Number: [id number]    Diagnosis:   Encounter Diagnoses   Name Primary?    Weakness     Posture imbalance      Physician: Louise Reed PA-C  Treatment Orders: Therapeutic exercise  Past Medical History:   Diagnosis Date    Infertility, female        Initial visit: 5/29/18  Date of Last visit: 7/16/18  Date of Discharge Note:  3/26/20  Total Visits Received: 9  Missed Visits: 0  ASSESSMENT   Status Towards Goals Met:  Not met due to non-compliance    Goals Not achieved and why: see above    Discharge reason : Pt has not schedule further follow-up sessions    PLAN   This patient is discharged from Physical Therapy Services.

## 2020-03-26 NOTE — PROGRESS NOTES
"Subjective:       Patient ID: Kim Padilla is a 44 y.o. female.    Chief Complaint: No chief complaint on file.    HPI     Kim Padilla returns for PAP follow up for compliance documentation    Kim Padilla has a history of mild Obstructive Sleep Apnea diagnosed in 2020 (AHI 9.9).   The last PAP titration was NA with AHI NA at NA cm H20.   Device is 1-2 month(s) old.   Current setting 7 cm H20.   DME is [facility].      Kim Padilla is using CPAP 93.3% of nights and averages 3 hours and 14 minutes.   Device use greater than 4 hours is 26.7%.   Patient does have problems with the pressure setting.   Mask interface issues are experienced.   A nasal mask interface is used.   The patient does experience side effects from therapy including "I hate it".   The patient experiences the following benefits of therapy:  reduced accident risk, reduced mortality and reduced cardiovascular benefit   There are not equipment issues.   Mean pressure 6.3, average peak pressure 7.6 and 90th percentile pressure 7.3.   Estimated AHI 0.7        Scheduled Meds:  Continuous Infusions:  PRN Meds:.           Importance of PAP compliance discussed.   Care and use of equipment discussed.   Download and relevant sleep studies reviewed with patient    Discussed therapeutic goals for positive airway pressure therapy(CPAP or BiPAP).  Ideal is usage 100% of nights for at least 6 hours per night.  Minimum usage is 70% of night for at least 4 hours per night used    The patient was given open opportunity to ask questions and/or express concerns about treatment plan.   All questions/concerns were discussed.            Review of Systems    Objective:      Physical Exam    Assessment:       1. ANNA (obstructive sleep apnea)        Plan:       Changed auto mode 5-7 with repeat download 1 month.   12-minute visit. >50% spent counseling patient and coordination of care.       "

## 2020-04-08 ENCOUNTER — PATIENT MESSAGE (OUTPATIENT)
Dept: OBSTETRICS AND GYNECOLOGY | Facility: CLINIC | Age: 45
End: 2020-04-08

## 2020-07-17 ENCOUNTER — OFFICE VISIT (OUTPATIENT)
Dept: OBSTETRICS AND GYNECOLOGY | Facility: CLINIC | Age: 45
End: 2020-07-17
Payer: COMMERCIAL

## 2020-07-17 VITALS
HEIGHT: 65 IN | WEIGHT: 184.63 LBS | SYSTOLIC BLOOD PRESSURE: 116 MMHG | BODY MASS INDEX: 30.76 KG/M2 | DIASTOLIC BLOOD PRESSURE: 80 MMHG

## 2020-07-17 DIAGNOSIS — Z01.419 ENCOUNTER FOR GYNECOLOGICAL EXAMINATION: Primary | ICD-10-CM

## 2020-07-17 PROCEDURE — 99999 PR PBB SHADOW E&M-EST. PATIENT-LVL III: CPT | Mod: PBBFAC,,, | Performed by: OBSTETRICS & GYNECOLOGY

## 2020-07-17 PROCEDURE — 99396 PREV VISIT EST AGE 40-64: CPT | Mod: S$GLB,,, | Performed by: OBSTETRICS & GYNECOLOGY

## 2020-07-17 PROCEDURE — 3008F BODY MASS INDEX DOCD: CPT | Mod: CPTII,S$GLB,, | Performed by: OBSTETRICS & GYNECOLOGY

## 2020-07-17 PROCEDURE — 3008F PR BODY MASS INDEX (BMI) DOCUMENTED: ICD-10-PCS | Mod: CPTII,S$GLB,, | Performed by: OBSTETRICS & GYNECOLOGY

## 2020-07-17 PROCEDURE — 99999 PR PBB SHADOW E&M-EST. PATIENT-LVL III: ICD-10-PCS | Mod: PBBFAC,,, | Performed by: OBSTETRICS & GYNECOLOGY

## 2020-07-17 PROCEDURE — 99396 PR PREVENTIVE VISIT,EST,40-64: ICD-10-PCS | Mod: S$GLB,,, | Performed by: OBSTETRICS & GYNECOLOGY

## 2020-07-17 NOTE — PROGRESS NOTES
"CC: Well woman exam    Kim Padilla is a 44 y.o. female  presents for a well woman exam.      Loves IUD no cycles.  Doing well.  Adoptive son turning 13.     Past Medical History:   Diagnosis Date    Infertility, female        Past Surgical History:   Procedure Laterality Date    BREAST LUMPECTOMY Left     Dr Brown - benign    DILATION AND CURETTAGE OF UTERUS      for SAB       OB History    Para Term  AB Living   1       1     SAB TAB Ectopic Multiple Live Births   1              # Outcome Date GA Lbr Calixto/2nd Weight Sex Delivery Anes PTL Lv   1 SAB                Family History   Problem Relation Age of Onset    Breast cancer Maternal Cousin     Colon cancer Neg Hx     Ovarian cancer Neg Hx        Social History     Tobacco Use    Smoking status: Never Smoker    Smokeless tobacco: Never Used   Substance Use Topics    Alcohol use: Not Currently     Comment: rarely    Drug use: No       /80   Ht 5' 5" (1.651 m)   Wt 83.7 kg (184 lb 10.2 oz)   BMI 30.72 kg/m²     ROS:  GENERAL: Denies weight gain or weight loss. Feeling well overall.   SKIN: Denies rash or lesions.   HEAD: Denies head injury or headache.   NODES: Denies enlarged lymph nodes.   CHEST: Denies chest pain or shortness of breath.   CARDIOVASCULAR: Denies palpitations or left sided chest pain.   ABDOMEN: No abdominal pain, constipation, diarrhea, nausea, vomiting or rectal bleeding.   URINARY: No frequency, dysuria, hematuria, or burning on urination.  REPRODUCTIVE: See HPI.   BREASTS: The patient performs breast self-examination and denies pain, lumps, or nipple discharge.   HEMATOLOGIC: No easy bruisability or excessive bleeding.  MUSCULOSKELETAL: Denies joint pain or swelling.   NEUROLOGIC: Denies syncope or weakness.   PSYCHIATRIC: Denies depression, anxiety or mood swings.    Physical Exam:    APPEARANCE: Well nourished, well developed, in no acute distress.  AFFECT: WNL, alert and oriented x 3  SKIN: No " acne or hirsutism  NECK: Neck symmetric without masses or thyromegaly  NODES: No inguinal, cervical, axillary, or femoral lymph node enlargement  CHEST: Good respiratory effect  ABDOMEN: Soft.  No tenderness or masses.  No hepatosplenomegaly.  No hernias.  BREASTS: Symmetrical, no skin changes or visible lesions.  No palpable masses, nipple discharge bilaterally.  PELVIC: Normal external genitalia without lesions.  Normal hair distribution.  Adequate perineal body, normal urethral meatus.  Vagina moist and well rugated without lesions or discharge.  +IUD strings Cervix pink, without lesions, discharge or tenderness.  No significant cystocele or rectocele.  Bimanual exam shows uterus to be normal size, regular, mobile and nontender.  Adnexa without masses or tenderness.    EXTREMITIES: No edema.    ASSESSMENT AND PLAN  1. Encounter for gynecological examination         Patient was counseled today on A.C.S. Pap guidelines and recommendations for yearly pelvic exams, mammograms and monthly self breast exams; to see her PCP for other health maintenance.     Follow up in about 1 year (around 7/17/2021).

## 2020-07-23 ENCOUNTER — OFFICE VISIT (OUTPATIENT)
Dept: PODIATRY | Facility: CLINIC | Age: 45
End: 2020-07-23
Payer: COMMERCIAL

## 2020-07-23 VITALS
WEIGHT: 180 LBS | RESPIRATION RATE: 18 BRPM | OXYGEN SATURATION: 99 % | BODY MASS INDEX: 29.95 KG/M2 | TEMPERATURE: 99 F | SYSTOLIC BLOOD PRESSURE: 110 MMHG | HEART RATE: 94 BPM | DIASTOLIC BLOOD PRESSURE: 80 MMHG

## 2020-07-23 DIAGNOSIS — M72.2 PLANTAR FASCIITIS: Primary | ICD-10-CM

## 2020-07-23 PROCEDURE — 20550 PR INJECT TENDON SHEATH/LIGAMENT: ICD-10-PCS | Mod: RT,S$GLB,, | Performed by: PODIATRIST

## 2020-07-23 PROCEDURE — 20550 NJX 1 TENDON SHEATH/LIGAMENT: CPT | Mod: RT,S$GLB,, | Performed by: PODIATRIST

## 2020-07-23 PROCEDURE — 99213 PR OFFICE/OUTPT VISIT, EST, LEVL III, 20-29 MIN: ICD-10-PCS | Mod: 25,S$GLB,, | Performed by: PODIATRIST

## 2020-07-23 PROCEDURE — 3008F BODY MASS INDEX DOCD: CPT | Mod: CPTII,S$GLB,, | Performed by: PODIATRIST

## 2020-07-23 PROCEDURE — 99999 PR PBB SHADOW E&M-EST. PATIENT-LVL III: CPT | Mod: PBBFAC,,, | Performed by: PODIATRIST

## 2020-07-23 PROCEDURE — 3008F PR BODY MASS INDEX (BMI) DOCUMENTED: ICD-10-PCS | Mod: CPTII,S$GLB,, | Performed by: PODIATRIST

## 2020-07-23 PROCEDURE — 99213 OFFICE O/P EST LOW 20 MIN: CPT | Mod: 25,S$GLB,, | Performed by: PODIATRIST

## 2020-07-23 PROCEDURE — 99999 PR PBB SHADOW E&M-EST. PATIENT-LVL III: ICD-10-PCS | Mod: PBBFAC,,, | Performed by: PODIATRIST

## 2020-07-23 RX ORDER — TRIAMCINOLONE ACETONIDE 40 MG/ML
40 INJECTION, SUSPENSION INTRA-ARTICULAR; INTRAMUSCULAR
Status: COMPLETED | OUTPATIENT
Start: 2020-07-23 | End: 2020-07-23

## 2020-07-23 RX ADMIN — TRIAMCINOLONE ACETONIDE 40 MG: 40 INJECTION, SUSPENSION INTRA-ARTICULAR; INTRAMUSCULAR at 03:07

## 2020-07-23 NOTE — PROGRESS NOTES
Subjective:      Patient ID: Kim Padilla is a 44 y.o. female.    Chief Complaint: Follow-up    44 y.o. female presenting with right heel pain. Points plantar heel for pain.  Ambulating in casual boot today. 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 First time having this type of pain. Noticed pain 3 weeks ago. No trauma noted. Pain is worse in the morning/coming out of her bed. Pain is worse when she starts walking after driving her car. Taking advil with minimal pain relief. Describes pain as sharp. Pain gets better with ambulation.     7/23/20 present with right heel pain.  History of steroid injection for right heel plantar fasciitis in the past by me which helped with the symptoms significantly.  Patient ambulating in normal tennis shoe.  Pain started again 4 weeks ago.  Describes pain as sharp and pain is worse in the morning. She would like have another injection today.         Review of Systems   Constitution: Negative for chills, decreased appetite, fever and malaise/fatigue.   HENT: Negative for congestion, ear discharge and sore throat.    Eyes: Negative for discharge and pain.   Cardiovascular: Negative for chest pain, claudication and leg swelling.   Respiratory: Negative for cough and shortness of breath.    Skin: Negative for color change, nail changes and rash.   Musculoskeletal: Negative for arthritis, joint pain, joint swelling and muscle weakness.        R heel pain    Gastrointestinal: Negative for bloating, abdominal pain, diarrhea, nausea and vomiting.   Genitourinary: Negative for flank pain and hematuria.   Neurological: Negative for headaches, numbness and weakness.   Psychiatric/Behavioral: Negative for altered mental status.             Past Medical History:   Diagnosis Date    Infertility, female        Past Surgical History:   Procedure Laterality Date    BREAST LUMPECTOMY Left 2014    Dr Brown - benign    DILATION AND CURETTAGE OF UTERUS      for SAB       Family History    Problem Relation Age of Onset    Breast cancer Maternal Cousin     Colon cancer Neg Hx     Ovarian cancer Neg Hx        Social History     Socioeconomic History    Marital status:      Spouse name: Not on file    Number of children: Not on file    Years of education: Not on file    Highest education level: Not on file   Occupational History    Not on file   Social Needs    Financial resource strain: Not on file    Food insecurity     Worry: Not on file     Inability: Not on file    Transportation needs     Medical: Not on file     Non-medical: Not on file   Tobacco Use    Smoking status: Never Smoker    Smokeless tobacco: Never Used   Substance and Sexual Activity    Alcohol use: Not Currently     Comment: rarely    Drug use: No    Sexual activity: Yes     Partners: Male     Birth control/protection: I.U.D.     Comment: mirena    Lifestyle    Physical activity     Days per week: Not on file     Minutes per session: Not on file    Stress: Not on file   Relationships    Social connections     Talks on phone: Not on file     Gets together: Not on file     Attends Rastafarian service: Not on file     Active member of club or organization: Not on file     Attends meetings of clubs or organizations: Not on file     Relationship status: Not on file   Other Topics Concern    Not on file   Social History Narrative    Not on file       Current Outpatient Medications   Medication Sig Dispense Refill    ALPRAZolam (XANAX) 0.5 MG tablet Take 1 tablet (0.5 mg total) by mouth nightly as needed for Insomnia or Anxiety. KEEP APPT FOR FURTHER REFILLS. 30 tablet 1    azelastine (ASTELIN) 137 mcg (0.1 %) nasal spray 1 spray (137 mcg total) by Nasal route 2 (two) times daily. 30 mL 0    eletriptan (RELPAX) 40 MG tablet Relpax 40 mg tablet   Take 1 tablet as needed by oral route as needed for 30 days.      FLUoxetine 10 MG capsule Take by mouth.      meloxicam (MOBIC) 15 MG tablet Take 1 tablet (15 mg  total) by mouth once daily. 14 tablet 0    ZOLMitriptan (ZOMIG) 5 mg Spry Zomig 5 mg nasal spray   Take by nasal route @ onset of headache, may repeat in 2-4hrs if needed, not more than 2/d or 6/wk      zolpidem (AMBIEN) 10 mg Tab Take 1 tablet (10 mg total) by mouth nightly as needed. 30 tablet 1     No current facility-administered medications for this visit.        Review of patient's allergies indicates:   Allergen Reactions    Penicillins Hives       Vitals:    07/23/20 1523   BP: 110/80   Pulse: 94   Resp: 18   Temp: 98.7 °F (37.1 °C)   SpO2: 99%   Weight: 81.6 kg (180 lb)       Objective:      Physical Exam  Constitutional:       General: She is not in acute distress.     Appearance: She is well-developed.   HENT:      Nose: Nose normal.   Eyes:      Conjunctiva/sclera: Conjunctivae normal.   Neck:      Musculoskeletal: Normal range of motion.   Pulmonary:      Effort: Pulmonary effort is normal.   Chest:      Chest wall: No tenderness.   Abdominal:      Tenderness: There is no abdominal tenderness.   Neurological:      Mental Status: She is alert and oriented to person, place, and time.   Psychiatric:         Behavior: Behavior normal.         Vascular: Distal DP/PT pulses palpable 2/4. CRT < 3 sec to tips of toes. No vericosities noted to LEs. Hair growth present LE, warm to touch LE, No edema noted to LE.    Dermatologic: No open lesions, lacerations or wounds. Interdigital spaces clean, dry and intact. No erythema, rubor, calor noted LE    Musculoskeletal: MMT 5/5 in DF/PF/Inv/Ev resistance with no reproduction of pain in any direction. Passive range of motion of ankle and pedal joints is painless. No calf tenderness LE, Compartments soft/compressible.   R foot: ttp plantar heel at PF insertion. No pain at calc upon medial and lateral squeezing.     Neurological: Light touch, proprioception, and sharp/dull sensation are all intact. Protective threshold with the [place]-Wienstein monofilament is intact.  Vibratory sensation intact.         Assessment:       Encounter Diagnosis   Name Primary?    Plantar fasciitis - Right Foot Yes         Plan:       Kim was seen today for follow-up.    Diagnoses and all orders for this visit:    Plantar fasciitis - Right Foot      I counseled the patient on her conditions, their implications and medical management.    44 y.o. female with R foot PF.     -s/p steroid injection. Pt tolerated well. See procedure note.  -c/w stretching and water bottle exercise. Instructions for both given to patient.  -Discussed different treatment options for heel pain. including conservative and surgical.  I gave written and verbal instructions on heel cord stretching and this was demonstrated for the patient. Patient expressed understanding. Discussed wearing appropriate shoe gear and avoiding flats, slippers, sandals, barefoot, and sockfeet. Recommended arch supports. My recommendation for OTC supports is Spenco polysorb replacement insoles or patient may elect more aggressive treatment with prescription arch supports.  -c/w OTC anti inflammatory medication  -The nature of the condition, options for management, as well as potential risks and complications were discussed in detail with patient. Patient was amenable to my recommendations and left my office fully informed and will follow up as instructed or sooner if necessary.    -Patient was advised of signs and symptoms of infection including redness, drainage, purulence, odor, streaking, fever, chills and I advised patient to seek medical attention (ER or urgent care) if these symptoms arise.   -f/u prn    Note dictated with voice recognition software, please excuse any grammatical errors.

## 2020-07-23 NOTE — PROCEDURES
Procedures     Injection consisted of 2 cc of Kenalog 40 with 0.5% Marcaine plain injected into the plantar medial right heel. Skin was prepped with alcohol and ethyl chloride spray. Patient tolerated well with no complications and related relief following injection. Bandaid applied to injection site.     Patient is to use combination of conservative treatment and return for follow up/reassessment at that time as needed. Timeout was performed with pt in the room.

## 2020-07-23 NOTE — PROGRESS NOTES
Subjective:      Patient ID: Kim Padilla is a 44 y.o. female.    Chief Complaint: No chief complaint on file.      44 y.o. female presenting with       {POD HPI BLOCKS:78078}    ROS          Past Medical History:   Diagnosis Date    Infertility, female        Past Surgical History:   Procedure Laterality Date    BREAST LUMPECTOMY Left 2014    Dr Brown - benign    DILATION AND CURETTAGE OF UTERUS      for SAB       Family History   Problem Relation Age of Onset    Breast cancer Maternal Cousin     Colon cancer Neg Hx     Ovarian cancer Neg Hx        Social History     Socioeconomic History    Marital status:      Spouse name: Not on file    Number of children: Not on file    Years of education: Not on file    Highest education level: Not on file   Occupational History    Not on file   Social Needs    Financial resource strain: Not on file    Food insecurity     Worry: Not on file     Inability: Not on file    Transportation needs     Medical: Not on file     Non-medical: Not on file   Tobacco Use    Smoking status: Never Smoker    Smokeless tobacco: Never Used   Substance and Sexual Activity    Alcohol use: Not Currently     Comment: rarely    Drug use: No    Sexual activity: Yes     Partners: Male     Birth control/protection: I.U.D.     Comment: mirena    Lifestyle    Physical activity     Days per week: Not on file     Minutes per session: Not on file    Stress: Not on file   Relationships    Social connections     Talks on phone: Not on file     Gets together: Not on file     Attends Mormonism service: Not on file     Active member of club or organization: Not on file     Attends meetings of clubs or organizations: Not on file     Relationship status: Not on file   Other Topics Concern    Not on file   Social History Narrative    Not on file       Current Outpatient Medications   Medication Sig Dispense Refill    ALPRAZolam (XANAX) 0.5 MG tablet Take 1 tablet (0.5 mg total) by  mouth nightly as needed for Insomnia or Anxiety. KEEP APPT FOR FURTHER REFILLS. 30 tablet 1    azelastine (ASTELIN) 137 mcg (0.1 %) nasal spray 1 spray (137 mcg total) by Nasal route 2 (two) times daily. 30 mL 0    eletriptan (RELPAX) 40 MG tablet Relpax 40 mg tablet   Take 1 tablet as needed by oral route as needed for 30 days.      FLUoxetine 10 MG capsule Take by mouth.      meloxicam (MOBIC) 15 MG tablet Take 1 tablet (15 mg total) by mouth once daily. 14 tablet 0    ZOLMitriptan (ZOMIG) 5 mg Spry Zomig 5 mg nasal spray   Take by nasal route @ onset of headache, may repeat in 2-4hrs if needed, not more than 2/d or 6/wk      zolpidem (AMBIEN) 10 mg Tab Take 1 tablet (10 mg total) by mouth nightly as needed. 30 tablet 1     No current facility-administered medications for this visit.        Review of patient's allergies indicates:   Allergen Reactions    Penicillins Hives       There were no vitals filed for this visit.    Objective:      Physical Exam    Vascular: Distal DP/PT pulses palpable 2/4. CRT < 3 sec to tips of toes. No vericosities noted to LEs. Hair growth present LE, warm to touch LE, No edema noted to LE.    Dermatologic: No open lesions, lacerations or wounds. Interdigital spaces clean, dry and intact. No erythema, rubor, calor noted LE    Musculoskeletal: MMT 5/5 in DF/PF/Inv/Ev resistance with no reproduction of pain in any direction. Passive range of motion of ankle and pedal joints is painless. No calf tenderness LE, Compartments soft/compressible. ROM of ankles with < 10 deg DF is noted ***     Neurological: Light touch, proprioception, and sharp/dull sensation are all intact. Protective threshold with the [place]-Wienstein monofilament is intact. Vibratory sensation intact.         Assessment:       No diagnosis found.      Plan:       There are no diagnoses linked to this encounter.  I counseled the patient on her conditions, their implications and medical management.    44 y.o. female  with    {PROCEDURES:44809}    Note dictated with voice recognition software, please excuse any grammatical errors.

## 2020-08-24 ENCOUNTER — PATIENT MESSAGE (OUTPATIENT)
Dept: OBSTETRICS AND GYNECOLOGY | Facility: CLINIC | Age: 45
End: 2020-08-24

## 2020-09-08 ENCOUNTER — OFFICE VISIT (OUTPATIENT)
Dept: SPORTS MEDICINE | Facility: CLINIC | Age: 45
End: 2020-09-08
Payer: COMMERCIAL

## 2020-09-08 VITALS
SYSTOLIC BLOOD PRESSURE: 143 MMHG | HEART RATE: 88 BPM | WEIGHT: 187.38 LBS | HEIGHT: 65 IN | BODY MASS INDEX: 31.22 KG/M2 | DIASTOLIC BLOOD PRESSURE: 92 MMHG

## 2020-09-08 DIAGNOSIS — M99.05 SOMATIC DYSFUNCTION OF PELVIS REGION: ICD-10-CM

## 2020-09-08 DIAGNOSIS — M51.36 BULGING LUMBAR DISC: ICD-10-CM

## 2020-09-08 DIAGNOSIS — K31.89 STOMACH IRRITATION: ICD-10-CM

## 2020-09-08 DIAGNOSIS — M54.41 ACUTE RIGHT-SIDED LOW BACK PAIN WITH RIGHT-SIDED SCIATICA: Primary | ICD-10-CM

## 2020-09-08 PROCEDURE — 3008F BODY MASS INDEX DOCD: CPT | Mod: CPTII,S$GLB,, | Performed by: ORTHOPAEDIC SURGERY

## 2020-09-08 PROCEDURE — 99214 OFFICE O/P EST MOD 30 MIN: CPT | Mod: 25,S$GLB,, | Performed by: ORTHOPAEDIC SURGERY

## 2020-09-08 PROCEDURE — 99214 PR OFFICE/OUTPT VISIT, EST, LEVL IV, 30-39 MIN: ICD-10-PCS | Mod: 25,S$GLB,, | Performed by: ORTHOPAEDIC SURGERY

## 2020-09-08 PROCEDURE — 98925 PR OSTEOPATHIC MANIP,1-2 BODY REGN: ICD-10-PCS | Mod: S$GLB,,, | Performed by: ORTHOPAEDIC SURGERY

## 2020-09-08 PROCEDURE — 99999 PR PBB SHADOW E&M-EST. PATIENT-LVL IV: ICD-10-PCS | Mod: PBBFAC,,, | Performed by: ORTHOPAEDIC SURGERY

## 2020-09-08 PROCEDURE — 3008F PR BODY MASS INDEX (BMI) DOCUMENTED: ICD-10-PCS | Mod: CPTII,S$GLB,, | Performed by: ORTHOPAEDIC SURGERY

## 2020-09-08 PROCEDURE — 98925 OSTEOPATH MANJ 1-2 REGIONS: CPT | Mod: S$GLB,,, | Performed by: ORTHOPAEDIC SURGERY

## 2020-09-08 PROCEDURE — 99999 PR PBB SHADOW E&M-EST. PATIENT-LVL IV: CPT | Mod: PBBFAC,,, | Performed by: ORTHOPAEDIC SURGERY

## 2020-09-08 RX ORDER — OMEPRAZOLE 20 MG/1
20 CAPSULE, DELAYED RELEASE ORAL DAILY
Qty: 30 CAPSULE | Refills: 0 | Status: SHIPPED | OUTPATIENT
Start: 2020-09-08 | End: 2021-11-09

## 2020-09-08 RX ORDER — PREDNISONE 10 MG/1
40 TABLET ORAL DAILY
Qty: 20 TABLET | Refills: 0 | Status: SHIPPED | OUTPATIENT
Start: 2020-09-08 | End: 2021-11-09

## 2020-09-08 NOTE — PROGRESS NOTES
CC: low back pain    Ms. Padilla is here today for follow up evaluation of her right hip and sacral pain. Patient reports her pain is 7/10 today. She states she was being seen by a chiropractor three times per week for six weeks and feels as though this was beneficial towards improving her pain. She indicates her most recent exacerbation began over the weekend when she spent more time sitting. She also states her pain became severe this morning when she was straining to have a bowel movement. She states her pain was immediate and severe, and she was unable to attend work as a result. When asked where she hurts she gestures to the right side of her low back and indicates her pain radiates distally down the posterior aspect of her right leg to her knee.     Recall from visit on 03/17 2020  Ms. Padilla is here today for follow up evaluation of her right hip and sacral pain. Patient reports her pain is 4/10 today. Patient reports her pain had been significantly improved up until the past 1-2 weeks. She states she recently returned from a cruise [place] and states she did a lot of walking. She states she was still unable to lay flat and she was disappointed by this. Patient reports her new symptom developed approximately 1-2 weeks ago and has become more severe over the past several days. Patient admits to experiencing numbness affecting the bottom of her right foot. She states she believes her pain is correlated with when she is sitting. Patient reports she sat for several hours at work yesterday and also babysat her young niece for a day and she believes this contributed to her recent pain exacerbation. Patient admits to remaining compliant with her HEP and states her pain is improved when she is walking. She also admits to approximately 1.5 hours of improvement in her pain after her  treated her with a Hypervolt.    Recall from visit on 2/13/2020  Ms. Padilla is here today for follow up evaluation of  "her right hip and sacral pain. Patient reports she had pain relief following OMT for approximately one week following her last visit. Patient reports 2/10/2020 she had to "wrangle" a young child at work due to behavioral issues and believes this may have exacerbated her pain. She also reports to left sided neck pain that began following this event. When asked where her pain is located today she gestures to the right posterior hip at the gluteal fold. Patient states she is going on a cruise for [employer] and she is concerned about this as she still cannot lay flat comfortably. Patient admits to performing her HEP several times throughout the day and believes this has been helpful to her. She denies falls or trauma since her last visit.      Recall from visit on 1/24/2020  Ms. Padilla is here today for follow up evaluation of her right hip and sacral pain. Patient reports she is feeling up to 70% improved at this time and believes her benefits from OMT have lasted from her first appointment. Patient reports her pain is the greatest first thing in the moring and after sitting for long periods of time. She also states she is unable to straighten her leg out fully in the bath tub without pain. She admits to performing her HEP five times since her first visit. She denies falls or trauma since her last visit.  She is pleased that she is feeling much better and notice a big difference immediately following OMT.    Recall from visit on 1/14/2020  44 y.o. Female presents today for evaluation of her right hip pain. Patient is unable to recall a specific mechanism of injury and denies prior history of right hip/low back injury. When asked where her pain is located she gestures to the posterior hip and her right low back. She describes the quality of her pain as a constant, sharp and stabbing pain which has caused her to be concerned she is suffering from sciatica. Patient states she also has right foot plantar fasciitis and " is wondering if this has caused her gait to shift causing her increase in pain. 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 She denies recent falls or trauma.   How long: Patient reports she has been experiencing right hip pain for the past 3-4 weeks.   What makes it better: Patient reports her pain is improved when she is standing.   What makes it worse: Patient reports her pain is increased with laying down, sitting and getting up from a seated position. She also admits to increased pain with sitting in the bathtub when she attempts to straighten out her legs.  Does it radiate: Patient also states her pain will radiate down the posterior aspect of her right leg. She denies numbness and tingling.   Attempted treatments: Patient states she has been taking ibuprofen and using a heating pad and states this has not helped to provide pain relief. She denies formal physical therapy, history of injection or surgery.   Pain score: 4/10   Any mechanical symptoms: Patient states she has experienced painless popping in her hip.   Feelings of instability: Patient denies feelings of instability.   Affecting ADLs: Patient admits to this problem affecting her ability to perform ADLs. Patient is a principal at  [employer].     REVIEW OF SYSTEMS:   Constitution: Patient denies fever, chills, night sweats, and weight changes.  Eyes: Patient denies eye pain or vision changes.  HENT: Patient denies headache, ear pain, sore throat, or nasal discharge.  CVS: Patient denies chest pain.  Lungs: Patient denies shortness of breath or cough.  Abd: Patient denies stomach pain, nausea, or vomiting.  Skin: Patient denies skin rash or itching.    Hematologic/Lymphatic: Patient denies easy bruising.   Musculoskeletal: Patient denies recent falls. See  HPI.  Psych: Patient denies any current anxiety or nervousness.    PAST MEDICAL HISTORY:   Past Medical History:   Diagnosis Date    Infertility, female        PAST SURGICAL HISTORY:   Past Surgical History:   Procedure Laterality Date    BREAST LUMPECTOMY Left 2014    Dr Brown - benign    DILATION AND CURETTAGE OF UTERUS      for SAB       FAMILY HISTORY:   Family History   Problem Relation Age of Onset    Breast cancer Maternal Cousin     Colon cancer Neg Hx     Ovarian cancer Neg Hx        SOCIAL HISTORY:   Social History     Socioeconomic History    Marital status:      Spouse name: Not on file    Number of children: Not on file    Years of education: Not on file    Highest education level: Not on file   Occupational History    Not on file   Social Needs    Financial resource strain: Not on file    Food insecurity     Worry: Not on file     Inability: Not on file    Transportation needs     Medical: Not on file     Non-medical: Not on file   Tobacco Use    Smoking status: Never Smoker    Smokeless tobacco: Never Used   Substance and Sexual Activity    Alcohol use: Not Currently     Comment: rarely    Drug use: No    Sexual activity: Yes     Partners: Male     Birth control/protection: I.U.D.     Comment: mirena    Lifestyle    Physical activity     Days per week: Not on file     Minutes per session: Not on file    Stress: Not on file   Relationships    Social connections     Talks on phone: Not on file     Gets together: Not on file     Attends Jainism service: Not on file     Active member of club or organization: Not on file     Attends meetings of clubs or organizations: Not on file     Relationship status: Not on file   Other Topics Concern    Not on file   Social History Narrative    Not on file       MEDICATIONS:     Current Outpatient Medications:     ALPRAZolam (XANAX) 0.5 MG tablet, Take 1 tablet (0.5 mg total) by mouth nightly as needed for Insomnia or Anxiety. KEEP  "APPT FOR FURTHER REFILLS., Disp: 30 tablet, Rfl: 1    azelastine (ASTELIN) 137 mcg (0.1 %) nasal spray, 1 spray (137 mcg total) by Nasal route 2 (two) times daily., Disp: 30 mL, Rfl: 0    eletriptan (RELPAX) 40 MG tablet, Relpax 40 mg tablet  Take 1 tablet as needed by oral route as needed for 30 days., Disp: , Rfl:     FLUoxetine 10 MG capsule, Take by mouth., Disp: , Rfl:     meloxicam (MOBIC) 15 MG tablet, Take 1 tablet (15 mg total) by mouth once daily., Disp: 14 tablet, Rfl: 0    omeprazole (PRILOSEC) 20 MG capsule, Take 1 capsule (20 mg total) by mouth once daily., Disp: 30 capsule, Rfl: 0    predniSONE (DELTASONE) 10 MG tablet, Take 4 tablets (40 mg total) by mouth once daily., Disp: 20 tablet, Rfl: 0    ZOLMitriptan (ZOMIG) 5 mg Spry, Zomig 5 mg nasal spray  Take by nasal route @ onset of headache, may repeat in 2-4hrs if needed, not more than 2/d or 6/wk, Disp: , Rfl:     zolpidem (AMBIEN) 10 mg Tab, Take 1 tablet (10 mg total) by mouth nightly as needed., Disp: 30 tablet, Rfl: 1    ALLERGIES:   Review of patient's allergies indicates:   Allergen Reactions    Penicillins Hives        PHYSICAL EXAMINATION:  BP (!) 143/92   Pulse 88   Ht 5' 5" (1.651 m)   Wt 85 kg (187 lb 6.4 oz)   BMI 31.18 kg/m²   Vitals signs and nursing note have been reviewed.  General: In no acute distress, well developed, well nourished, no diaphoresis  Eyes: EOM full and smooth, no eye redness or discharge  HENT: normocephalic and atraumatic, neck supple, trachea midline, no nasal discharge, no external ear redness or discharge  Cardiovascular: no LE edema  Lungs: respirations non-labored, no conversational dyspnea   Abd: non-distended, no rigidity  MSK: no amputation or deformity, no swelling of extremities  Neuro: AAOx3, CN2-12 grossly intact  Skin: No rashes, warm and dry  Psychiatric: cooperative, pleasant, mood and affect appropriate for age    HIP: RIGHT  The affected hip is compared to the contralateral " hip.    Observation:    There is no edema, erythema, or ecchymosis in the lumbosacral region.   There is no Trendelenburg sign on either side  No obvious pelvic obliquity while standing.    No thoracolumbar scoliosis observed.    No midline skin abnormalities.  No atrophy noted in the lower limbs.    ROM:   Passive hip flexion to 120° on left and 120° on right.    Passive hip internal rotation to 45° on left and 45° on right.    Passive hip external rotation to 45° on left and 45° on right.    Passive hip abduction to 45° on left and 45° on right.    All motions above are without pain.    Tenderness To Palpation:  No tenderness at the ASIS, AIIS, PSIS, PIIS, iliac crest, pubic bones, ischial tuberosity.  + tenderness at L4-L5 paraspinal muscles more pronounced on the right  No tenderness over the piriformis  No tenderness over the greater trochanteric bursa or greater/lesser trochanters.  No tenderness at the glut attachments on the greater trochanter  No tenderness over proximal IT band or hip flexor musculature.    Strength Testing:  Hip flexion - 5/5 on left and 5/5 on right  Hip extension - 5/5 on left and 5/5 on right  Hip adduction - 5/5 on left and 5/5 on right  Hip abduction - 5/5 on left and 5/5 on right  Knee flexion - 5/5 on left and 5/5 on right  Knee extension - 5/5 on left and 5/5 on right    Special Tests:  Standing Trendelenburg test - negative    Seated straight leg raise - positive on right  Supine straight leg raise - positive on right    Log roll - negative  NOLAN - negative  FADIR - negative  Scour test - negative  No pain with posterior hip capsule compression    ASIS compression test - negative  SI drawer test - negative   Thigh thrust test - negative   Quadriceps flexibility symmetric.  Carline test - negative  Bhavin compression test - negative    Fulcrum test - negative    Posture:  Upright  Gait: Right antalgic with Over pronation on right ankle mechanics    TART (Tissue texture abnormality,  Asymmetry,  Restriction of motion and/or Tenderness) changes:       Cervical Spine  Thoracic Spine  Lumbar Spine   C1  T1  L1    C2  T2  L2    C3  T3  L3    C4  T4  L4    C5  T5  L5    C6  T6      C7  T7        T8        T9        T10        T11        T12        Pelvis:  · Innominate:Right superior shear  · Pubic bone:Neutral    Sacrum:    Key   F= Flexed   E = Extended   R = Rotated   S = Sidebent   TTA = tissue texture abnormality       Neurovascular Exam:  Antalgic gait due to low back pain  2+ femoral, DP, and PT pulses BL.  No skin changes, no abnormal hair distribution.  Sensation intact to light touch throughout the obturator and medial/lateral/posterior femoral cutaneous nerves.  Capillary refill intact to <2 seconds in all lower limb digits.    IMAGIN. MRI obtained 2020 due to low back pain   2. MRI images were reviewed personally by me and then directly with patient.  3. FINDINGS: MRI images obtained demonstrate lumbar spondylosis at L2-L3, L4-L5 and L5-S1. Broad based 3 mm right paracentral disc protrusion at L5-S1 contacts right S1 nerve root but there is no associated canal stenosis or neural foraminal stenosis at this level or elsewhere throughout the lumbar spine. No fractures or malalignment.   4. IMPRESSION: As above.       ASSESSMENT:      ICD-10-CM ICD-9-CM   1. Acute right-sided low back pain with right-sided sciatica  M54.41 724.2     724.3   2. Bulging lumbar disc  M51.26 722.10   3. Stomach irritation  K31.89 536.9   4. Somatic dysfunction of pelvis region  M99.05 739.5       PLAN:  1-2. Right low back pain / bulging disc - worsening    - Recall that Kim admits to right posterior and ride sided low back pain for the past several months without any mechanism of injury.  She admits to severely increased pain beginning 2020 when she strained during a bowel movement. She indicates her pain has been debilitating and indicates her pain radiates down the posterior leg to her knee.   She has been seeing a chiropractor multiple times throughout the week which has helped for a short period of time.    - MRI images and report were personally reviewed today.  She provided the disc and report from an outside imaging center.  See above for further detail.    - To help with her acute pain exacerbation, prednisone 40 mg daily for 5 days.  Omeprazole 20 mg daily to help with stomach irritation.    - Referral placed for physical therapy for core and low back strengthening and stabilizing.    - Referral placed to pain management (Dr. Blake) to be evaluated for an epidural injection.    - Based on her description/body language of pain and somatic dysfunction identified on exam, I discussed osteopathic manipulation as a treatment option today.  She consents to evaluation and treatment.  See below.  We avoided any OMT to the lumbar and sacral regions due to the recent exacerbation.      4.  Somatic dysfunction of pelvic region -     - OMT 1-2 regions. Oral consent obtained. Reviewed benefits and potential side effects. OMT indicated today due to signs and symptoms as well as local and remote somatic dysfunction findings and their related neurokinetic, lymphatic, fascial and/or arteriovenous body connections. OMT techniques used: Myofascial Release and High Velocity Low Amplitude. Treatment was tolerated well. Improvement noted in segmental mobility post-treatment in dysfunctional regions. There were no adverse events and no complications immediately following treatment. Advised plenty of water to help alleviate soreness.      Future planning includes - next steps pending response to PT, possible epidural if recommended by pain management    All questions were answered to the best of my ability and all concerns were addressed at this time.    Follow up as needed.      This note is dictated using the M*Modal Fluency Direct word recognition program. There are word recognition mistakes that are occasionally  missed on review.

## 2020-09-09 ENCOUNTER — CLINICAL SUPPORT (OUTPATIENT)
Dept: REHABILITATION | Facility: HOSPITAL | Age: 45
End: 2020-09-09
Attending: ORTHOPAEDIC SURGERY
Payer: COMMERCIAL

## 2020-09-09 DIAGNOSIS — M54.41 BILATERAL LOW BACK PAIN WITH RIGHT-SIDED SCIATICA, UNSPECIFIED CHRONICITY: ICD-10-CM

## 2020-09-09 DIAGNOSIS — M54.41 ACUTE RIGHT-SIDED LOW BACK PAIN WITH RIGHT-SIDED SCIATICA: ICD-10-CM

## 2020-09-09 DIAGNOSIS — R29.898 DECREASED STRENGTH OF LOWER EXTREMITY: ICD-10-CM

## 2020-09-09 DIAGNOSIS — R26.89 IMPAIRED GAIT AND MOBILITY: ICD-10-CM

## 2020-09-09 PROCEDURE — 97161 PT EVAL LOW COMPLEX 20 MIN: CPT | Mod: PO

## 2020-09-09 NOTE — PLAN OF CARE
[facility]  Physical Therapy Initial Evaluation    Name: Kim Padilla  Clinic Number: [id number]    Therapy Diagnosis:   Encounter Diagnoses   Name Primary?    Acute right-sided low back pain with right-sided sciatica     Bilateral low back pain with right-sided sciatica, unspecified chronicity     Decreased strength of lower extremity     Impaired gait and mobility      Physician: Levy Gao,     Physician Orders: PT Eval and Treat   Medical Diagnosis from Referral: Acute right-sided low back pain with right-sided sciatica  Evaluation Date: 9/9/2020  Authorization Period Expiration: 9/08/2021  Plan of Care Expiration: 11/18/2020  Visit # / Visits authorized: 1/1    Time In: 10:00 AM  Time Out: 1040 AM    Total Billable Time: 40 minutes    Precautions: Standard    Subjective   Date of onset: December 2019 is when this all started but it has gotten worse   History of current condition - Kim reports: she has bulging discs in the lumbar spine that started acting up during Christmas. She went to see Dr. Gao and had herniated fascial tissue and her sacrum was immobile. Dr. Gao would do some adjustments. She also received modalities from the chiropractor (no adjustments). This weekend, she sat a lot. Yesterday, she went to the bathroom and all of a sudden had a sharp, electrocuting pain in low back and could not really move the right leg. She said the doctor said she has spinal stenosis and two bulging discs that are pressing on the sciatic nerve. The doctor recommended PT and eventually will follow up with epidural injections.  Right now, the pain is starting by the sacrum on both sides (moreso on the right) and going down the right leg to the knee. She does endorse numbness and tingling on the right leg. This all started in December 2019 and has gotten worse. She says her last fall was about a month ago and when she missed a step on the stairs but does not note previous  falls.          Medical History:   Past Medical History:   Diagnosis Date    Infertility, female        Surgical History:   Kim Padilla  has a past surgical history that includes Breast lumpectomy (Left, 2014) and Dilation and curettage of uterus.    Medications:   Kim has a current medication list which includes the following prescription(s): alprazolam, azelastine, eletriptan, fluoxetine, meloxicam, omeprazole, prednisone, zolmitriptan, and zolpidem.    Allergies:   Review of patient's allergies indicates:   Allergen Reactions    Penicillins Hives        Imaging,     Xray Lumbar Spine 1/2020: Mild narrowing of the lumbosacral disc space.  The other disc spaces are well maintained.  No fracture, spondylolisthesis or bone destruction identified    Xray Hip 1/2020: No acute fracture of visualized lower lumbar spine, bony pelvis, or proximal femurs.  Preserved right and left SI joints.  Preserved left hip joint space.  Preserved right and left femoral head contours.  Minimal spurring about the right acetabular roof.  IUD seen just to the right of midline involving central to lower pelvis.    Prior Therapy: yes for neck and shoulder  Social History: two story home that is raised, but she does not often go upstairs   Occupation: Principal at [employer]  Prior Level of Function: all of this began in December 2019- no prior issues before that   Current Level of Function: pain is stopping her from going to work,  has to help her dress, out of the shower, off the toilet    Pain:  Current 5/10, worst 7/10, best 2/10   Location: bilateral low back and right leg    Description: electrical, Shooting, sometimes it will throb    Aggravating Factors: sitting, driving, cannot lay down in tub and getting in and out of tub, lifting   Easing Factors: prednisone, extra strength tylenol, laying down, ice    Pts goals: decrease pain overall      Objective       Observation: enters clinic independently       Gait:  decreased heel strike R, toe out bilaterally, antalgic gait right side, decreased WB right    Lumbar ROM: %AROM   Degrees Quality   Flexion   75%  right glute and leg pain   Extension WFL    Left Side Bending WFL    Right Side Bending WFL    Left rotation WFL    Right Rotation WFL        Dermatomes: overall dulled sensation to gross light touch on RLE       Reflexes: 2+ bilat patellar     Hip ROM: WNL     Lower Extremity Strength (graded 0-5 out of 5)   RLE LLE   Hip flexion: 4/5* 5/5   Hip ER 4/5* 5/5   Hip IR 5/5* 5/5   Knee extension: 4-/5* 5/5   Ankle dorsiflexion: 5/5 5/5   Posterior fibers of Gluteus medius 4/5 5/5   Knee flexion 5/5 5/5   Glute max 4-/5 5/5   *=pain    Special Tests: ((+): pos.; (-): neg.)   · Quadrant test:  + some pain right  · Slump Test: + R   · SLR Test: + R  · Bridge Test: + core weakness with single leg bridging      Flexibility: (min, mod, severe limitation)  Hamstrings: min L, right too painful for assessment  Rectus Femoris: min B  Iliopspas (gabino test (+ R): mod bilateral         Palpation for condition: no TTP today          TREATMENT     Home Exercises and Patient Education Provided    Education provided:   - PT role and POC  -work ergonomics    Assessment   Kim is a 44 y.o. female referred to outpatient Physical Therapy with a medical diagnosis of Acute right-sided low back pain with right-sided sciatica. Pt presents with signs and symptoms of low back pain with right sided radiculopathy. She presents with decreased and painful lower extremity strength testing, impaired core strength, increased lower extremity neural tension on right, and overall pain limiting mobility. She is educated on work place ergonomics (proper chair and lumbar support with feet on ground) and is educated to take breaks from prolonged seated positions.     Pt prognosis is Good.   Pt will benefit from skilled outpatient Physical Therapy to address the deficits stated above and in the chart below,  provide pt/family education, and to maximize pt's level of independence.     Plan of care discussed with patient: Yes  Pt's spiritual, cultural and educational needs considered and patient is agreeable to the plan of care and goals as stated below:     Anticipated Barriers for therapy: none anticipated    Medical Necessity is demonstrated by the following  History  Co-morbidities and personal factors that may impact the plan of care Co-morbidities:   n/a    Personal Factors:   no deficits     low   Examination  Body Structures and Functions, activity limitations and participation restrictions that may impact the plan of care Body Regions:   back  lower extremities    Body Systems:    gross symmetry  ROM  strength  gross coordinated movement  balance  gait  transfers  transitions  motor control    Participation Restrictions:   Limited by pain in dressing, bathing, getting in and out of the tub and on and off toilet, lifting things, going to work, driving    Activity limitations:   Learning and applying knowledge  no deficits    General Tasks and Commands  no deficits    Communication  no deficits    Mobility  lifting and carrying objects  walking  driving (bike, car, motorcycle)    Self care  washing oneself (bathing, drying, washing hands)  toileting  dressing    Domestic Life  doing house work (cleaning house, washing dishes, laundry)    Interactions/Relationships  no deficits    Life Areas  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 Pt will be compliant with HEP to supplement PT in decreasing pain with functional mobility.  2. Pt will perform bridge with good control to demonstrate improved core strength.  3. Pt will report 50% decrease in pain in order  to promote return to household cores, ADLs, and work related tasks.   4. Pt will improve impaired LE MMTs by 1/2 muscle grade to improve strength for functional tasks.    Long Term Goals (10 Weeks):   1. Pt will be compliant with HEPprogressions to supplement PT in decreasing pain with functional mobility.  2. Pt will perform single leg bridge with good control to demonstrate improved core strength.  3. Pt will improve impaired LE MMTs by 1 muscle grade to improve strength for functional tasks.  4. Pt will report no pain during lumbar ROM to promote functional mobility.  5. Pt to show heel to toe gait in order to promote normal movement patterns.   6. Pt will report 80% decrease in pain in order to promote return to household cores, ADLs, and work related tasks.   7. Pt to report full independence with ADLs and household chores to improve QoL.       Plan   Plan of care Certification: 9/9/2020 to 11/18/2020.    Outpatient Physical Therapy 2 times weekly for 10 weeks to include the following interventions: Gait Training, Manual Therapy, Moist Heat/ Ice, Neuromuscular Re-ed, Patient Education, Self Care, Therapeutic Activites, Therapeutic Exercise and dry needling.     Kandy Watt, PT

## 2020-09-17 ENCOUNTER — CLINICAL SUPPORT (OUTPATIENT)
Dept: REHABILITATION | Facility: HOSPITAL | Age: 45
End: 2020-09-17
Attending: ORTHOPAEDIC SURGERY
Payer: COMMERCIAL

## 2020-09-17 DIAGNOSIS — G89.29 CHRONIC BILATERAL LOW BACK PAIN WITH RIGHT-SIDED SCIATICA: ICD-10-CM

## 2020-09-17 DIAGNOSIS — R26.89 IMPAIRED GAIT AND MOBILITY: ICD-10-CM

## 2020-09-17 DIAGNOSIS — M54.41 CHRONIC BILATERAL LOW BACK PAIN WITH RIGHT-SIDED SCIATICA: ICD-10-CM

## 2020-09-17 DIAGNOSIS — R29.898 DECREASED STRENGTH OF LOWER EXTREMITY: ICD-10-CM

## 2020-09-17 PROCEDURE — 97110 THERAPEUTIC EXERCISES: CPT | Mod: PO

## 2020-09-17 PROCEDURE — 97140 MANUAL THERAPY 1/> REGIONS: CPT | Mod: PO

## 2020-09-17 NOTE — PROGRESS NOTES
"  Physical Therapy Treatment Note     Name: Kim Padilla  Clinic Number: [id number]    Therapy Diagnosis:   Encounter Diagnoses   Name Primary?    Chronic bilateral low back pain with right-sided sciatica     Decreased strength of lower extremity     Impaired gait and mobility      Physician: Levy Gao DO    Visit Date: 9/17/2020    Physician Orders: PT Eval and Treat   Medical Diagnosis from Referral: Acute right-sided low back pain with right-sided sciatica  Evaluation Date: 9/9/2020  Authorization Period Expiration: 9/08/2021  Plan of Care Expiration: 11/18/2020  Visit # / Visits authorized: 2/25      Time In: 2:15pm  Time Out: 3:00pm  Total Billable Time: 45 minutes    Precautions: Standard  Pts goals: decrease pain overall    Imaging,    Xray Lumbar Spine 1/2020: Mild narrowing of the lumbosacral disc space.  The other disc spaces are well maintained.  No fracture, spondylolisthesis or bone destruction identified     Xray Hip 1/2020: Minimal spurring about the right acetabular roof.   Subjective   Pt reports: Laying in tub is painful, sitting in car is painful, ice is helpful (lying down). Once up and moving, it gets better. If I sit for too long, I freeze up.  She was not compliant with home exercise program (no HEP given).  Response to previous treatment: Felt okay afterward. Was still on prednisone.  Functional change: None yet    Pain: 3/10  After: 1/10 "Feel like jell-o".  Location: mostly R side    Objective     Kim received therapeutic exercises to develop strength, endurance, ROM, posture and core stabilization for 35 minutes including:  Additions/progressions bolded    Treadmill x 5 mins at 1.8 mph, more toe-out on RLE and slight circumduction, increased hyperextension of knees  Posterior pelvic tilts x 15 reps  Piriformis/figure4 stretch 2x30'' BLE  Hamstring (seated) stretch 2x30'' (very tight)  Nerve glides (supine) x 10 reps  Bridges x 15 reps (with orange band, abd iso)  Clam shells " with orange band x 15  Side-lying hip abduction x 15 reps  Hip extension (prone) x 15 reps  Open book stretch, 3x10'' hold    Next session, consider:  TKEs x15 reps  Paloff press x 15 reps,  green band      Kim received the following manual therapy techniques: Joint mobilizations, Manual traction, Myofacial release and Soft tissue Mobilization were applied to the: B hips and low back for 10 minutes, including:  · Short-axis hip traction bilaterally 3x10'' hold, L adductor trigger point release.  · P-A glides alone mid-lower thoracic and lumbar spinous processes- hypomobile along t-spine.  · T-spine paraspinals --tightness noted more so on R than L side.   · STM to B gluteal muscles, slight tightness noted in R piriformis.     Home Exercises Provided and Patient Education Provided     Education provided:   - Ice for 20 mins with pillows under legs for pain-relief (to open intervertebral joints)  -heat to promote blood flow, loosen muscles, relaxation    Written Home Exercises Provided: yes.  Exercises were reviewed and Kim was able to demonstrate them prior to the end of the session.  Kim demonstrated good  understanding of the education provided.     See EMR under Patient Instructions for exercises provided 9/17/2020.    Assessment     Pt responded well to all treatment today, including exercises, stretches and manual therapy. She is a flexion-responder, has notable tightness in both hamstrings (more so R than L) and will benefit greatly from continued PT services, focusing on restoring full AROM of the thoracic and lumbar spine, postural awareness, and strength in both lower extremities.    Kim is progressing well towards her goals.   Pt prognosis is Good.     Pt will continue to benefit from skilled outpatient physical therapy to address the deficits listed in the problem list box on initial evaluation, provide pt/family education and to maximize pt's level of independence in the home and community  environment.     Pt's spiritual, cultural and educational needs considered and pt agreeable to plan of care and goals.     Anticipated barriers to physical therapy: none       Goals:     Short Term Goals (5 Weeks): progressing, not met  1. Pt will be compliant with HEP to supplement PT in decreasing pain with functional mobility.  2. Pt will perform bridge with good control to demonstrate improved core strength.  3. Pt will report 50% decrease in pain in order to promote return to household cores, ADLs, and work related tasks.   4. Pt will improve impaired LE MMTs by 1/2 muscle grade to improve strength for functional tasks.     Long Term Goals (10 Weeks): progressing, not met  1. Pt will be compliant with HEPprogressions to supplement PT in decreasing pain with functional mobility.  2. Pt will perform single leg bridge with good control to demonstrate improved core strength.  3. Pt will improve impaired LE MMTs by 1 muscle grade to improve strength for functional tasks.  4. Pt will report no pain during lumbar ROM to promote functional mobility.  5. Pt to show heel to toe gait in order to promote normal movement patterns.   6. Pt will report 80% decrease in pain in order to promote return to household cores, ADLs, and work related tasks.   7. Pt to report full independence with ADLs and household chores to improve QoL.      Plan     Plan of care Certification: 9/9/2020 to 11/18/2020.     Outpatient Physical Therapy 2 times weekly for 10 weeks to include the following interventions: Gait Training, Manual Therapy, Moist Heat/ Ice, Neuromuscular Re-ed, Patient Education, Self Care, Therapeutic Activites, Therapeutic Exercise and dry needling.       Alberta Levy, PT

## 2020-09-23 ENCOUNTER — OFFICE VISIT (OUTPATIENT)
Dept: PAIN MEDICINE | Facility: CLINIC | Age: 45
End: 2020-09-23
Payer: COMMERCIAL

## 2020-09-23 ENCOUNTER — PATIENT MESSAGE (OUTPATIENT)
Dept: SPORTS MEDICINE | Facility: CLINIC | Age: 45
End: 2020-09-23

## 2020-09-23 VITALS
HEART RATE: 93 BPM | BODY MASS INDEX: 31.18 KG/M2 | SYSTOLIC BLOOD PRESSURE: 121 MMHG | WEIGHT: 187.38 LBS | DIASTOLIC BLOOD PRESSURE: 87 MMHG

## 2020-09-23 DIAGNOSIS — M54.41 CHRONIC RIGHT-SIDED LOW BACK PAIN WITH RIGHT-SIDED SCIATICA: Primary | ICD-10-CM

## 2020-09-23 DIAGNOSIS — M51.36 BULGING LUMBAR DISC: ICD-10-CM

## 2020-09-23 DIAGNOSIS — G89.29 CHRONIC RIGHT-SIDED LOW BACK PAIN WITH RIGHT-SIDED SCIATICA: Primary | ICD-10-CM

## 2020-09-23 PROBLEM — M51.369 BULGING LUMBAR DISC: Status: ACTIVE | Noted: 2020-09-23

## 2020-09-23 PROCEDURE — 96372 PR INJECTION,THERAP/PROPH/DIAG2ST, IM OR SUBCUT: ICD-10-PCS | Mod: S$GLB,,, | Performed by: PAIN MEDICINE

## 2020-09-23 PROCEDURE — 96372 THER/PROPH/DIAG INJ SC/IM: CPT | Mod: S$GLB,,, | Performed by: PAIN MEDICINE

## 2020-09-23 PROCEDURE — 99244 PR OFFICE CONSULTATION,LEVEL IV: ICD-10-PCS | Mod: 25,S$GLB,, | Performed by: PAIN MEDICINE

## 2020-09-23 PROCEDURE — 99999 PR PBB SHADOW E&M-EST. PATIENT-LVL III: ICD-10-PCS | Mod: PBBFAC,,, | Performed by: PAIN MEDICINE

## 2020-09-23 PROCEDURE — 99999 PR PBB SHADOW E&M-EST. PATIENT-LVL III: CPT | Mod: PBBFAC,,, | Performed by: PAIN MEDICINE

## 2020-09-23 PROCEDURE — 99244 OFF/OP CNSLTJ NEW/EST MOD 40: CPT | Mod: 25,S$GLB,, | Performed by: PAIN MEDICINE

## 2020-09-23 RX ORDER — PREGABALIN 25 MG/1
25 CAPSULE ORAL 3 TIMES DAILY
Qty: 90 CAPSULE | Refills: 1 | Status: SHIPPED | OUTPATIENT
Start: 2020-09-23 | End: 2020-10-26

## 2020-09-23 RX ORDER — TIZANIDINE 4 MG/1
4-8 TABLET ORAL 2 TIMES DAILY PRN
Qty: 40 TABLET | Refills: 1 | Status: SHIPPED | OUTPATIENT
Start: 2020-09-23 | End: 2020-10-26

## 2020-09-23 RX ORDER — ACETAMINOPHEN 500 MG
1000 TABLET ORAL 3 TIMES DAILY
Refills: 0 | COMMUNITY
Start: 2020-09-23 | End: 2020-10-26

## 2020-09-23 RX ORDER — DICLOFENAC SODIUM 75 MG/1
75 TABLET, DELAYED RELEASE ORAL 2 TIMES DAILY
Qty: 40 TABLET | Refills: 1 | Status: SHIPPED | OUTPATIENT
Start: 2020-09-23 | End: 2020-10-26

## 2020-09-23 RX ORDER — KETOROLAC TROMETHAMINE 30 MG/ML
60 INJECTION, SOLUTION INTRAMUSCULAR; INTRAVENOUS
Status: COMPLETED | OUTPATIENT
Start: 2020-09-23 | End: 2020-09-23

## 2020-09-23 RX ADMIN — KETOROLAC TROMETHAMINE 60 MG: 30 INJECTION, SOLUTION INTRAMUSCULAR; INTRAVENOUS at 03:09

## 2020-09-23 NOTE — LETTER
September 23, 2020      Levy Gao, DO  [address]  [facility] [address]           [facility]  [address]  [facility] [address]  Phone: [phone]          Patient: Kim Padilla   MR Number: [id number]   YOB: 1975   Date of Visit: 9/23/2020       Dear Dr. Levy Gao:    Thank you for referring Kim Padilla to me for evaluation. Attached you will find relevant portions of my assessment and plan of care.    If you have questions, please do not hesitate to call me. I look forward to following Kim Padilla along with you.    Sincerely,    Taco Gaming Jr., MD    Enclosure  CC:  No Recipients    If you would like to receive this communication electronically, please contact [email or web address] or [phone] to request more information on [employer] Link access.    For providers and/or their staff who would like to refer a patient to [facility], please contact us through our one-stop-shop provider referral line, [facility] , at [phone].    If you feel you have received this communication in error or would no longer like to receive these types of communications, please e-mail [email or web address]

## 2020-09-23 NOTE — PROGRESS NOTES
[facility] New Patient Evaluation    Referred by: Levy Gao DO  Reason for referral: Back pain    CC:   Chief Complaint   Patient presents with    Low-back Pain    Leg Pain     Last 3 PDI Scores 9/23/2020   Pain Disability Index (PDI) 35       HPI:   Kim Padilla is a 44 y.o. female who complains chronic pain shooting into the right leg from the back.  Pain started in the low back in Dec 2019 but has progressively worsened in waves over the past 9-10 months.  She experienced several flare ups in 2020 which responded to conservative therapy and chiropractic treatments.  The current episode has been severe and refractory to conservative treatments such as rest, ice, heat, steroid dose pk, and OTC medications.  She is referred to PT, which will start soon.  She obtained an MRI L-spine in April 2020 at [facility] which shows DDD at L5-S1 abutting the descending S1 nerve root.    Location: low back pain   Onset: 9 months  Current Pain Score: 5/10  Daily Pain of Range: 5-6/10  Quality: Burning, Tight, Deep, Electric and Shooting  Radiation: right leg  Worsened by: extension and sitting  Improved by: ice and physical therapy    Previous Therapies:  PT/OT: Referred by Dr. Gao to start in the near future  HEP: Yes  Interventions: Denies  Surgery: Denies back surgery  Medications:   - NSAIDS:   - MSK Relaxants:   - TCAs:   - SNRIs:   - Topicals:   - Anticonvulsants:  - Opioids:     Current Pain Medications:  1. Ibuprofen     Review of Systems:  Review of Systems   Constitutional: Negative for chills and fever.   HENT: Negative for nosebleeds.    Eyes: Negative for blurred vision.   Respiratory: Negative for hemoptysis.    Cardiovascular: Negative for chest pain and palpitations.   Gastrointestinal: Negative for heartburn and vomiting.   Genitourinary: Negative for hematuria.   Musculoskeletal: Positive for back pain. Negative for myalgias.   Skin: Negative for rash.   Neurological: Positive for tingling and  focal weakness (feels weak in right leg). Negative for seizures and loss of consciousness.   Endo/Heme/Allergies: Does not bruise/bleed easily.   Psychiatric/Behavioral: Negative for hallucinations. The patient has insomnia.        History:    Current Outpatient Medications:     eletriptan (RELPAX) 40 MG tablet, Relpax 40 mg tablet  Take 1 tablet as needed by oral route as needed for 30 days., Disp: , Rfl:     ZOLMitriptan (ZOMIG) 5 mg Spry, Zomig 5 mg nasal spray  Take by nasal route @ onset of headache, may repeat in 2-4hrs if needed, not more than 2/d or 6/wk, Disp: , Rfl:     acetaminophen (TYLENOL) 500 MG tablet, Take 2 tablets (1,000 mg total) by mouth 3 (three) times daily., Disp: , Rfl: 0    ALPRAZolam (XANAX) 0.5 MG tablet, Take 1 tablet (0.5 mg total) by mouth nightly as needed for Insomnia or Anxiety. KEEP APPT FOR FURTHER REFILLS., Disp: 30 tablet, Rfl: 1    azelastine (ASTELIN) 137 mcg (0.1 %) nasal spray, 1 spray (137 mcg total) by Nasal route 2 (two) times daily., Disp: 30 mL, Rfl: 0    diclofenac (VOLTAREN) 75 MG EC tablet, Take 1 tablet (75 mg total) by mouth 2 (two) times daily., Disp: 40 tablet, Rfl: 1    FLUoxetine 10 MG capsule, Take by mouth., Disp: , Rfl:     omeprazole (PRILOSEC) 20 MG capsule, Take 1 capsule (20 mg total) by mouth once daily. (Patient not taking: Reported on 9/23/2020), Disp: 30 capsule, Rfl: 0    predniSONE (DELTASONE) 10 MG tablet, Take 4 tablets (40 mg total) by mouth once daily. (Patient not taking: Reported on 9/23/2020), Disp: 20 tablet, Rfl: 0    pregabalin (LYRICA) 25 MG capsule, Take 1 capsule (25 mg total) by mouth 3 (three) times daily., Disp: 90 capsule, Rfl: 1    tiZANidine (ZANAFLEX) 4 MG tablet, Take 1-2 tablets (4-8 mg total) by mouth 2 (two) times daily as needed., Disp: 40 tablet, Rfl: 1    zolpidem (AMBIEN) 10 mg Tab, Take 1 tablet (10 mg total) by mouth nightly as needed. (Patient not taking: Reported on 9/23/2020), Disp: 30 tablet, Rfl:  1    Current Facility-Administered Medications:     ketorolac injection 60 mg, 60 mg, Intramuscular, 1 time in Clinic/HOD, Taco Gaming Jr., MD    Past Medical History:   Diagnosis Date    Infertility, female        Past Surgical History:   Procedure Laterality Date    BREAST LUMPECTOMY Left 2014    Dr Brown - benign    DILATION AND CURETTAGE OF UTERUS      for SAB       Family History   Problem Relation Age of Onset    Breast cancer Maternal Cousin     Colon cancer Neg Hx     Ovarian cancer Neg Hx        Social History     Socioeconomic History    Marital status:      Spouse name: Not on file    Number of children: Not on file    Years of education: Not on file    Highest education level: Not on file   Occupational History    Not on file   Social Needs    Financial resource strain: Not on file    Food insecurity     Worry: Not on file     Inability: Not on file    Transportation needs     Medical: Not on file     Non-medical: Not on file   Tobacco Use    Smoking status: Never Smoker    Smokeless tobacco: Never Used   Substance and Sexual Activity    Alcohol use: Not Currently     Comment: rarely    Drug use: No    Sexual activity: Yes     Partners: Male     Birth control/protection: I.U.D.     Comment: mirena    Lifestyle    Physical activity     Days per week: Not on file     Minutes per session: Not on file    Stress: Not on file   Relationships    Social connections     Talks on phone: Not on file     Gets together: Not on file     Attends Zoroastrianism service: Not on file     Active member of club or organization: Not on file     Attends meetings of clubs or organizations: Not on file     Relationship status: Not on file   Other Topics Concern    Not on file   Social History Narrative    Not on file       Review of patient's allergies indicates:   Allergen Reactions    Penicillins Hives       Physical Exam:  There were no vitals filed for this visit.  General    Nursing note  and vitals reviewed.  Constitutional: She is oriented to person, place, and time. She appears well-developed and well-nourished. No distress.   HENT:   Head: Normocephalic and atraumatic.   Nose: Nose normal.   Eyes: Conjunctivae and EOM are normal. Pupils are equal, round, and reactive to light. Right eye exhibits no discharge. Left eye exhibits no discharge. No scleral icterus.   Neck: No JVD present.   Cardiovascular: Intact distal pulses.    Pulmonary/Chest: Effort normal. No respiratory distress.   Abdominal: She exhibits no distension.   Neurological: She is alert and oriented to person, place, and time. Coordination normal.   Psychiatric: She has a normal mood and affect. Her behavior is normal. Judgment and thought content normal.     General Musculoskeletal Exam   Gait: normal     Back (L-Spine & T-Spine) / Neck (C-Spine) Exam     Tenderness Right paramedian tenderness of the Lower L-Spine. Left paramedian tenderness of the Lower L-Spine.     Back (L-Spine & T-Spine) Range of Motion   Back extension: facet loading is positive and exacerabtes/reproduces the patient's typical low back pain    Back flexion: limited ROM but partial relief of low back pain noted.     Spinal Sensation   Right Side Sensation  L-Spine Level: normal  Left Side Sensation  L-Spine Level: normal    Other She has no scoliosis .    Comments:  ++ SLR on right      Muscle Strength   Right Lower Extremity   Hip Flexion: 5/5   Hip Extensors: 5/5  Quadriceps:  5/5   Hamstrin/5   Gastrocsoleus:  5/5   Left Lower Extremity   Hip Flexion: 5/5   Hip Extensors: 5/5  Quadriceps:  5/5   Hamstrin/5   Gastrocsoleus:  5/5     Reflexes     Left Side  Achilles:  2+  Quadriceps:  2+    Right Side   Achilles:  2+  Quadriceps:  2+      Imagin2020  X-Ray Lumbar Spine Complete 5 View  Narrative & Impression     EXAMINATION:  XR LUMBAR SPINE COMPLETE 5 VIEW     CLINICAL HISTORY:  Low back pain, <6wks, no red flags, no prior management;Low  back pain     TECHNIQUE:  AP, lateral, spot and bilateral oblique views of the lumbar spine were performed.     COMPARISON:  None     FINDINGS:  Mild narrowing of the lumbosacral disc space.  The other disc spaces are well maintained.  No fracture, spondylolisthesis or bone destruction identified     Impression:     See above        Electronically signed by: Balaji Dowd MD  Date:                                            01/14/2020  Time:                                           16:20        Labs:  BMP  No results found for: NA, K, CL, CO2, BUN, CREATININE, CALCIUM, ANIONGAP, ESTGFRAFRICA, EGFRNONAA  No results found for: ALT, AST, GGT, ALKPHOS, BILITOT    Assessment:  Problem List Items Addressed This Visit     Chronic right-sided low back pain with right-sided sciatica - Primary    Relevant Medications    pregabalin (LYRICA) 25 MG capsule    acetaminophen (TYLENOL) 500 MG tablet    diclofenac (VOLTAREN) 75 MG EC tablet    tiZANidine (ZANAFLEX) 4 MG tablet    ketorolac injection 60 mg (Start on 9/23/2020  3:15 PM)    Bulging lumbar disc    Relevant Medications    pregabalin (LYRICA) 25 MG capsule    acetaminophen (TYLENOL) 500 MG tablet    diclofenac (VOLTAREN) 75 MG EC tablet    tiZANidine (ZANAFLEX) 4 MG tablet          9/23/20 - Kim Padilla is a 44 y.o. female with chronic low back pain and right lower extremity radicular symptoms consistent with degenerative disc disease and annular tear at L5-S1 resulting in compression of the descending S1 nerve root.  There is also likely a component of discogenic pain based on Hx of increase pain with cough/valsalva and chemical irritation of the nerve with extrusion of the disc contents.  Her pain is progressively worsened in episodic waves likely representing progression of DDD.  I have recommended a combination of medications and epidural steroid injection.  PT was also highly encouraged as prescribed by Dr. Gao.  I also requested that she being her outside  imaging for me to review.    : Reviewed and consistent with medication use as prescribed.    Treatment Plan:   Procedures: Recommend right TFESI @ L5-S1  PT/OT/HEP: Patient referred.  Medications:    - Start Lyrica 25 mg TID (gabapetnin would likely be more sedating and limit her capacity as a )   - Start Tylenol 1000 mg TID   - Start Diclofenac 75 mg BID (to start tomorrow)   - Start Tizanidine 4-8 mg PO BID PRN   - IM Toradol 60 mg in clinic today (patient advised to avoid all other NSAIDS for 24 hrs)  Labs: reviewed and medications are appropriately dosed for current hepatorenal function.  Imaging: No additional recommended at this time.    Follow Up: RTC 2-3 weeks and patient to send [employer] message or call if she would like to schedule the [facility]    Taco Gaming Jr, MD  Interventional Pain Medicine / Anesthesiology    Disclaimer: This note was partly generated using dictation software which may occasionally result in transcription errors.

## 2020-09-24 NOTE — PROGRESS NOTES
"  Physical Therapy Treatment Note     Name: Kim Padilla  Clinic Number: [id number]    Therapy Diagnosis:   Encounter Diagnoses   Name Primary?    Chronic bilateral low back pain with right-sided sciatica     Decreased strength of lower extremity     Impaired gait and mobility      Physician: Levy Gao DO    Visit Date: 9/25/2020    Physician Orders: PT Eval and Treat   Medical Diagnosis from Referral: Acute right-sided low back pain with right-sided sciatica  Evaluation Date: 9/9/2020  Authorization Period Expiration: 9/08/2021  Plan of Care Expiration: 11/18/2020  Visit # / Visits authorized: 3/25      Time In: 2:00 pm  Time Out: 2:45 pm  Total Billable Time: 45 minutes    Precautions: Standard     Subjective     Pt reports: she notices she is running into things at times. She is on lyrica and had a tramdol shot and shot and she is finally feeling some relief.   She was compliant with home exercise program   Response to previous treatment:   Functional change: sit to stands are tough, cannot trust her right leg    Pain: 3/10  After: 1/10 "Feel like jell-o".  Location: mostly R side    Objective     Kim received therapeutic exercises to develop strength, endurance, ROM, posture and core stabilization for 35 minutes including:  Additions/progressions bolded      Posterior pelvic tilts x 20 reps  Piriformis/figure4 stretch 2x30'' BLE  Hamstring stretch 2x30''   Nerve glides (supine) x 15 reps  Bridges x 15 reps   Clam shells x 15   Side-lying hip abduction x 15 reps  Hip extension x 15 reps  Open book stretch, 3x10'' hold        Kim received the following manual therapy techniques: Joint mobilizations, Manual traction, Myofacial release and Soft tissue Mobilization were applied to the: B hips and low back for 10 minutes, including:  · Long axis traction right leg   · STM to R gluteal muscles, hamstrings    Home Exercises Provided and Patient Education Provided     Education provided:   - Ice for 20 mins " with pillows under legs for pain-relief (to open intervertebral joints)  -heat to promote blood flow, loosen muscles, relaxation    Written Home Exercises Provided: yes.  Exercises were reviewed and Kim was able to demonstrate them prior to the end of the session.  Kim demonstrated good  understanding of the education provided.     See EMR under Patient Instructions for exercises provided 9/17/2020.    Assessment     Patient arrived to session with decrease in pain since meeting with pain management and receiving a Tramadol shot and Lyrica. Kim performed exercises well, limited with hamstring flexibility on right lower extremity. Patient had good response to dry needling in the past and may benefit from it along with stretching and strengthening. Continue to progress as patient tolerates. Favorable response to long axis distraction.     Kim is progressing well towards her goals.   Pt prognosis is Good.     Pt will continue to benefit from skilled outpatient physical therapy to address the deficits listed in the problem list box on initial evaluation, provide pt/family education and to maximize pt's level of independence in the home and community environment.     Pt's spiritual, cultural and educational needs considered and pt agreeable to plan of care and goals.     Anticipated barriers to physical therapy: none       Goals:     Short Term Goals (5 Weeks): progressing, not met  1. Pt will be compliant with HEP to supplement PT in decreasing pain with functional mobility.  2. Pt will perform bridge with good control to demonstrate improved core strength.  3. Pt will report 50% decrease in pain in order to promote return to household cores, ADLs, and work related tasks.   4. Pt will improve impaired LE MMTs by 1/2 muscle grade to improve strength for functional tasks.     Long Term Goals (10 Weeks): progressing, not met  1. Pt will be compliant with HEPprogressions to supplement PT in decreasing pain with  functional mobility.  2. Pt will perform single leg bridge with good control to demonstrate improved core strength.  3. Pt will improve impaired LE MMTs by 1 muscle grade to improve strength for functional tasks.  4. Pt will report no pain during lumbar ROM to promote functional mobility.  5. Pt to show heel to toe gait in order to promote normal movement patterns.   6. Pt will report 80% decrease in pain in order to promote return to household cores, ADLs, and work related tasks.   7. Pt to report full independence with ADLs and household chores to improve QoL.      Plan     Plan of care Certification: 9/9/2020 to 11/18/2020.     Outpatient Physical Therapy 2 times weekly for 10 weeks to include the following interventions: Gait Training, Manual Therapy, Moist Heat/ Ice, Neuromuscular Re-ed, Patient Education, Self Care, Therapeutic Activites, Therapeutic Exercise and dry needling.       Jennifer Swenson, PTA

## 2020-09-25 ENCOUNTER — CLINICAL SUPPORT (OUTPATIENT)
Dept: REHABILITATION | Facility: HOSPITAL | Age: 45
End: 2020-09-25
Attending: ORTHOPAEDIC SURGERY
Payer: COMMERCIAL

## 2020-09-25 DIAGNOSIS — G89.29 CHRONIC BILATERAL LOW BACK PAIN WITH RIGHT-SIDED SCIATICA: ICD-10-CM

## 2020-09-25 DIAGNOSIS — R29.898 DECREASED STRENGTH OF LOWER EXTREMITY: ICD-10-CM

## 2020-09-25 DIAGNOSIS — R26.89 IMPAIRED GAIT AND MOBILITY: ICD-10-CM

## 2020-09-25 DIAGNOSIS — M54.41 CHRONIC BILATERAL LOW BACK PAIN WITH RIGHT-SIDED SCIATICA: ICD-10-CM

## 2020-09-25 PROCEDURE — 97110 THERAPEUTIC EXERCISES: CPT | Mod: PO,CQ

## 2020-09-28 ENCOUNTER — PATIENT MESSAGE (OUTPATIENT)
Dept: PAIN MEDICINE | Facility: CLINIC | Age: 45
End: 2020-09-28

## 2020-09-29 NOTE — PROGRESS NOTES
"  Physical Therapy Treatment Note     Name: Kim Padilla  Clinic Number: [id number]    Therapy Diagnosis:   Encounter Diagnoses   Name Primary?    Chronic bilateral low back pain with right-sided sciatica     Decreased strength of lower extremity     Impaired gait and mobility      Physician: Levy Gao DO    Visit Date: 9/30/2020    Physician Orders: PT Eval and Treat   Medical Diagnosis from Referral: Acute right-sided low back pain with right-sided sciatica  Evaluation Date: 9/9/2020  Authorization Period Expiration: 9/08/2021  Plan of Care Expiration: 11/18/2020  Visit # / Visits authorized: 4/25      Time In: 8:30 pm  Time Out: 9:10 pm  Total Billable Time: 40 minutes    Precautions: Standard     Subjective     Pt reports: she is doing her HEP religiously, bought a cane for long distance walking. The nerve glides are getting slightly better, but still difficult.   She was compliant with home exercise program   Response to previous treatment: no issues.   Functional change: sit to stands are tough, cannot trust her right leg    Pain: 3/10  After: 1/10 "Feel like jell-o".  Location: mostly R side    Objective     Kim received therapeutic exercises to develop strength, endurance, ROM, posture and core stabilization for 30 minutes including:    Additions/progressions bolded:       Posterior pelvic tilts x 20 reps  Piriformis/figure4 stretch 2x30'' BLE  Hamstring stretch 3x30''   IT band stretch on R 4 x 30"   LTR on theraball x 15   DKTC on theraball x 15   Nerve glides (supine) x 15 reps  Bridges with hip add x 15 reps   R Side lying Clam shells x 15   Prone donkey kicks  x 15 reps  Prone fire hydrants on R x 15 reps   Shuttle donkey kicks 1 red x 15 reps       Kim received the following manual therapy techniques: Joint mobilizations, Manual traction, Myofacial release and Soft tissue Mobilization were applied to the: B hips and low back for 10 minutes, including:  · Long axis traction right leg   · STM " to R gluteal muscles, hamstrings    Home Exercises Provided and Patient Education Provided     Education provided:   - Ice for 20 mins with pillows under legs for pain-relief (to open intervertebral joints)  -heat to promote blood flow, loosen muscles, relaxation    Written Home Exercises Provided: yes.  Exercises were reviewed and Kim was able to demonstrate them prior to the end of the session.  Kim demonstrated good  understanding of the education provided.     See EMR under Patient Instructions for exercises provided 9/17/2020.    Assessment      Patient is making some minor progress with the help of oral medication and performing her HEP religiously. Patient has difficulty maintaining level pelvis during mobility. Patient will benefit from dry needling to piriformis since she has had relief from it previously. Continue to progress as tolerates.     Kim is progressing well towards her goals.   Pt prognosis is Good.     Pt will continue to benefit from skilled outpatient physical therapy to address the deficits listed in the problem list box on initial evaluation, provide pt/family education and to maximize pt's level of independence in the home and community environment.     Pt's spiritual, cultural and educational needs considered and pt agreeable to plan of care and goals.     Anticipated barriers to physical therapy: none       Goals:     Short Term Goals (5 Weeks): progressing, not met  1. Pt will be compliant with HEP to supplement PT in decreasing pain with functional mobility.  2. Pt will perform bridge with good control to demonstrate improved core strength.  3. Pt will report 50% decrease in pain in order to promote return to household cores, ADLs, and work related tasks.   4. Pt will improve impaired LE MMTs by 1/2 muscle grade to improve strength for functional tasks.     Long Term Goals (10 Weeks): progressing, not met  1. Pt will be compliant with HEPprogressions to supplement PT in decreasing  pain with functional mobility.  2. Pt will perform single leg bridge with good control to demonstrate improved core strength.  3. Pt will improve impaired LE MMTs by 1 muscle grade to improve strength for functional tasks.  4. Pt will report no pain during lumbar ROM to promote functional mobility.  5. Pt to show heel to toe gait in order to promote normal movement patterns.   6. Pt will report 80% decrease in pain in order to promote return to household cores, ADLs, and work related tasks.   7. Pt to report full independence with ADLs and household chores to improve QoL.      Plan     Plan of care Certification: 9/9/2020 to 11/18/2020.     Outpatient Physical Therapy 2 times weekly for 10 weeks to include the following interventions: Gait Training, Manual Therapy, Moist Heat/ Ice, Neuromuscular Re-ed, Patient Education, Self Care, Therapeutic Activites, Therapeutic Exercise and dry needling.       Jennifer Swenson, PTA

## 2020-09-30 ENCOUNTER — CLINICAL SUPPORT (OUTPATIENT)
Dept: REHABILITATION | Facility: HOSPITAL | Age: 45
End: 2020-09-30
Attending: ORTHOPAEDIC SURGERY
Payer: COMMERCIAL

## 2020-09-30 DIAGNOSIS — M54.41 CHRONIC BILATERAL LOW BACK PAIN WITH RIGHT-SIDED SCIATICA: ICD-10-CM

## 2020-09-30 DIAGNOSIS — R29.898 DECREASED STRENGTH OF LOWER EXTREMITY: ICD-10-CM

## 2020-09-30 DIAGNOSIS — G89.29 CHRONIC BILATERAL LOW BACK PAIN WITH RIGHT-SIDED SCIATICA: ICD-10-CM

## 2020-09-30 DIAGNOSIS — R26.89 IMPAIRED GAIT AND MOBILITY: ICD-10-CM

## 2020-09-30 PROCEDURE — 97140 MANUAL THERAPY 1/> REGIONS: CPT | Mod: PO,CQ

## 2020-09-30 PROCEDURE — 97110 THERAPEUTIC EXERCISES: CPT | Mod: PO,CQ

## 2020-10-02 NOTE — PROGRESS NOTES
"  Physical Therapy Treatment Note     Name: Kim Padilla  Clinic Number: [id number]    Therapy Diagnosis:   Encounter Diagnoses   Name Primary?    Decreased strength of lower extremity Yes    Impaired gait and mobility      Physician: Levy Gao DO    Visit Date: 10/5/2020    Physician Orders: PT Eval and Treat   Medical Diagnosis from Referral: Acute right-sided low back pain with right-sided sciatica  Evaluation Date: 9/9/2020  Authorization Period Expiration: 9/08/2021  Plan of Care Expiration: 11/18/2020  Visit # / Visits authorized: 5/25      Time In: 703 AM  Time Out: 745 AM  Total Billable Time: 42 minutes (Te 2, mt 1)c    Precautions: Standard     Subjective     Pt reports: she has had a tough weekend. The right knee and the ankle are bothering her. She thinks she is not walking normally and compensated.   She was compliant with home exercise program   Response to previous treatment: she felt good and energized   Functional change: she notices that when she does the exercises it is getting slightly easier     Pain: 4-5/10  Location:right buttock and then snf down the back of the leg    Objective       Bold=performed at today's session     Kim received therapeutic exercises to develop strength, endurance, ROM, posture and core stabilization for 27 minutes including:    Posterior pelvic tilts x 20 reps  Piriformis stretch 3x30" R   Hamstring stretch 3x30''   IT band stretch on R 4 x 30"   LTR on theraball x 15   DKTC on red physioball 2x10 3"  Nerve glides (supine) x 15 reps  Bridges with hip add x 15 reps   R Side lying Clam shells x 15   Prone donkey kicks  x 15 reps  Prone fire hydrants on R x 15 reps   Shuttle donkey kicks 1 red x 15 reps   Gait training 1 lap around clinic with emphasis on proper heel to toe pattern  Gait training 1 lap around clinic with SPC       Kim received the following manual therapy techniques: Joint mobilizations, Manual traction, Myofacial release and Soft tissue " Mobilization were applied to the: B hips and low back for 15 minutes, including:  · Hooklying distraction with belt  · RLE lateral distraction with belt   · Piriformis release to R piriformis     Home Exercises Provided and Patient Education Provided     Education provided:   -exercises  - gait     Written Home Exercises Provided: Patient instructed to cont prior HEP.  Exercises were reviewed and Kim was able to demonstrate them prior to the end of the session.  Kim demonstrated good  understanding of the education provided.     See EMR under Patient Instructions for exercises provided 9/17/2020.    Assessment     Pt with good response to piriformis release today. She is educated on proper heel to toe gait without assistive device. She is also educated on ambulation with SPC and adjustments/proper use of SPC. Pt notes decreased pain upon end of session today. Progress as kash and reassess next visit.    Kim is progressing well towards her goals.   Pt prognosis is Good.     Pt will continue to benefit from skilled outpatient physical therapy to address the deficits listed in the problem list box on initial evaluation, provide pt/family education and to maximize pt's level of independence in the home and community environment.     Pt's spiritual, cultural and educational needs considered and pt agreeable to plan of care and goals.     Anticipated barriers to physical therapy: none       Goals:     Short Term Goals (5 Weeks):   1. Pt will be compliant with HEP to supplement PT in decreasing pain with functional mobility.-MET 10/5/2020  2. Pt will perform bridge with good control to demonstrate improved core strength.  3. Pt will report 50% decrease in pain in order to promote return to household cores, ADLs, and work related tasks.   4. Pt will improve impaired LE MMTs by 1/2 muscle grade to improve strength for functional tasks.     Long Term Goals (10 Weeks):   1. Pt will be compliant with HEPprogressions to  supplement PT in decreasing pain with functional mobility.  2. Pt will perform single leg bridge with good control to demonstrate improved core strength.  3. Pt will improve impaired LE MMTs by 1 muscle grade to improve strength for functional tasks.  4. Pt will report no pain during lumbar ROM to promote functional mobility.  5. Pt to show heel to toe gait in order to promote normal movement patterns.   6. Pt will report 80% decrease in pain in order to promote return to household cores, ADLs, and work related tasks.   7. Pt to report full independence with ADLs and household chores to improve QoL.      Plan     Plan of care Certification: 9/9/2020 to 11/18/2020.     Outpatient Physical Therapy 2 times weekly for 10 weeks to include the following interventions: Gait Training, Manual Therapy, Moist Heat/ Ice, Neuromuscular Re-ed, Patient Education, Self Care, Therapeutic Activites, Therapeutic Exercise and dry needling.     Reassess next visit.   Incorporate dry needling at next visit if able.      Kandy Watt, PT

## 2020-10-05 ENCOUNTER — CLINICAL SUPPORT (OUTPATIENT)
Dept: REHABILITATION | Facility: HOSPITAL | Age: 45
End: 2020-10-05
Attending: ORTHOPAEDIC SURGERY
Payer: COMMERCIAL

## 2020-10-05 DIAGNOSIS — R29.898 DECREASED STRENGTH OF LOWER EXTREMITY: Primary | ICD-10-CM

## 2020-10-05 DIAGNOSIS — R26.89 IMPAIRED GAIT AND MOBILITY: ICD-10-CM

## 2020-10-05 PROCEDURE — 97110 THERAPEUTIC EXERCISES: CPT | Mod: PO

## 2020-10-05 PROCEDURE — 97140 MANUAL THERAPY 1/> REGIONS: CPT | Mod: PO

## 2020-10-09 NOTE — PROGRESS NOTES
"  Physical Therapy Treatment Note/ Progress Note     Name: Kim Padilla  Clinic Number: [id number]    Therapy Diagnosis:   Encounter Diagnoses   Name Primary?    Decreased strength of lower extremity Yes    Impaired gait and mobility      Physician: Levy Gao DO    Visit Date: 10/12/2020    Physician Orders: PT Eval and Treat   Medical Diagnosis from Referral: Acute right-sided low back pain with right-sided sciatica  Evaluation Date: 9/9/2020  Authorization Period Expiration: 9/08/2021  Plan of Care Expiration: 11/18/2020  Visit # / Visits authorized: 6/25      Time In: 916 AM  Time Out: 955 AM  Total Billable Time: 39 min (Te 2, mt 1)    Precautions: Standard     Subjective     Pt reports: the massage and distraction felt good last time. She feels that she is walking better. She tends to hurt worse over the weekends.   She was compliant with home exercise program   Response to previous treatment: felt good when she left  Functional change: walking better, the pain is less frequent     Pain: 3-4/10  Location: right buttock (more in piriformis)    Objective          Lower Extremity Strength (graded 0-5 out of 5)    RLE LLE   Hip flexion: 4+/5 5/5   Hip ER 4+/5 5/5   Hip IR 5/5 5/5   Knee extension: 4+/5 5/5   Ankle dorsiflexion: 5/5 5/5   Posterior fibers of Gluteus medius 5/5 5/5   Knee flexion 5/5 5/5   Glute max 4+/5 5/5         Bold=performed at today's session     Kim received therapeutic exercises to develop strength, endurance, ROM, posture and core stabilization for 24 minutes including:    Posterior pelvic tilts x 20 reps x5" holds  Piriformis stretch 3x30" R   Hamstring stretch 3x30''   IT band stretch on R 4 x 30"   LTR on theraball x 15   DKTC on red physioball 2x10 3"  Nerve glides (supine) x 15 reps  Bridges 2x10   +single leg bridge x10   Side lying Clam shells +OTB 2x10  Prone donkey kicks  x 15 reps  Prone fire hydrants on R x 15 reps   Shuttle donkey kicks 1 red x 15 reps   Gait training 1 " lap around clinic  Gait training 1 lap around clinic with SPC       Kim received the following manual therapy techniques: Joint mobilizations, Manual traction, Myofacial release and Soft tissue Mobilization were applied to the: B hips and low back for 15 minutes, including:  · Hooklying distraction with belt  · RLE lateral distraction with belt   · Piriformis release to R piriformis     Home Exercises Provided and Patient Education Provided     Education provided:   -exercises  -HEP    Written Home Exercises Provided: yes.  Exercises were reviewed and Kim was able to demonstrate them prior to the end of the session.  Kim demonstrated good  understanding of the education provided.     See EMR under Patient Instructions for exercises provided 10/12/2020.    Assessment     Pt presents to today's reassessment with improved lower extremity strength, symptom management, and gait. She shows good core control and stability with double leg bridge. She is introduced to single leg bridge today with appropriate challenge and education to keep a pelvis level. She shows improved heel to toe gait pattern with no cuing needed today. At this time, she is progressing very well towards all goals at this time and will continue to benefit from skilled outpatient physical therapy to continue improving upon all deficits and improve pain free functional mobility.       Kim is progressing well towards her goals.   Pt prognosis is Good.     Pt will continue to benefit from skilled outpatient physical therapy to address the deficits listed in the problem list box on initial evaluation, provide pt/family education and to maximize pt's level of independence in the home and community environment.     Pt's spiritual, cultural and educational needs considered and pt agreeable to plan of care and goals.     Anticipated barriers to physical therapy: none       Goals:    Short Term Goals (5 Weeks):   1. Pt will be compliant with HEP to supplement  PT in decreasing pain with functional mobility.-MET 10/5/2020  2. Pt will perform bridge with good control to demonstrate improved core strength.-MET 10/12/2020  3. Pt will report 50% decrease in pain in order to promote return to household cores, ADLs, and work related tasks.-almost met    4. Pt will improve impaired LE MMTs by 1/2 muscle grade to improve strength for functional tasks.-MET 10/12/2020     Long Term Goals (10 Weeks):   1. Pt will be compliant with HEPprogressions to supplement PT in decreasing pain with functional mobility.-MET 10/12/2020  2. Pt will perform single leg bridge with good control to demonstrate improved core strength.-in progress   3. Pt will improve impaired LE MMTs by 1 muscle grade to improve strength for functional tasks.-in progress   4. Pt will report no pain during lumbar ROM to promote functional mobility.  5. Pt to show heel to toe gait in order to promote normal movement patterns.-MET 10/12/2020   6. Pt will report 80% decrease in pain in order to promote return to household cores, ADLs, and work related tasks.   7. Pt to report full independence with ADLs and household chores to improve QoL.      Plan     Plan of care Certification: 9/9/2020 to 11/18/2020.     Outpatient Physical Therapy 2 times weekly for 10 weeks to include the following interventions: Gait Training, Manual Therapy, Moist Heat/ Ice, Neuromuscular Re-ed, Patient Education, Self Care, Therapeutic Activites, Therapeutic Exercise and dry needling.       Kandy Watt, PT

## 2020-10-12 ENCOUNTER — PATIENT MESSAGE (OUTPATIENT)
Dept: PAIN MEDICINE | Facility: CLINIC | Age: 45
End: 2020-10-12

## 2020-10-12 ENCOUNTER — CLINICAL SUPPORT (OUTPATIENT)
Dept: REHABILITATION | Facility: HOSPITAL | Age: 45
End: 2020-10-12
Attending: ORTHOPAEDIC SURGERY
Payer: COMMERCIAL

## 2020-10-12 DIAGNOSIS — R26.89 IMPAIRED GAIT AND MOBILITY: ICD-10-CM

## 2020-10-12 DIAGNOSIS — R29.898 DECREASED STRENGTH OF LOWER EXTREMITY: Primary | ICD-10-CM

## 2020-10-12 PROCEDURE — 97110 THERAPEUTIC EXERCISES: CPT | Mod: PO

## 2020-10-12 PROCEDURE — 97140 MANUAL THERAPY 1/> REGIONS: CPT | Mod: PO

## 2020-10-16 ENCOUNTER — CLINICAL SUPPORT (OUTPATIENT)
Dept: REHABILITATION | Facility: HOSPITAL | Age: 45
End: 2020-10-16
Attending: ORTHOPAEDIC SURGERY
Payer: COMMERCIAL

## 2020-10-16 DIAGNOSIS — G89.29 CHRONIC BILATERAL LOW BACK PAIN WITH RIGHT-SIDED SCIATICA: ICD-10-CM

## 2020-10-16 DIAGNOSIS — R29.898 DECREASED STRENGTH OF LOWER EXTREMITY: ICD-10-CM

## 2020-10-16 DIAGNOSIS — M54.41 CHRONIC BILATERAL LOW BACK PAIN WITH RIGHT-SIDED SCIATICA: ICD-10-CM

## 2020-10-16 DIAGNOSIS — R26.89 IMPAIRED GAIT AND MOBILITY: ICD-10-CM

## 2020-10-16 PROCEDURE — 97110 THERAPEUTIC EXERCISES: CPT | Mod: PO

## 2020-10-16 PROCEDURE — 97140 MANUAL THERAPY 1/> REGIONS: CPT | Mod: PO

## 2020-10-16 NOTE — PROGRESS NOTES
"  Physical Therapy Treatment Note     Name: Kim Padilla  Clinic Number: [id number]    Therapy Diagnosis:   Encounter Diagnoses   Name Primary?    Decreased strength of lower extremity Yes    Impaired gait and mobility      Physician: Levy Gao DO    Visit Date: 10/19/2020    Physician Orders: PT Eval and Treat   Medical Diagnosis from Referral: Acute right-sided low back pain with right-sided sciatica  Evaluation Date: 9/9/2020  Authorization Period Expiration: 9/08/2021  Plan of Care Expiration: 11/18/2020  Visit # / Visits authorized: 8/25      Time In: 701 AM  Time Out: 741 AM  Total Billable Time: 40  min (Te 2, mt 1)    Precautions: Standard     Subjective     Pt reports: she did not do anything physical this weekend so that helped her pain.   She was compliant with home exercise program   Response to previous treatment: she was sore and tired   Functional change: ongoing    Pain:0- 1/10  Location: right buttock (more in piriformis)    Objective           Bold=performed at today's session     Kim received therapeutic exercises to develop strength, endurance, ROM, posture and core stabilization for 30 minutes including:    Upright bike level 2 8 mins  +TrA contractions with pilates ring 2x10 5" holds  +Palloff press GTB 2x10 B   Posterior pelvic tilts x 20 reps x5" holds  Piriformis stretch 3x30" R   Hamstring stretch 3x30''   IT band stretch on R 4 x 30"   LTR on theraball x 15   DKTC on red physioball 2x10 3"  Nerve glides (supine) x 15 reps  Seated sciatic nerve glides 2x10   Bridges 2x10  With core activation   single leg bridge x10  Side lying Clam shells +OTB 2x10  Prone hip extension 2x10  Prone fire hydrants on R x 15 reps   Shuttle donkey kicks 1 red x 15 reps   Gait training 1 lap around clinic  Gait training 1 lap around clinic with SPC       Kim received the following manual therapy techniques: Joint mobilizations, Manual traction, Myofacial release and Soft tissue Mobilization were " applied to the: B hips/ glutes and low back for 10 minutes, including:  · Hooklying distraction with belt   · Piriformis release to R piriformis     Home Exercises Provided and Patient Education Provided     Education provided:   -exercises    Written Home Exercises Provided: Patient instructed to cont prior HEP.  Exercises were reviewed and Kim was able to demonstrate them prior to the end of the session.  Kim demonstrated good  understanding of the education provided.     See EMR under Patient Instructions for exercises provided 10/12/2020 and 10/16/2020.    Assessment     Pt presents to today's session with good tolerance. She is introduced to core strengthening/control exercises today, such as palloff press and TrA activation with use of piliates ring pressed to thighs. Educated on proper bridge form with appropriate core control during performance. No adverse effects to exercises. Progress as kash.       Kim is progressing well towards her goals.   Pt prognosis is Good.     Pt will continue to benefit from skilled outpatient physical therapy to address the deficits listed in the problem list box on initial evaluation, provide pt/family education and to maximize pt's level of independence in the home and community environment.     Pt's spiritual, cultural and educational needs considered and pt agreeable to plan of care and goals.     Anticipated barriers to physical therapy: none       Goals:    Short Term Goals (5 Weeks):   1. Pt will be compliant with HEP to supplement PT in decreasing pain with functional mobility.-MET 10/5/2020  2. Pt will perform bridge with good control to demonstrate improved core strength.-MET 10/12/2020  3. Pt will report 50% decrease in pain in order to promote return to household cores, ADLs, and work related tasks.-almost met    4. Pt will improve impaired LE MMTs by 1/2 muscle grade to improve strength for functional tasks.-MET 10/12/2020     Long Term Goals (10 Weeks):   1. Pt  will be compliant with HEPprogressions to supplement PT in decreasing pain with functional mobility.-MET 10/12/2020  2. Pt will perform single leg bridge with good control to demonstrate improved core strength.-in progress   3. Pt will improve impaired LE MMTs by 1 muscle grade to improve strength for functional tasks.-in progress   4. Pt will report no pain during lumbar ROM to promote functional mobility.  5. Pt to show heel to toe gait in order to promote normal movement patterns.-MET 10/12/2020   6. Pt will report 80% decrease in pain in order to promote return to household cores, ADLs, and work related tasks.   7. Pt to report full independence with ADLs and household chores to improve QoL.      Plan     Plan of care Certification: 9/9/2020 to 11/18/2020.     Outpatient Physical Therapy 2 times weekly for 10 weeks to include the following interventions: Gait Training, Manual Therapy, Moist Heat/ Ice, Neuromuscular Re-ed, Patient Education, Self Care, Therapeutic Activites, Therapeutic Exercise and dry needling.       Kandy Watt, PT

## 2020-10-16 NOTE — PROGRESS NOTES
"  Physical Therapy Treatment Note     Name: Kim Padilla  Clinic Number: [id number]    Therapy Diagnosis:   Encounter Diagnoses   Name Primary?    Chronic bilateral low back pain with right-sided sciatica     Decreased strength of lower extremity     Impaired gait and mobility      Physician: Levy Gao DO    Visit Date: 10/16/2020    Physician Orders: PT Eval and Treat   Medical Diagnosis from Referral: Acute right-sided low back pain with right-sided sciatica  Evaluation Date: 9/9/2020  Authorization Period Expiration: 9/08/2021  Plan of Care Expiration: 11/18/2020  Visit # / Visits authorized: 7/25      Time In: 703 AM  Time Out: 744 AM  Total Billable Time: 41 min (Te 2, mt 1)    Precautions: Standard     Subjective     Pt reports: she felt pain free on Monday and Tuesday but then she was sore from sitting in meetings Tuesday and Wednesday and felt the soreness last night.   She was compliant with home exercise program   Response to previous treatment: felt good when she left  Functional change: walking better, the pain is less frequent     Pain: 3-4/10  Location: right buttock (more in piriformis)    Objective           Bold=performed at today's session     Kim received therapeutic exercises to develop strength, endurance, ROM, posture and core stabilization for 26 minutes including:    Upright bike level 2 8 mins  Posterior pelvic tilts x 20 reps x5" holds  Piriformis stretch 3x30" R   Hamstring stretch 3x30''   IT band stretch on R 4 x 30"   LTR on theraball x 15   DKTC on red physioball 2x10 3"  Nerve glides (supine) x 15 reps  Seated sciatic nerve glides 2x10   Bridges 2x10   single leg bridge x10  Side lying Clam shells +OTB 2x10  Prone hip extension 2x10  Prone fire hydrants on R x 15 reps   Shuttle donkey kicks 1 red x 15 reps   Gait training 1 lap around clinic  Gait training 1 lap around clinic with SPC       Kim received the following manual therapy techniques: Joint mobilizations, Manual " traction, Myofacial release and Soft tissue Mobilization were applied to the: B hips and low back for 15 minutes, including:  · MET to attempt to correct slight R anterior rotation of innominate   · Piriformis release to R piriformis     Home Exercises Provided and Patient Education Provided     Education provided:   -exercises  - seated sciatic nerve glide and seated piriformis stretch   - symptom management at work     Written Home Exercises Provided: yes.  Exercises were reviewed and Kim was able to demonstrate them prior to the end of the session.  Kim demonstrated good  understanding of the education provided.     See EMR under Patient Instructions for exercises provided 10/12/2020 and 10/16/2020.    Assessment     Pt presents to today's session with improved tolerance to single leg bridges. PT performs MET to attempt to correct right anterior rotation of innominate. Patient educated on seated piriformis stretch and seated sciatic nerve glide for symptom management at work. Seated sciatic nerve glides performed with knee flexed as full extension of the knee elicits increased pain, so seated sciatic nerve glide is modified (see HEP under patient instructions for today). Progress as kash.     Kim is progressing well towards her goals.   Pt prognosis is Good.     Pt will continue to benefit from skilled outpatient physical therapy to address the deficits listed in the problem list box on initial evaluation, provide pt/family education and to maximize pt's level of independence in the home and community environment.     Pt's spiritual, cultural and educational needs considered and pt agreeable to plan of care and goals.     Anticipated barriers to physical therapy: none       Goals:    Short Term Goals (5 Weeks):   1. Pt will be compliant with HEP to supplement PT in decreasing pain with functional mobility.-MET 10/5/2020  2. Pt will perform bridge with good control to demonstrate improved core strength.-MET  10/12/2020  3. Pt will report 50% decrease in pain in order to promote return to household cores, ADLs, and work related tasks.-almost met    4. Pt will improve impaired LE MMTs by 1/2 muscle grade to improve strength for functional tasks.-MET 10/12/2020     Long Term Goals (10 Weeks):   1. Pt will be compliant with HEPprogressions to supplement PT in decreasing pain with functional mobility.-MET 10/12/2020  2. Pt will perform single leg bridge with good control to demonstrate improved core strength.-in progress   3. Pt will improve impaired LE MMTs by 1 muscle grade to improve strength for functional tasks.-in progress   4. Pt will report no pain during lumbar ROM to promote functional mobility.  5. Pt to show heel to toe gait in order to promote normal movement patterns.-MET 10/12/2020   6. Pt will report 80% decrease in pain in order to promote return to household cores, ADLs, and work related tasks.   7. Pt to report full independence with ADLs and household chores to improve QoL.      Plan     Plan of care Certification: 9/9/2020 to 11/18/2020.     Outpatient Physical Therapy 2 times weekly for 10 weeks to include the following interventions: Gait Training, Manual Therapy, Moist Heat/ Ice, Neuromuscular Re-ed, Patient Education, Self Care, Therapeutic Activites, Therapeutic Exercise and dry needling.       Kandy Watt, PT

## 2020-10-19 ENCOUNTER — CLINICAL SUPPORT (OUTPATIENT)
Dept: REHABILITATION | Facility: HOSPITAL | Age: 45
End: 2020-10-19
Attending: ORTHOPAEDIC SURGERY
Payer: COMMERCIAL

## 2020-10-19 DIAGNOSIS — R29.898 DECREASED STRENGTH OF LOWER EXTREMITY: Primary | ICD-10-CM

## 2020-10-19 DIAGNOSIS — R26.89 IMPAIRED GAIT AND MOBILITY: ICD-10-CM

## 2020-10-19 PROCEDURE — 97140 MANUAL THERAPY 1/> REGIONS: CPT | Mod: PO

## 2020-10-19 PROCEDURE — 97110 THERAPEUTIC EXERCISES: CPT | Mod: PO

## 2020-10-22 NOTE — PROGRESS NOTES
"    Physical Therapy Treatment Note     Name: Kim Padilla  Clinic Number: [id number]    Therapy Diagnosis:   Encounter Diagnoses   Name Primary?    Decreased strength of lower extremity Yes    Impaired gait and mobility      Physician: Levy Gao DO    Visit Date: 10/23/2020    Physician Orders: PT Eval and Treat   Medical Diagnosis from Referral: Acute right-sided low back pain with right-sided sciatica  Evaluation Date: 9/9/2020  Authorization Period Expiration: 9/08/2021  Plan of Care Expiration: 11/18/2020  Visit # / Visits authorized: 9/25      Time In: 702 AM  Time Out: 730 AM  Total Billable Time: 28 min (Te 1, mt 1)    Precautions: Standard     Subjective     Pt reports: she has been pain-free since her last visit. She goes back to the pain doctor on Monday.  She was compliant with home exercise program   Response to previous treatment: pain-free since last visit   Functional change: ongoing    Pain: 0/10  Location: right buttock (more in piriformis)    Objective           Bold=performed at today's session     Kim received therapeutic exercises to develop strength, endurance, ROM, posture and core stabilization for 18 minutes including:    +treadmill walking @  1.2 ph, 6 mins   Upright bike level 2 8 mins  TrA contractions with pilates ring 2x10 5" holds  Palloff press GTB 2x10 B   Posterior pelvic tilts x 20 reps x5" holds  Piriformis stretch 3x30" R   Hamstring stretch 3x30''   IT band stretch on R 4 x 30"   LTR on theraball x 15   DKTC on red physioball 2x10 3"  Nerve glides (supine) x 15 reps  Seated sciatic nerve glides 2x10   Bridges 2x10  With core activation   single leg bridge x10  Side lying Clam shells +OTB 2x10  Prone hip extension 2x10  Prone fire hydrants on R x 15 reps   Shuttle donkey kicks 1 red x 15 reps   Gait training 1 lap around clinic  Gait training 1 lap around clinic with SPC       Kim received the following manual therapy techniques: Joint mobilizations, Manual traction, " Myofacial release and Soft tissue Mobilization were applied to the: B hips/ glutes and low back for 10 minutes, including:   · Piriformis release to R piriformis     Home Exercises Provided and Patient Education Provided     Education provided:   -exercises    Written Home Exercises Provided: Patient instructed to cont prior HEP.  Exercises were reviewed and Kim was able to demonstrate them prior to the end of the session.  Kim demonstrated good  understanding of the education provided.     See EMR under Patient Instructions for exercises provided 10/12/2020 and 10/16/2020.    Assessment     Pt presents to today's session with good tolerance. She has no pain today. Initiated treadmill walking at today's session and pt has no pain and demos proper heel to toe gait with no cuing needed. Pt denies pain throughout session. She is progressing extremely well towards goals at this time. Progress as kash.       Kim is progressing well towards her goals.   Pt prognosis is Good.     Pt will continue to benefit from skilled outpatient physical therapy to address the deficits listed in the problem list box on initial evaluation, provide pt/family education and to maximize pt's level of independence in the home and community environment.     Pt's spiritual, cultural and educational needs considered and pt agreeable to plan of care and goals.     Anticipated barriers to physical therapy: none       Goals:    Short Term Goals (5 Weeks):   1. Pt will be compliant with HEP to supplement PT in decreasing pain with functional mobility.-MET 10/5/2020  2. Pt will perform bridge with good control to demonstrate improved core strength.-MET 10/12/2020  3. Pt will report 50% decrease in pain in order to promote return to household cores, ADLs, and work related tasks.-almost met    4. Pt will improve impaired LE MMTs by 1/2 muscle grade to improve strength for functional tasks.-MET 10/12/2020     Long Term Goals (10 Weeks):   1. Pt will be  compliant with HEPprogressions to supplement PT in decreasing pain with functional mobility.-MET 10/12/2020  2. Pt will perform single leg bridge with good control to demonstrate improved core strength.-in progress   3. Pt will improve impaired LE MMTs by 1 muscle grade to improve strength for functional tasks.-in progress   4. Pt will report no pain during lumbar ROM to promote functional mobility.  5. Pt to show heel to toe gait in order to promote normal movement patterns.-MET 10/12/2020   6. Pt will report 80% decrease in pain in order to promote return to household cores, ADLs, and work related tasks.   7. Pt to report full independence with ADLs and household chores to improve QoL.      Plan     Plan of care Certification: 9/9/2020 to 11/18/2020.     Outpatient Physical Therapy 2 times weekly for 10 weeks to include the following interventions: Gait Training, Manual Therapy, Moist Heat/ Ice, Neuromuscular Re-ed, Patient Education, Self Care, Therapeutic Activites, Therapeutic Exercise and dry needling.       Kandy Watt, PT

## 2020-10-23 ENCOUNTER — CLINICAL SUPPORT (OUTPATIENT)
Dept: REHABILITATION | Facility: HOSPITAL | Age: 45
End: 2020-10-23
Attending: ORTHOPAEDIC SURGERY
Payer: COMMERCIAL

## 2020-10-23 DIAGNOSIS — R29.898 DECREASED STRENGTH OF LOWER EXTREMITY: Primary | ICD-10-CM

## 2020-10-23 DIAGNOSIS — R26.89 IMPAIRED GAIT AND MOBILITY: ICD-10-CM

## 2020-10-23 PROCEDURE — 97140 MANUAL THERAPY 1/> REGIONS: CPT | Mod: PO

## 2020-10-23 PROCEDURE — 97110 THERAPEUTIC EXERCISES: CPT | Mod: PO

## 2020-10-23 NOTE — PROGRESS NOTES
[facility] New Patient Evaluation    Referred by: Levy Gao DO  Reason for referral: Back pain    CC:   Chief Complaint   Patient presents with    Back Pain    Leg Pain       Last 3 PDI Scores 9/23/2020   Pain Disability Index (PDI) 35         Interval Update:  10/26/2020 - Ms. Padilla returns to clinic for follow up visit reporting improved back pain. Pain intensity is currently 0/10 and she experiences radiating symptoms down the leg rarely.  She denies experiencing pain, but does report numbness and tingling that occurs with standing for 10-15 minutes.  She has been attending physical therapy twice weekly since her last visit and is very pleased with the progress.  She also reports determined performance of her home exercise program.    HPI:   Kim Padilla is a 44 y.o. female who complains chronic pain shooting into the right leg from the back.  Pain started in the low back in Dec 2019 but has progressively worsened in waves over the past 9-10 months.  She experienced several flare ups in 2020 which responded to conservative therapy and chiropractic treatments.  The current episode has been severe and refractory to conservative treatments such as rest, ice, heat, steroid dose pk, and OTC medications.  She is referred to PT, which will start soon.  She obtained an MRI L-spine in April 2020 at [facility] which shows DDD at L5-S1 abutting the descending S1 nerve root.    Location: low back pain   Onset: 9 months  Current Pain Score: 5/10  Daily Pain of Range: 5-6/10  Quality: Burning, Tight, Deep, Electric and Shooting  Radiation: right leg  Worsened by: extension and sitting  Improved by: ice and physical therapy    Previous Therapies:  PT/OT: Referred by Dr. Gao to start in the near future  HEP: Yes  Interventions: Denies  Surgery: Denies back surgery  Medications:   - NSAIDS:   - MSK Relaxants:   - TCAs:   - SNRIs:   - Topicals:   - Anticonvulsants:  - Opioids:     Current Pain  Medications:  1. Ibuprofen     Review of Systems:  Review of Systems   Constitutional: Negative for chills and fever.   HENT: Negative for nosebleeds.    Eyes: Negative for blurred vision.   Respiratory: Negative for hemoptysis.    Cardiovascular: Negative for chest pain and palpitations.   Gastrointestinal: Negative for heartburn and vomiting.   Genitourinary: Negative for hematuria.   Musculoskeletal: Positive for back pain. Negative for myalgias.   Skin: Negative for rash.   Neurological: Positive for tingling and focal weakness (feels weak in right leg). Negative for seizures and loss of consciousness.   Endo/Heme/Allergies: Does not bruise/bleed easily.   Psychiatric/Behavioral: Negative for hallucinations. The patient has insomnia.        History:    Current Outpatient Medications:     acetaminophen (TYLENOL) 500 MG tablet, Take 2 tablets (1,000 mg total) by mouth 3 (three) times daily., Disp: , Rfl: 0    ALPRAZolam (XANAX) 0.5 MG tablet, Take 1 tablet (0.5 mg total) by mouth nightly as needed for Insomnia or Anxiety. KEEP APPT FOR FURTHER REFILLS., Disp: 30 tablet, Rfl: 1    azelastine (ASTELIN) 137 mcg (0.1 %) nasal spray, 1 spray (137 mcg total) by Nasal route 2 (two) times daily., Disp: 30 mL, Rfl: 0    diclofenac (VOLTAREN) 75 MG EC tablet, Take 1 tablet (75 mg total) by mouth 2 (two) times daily., Disp: 40 tablet, Rfl: 1    eletriptan (RELPAX) 40 MG tablet, Relpax 40 mg tablet  Take 1 tablet as needed by oral route as needed for 30 days., Disp: , Rfl:     FLUoxetine 10 MG capsule, Take by mouth., Disp: , Rfl:     omeprazole (PRILOSEC) 20 MG capsule, Take 1 capsule (20 mg total) by mouth once daily. (Patient not taking: Reported on 9/23/2020), Disp: 30 capsule, Rfl: 0    predniSONE (DELTASONE) 10 MG tablet, Take 4 tablets (40 mg total) by mouth once daily. (Patient not taking: Reported on 9/23/2020), Disp: 20 tablet, Rfl: 0    pregabalin (LYRICA) 25 MG capsule, Take 1 capsule (25 mg total) by  mouth 3 (three) times daily., Disp: 90 capsule, Rfl: 1    tiZANidine (ZANAFLEX) 4 MG tablet, Take 1-2 tablets (4-8 mg total) by mouth 2 (two) times daily as needed., Disp: 40 tablet, Rfl: 1    ZOLMitriptan (ZOMIG) 5 mg Spry, Zomig 5 mg nasal spray  Take by nasal route @ onset of headache, may repeat in 2-4hrs if needed, not more than 2/d or 6/wk, Disp: , Rfl:     zolpidem (AMBIEN) 10 mg Tab, Take 1 tablet (10 mg total) by mouth nightly as needed. (Patient not taking: Reported on 9/23/2020), Disp: 30 tablet, Rfl: 1    Past Medical History:   Diagnosis Date    Infertility, female        Past Surgical History:   Procedure Laterality Date    BREAST LUMPECTOMY Left 2014    Dr Brown - benign    DILATION AND CURETTAGE OF UTERUS      for SAB       Family History   Problem Relation Age of Onset    Breast cancer Maternal Cousin     Colon cancer Neg Hx     Ovarian cancer Neg Hx        Social History     Socioeconomic History    Marital status:      Spouse name: Not on file    Number of children: Not on file    Years of education: Not on file    Highest education level: Not on file   Occupational History    Not on file   Social Needs    Financial resource strain: Not on file    Food insecurity     Worry: Not on file     Inability: Not on file    Transportation needs     Medical: Not on file     Non-medical: Not on file   Tobacco Use    Smoking status: Never Smoker    Smokeless tobacco: Never Used   Substance and Sexual Activity    Alcohol use: Not Currently     Comment: rarely    Drug use: No    Sexual activity: Yes     Partners: Male     Birth control/protection: I.U.D.     Comment: mirena    Lifestyle    Physical activity     Days per week: Not on file     Minutes per session: Not on file    Stress: Not on file   Relationships    Social connections     Talks on phone: Not on file     Gets together: Not on file     Attends Faith service: Not on file     Active member of club or  organization: Not on file     Attends meetings of clubs or organizations: Not on file     Relationship status: Not on file   Other Topics Concern    Not on file   Social History Narrative    Not on file       Review of patient's allergies indicates:   Allergen Reactions    Penicillins Hives       Physical Exam:  There were no vitals filed for this visit.  General    Nursing note and vitals reviewed.  Constitutional: She is oriented to person, place, and time. She appears well-developed and well-nourished. No distress.   HENT:   Head: Normocephalic and atraumatic.   Nose: Nose normal.   Eyes: Conjunctivae and EOM are normal. Pupils are equal, round, and reactive to light. Right eye exhibits no discharge. Left eye exhibits no discharge. No scleral icterus.   Neck: No JVD present.   Cardiovascular: Intact distal pulses.    Pulmonary/Chest: Effort normal. No respiratory distress.   Abdominal: She exhibits no distension.   Neurological: She is alert and oriented to person, place, and time. Coordination normal.   Psychiatric: She has a normal mood and affect. Her behavior is normal. Judgment and thought content normal.     General Musculoskeletal Exam   Gait: normal     Back (L-Spine & T-Spine) / Neck (C-Spine) Exam     Tenderness Right paramedian tenderness of the Lower L-Spine. Left paramedian tenderness of the Lower L-Spine.     Back (L-Spine & T-Spine) Range of Motion   Back extension: facet loading is positive and exacerabtes/reproduces the patient's typical low back pain    Back flexion: limited ROM but partial relief of low back pain noted.     Spinal Sensation   Right Side Sensation  L-Spine Level: normal  Left Side Sensation  L-Spine Level: normal    Other She has no scoliosis .    Comments:  ++ SLR on right      Muscle Strength   Right Lower Extremity   Hip Flexion: 5/5   Hip Extensors: 5/5  Quadriceps:  5/5   Hamstrin/5   Gastrocsoleus:  5/5   Left Lower Extremity   Hip Flexion: 5/5   Hip Extensors:  5/5  Quadriceps:  5/5   Hamstrin/5   Gastrocsoleus:  5/5     Reflexes     Left Side  Achilles:  2+  Quadriceps:  2+    Right Side   Achilles:  2+  Quadriceps:  2+      Imagin2020  X-Ray Lumbar Spine Complete 5 View  Narrative & Impression     EXAMINATION:  XR LUMBAR SPINE COMPLETE 5 VIEW     CLINICAL HISTORY:  Low back pain, <6wks, no red flags, no prior management;Low back pain     TECHNIQUE:  AP, lateral, spot and bilateral oblique views of the lumbar spine were performed.     COMPARISON:  None     FINDINGS:  Mild narrowing of the lumbosacral disc space.  The other disc spaces are well maintained.  No fracture, spondylolisthesis or bone destruction identified     Impression:     See above        Electronically signed by: Balaji Dowd MD  Date:                                            2020  Time:                                           16:20        Labs:  BMP  No results found for: NA, K, CL, CO2, BUN, CREATININE, CALCIUM, ANIONGAP, ESTGFRAFRICA, EGFRNONAA  No results found for: ALT, AST, GGT, ALKPHOS, BILITOT    Assessment:  Problem List Items Addressed This Visit     Chronic right-sided low back pain with right-sided sciatica - Primary    Bulging lumbar disc          20 - Kim Padilla is a 44 y.o. female with chronic low back pain and right lower extremity radicular symptoms consistent with degenerative disc disease and annular tear at L5-S1 resulting in compression of the descending S1 nerve root.  There is also likely a component of discogenic pain based on Hx of increase pain with cough/valsalva and chemical irritation of the nerve with extrusion of the disc contents.  Her pain is progressively worsened in episodic waves likely representing progression of DDD.  I have recommended a combination of medications and epidural steroid injection.  PT was also highly encouraged as prescribed by Dr. Gao.  I also requested that she being her outside imaging for me to review.    10/26/20  - this is a 44-year-old female with right lower extremity lumbar radiculopathy which is significantly improved compared to the last visit, approximately 1 month ago.  Patient reports stopping all of her oral medications within the past 48 hr as her symptoms have improved drastically with physical therapy and home exercise.    : Reviewed and consistent with medication use as prescribed.    Treatment Plan:   Procedures: consider right TFESI @ L5-S1 in the future  PT/OT/HEP: Patient referred.  Medications: d/c all meds prescribed at last visit  Labs: reviewed and medications are appropriately dosed for current hepatorenal function.  Imaging: No additional recommended at this time.    Follow Up: RTC as needed    Taco Gaming Jr, MD  Interventional Pain Medicine / Anesthesiology    Disclaimer: This note was partly generated using dictation software which may occasionally result in transcription errors.

## 2020-10-26 ENCOUNTER — OFFICE VISIT (OUTPATIENT)
Dept: PAIN MEDICINE | Facility: CLINIC | Age: 45
End: 2020-10-26
Payer: COMMERCIAL

## 2020-10-26 VITALS
WEIGHT: 187.38 LBS | SYSTOLIC BLOOD PRESSURE: 117 MMHG | DIASTOLIC BLOOD PRESSURE: 81 MMHG | BODY MASS INDEX: 31.18 KG/M2 | HEART RATE: 89 BPM

## 2020-10-26 DIAGNOSIS — G89.29 CHRONIC RIGHT-SIDED LOW BACK PAIN WITH RIGHT-SIDED SCIATICA: Primary | ICD-10-CM

## 2020-10-26 DIAGNOSIS — M54.41 CHRONIC RIGHT-SIDED LOW BACK PAIN WITH RIGHT-SIDED SCIATICA: Primary | ICD-10-CM

## 2020-10-26 DIAGNOSIS — M51.36 BULGING LUMBAR DISC: ICD-10-CM

## 2020-10-26 PROCEDURE — 99999 PR PBB SHADOW E&M-EST. PATIENT-LVL III: ICD-10-PCS | Mod: PBBFAC,,, | Performed by: PAIN MEDICINE

## 2020-10-26 PROCEDURE — 99213 PR OFFICE/OUTPT VISIT, EST, LEVL III, 20-29 MIN: ICD-10-PCS | Mod: S$GLB,,, | Performed by: PAIN MEDICINE

## 2020-10-26 PROCEDURE — 3008F BODY MASS INDEX DOCD: CPT | Mod: CPTII,S$GLB,, | Performed by: PAIN MEDICINE

## 2020-10-26 PROCEDURE — 99999 PR PBB SHADOW E&M-EST. PATIENT-LVL III: CPT | Mod: PBBFAC,,, | Performed by: PAIN MEDICINE

## 2020-10-26 PROCEDURE — 3008F PR BODY MASS INDEX (BMI) DOCUMENTED: ICD-10-PCS | Mod: CPTII,S$GLB,, | Performed by: PAIN MEDICINE

## 2020-10-26 PROCEDURE — 99213 OFFICE O/P EST LOW 20 MIN: CPT | Mod: S$GLB,,, | Performed by: PAIN MEDICINE

## 2020-11-30 ENCOUNTER — CLINICAL SUPPORT (OUTPATIENT)
Dept: URGENT CARE | Facility: CLINIC | Age: 45
End: 2020-11-30
Payer: COMMERCIAL

## 2020-11-30 DIAGNOSIS — Z03.89 ENCOUNTER FOR OBSERVATION FOR OTHER SUSPECTED DISEASES AND CONDITIONS RULED OUT: Primary | ICD-10-CM

## 2020-11-30 LAB
CTP QC/QA: YES
SARS-COV-2 RDRP RESP QL NAA+PROBE: NEGATIVE

## 2020-11-30 PROCEDURE — 99211 PR OFFICE/OUTPT VISIT, EST, LEVL I: ICD-10-PCS | Mod: S$GLB,CS,, | Performed by: FAMILY MEDICINE

## 2020-11-30 PROCEDURE — U0002 COVID-19 LAB TEST NON-CDC: HCPCS | Mod: QW,S$GLB,, | Performed by: FAMILY MEDICINE

## 2020-11-30 PROCEDURE — 99211 OFF/OP EST MAY X REQ PHY/QHP: CPT | Mod: S$GLB,CS,, | Performed by: FAMILY MEDICINE

## 2020-11-30 PROCEDURE — U0002: ICD-10-PCS | Mod: QW,S$GLB,, | Performed by: FAMILY MEDICINE

## 2020-12-09 ENCOUNTER — CLINICAL SUPPORT (OUTPATIENT)
Dept: URGENT CARE | Facility: CLINIC | Age: 45
End: 2020-12-09
Payer: COMMERCIAL

## 2020-12-09 DIAGNOSIS — Z03.818 ENCOUNTER FOR OBSERVATION FOR SUSPECTED EXPOSURE TO OTHER BIOLOGICAL AGENTS RULED OUT: Primary | ICD-10-CM

## 2020-12-09 LAB
CTP QC/QA: YES
SARS-COV-2 RDRP RESP QL NAA+PROBE: NEGATIVE

## 2020-12-09 PROCEDURE — U0002: ICD-10-PCS | Mod: QW,S$GLB,, | Performed by: PHYSICIAN ASSISTANT

## 2020-12-09 PROCEDURE — U0002 COVID-19 LAB TEST NON-CDC: HCPCS | Mod: QW,S$GLB,, | Performed by: PHYSICIAN ASSISTANT

## 2020-12-26 ENCOUNTER — PATIENT MESSAGE (OUTPATIENT)
Dept: OBSTETRICS AND GYNECOLOGY | Facility: CLINIC | Age: 45
End: 2020-12-26

## 2020-12-28 NOTE — TELEPHONE ENCOUNTER
L/M to call me back.  Dr. Pena is out today, I would like to discuss the removal of her IUD with her, she stated her  is wanting a Vasectomy.

## 2021-02-23 ENCOUNTER — OFFICE VISIT (OUTPATIENT)
Dept: URGENT CARE | Facility: CLINIC | Age: 46
End: 2021-02-23
Payer: COMMERCIAL

## 2021-02-23 VITALS
HEART RATE: 80 BPM | SYSTOLIC BLOOD PRESSURE: 117 MMHG | DIASTOLIC BLOOD PRESSURE: 81 MMHG | HEIGHT: 65 IN | OXYGEN SATURATION: 98 % | WEIGHT: 187 LBS | BODY MASS INDEX: 31.16 KG/M2 | TEMPERATURE: 98 F

## 2021-02-23 DIAGNOSIS — J02.9 SORE THROAT: Primary | ICD-10-CM

## 2021-02-23 DIAGNOSIS — Z11.59 ENCOUNTER FOR SCREENING FOR OTHER VIRAL DISEASES: ICD-10-CM

## 2021-02-23 LAB
CTP QC/QA: YES
CTP QC/QA: YES
MOLECULAR STREP A: NEGATIVE
SARS-COV-2 RDRP RESP QL NAA+PROBE: NEGATIVE

## 2021-02-23 PROCEDURE — 3008F PR BODY MASS INDEX (BMI) DOCUMENTED: ICD-10-PCS | Mod: CPTII,S$GLB,, | Performed by: NURSE PRACTITIONER

## 2021-02-23 PROCEDURE — U0002: ICD-10-PCS | Mod: QW,S$GLB,, | Performed by: NURSE PRACTITIONER

## 2021-02-23 PROCEDURE — 3008F BODY MASS INDEX DOCD: CPT | Mod: CPTII,S$GLB,, | Performed by: NURSE PRACTITIONER

## 2021-02-23 PROCEDURE — U0002 COVID-19 LAB TEST NON-CDC: HCPCS | Mod: QW,S$GLB,, | Performed by: NURSE PRACTITIONER

## 2021-02-23 PROCEDURE — 87651 STREP A DNA AMP PROBE: CPT | Mod: QW,S$GLB,, | Performed by: NURSE PRACTITIONER

## 2021-02-23 PROCEDURE — 87651 POCT STREP A MOLECULAR: ICD-10-PCS | Mod: QW,S$GLB,, | Performed by: NURSE PRACTITIONER

## 2021-02-23 PROCEDURE — 99214 PR OFFICE/OUTPT VISIT, EST, LEVL IV, 30-39 MIN: ICD-10-PCS | Mod: S$GLB,,, | Performed by: NURSE PRACTITIONER

## 2021-02-23 PROCEDURE — 99214 OFFICE O/P EST MOD 30 MIN: CPT | Mod: S$GLB,,, | Performed by: NURSE PRACTITIONER

## 2021-02-23 RX ORDER — AZELASTINE 1 MG/ML
1 SPRAY, METERED NASAL 2 TIMES DAILY PRN
Qty: 30 ML | Refills: 0 | Status: SHIPPED | OUTPATIENT
Start: 2021-02-23 | End: 2023-09-15

## 2021-03-04 ENCOUNTER — IMMUNIZATION (OUTPATIENT)
Dept: INTERNAL MEDICINE | Facility: CLINIC | Age: 46
End: 2021-03-04
Payer: COMMERCIAL

## 2021-03-04 DIAGNOSIS — Z23 NEED FOR VACCINATION: Primary | ICD-10-CM

## 2021-03-04 PROCEDURE — 91300 COVID-19, MRNA, LNP-S, PF, 30 MCG/0.3 ML DOSE VACCINE: CPT | Mod: PBBFAC | Performed by: FAMILY MEDICINE

## 2021-03-25 ENCOUNTER — IMMUNIZATION (OUTPATIENT)
Dept: INTERNAL MEDICINE | Facility: CLINIC | Age: 46
End: 2021-03-25
Payer: COMMERCIAL

## 2021-03-25 DIAGNOSIS — Z23 NEED FOR VACCINATION: Primary | ICD-10-CM

## 2021-03-25 PROCEDURE — 0002A COVID-19, MRNA, LNP-S, PF, 30 MCG/0.3 ML DOSE VACCINE: CPT | Mod: PBBFAC | Performed by: FAMILY MEDICINE

## 2021-03-25 PROCEDURE — 91300 COVID-19, MRNA, LNP-S, PF, 30 MCG/0.3 ML DOSE VACCINE: CPT | Mod: PBBFAC | Performed by: FAMILY MEDICINE

## 2021-06-17 ENCOUNTER — OFFICE VISIT (OUTPATIENT)
Dept: PODIATRY | Facility: CLINIC | Age: 46
End: 2021-06-17
Payer: COMMERCIAL

## 2021-06-17 VITALS
HEIGHT: 65 IN | HEART RATE: 76 BPM | OXYGEN SATURATION: 98 % | DIASTOLIC BLOOD PRESSURE: 68 MMHG | WEIGHT: 172.5 LBS | BODY MASS INDEX: 28.74 KG/M2 | SYSTOLIC BLOOD PRESSURE: 116 MMHG

## 2021-06-17 DIAGNOSIS — M72.2 PLANTAR FASCIITIS: Primary | ICD-10-CM

## 2021-06-17 PROCEDURE — 99999 PR PBB SHADOW E&M-EST. PATIENT-LVL III: ICD-10-PCS | Mod: PBBFAC,,, | Performed by: PODIATRIST

## 2021-06-17 PROCEDURE — 20550 PR INJECT TENDON SHEATH/LIGAMENT: ICD-10-PCS | Mod: RT,S$GLB,, | Performed by: PODIATRIST

## 2021-06-17 PROCEDURE — 3008F BODY MASS INDEX DOCD: CPT | Mod: CPTII,S$GLB,, | Performed by: PODIATRIST

## 2021-06-17 PROCEDURE — 3008F PR BODY MASS INDEX (BMI) DOCUMENTED: ICD-10-PCS | Mod: CPTII,S$GLB,, | Performed by: PODIATRIST

## 2021-06-17 PROCEDURE — 1126F AMNT PAIN NOTED NONE PRSNT: CPT | Mod: S$GLB,,, | Performed by: PODIATRIST

## 2021-06-17 PROCEDURE — 20550 NJX 1 TENDON SHEATH/LIGAMENT: CPT | Mod: RT,S$GLB,, | Performed by: PODIATRIST

## 2021-06-17 PROCEDURE — 99999 PR PBB SHADOW E&M-EST. PATIENT-LVL III: CPT | Mod: PBBFAC,,, | Performed by: PODIATRIST

## 2021-06-17 PROCEDURE — 1126F PR PAIN SEVERITY QUANTIFIED, NO PAIN PRESENT: ICD-10-PCS | Mod: S$GLB,,, | Performed by: PODIATRIST

## 2021-06-17 PROCEDURE — 99213 OFFICE O/P EST LOW 20 MIN: CPT | Mod: 25,S$GLB,, | Performed by: PODIATRIST

## 2021-06-17 PROCEDURE — 99213 PR OFFICE/OUTPT VISIT, EST, LEVL III, 20-29 MIN: ICD-10-PCS | Mod: 25,S$GLB,, | Performed by: PODIATRIST

## 2021-06-17 RX ORDER — TRIAMCINOLONE ACETONIDE 40 MG/ML
40 INJECTION, SUSPENSION INTRA-ARTICULAR; INTRAMUSCULAR
Status: COMPLETED | OUTPATIENT
Start: 2021-06-17 | End: 2021-06-17

## 2021-06-17 RX ADMIN — TRIAMCINOLONE ACETONIDE 40 MG: 40 INJECTION, SUSPENSION INTRA-ARTICULAR; INTRAMUSCULAR at 09:06

## 2021-06-29 ENCOUNTER — PATIENT MESSAGE (OUTPATIENT)
Dept: SLEEP MEDICINE | Facility: CLINIC | Age: 46
End: 2021-06-29

## 2021-07-11 ENCOUNTER — PATIENT MESSAGE (OUTPATIENT)
Dept: PODIATRY | Facility: CLINIC | Age: 46
End: 2021-07-11

## 2021-10-26 ENCOUNTER — PATIENT MESSAGE (OUTPATIENT)
Dept: OBSTETRICS AND GYNECOLOGY | Facility: CLINIC | Age: 46
End: 2021-10-26
Payer: COMMERCIAL

## 2021-11-09 ENCOUNTER — LAB VISIT (OUTPATIENT)
Dept: LAB | Facility: HOSPITAL | Age: 46
End: 2021-11-09
Attending: INTERNAL MEDICINE
Payer: COMMERCIAL

## 2021-11-09 ENCOUNTER — OFFICE VISIT (OUTPATIENT)
Dept: ENDOCRINOLOGY | Facility: CLINIC | Age: 46
End: 2021-11-09
Payer: COMMERCIAL

## 2021-11-09 VITALS
HEIGHT: 65 IN | BODY MASS INDEX: 30.64 KG/M2 | HEART RATE: 81 BPM | DIASTOLIC BLOOD PRESSURE: 84 MMHG | WEIGHT: 183.88 LBS | SYSTOLIC BLOOD PRESSURE: 120 MMHG

## 2021-11-09 DIAGNOSIS — R73.09 ABNORMAL GLUCOSE: Primary | ICD-10-CM

## 2021-11-09 DIAGNOSIS — R53.83 FATIGUE, UNSPECIFIED TYPE: ICD-10-CM

## 2021-11-09 DIAGNOSIS — R73.09 ABNORMAL GLUCOSE: ICD-10-CM

## 2021-11-09 PROBLEM — M54.2 NECK PAIN: Status: RESOLVED | Noted: 2018-10-16 | Resolved: 2021-11-09

## 2021-11-09 LAB
25(OH)D3+25(OH)D2 SERPL-MCNC: 24 NG/ML (ref 30–96)
ALBUMIN SERPL BCP-MCNC: 4.2 G/DL (ref 3.5–5.2)
ALP SERPL-CCNC: 82 U/L (ref 55–135)
ALT SERPL W/O P-5'-P-CCNC: 42 U/L (ref 10–44)
ANION GAP SERPL CALC-SCNC: 6 MMOL/L (ref 8–16)
AST SERPL-CCNC: 22 U/L (ref 10–40)
BILIRUB SERPL-MCNC: 0.5 MG/DL (ref 0.1–1)
BUN SERPL-MCNC: 12 MG/DL (ref 6–20)
CALCIUM SERPL-MCNC: 9.8 MG/DL (ref 8.7–10.5)
CHLORIDE SERPL-SCNC: 107 MMOL/L (ref 95–110)
CHOLEST SERPL-MCNC: 166 MG/DL (ref 120–199)
CHOLEST/HDLC SERPL: 3.4 {RATIO} (ref 2–5)
CO2 SERPL-SCNC: 25 MMOL/L (ref 23–29)
CREAT SERPL-MCNC: 0.7 MG/DL (ref 0.5–1.4)
ERYTHROCYTE [DISTWIDTH] IN BLOOD BY AUTOMATED COUNT: 11.9 % (ref 11.5–14.5)
EST. GFR  (AFRICAN AMERICAN): >60 ML/MIN/1.73 M^2
EST. GFR  (NON AFRICAN AMERICAN): >60 ML/MIN/1.73 M^2
ESTIMATED AVG GLUCOSE: 114 MG/DL (ref 68–131)
FSH SERPL-ACNC: 9.03 MIU/ML
GLUCOSE SERPL-MCNC: 108 MG/DL (ref 70–110)
HBA1C MFR BLD: 5.6 % (ref 4–5.6)
HCT VFR BLD AUTO: 42.6 % (ref 37–48.5)
HDLC SERPL-MCNC: 49 MG/DL (ref 40–75)
HDLC SERPL: 29.5 % (ref 20–50)
HGB BLD-MCNC: 14.1 G/DL (ref 12–16)
INSULIN COLLECTION INTERVAL: NORMAL
INSULIN SERPL-ACNC: 20.8 UU/ML
LDLC SERPL CALC-MCNC: 101.8 MG/DL (ref 63–159)
MCH RBC QN AUTO: 29.1 PG (ref 27–31)
MCHC RBC AUTO-ENTMCNC: 33.1 G/DL (ref 32–36)
MCV RBC AUTO: 88 FL (ref 82–98)
NONHDLC SERPL-MCNC: 117 MG/DL
PLATELET # BLD AUTO: 273 K/UL (ref 150–450)
PMV BLD AUTO: 9.3 FL (ref 9.2–12.9)
POTASSIUM SERPL-SCNC: 4.2 MMOL/L (ref 3.5–5.1)
PROT SERPL-MCNC: 7.5 G/DL (ref 6–8.4)
RBC # BLD AUTO: 4.85 M/UL (ref 4–5.4)
SODIUM SERPL-SCNC: 138 MMOL/L (ref 136–145)
TRIGL SERPL-MCNC: 76 MG/DL (ref 30–150)
TSH SERPL DL<=0.005 MIU/L-ACNC: 1.61 UIU/ML (ref 0.4–4)
VIT B12 SERPL-MCNC: 407 PG/ML (ref 210–950)
WBC # BLD AUTO: 7.88 K/UL (ref 3.9–12.7)

## 2021-11-09 PROCEDURE — 99999 PR PBB SHADOW E&M-EST. PATIENT-LVL III: CPT | Mod: PBBFAC,,, | Performed by: INTERNAL MEDICINE

## 2021-11-09 PROCEDURE — 99204 PR OFFICE/OUTPT VISIT, NEW, LEVL IV, 45-59 MIN: ICD-10-PCS | Mod: S$GLB,,, | Performed by: INTERNAL MEDICINE

## 2021-11-09 PROCEDURE — 85027 COMPLETE CBC AUTOMATED: CPT | Performed by: INTERNAL MEDICINE

## 2021-11-09 PROCEDURE — 99999 PR PBB SHADOW E&M-EST. PATIENT-LVL III: ICD-10-PCS | Mod: PBBFAC,,, | Performed by: INTERNAL MEDICINE

## 2021-11-09 PROCEDURE — 83525 ASSAY OF INSULIN: CPT | Performed by: INTERNAL MEDICINE

## 2021-11-09 PROCEDURE — 3008F BODY MASS INDEX DOCD: CPT | Mod: CPTII,S$GLB,, | Performed by: INTERNAL MEDICINE

## 2021-11-09 PROCEDURE — 3008F PR BODY MASS INDEX (BMI) DOCUMENTED: ICD-10-PCS | Mod: CPTII,S$GLB,, | Performed by: INTERNAL MEDICINE

## 2021-11-09 PROCEDURE — 1160F PR REVIEW ALL MEDS BY PRESCRIBER/CLIN PHARMACIST DOCUMENTED: ICD-10-PCS | Mod: CPTII,S$GLB,, | Performed by: INTERNAL MEDICINE

## 2021-11-09 PROCEDURE — 3079F PR MOST RECENT DIASTOLIC BLOOD PRESSURE 80-89 MM HG: ICD-10-PCS | Mod: CPTII,S$GLB,, | Performed by: INTERNAL MEDICINE

## 2021-11-09 PROCEDURE — 83036 HEMOGLOBIN GLYCOSYLATED A1C: CPT | Performed by: INTERNAL MEDICINE

## 2021-11-09 PROCEDURE — 83001 ASSAY OF GONADOTROPIN (FSH): CPT | Performed by: INTERNAL MEDICINE

## 2021-11-09 PROCEDURE — 80053 COMPREHEN METABOLIC PANEL: CPT | Performed by: INTERNAL MEDICINE

## 2021-11-09 PROCEDURE — 3079F DIAST BP 80-89 MM HG: CPT | Mod: CPTII,S$GLB,, | Performed by: INTERNAL MEDICINE

## 2021-11-09 PROCEDURE — 3074F PR MOST RECENT SYSTOLIC BLOOD PRESSURE < 130 MM HG: ICD-10-PCS | Mod: CPTII,S$GLB,, | Performed by: INTERNAL MEDICINE

## 2021-11-09 PROCEDURE — 1159F PR MEDICATION LIST DOCUMENTED IN MEDICAL RECORD: ICD-10-PCS | Mod: CPTII,S$GLB,, | Performed by: INTERNAL MEDICINE

## 2021-11-09 PROCEDURE — 99204 OFFICE O/P NEW MOD 45 MIN: CPT | Mod: S$GLB,,, | Performed by: INTERNAL MEDICINE

## 2021-11-09 PROCEDURE — 80061 LIPID PANEL: CPT | Performed by: INTERNAL MEDICINE

## 2021-11-09 PROCEDURE — 3074F SYST BP LT 130 MM HG: CPT | Mod: CPTII,S$GLB,, | Performed by: INTERNAL MEDICINE

## 2021-11-09 PROCEDURE — 82306 VITAMIN D 25 HYDROXY: CPT | Performed by: INTERNAL MEDICINE

## 2021-11-09 PROCEDURE — 1159F MED LIST DOCD IN RCRD: CPT | Mod: CPTII,S$GLB,, | Performed by: INTERNAL MEDICINE

## 2021-11-09 PROCEDURE — 82607 VITAMIN B-12: CPT | Performed by: INTERNAL MEDICINE

## 2021-11-09 PROCEDURE — 84443 ASSAY THYROID STIM HORMONE: CPT | Performed by: INTERNAL MEDICINE

## 2021-11-09 PROCEDURE — 36415 COLL VENOUS BLD VENIPUNCTURE: CPT | Performed by: INTERNAL MEDICINE

## 2021-11-09 PROCEDURE — 1160F RVW MEDS BY RX/DR IN RCRD: CPT | Mod: CPTII,S$GLB,, | Performed by: INTERNAL MEDICINE

## 2021-11-16 ENCOUNTER — LAB VISIT (OUTPATIENT)
Dept: LAB | Facility: HOSPITAL | Age: 46
End: 2021-11-16
Attending: HOSPITALIST
Payer: COMMERCIAL

## 2021-11-16 ENCOUNTER — OFFICE VISIT (OUTPATIENT)
Dept: NEPHROLOGY | Facility: CLINIC | Age: 46
End: 2021-11-16
Payer: COMMERCIAL

## 2021-11-16 VITALS
DIASTOLIC BLOOD PRESSURE: 70 MMHG | HEIGHT: 65 IN | HEART RATE: 98 BPM | SYSTOLIC BLOOD PRESSURE: 130 MMHG | WEIGHT: 184.06 LBS | BODY MASS INDEX: 30.67 KG/M2 | OXYGEN SATURATION: 99 %

## 2021-11-16 DIAGNOSIS — R53.83 FATIGUE, UNSPECIFIED TYPE: ICD-10-CM

## 2021-11-16 DIAGNOSIS — N20.0 KIDNEY STONES: Primary | ICD-10-CM

## 2021-11-16 DIAGNOSIS — N20.0 KIDNEY STONES: ICD-10-CM

## 2021-11-16 LAB
25(OH)D3+25(OH)D2 SERPL-MCNC: 24 NG/ML (ref 30–96)
PTH-INTACT SERPL-MCNC: 65.2 PG/ML (ref 9–77)
URATE SERPL-MCNC: 5.2 MG/DL (ref 2.4–5.7)

## 2021-11-16 PROCEDURE — 84550 ASSAY OF BLOOD/URIC ACID: CPT | Performed by: HOSPITALIST

## 2021-11-16 PROCEDURE — 1159F PR MEDICATION LIST DOCUMENTED IN MEDICAL RECORD: ICD-10-PCS | Mod: CPTII,S$GLB,, | Performed by: HOSPITALIST

## 2021-11-16 PROCEDURE — 83970 ASSAY OF PARATHORMONE: CPT | Performed by: HOSPITALIST

## 2021-11-16 PROCEDURE — 99204 PR OFFICE/OUTPT VISIT, NEW, LEVL IV, 45-59 MIN: ICD-10-PCS | Mod: S$GLB,,, | Performed by: HOSPITALIST

## 2021-11-16 PROCEDURE — 3075F SYST BP GE 130 - 139MM HG: CPT | Mod: CPTII,S$GLB,, | Performed by: HOSPITALIST

## 2021-11-16 PROCEDURE — 3078F PR MOST RECENT DIASTOLIC BLOOD PRESSURE < 80 MM HG: ICD-10-PCS | Mod: CPTII,S$GLB,, | Performed by: HOSPITALIST

## 2021-11-16 PROCEDURE — 1159F MED LIST DOCD IN RCRD: CPT | Mod: CPTII,S$GLB,, | Performed by: HOSPITALIST

## 2021-11-16 PROCEDURE — 3008F BODY MASS INDEX DOCD: CPT | Mod: CPTII,S$GLB,, | Performed by: HOSPITALIST

## 2021-11-16 PROCEDURE — 3075F PR MOST RECENT SYSTOLIC BLOOD PRESS GE 130-139MM HG: ICD-10-PCS | Mod: CPTII,S$GLB,, | Performed by: HOSPITALIST

## 2021-11-16 PROCEDURE — 82306 VITAMIN D 25 HYDROXY: CPT | Performed by: HOSPITALIST

## 2021-11-16 PROCEDURE — 3066F PR DOCUMENTATION OF TREATMENT FOR NEPHROPATHY: ICD-10-PCS | Mod: CPTII,S$GLB,, | Performed by: HOSPITALIST

## 2021-11-16 PROCEDURE — 99999 PR PBB SHADOW E&M-EST. PATIENT-LVL III: ICD-10-PCS | Mod: PBBFAC,,, | Performed by: HOSPITALIST

## 2021-11-16 PROCEDURE — 3066F NEPHROPATHY DOC TX: CPT | Mod: CPTII,S$GLB,, | Performed by: HOSPITALIST

## 2021-11-16 PROCEDURE — 3044F HG A1C LEVEL LT 7.0%: CPT | Mod: CPTII,S$GLB,, | Performed by: HOSPITALIST

## 2021-11-16 PROCEDURE — 3078F DIAST BP <80 MM HG: CPT | Mod: CPTII,S$GLB,, | Performed by: HOSPITALIST

## 2021-11-16 PROCEDURE — 3044F PR MOST RECENT HEMOGLOBIN A1C LEVEL <7.0%: ICD-10-PCS | Mod: CPTII,S$GLB,, | Performed by: HOSPITALIST

## 2021-11-16 PROCEDURE — 3008F PR BODY MASS INDEX (BMI) DOCUMENTED: ICD-10-PCS | Mod: CPTII,S$GLB,, | Performed by: HOSPITALIST

## 2021-11-16 PROCEDURE — 99204 OFFICE O/P NEW MOD 45 MIN: CPT | Mod: S$GLB,,, | Performed by: HOSPITALIST

## 2021-11-16 PROCEDURE — 99999 PR PBB SHADOW E&M-EST. PATIENT-LVL III: CPT | Mod: PBBFAC,,, | Performed by: HOSPITALIST

## 2021-11-16 PROCEDURE — 82652 VIT D 1 25-DIHYDROXY: CPT | Performed by: HOSPITALIST

## 2021-11-19 LAB — 1,25(OH)2D3 SERPL-MCNC: 99 PG/ML (ref 20–79)

## 2021-11-29 ENCOUNTER — HOSPITAL ENCOUNTER (OUTPATIENT)
Dept: RADIOLOGY | Facility: HOSPITAL | Age: 46
Discharge: HOME | End: 2021-11-29
Attending: HOSPITALIST
Payer: COMMERCIAL

## 2021-11-29 DIAGNOSIS — N20.0 KIDNEY STONES: ICD-10-CM

## 2021-11-29 PROCEDURE — 76770 US RETROPERITONEAL COMPLETE: ICD-10-PCS | Mod: 26,,, | Performed by: INTERNAL MEDICINE

## 2021-11-29 PROCEDURE — 76770 US EXAM ABDO BACK WALL COMP: CPT | Mod: TC

## 2021-11-29 PROCEDURE — 76770 US EXAM ABDO BACK WALL COMP: CPT | Mod: 26,,, | Performed by: INTERNAL MEDICINE

## 2021-12-13 ENCOUNTER — LAB VISIT (OUTPATIENT)
Dept: LAB | Facility: HOSPITAL | Age: 46
End: 2021-12-13
Attending: HOSPITALIST
Payer: COMMERCIAL

## 2021-12-13 DIAGNOSIS — N20.0 KIDNEY STONES: ICD-10-CM

## 2021-12-13 PROCEDURE — 82507 ASSAY OF CITRATE: CPT | Mod: 91 | Performed by: HOSPITALIST

## 2021-12-13 PROCEDURE — 82570 ASSAY OF URINE CREATININE: CPT | Performed by: HOSPITALIST

## 2021-12-17 LAB
AMMONIA 24H UR-SRATE: 17 MMOL/24 H (ref 15–56)
CA H2 PHOS DIHYD 24H SATFR UR: ABNORMAL
CALCIUM 24H UR-MRATE: 124 MG/24 H
CAOX 24H ENGDIFF UR: ABNORMAL
CHLORIDE 24H UR-SRATE: 67 MMOL/24 H
CITRATE 24H UR-MRATE: ABNORMAL MG/(24.H)
CLINICAL BIOCHEMIST REVIEW: ABNORMAL
COLLECT DURATION TIME UR: 24 H
CREAT 24H UR-MRATE: 800 MG/24 H (ref 603–1783)
HYDROXYAPATITE 24H ENGDIFF UR: ABNORMAL
MAGNESIUM 24H UR-MRATE: <16 MG/24 H (ref 51–269)
NA URATE 24H SATFR UR: 2.17 DG
OSMOLALITY 24H UR: 576 MOSM/KG (ref 150–1150)
OXALATE 24H UR-MRATE: 9.7 MG/24 H (ref 9.7–40.5)
OXALATE 24H UR-SRATE: 0.11 MMOL/24 H (ref 0.11–0.46)
PH 24H UR: 6 [PH] (ref 4.5–8)
PHOSPHATE 24H UR-MRATE: 553 MG/24 H (ref 226–1797)
POTASSIUM 24H UR-SRATE: 26 MMOL/24 H (ref 16–105)
PROTEIN CATABOLIC RATE, URINE: 58 G/24 H (ref 56–125)
SODIUM 24H UR-SRATE: 88 MMOL/24 H (ref 22–328)
SPECIMEN VOL 24H UR: 825 L
SULFATE 24H UR-SRATE: 9 MMOL/24 H (ref 7–47)
URATE 24H SATFR UR: 1.92 DG
URATE 24H UR-MRATE: 446 MG/24 H (ref 250–750)
UUN 24H UR-MRATE: 5.3 G/24 H (ref 7–42)

## 2021-12-19 LAB
CITRATE 24H UR-MRATE: 503 MG/24 H (ref 100–1300)
CITRATE/CREAT 24H UR: 642 MG/G CREAT (ref 180–1070)
CREAT 24H UR-MRATE: 0.78 G/24 H (ref 0.5–2.15)
SPECIMEN VOL 24H UR: 825 ML

## 2022-01-10 ENCOUNTER — PATIENT MESSAGE (OUTPATIENT)
Dept: SLEEP MEDICINE | Facility: CLINIC | Age: 47
End: 2022-01-10
Payer: COMMERCIAL

## 2022-01-11 ENCOUNTER — PATIENT MESSAGE (OUTPATIENT)
Dept: NEPHROLOGY | Facility: CLINIC | Age: 47
End: 2022-01-11
Payer: COMMERCIAL

## 2022-01-13 DIAGNOSIS — N39.0 UTI (URINARY TRACT INFECTION), UNCOMPLICATED: ICD-10-CM

## 2022-01-13 DIAGNOSIS — N20.0 KIDNEY STONES: Primary | ICD-10-CM

## 2022-01-13 RX ORDER — POTASSIUM CITRATE 5 MEQ/1
5 TABLET, EXTENDED RELEASE ORAL 2 TIMES DAILY WITH MEALS
Qty: 60 TABLET | Refills: 11 | Status: SHIPPED | OUTPATIENT
Start: 2022-01-13 | End: 2023-02-07 | Stop reason: CLARIF

## 2022-01-13 RX ORDER — TAMSULOSIN HYDROCHLORIDE 0.4 MG/1
0.4 CAPSULE ORAL DAILY
Qty: 30 CAPSULE | Refills: 11 | Status: SHIPPED | OUTPATIENT
Start: 2022-01-13 | End: 2023-01-13

## 2022-01-17 ENCOUNTER — PATIENT MESSAGE (OUTPATIENT)
Dept: OBSTETRICS AND GYNECOLOGY | Facility: CLINIC | Age: 47
End: 2022-01-17
Payer: COMMERCIAL

## 2022-01-21 ENCOUNTER — PATIENT MESSAGE (OUTPATIENT)
Dept: ENDOCRINOLOGY | Facility: CLINIC | Age: 47
End: 2022-01-21
Payer: COMMERCIAL

## 2022-02-02 ENCOUNTER — PATIENT MESSAGE (OUTPATIENT)
Dept: SLEEP MEDICINE | Facility: CLINIC | Age: 47
End: 2022-02-02
Payer: COMMERCIAL

## 2022-02-08 ENCOUNTER — TELEPHONE (OUTPATIENT)
Dept: SLEEP MEDICINE | Facility: CLINIC | Age: 47
End: 2022-02-08
Payer: COMMERCIAL

## 2022-02-09 ENCOUNTER — OFFICE VISIT (OUTPATIENT)
Dept: SLEEP MEDICINE | Facility: CLINIC | Age: 47
End: 2022-02-09
Payer: COMMERCIAL

## 2022-02-09 ENCOUNTER — PATIENT MESSAGE (OUTPATIENT)
Dept: SLEEP MEDICINE | Facility: CLINIC | Age: 47
End: 2022-02-09

## 2022-02-09 DIAGNOSIS — G47.10 HYPERSOMNOLENCE: ICD-10-CM

## 2022-02-09 DIAGNOSIS — F51.09 OTHER INSOMNIA NOT DUE TO A SUBSTANCE OR KNOWN PHYSIOLOGICAL CONDITION: ICD-10-CM

## 2022-02-09 DIAGNOSIS — G47.33 OSA (OBSTRUCTIVE SLEEP APNEA): Primary | ICD-10-CM

## 2022-02-09 PROCEDURE — 99213 PR OFFICE/OUTPT VISIT, EST, LEVL III, 20-29 MIN: ICD-10-PCS | Mod: 95,,, | Performed by: INTERNAL MEDICINE

## 2022-02-09 PROCEDURE — 99213 OFFICE O/P EST LOW 20 MIN: CPT | Mod: 95,,, | Performed by: INTERNAL MEDICINE

## 2022-02-09 NOTE — PROGRESS NOTES
ESTABLISHED PATIENT VISIT (LOV 3/26/2020 NP Gaurav: )    Kim Padilla  is a pleasant 46 y.o. female  with history of PMH:  BMI 30,  ANNA dx 2020 (AHI 9.9, CPAP =7, HME) who presents for CPAP follow-up    Here today for: CPAP follow-up    Plan last visit :      Since last visit:   Her cheney machine is under recall  Has stopped using, very sleepy during the day without it      PAP history   Problems    Mask Nasal mask   Pressure 7   Benefit Sleeps better   DME HME   Machine age    Download        SLEEP SCHEDULE   Bed Time 8:30-9P   Sleep Latency 30 min   Arousals 6-7   Nocturia once   Back to sleep 30-45min   Wake time 4:30-5 (7:30-8:40)   Naps once   Work            There were no vitals filed for this visit.  Physical Exam:    GEN:   Well-appearing  Psych:  Appropriate affect, demonstrates insight  SKIN:  No rash on the face or bridge of the nose    LABS:  Lab Results   Component Value Date    HGB 14.1 11/09/2021    CO2 25 11/09/2021         RECORDS REVIEWED PREVIOUSLY:      PSG 1.17.2020: AHI 9.9      PROBLEM DESCRIPTION/ Sx on Presentation Interval Hx STATUS   ANNA   Mild by AHI   Not using due to recall uncontrolled   Daytime Sx   Nasal mask  ESS 21/24  Was better with CPAP Worse since her CPAP recalled uncontrolled   Comorbidities:     PLAN     -will give her prescription (auto 6-10) for an airmini to purchase online  -using and benefitting from PAP therapy when her machine is functioning  -- we discussed [employer] recall of CPAPs/BIPAPs due to potentially dangerous particles or gas that could come from a foam insert and their recommendation that the patient stop using the machine until replaced.    more information at [employer] website : https://www.[employer]/healthcare/e/sleep/communications/src-update  --we discussed the risk of continued CPAP usage including respiratory illness and cancer versus the risks of untreated obstructive sleep apnea with its increased risks of heart attack, stroke  and heart failure  --discussed registering the machine for recall    RTC

## 2022-02-21 ENCOUNTER — OFFICE VISIT (OUTPATIENT)
Dept: OBSTETRICS AND GYNECOLOGY | Facility: CLINIC | Age: 47
End: 2022-02-21
Attending: OBSTETRICS & GYNECOLOGY
Payer: COMMERCIAL

## 2022-02-21 VITALS
BODY MASS INDEX: 31.29 KG/M2 | HEIGHT: 65 IN | DIASTOLIC BLOOD PRESSURE: 88 MMHG | SYSTOLIC BLOOD PRESSURE: 112 MMHG | WEIGHT: 187.81 LBS

## 2022-02-21 DIAGNOSIS — Z11.51 SCREENING FOR HPV (HUMAN PAPILLOMAVIRUS): ICD-10-CM

## 2022-02-21 DIAGNOSIS — N92.0 MENORRHAGIA WITH REGULAR CYCLE: ICD-10-CM

## 2022-02-21 DIAGNOSIS — Z01.419 ENCOUNTER FOR GYNECOLOGICAL EXAMINATION: Primary | ICD-10-CM

## 2022-02-21 DIAGNOSIS — Z12.4 SCREENING FOR CERVICAL CANCER: ICD-10-CM

## 2022-02-21 PROCEDURE — 88175 CYTOPATH C/V AUTO FLUID REDO: CPT | Performed by: OBSTETRICS & GYNECOLOGY

## 2022-02-21 PROCEDURE — 99396 PREV VISIT EST AGE 40-64: CPT | Mod: S$GLB,,, | Performed by: OBSTETRICS & GYNECOLOGY

## 2022-02-21 PROCEDURE — 3008F BODY MASS INDEX DOCD: CPT | Mod: CPTII,S$GLB,, | Performed by: OBSTETRICS & GYNECOLOGY

## 2022-02-21 PROCEDURE — 1159F MED LIST DOCD IN RCRD: CPT | Mod: CPTII,S$GLB,, | Performed by: OBSTETRICS & GYNECOLOGY

## 2022-02-21 PROCEDURE — 1159F PR MEDICATION LIST DOCUMENTED IN MEDICAL RECORD: ICD-10-PCS | Mod: CPTII,S$GLB,, | Performed by: OBSTETRICS & GYNECOLOGY

## 2022-02-21 PROCEDURE — 99396 PR PREVENTIVE VISIT,EST,40-64: ICD-10-PCS | Mod: S$GLB,,, | Performed by: OBSTETRICS & GYNECOLOGY

## 2022-02-21 PROCEDURE — 3079F PR MOST RECENT DIASTOLIC BLOOD PRESSURE 80-89 MM HG: ICD-10-PCS | Mod: CPTII,S$GLB,, | Performed by: OBSTETRICS & GYNECOLOGY

## 2022-02-21 PROCEDURE — 1160F PR REVIEW ALL MEDS BY PRESCRIBER/CLIN PHARMACIST DOCUMENTED: ICD-10-PCS | Mod: CPTII,S$GLB,, | Performed by: OBSTETRICS & GYNECOLOGY

## 2022-02-21 PROCEDURE — 1160F RVW MEDS BY RX/DR IN RCRD: CPT | Mod: CPTII,S$GLB,, | Performed by: OBSTETRICS & GYNECOLOGY

## 2022-02-21 PROCEDURE — 3074F PR MOST RECENT SYSTOLIC BLOOD PRESSURE < 130 MM HG: ICD-10-PCS | Mod: CPTII,S$GLB,, | Performed by: OBSTETRICS & GYNECOLOGY

## 2022-02-21 PROCEDURE — 3079F DIAST BP 80-89 MM HG: CPT | Mod: CPTII,S$GLB,, | Performed by: OBSTETRICS & GYNECOLOGY

## 2022-02-21 PROCEDURE — 99999 PR PBB SHADOW E&M-EST. PATIENT-LVL III: CPT | Mod: PBBFAC,,, | Performed by: OBSTETRICS & GYNECOLOGY

## 2022-02-21 PROCEDURE — 3008F PR BODY MASS INDEX (BMI) DOCUMENTED: ICD-10-PCS | Mod: CPTII,S$GLB,, | Performed by: OBSTETRICS & GYNECOLOGY

## 2022-02-21 PROCEDURE — 87624 HPV HI-RISK TYP POOLED RSLT: CPT | Performed by: OBSTETRICS & GYNECOLOGY

## 2022-02-21 PROCEDURE — 99999 PR PBB SHADOW E&M-EST. PATIENT-LVL III: ICD-10-PCS | Mod: PBBFAC,,, | Performed by: OBSTETRICS & GYNECOLOGY

## 2022-02-21 PROCEDURE — 3074F SYST BP LT 130 MM HG: CPT | Mod: CPTII,S$GLB,, | Performed by: OBSTETRICS & GYNECOLOGY

## 2022-02-21 RX ORDER — ALPRAZOLAM 0.5 MG/1
0.5 TABLET ORAL NIGHTLY PRN
Qty: 30 TABLET | Refills: 0 | Status: SHIPPED | OUTPATIENT
Start: 2022-02-21 | End: 2022-09-19 | Stop reason: SDUPTHER

## 2022-02-21 NOTE — PROGRESS NOTES
"Subjective:       Patient ID: Kim Padilla is a 46 y.o. female.    Chief Complaint:  Annual Exam (Last pap/hpv: 6/2018 Normal; EJ follows [facility])      History of Present Illness  - here for annual.   - patient reports daily life stress as well as situational anxiety (!). Takes Xanax to help her brain shut down so she can sleep well. Can't even remember last time she took it. Would like a refill to have just in case. Is seeing a therapist.  - had benign breast MRI biopsy.  - very concerned about weight gain and inability to lose weight. Tried [employer] and [employer]. Lost weight every time she tried something new but always gained it back plus more! Frustrated.  - needs a PCP.    Past Medical History:   Diagnosis Date    Infertility, female        Past Surgical History:   Procedure Laterality Date    BREAST LUMPECTOMY Left 2014    Dr Brown - benign    DILATION AND CURETTAGE OF UTERUS      for SAB        Family History   Problem Relation Age of Onset    Breast cancer Maternal Cousin     Colon cancer Neg Hx     Ovarian cancer Neg Hx         Social History     Socioeconomic History    Marital status:    Tobacco Use    Smoking status: Never Smoker    Smokeless tobacco: Never Used   Substance and Sexual Activity    Alcohol use: Not Currently     Comment: rarely    Drug use: No    Sexual activity: Yes     Partners: Male     Birth control/protection: I.U.D.     Comment: mirena            Objective:     Vitals:    02/21/22 1309   BP: 112/88   Weight: 85.2 kg (187 lb 13.3 oz)   Height: 5' 5" (1.651 m)       Physical Exam:   Constitutional: She is oriented to person, place, and time. She appears well-developed and well-nourished.        Pulmonary/Chest: Right breast exhibits no mass, no nipple discharge, no skin change, no tenderness and no swelling. Left breast exhibits no mass, no nipple discharge, no skin change, no tenderness and no swelling. Breasts are symmetrical.        Abdominal: " Soft. She exhibits no distension. There is no abdominal tenderness.     Genitourinary:    Vagina and uterus normal.   There is no tenderness or lesion on the right labia. There is no tenderness or lesion on the left labia. Cervix is normal. Right adnexum displays no mass, no tenderness and no fullness. Left adnexum displays no mass, no tenderness and no fullness. No  no vaginal discharge in the vagina.    Genitourinary Comments: Pap done             Musculoskeletal: Moves all extremeties.       Neurological: She is alert and oriented to person, place, and time.     Psychiatric: She has a normal mood and affect.        Assessment/ Plan:     Orders Placed This Encounter    HPV High Risk Genotypes, PCR    Liquid-Based Pap Smear, Screening    ALPRAZolam (XANAX) 0.5 MG tablet       Kim was seen today for annual exam.    Diagnoses and all orders for this visit:    Encounter for gynecological examination    Screening for cervical cancer  -     Liquid-Based Pap Smear, Screening    Screening for HPV (human papillomavirus)  -     HPV High Risk Genotypes, PCR    Menorrhagia with regular cycle  -     ALPRAZolam (XANAX) 0.5 MG tablet; Take 1 tablet (0.5 mg total) by mouth nightly as needed for Insomnia or Anxiety.    - gave Dr. Ragland's name for primary care  - discussed changing lifestyle rather than trying to do something so restrictive. Discussed WW and encouraged patient to try initially to log in what she eats normally so she can see where she may have issues. Discussed the support and articles that go along with the program. She'll keep me posted.  - sent in Xanax Rx. Discussed considering a daily medication if starts feeling like needs to have something daily to treat anxiety.      Follow up in about 1 year (around 2/21/2023) for annual exam.    As of April 1, 2021, the Cures Act has been passed nationally. This new law requires that all doctors progress notes, lab results, pathology reports and radiology reports be  released IMMEDIATELY to the patient in the patient portal. That means that the results are released to you at the EXACT same time they are released to me. Therefore, with all of the patients that I have I am not able to reply to each patient exactly when the results come in. So there will be a delay from when you see the results to when I see them and have time to come up with a response to send you. Also I only see these results when I am on the computer at work. So if the results come in over the weekend or after 5 pm of a work day, I will not see them until the next business day. As you can tell, this is a challenge as a physician to give every patient the quick response they hope for and deserve. So please be patient!   Thanks for your understanding and patience.

## 2022-02-24 LAB
HPV HR 12 DNA SPEC QL NAA+PROBE: NEGATIVE
HPV16 AG SPEC QL: NEGATIVE
HPV18 DNA SPEC QL NAA+PROBE: NEGATIVE

## 2022-02-28 LAB
FINAL PATHOLOGIC DIAGNOSIS: NORMAL
Lab: NORMAL

## 2022-03-18 DIAGNOSIS — N20.0 KIDNEY STONES: Primary | ICD-10-CM

## 2022-03-30 ENCOUNTER — HOSPITAL ENCOUNTER (OUTPATIENT)
Dept: ENDOCRINOLOGY | Facility: CLINIC | Age: 47
Discharge: HOME | End: 2022-03-30
Attending: INTERNAL MEDICINE
Payer: COMMERCIAL

## 2022-03-30 DIAGNOSIS — R73.09 ABNORMAL GLUCOSE: ICD-10-CM

## 2022-03-30 DIAGNOSIS — R53.83 FATIGUE, UNSPECIFIED TYPE: ICD-10-CM

## 2022-03-30 PROCEDURE — 76536 US SOFT TISSUE HEAD NECK THYROID: ICD-10-PCS | Mod: S$GLB,,, | Performed by: INTERNAL MEDICINE

## 2022-03-30 PROCEDURE — 76536 US EXAM OF HEAD AND NECK: CPT | Mod: S$GLB,,, | Performed by: INTERNAL MEDICINE

## 2022-04-04 ENCOUNTER — PATIENT MESSAGE (OUTPATIENT)
Dept: OBSTETRICS AND GYNECOLOGY | Facility: CLINIC | Age: 47
End: 2022-04-04
Payer: COMMERCIAL

## 2022-05-05 ENCOUNTER — TELEPHONE (OUTPATIENT)
Dept: INTERNAL MEDICINE | Facility: CLINIC | Age: 47
End: 2022-05-05
Payer: COMMERCIAL

## 2022-05-16 ENCOUNTER — PATIENT OUTREACH (OUTPATIENT)
Dept: ADMINISTRATIVE | Facility: HOSPITAL | Age: 47
End: 2022-05-16
Payer: COMMERCIAL

## 2022-05-16 ENCOUNTER — PATIENT MESSAGE (OUTPATIENT)
Dept: ADMINISTRATIVE | Facility: HOSPITAL | Age: 47
End: 2022-05-16
Payer: COMMERCIAL

## 2022-05-19 ENCOUNTER — TELEPHONE (OUTPATIENT)
Dept: INTERNAL MEDICINE | Facility: CLINIC | Age: 47
End: 2022-05-19
Payer: COMMERCIAL

## 2022-06-06 ENCOUNTER — OFFICE VISIT (OUTPATIENT)
Dept: INTERNAL MEDICINE | Facility: CLINIC | Age: 47
End: 2022-06-06
Payer: COMMERCIAL

## 2022-06-06 ENCOUNTER — LAB VISIT (OUTPATIENT)
Dept: LAB | Facility: OTHER | Age: 47
End: 2022-06-06
Attending: STUDENT IN AN ORGANIZED HEALTH CARE EDUCATION/TRAINING PROGRAM
Payer: COMMERCIAL

## 2022-06-06 VITALS
SYSTOLIC BLOOD PRESSURE: 123 MMHG | WEIGHT: 193.13 LBS | OXYGEN SATURATION: 99 % | HEART RATE: 87 BPM | DIASTOLIC BLOOD PRESSURE: 76 MMHG | BODY MASS INDEX: 32.14 KG/M2

## 2022-06-06 DIAGNOSIS — R53.83 FATIGUE, UNSPECIFIED TYPE: ICD-10-CM

## 2022-06-06 DIAGNOSIS — Z12.12 SCREENING FOR COLORECTAL CANCER: ICD-10-CM

## 2022-06-06 DIAGNOSIS — Z00.00 HEALTH MAINTENANCE EXAMINATION: ICD-10-CM

## 2022-06-06 DIAGNOSIS — Z11.4 SCREENING FOR HIV WITHOUT PRESENCE OF RISK FACTORS: ICD-10-CM

## 2022-06-06 DIAGNOSIS — R73.03 PREDIABETES: Primary | ICD-10-CM

## 2022-06-06 DIAGNOSIS — Z11.59 ENCOUNTER FOR HEPATITIS C SCREENING TEST FOR LOW RISK PATIENT: ICD-10-CM

## 2022-06-06 DIAGNOSIS — Z23 NEED FOR VACCINATION: ICD-10-CM

## 2022-06-06 DIAGNOSIS — Z12.11 SCREENING FOR COLORECTAL CANCER: ICD-10-CM

## 2022-06-06 LAB
ALBUMIN SERPL BCP-MCNC: 4.1 G/DL (ref 3.5–5.2)
ALP SERPL-CCNC: 72 U/L (ref 55–135)
ALT SERPL W/O P-5'-P-CCNC: 67 U/L (ref 10–44)
ANION GAP SERPL CALC-SCNC: 12 MMOL/L (ref 8–16)
AST SERPL-CCNC: 41 U/L (ref 10–40)
BASOPHILS # BLD AUTO: 0.06 K/UL (ref 0–0.2)
BASOPHILS NFR BLD: 0.5 % (ref 0–1.9)
BILIRUB SERPL-MCNC: 0.8 MG/DL (ref 0.1–1)
BUN SERPL-MCNC: 11 MG/DL (ref 6–20)
CALCIUM SERPL-MCNC: 9.8 MG/DL (ref 8.7–10.5)
CHLORIDE SERPL-SCNC: 106 MMOL/L (ref 95–110)
CO2 SERPL-SCNC: 23 MMOL/L (ref 23–29)
CREAT SERPL-MCNC: 0.8 MG/DL (ref 0.5–1.4)
DIFFERENTIAL METHOD: NORMAL
EOSINOPHIL # BLD AUTO: 0.3 K/UL (ref 0–0.5)
EOSINOPHIL NFR BLD: 2.1 % (ref 0–8)
ERYTHROCYTE [DISTWIDTH] IN BLOOD BY AUTOMATED COUNT: 12.1 % (ref 11.5–14.5)
EST. GFR  (AFRICAN AMERICAN): >60 ML/MIN/1.73 M^2
EST. GFR  (NON AFRICAN AMERICAN): >60 ML/MIN/1.73 M^2
FOLATE SERPL-MCNC: 7.5 NG/ML (ref 4–24)
GLUCOSE SERPL-MCNC: 78 MG/DL (ref 70–110)
HCT VFR BLD AUTO: 41.5 % (ref 37–48.5)
HCYS SERPL-SCNC: 5.9 UMOL/L (ref 4–15.5)
HGB BLD-MCNC: 14.4 G/DL (ref 12–16)
IMM GRANULOCYTES # BLD AUTO: 0.04 K/UL (ref 0–0.04)
IMM GRANULOCYTES NFR BLD AUTO: 0.3 % (ref 0–0.5)
LYMPHOCYTES # BLD AUTO: 3.8 K/UL (ref 1–4.8)
LYMPHOCYTES NFR BLD: 29.6 % (ref 18–48)
MCH RBC QN AUTO: 30.1 PG (ref 27–31)
MCHC RBC AUTO-ENTMCNC: 34.7 G/DL (ref 32–36)
MCV RBC AUTO: 87 FL (ref 82–98)
MONOCYTES # BLD AUTO: 1 K/UL (ref 0.3–1)
MONOCYTES NFR BLD: 7.6 % (ref 4–15)
NEUTROPHILS # BLD AUTO: 7.6 K/UL (ref 1.8–7.7)
NEUTROPHILS NFR BLD: 59.9 % (ref 38–73)
NRBC BLD-RTO: 0 /100 WBC
PLATELET # BLD AUTO: 293 K/UL (ref 150–450)
PMV BLD AUTO: 9.8 FL (ref 9.2–12.9)
POTASSIUM SERPL-SCNC: 3.8 MMOL/L (ref 3.5–5.1)
PROT SERPL-MCNC: 7.3 G/DL (ref 6–8.4)
RBC # BLD AUTO: 4.79 M/UL (ref 4–5.4)
SODIUM SERPL-SCNC: 141 MMOL/L (ref 136–145)
TSH SERPL DL<=0.005 MIU/L-ACNC: 1.46 UIU/ML (ref 0.4–4)
VIT B12 SERPL-MCNC: 497 PG/ML (ref 210–950)
WBC # BLD AUTO: 12.66 K/UL (ref 3.9–12.7)

## 2022-06-06 PROCEDURE — 90715 TDAP VACCINE GREATER THAN OR EQUAL TO 7YO IM: ICD-10-PCS | Mod: S$GLB,,, | Performed by: STUDENT IN AN ORGANIZED HEALTH CARE EDUCATION/TRAINING PROGRAM

## 2022-06-06 PROCEDURE — 1159F MED LIST DOCD IN RCRD: CPT | Mod: CPTII,S$GLB,, | Performed by: STUDENT IN AN ORGANIZED HEALTH CARE EDUCATION/TRAINING PROGRAM

## 2022-06-06 PROCEDURE — 99999 PR PBB SHADOW E&M-EST. PATIENT-LVL IV: CPT | Mod: PBBFAC,,, | Performed by: STUDENT IN AN ORGANIZED HEALTH CARE EDUCATION/TRAINING PROGRAM

## 2022-06-06 PROCEDURE — 3078F DIAST BP <80 MM HG: CPT | Mod: CPTII,S$GLB,, | Performed by: STUDENT IN AN ORGANIZED HEALTH CARE EDUCATION/TRAINING PROGRAM

## 2022-06-06 PROCEDURE — 90471 IMMUNIZATION ADMIN: CPT | Mod: S$GLB,,, | Performed by: STUDENT IN AN ORGANIZED HEALTH CARE EDUCATION/TRAINING PROGRAM

## 2022-06-06 PROCEDURE — 83921 ORGANIC ACID SINGLE QUANT: CPT | Performed by: STUDENT IN AN ORGANIZED HEALTH CARE EDUCATION/TRAINING PROGRAM

## 2022-06-06 PROCEDURE — 3008F BODY MASS INDEX DOCD: CPT | Mod: CPTII,S$GLB,, | Performed by: STUDENT IN AN ORGANIZED HEALTH CARE EDUCATION/TRAINING PROGRAM

## 2022-06-06 PROCEDURE — 3078F PR MOST RECENT DIASTOLIC BLOOD PRESSURE < 80 MM HG: ICD-10-PCS | Mod: CPTII,S$GLB,, | Performed by: STUDENT IN AN ORGANIZED HEALTH CARE EDUCATION/TRAINING PROGRAM

## 2022-06-06 PROCEDURE — 85025 COMPLETE CBC W/AUTO DIFF WBC: CPT | Performed by: STUDENT IN AN ORGANIZED HEALTH CARE EDUCATION/TRAINING PROGRAM

## 2022-06-06 PROCEDURE — 99999 PR PBB SHADOW E&M-EST. PATIENT-LVL IV: ICD-10-PCS | Mod: PBBFAC,,, | Performed by: STUDENT IN AN ORGANIZED HEALTH CARE EDUCATION/TRAINING PROGRAM

## 2022-06-06 PROCEDURE — 1160F PR REVIEW ALL MEDS BY PRESCRIBER/CLIN PHARMACIST DOCUMENTED: ICD-10-PCS | Mod: CPTII,S$GLB,, | Performed by: STUDENT IN AN ORGANIZED HEALTH CARE EDUCATION/TRAINING PROGRAM

## 2022-06-06 PROCEDURE — 1159F PR MEDICATION LIST DOCUMENTED IN MEDICAL RECORD: ICD-10-PCS | Mod: CPTII,S$GLB,, | Performed by: STUDENT IN AN ORGANIZED HEALTH CARE EDUCATION/TRAINING PROGRAM

## 2022-06-06 PROCEDURE — 3008F PR BODY MASS INDEX (BMI) DOCUMENTED: ICD-10-PCS | Mod: CPTII,S$GLB,, | Performed by: STUDENT IN AN ORGANIZED HEALTH CARE EDUCATION/TRAINING PROGRAM

## 2022-06-06 PROCEDURE — 36415 COLL VENOUS BLD VENIPUNCTURE: CPT | Performed by: STUDENT IN AN ORGANIZED HEALTH CARE EDUCATION/TRAINING PROGRAM

## 2022-06-06 PROCEDURE — 84443 ASSAY THYROID STIM HORMONE: CPT | Performed by: STUDENT IN AN ORGANIZED HEALTH CARE EDUCATION/TRAINING PROGRAM

## 2022-06-06 PROCEDURE — 82607 VITAMIN B-12: CPT | Performed by: STUDENT IN AN ORGANIZED HEALTH CARE EDUCATION/TRAINING PROGRAM

## 2022-06-06 PROCEDURE — 99386 PR PREVENTIVE VISIT,NEW,40-64: ICD-10-PCS | Mod: 25,S$GLB,, | Performed by: STUDENT IN AN ORGANIZED HEALTH CARE EDUCATION/TRAINING PROGRAM

## 2022-06-06 PROCEDURE — 99386 PREV VISIT NEW AGE 40-64: CPT | Mod: 25,S$GLB,, | Performed by: STUDENT IN AN ORGANIZED HEALTH CARE EDUCATION/TRAINING PROGRAM

## 2022-06-06 PROCEDURE — 83090 ASSAY OF HOMOCYSTEINE: CPT | Performed by: STUDENT IN AN ORGANIZED HEALTH CARE EDUCATION/TRAINING PROGRAM

## 2022-06-06 PROCEDURE — 90715 TDAP VACCINE 7 YRS/> IM: CPT | Mod: S$GLB,,, | Performed by: STUDENT IN AN ORGANIZED HEALTH CARE EDUCATION/TRAINING PROGRAM

## 2022-06-06 PROCEDURE — 3074F SYST BP LT 130 MM HG: CPT | Mod: CPTII,S$GLB,, | Performed by: STUDENT IN AN ORGANIZED HEALTH CARE EDUCATION/TRAINING PROGRAM

## 2022-06-06 PROCEDURE — 1160F RVW MEDS BY RX/DR IN RCRD: CPT | Mod: CPTII,S$GLB,, | Performed by: STUDENT IN AN ORGANIZED HEALTH CARE EDUCATION/TRAINING PROGRAM

## 2022-06-06 PROCEDURE — 82746 ASSAY OF FOLIC ACID SERUM: CPT | Performed by: STUDENT IN AN ORGANIZED HEALTH CARE EDUCATION/TRAINING PROGRAM

## 2022-06-06 PROCEDURE — 90471 TDAP VACCINE GREATER THAN OR EQUAL TO 7YO IM: ICD-10-PCS | Mod: S$GLB,,, | Performed by: STUDENT IN AN ORGANIZED HEALTH CARE EDUCATION/TRAINING PROGRAM

## 2022-06-06 PROCEDURE — 3074F PR MOST RECENT SYSTOLIC BLOOD PRESSURE < 130 MM HG: ICD-10-PCS | Mod: CPTII,S$GLB,, | Performed by: STUDENT IN AN ORGANIZED HEALTH CARE EDUCATION/TRAINING PROGRAM

## 2022-06-06 PROCEDURE — 80053 COMPREHEN METABOLIC PANEL: CPT | Performed by: STUDENT IN AN ORGANIZED HEALTH CARE EDUCATION/TRAINING PROGRAM

## 2022-06-06 RX ORDER — METFORMIN HYDROCHLORIDE 500 MG/1
1000 TABLET, EXTENDED RELEASE ORAL
Qty: 180 TABLET | Refills: 3 | Status: SHIPPED | OUTPATIENT
Start: 2022-06-06 | End: 2022-07-18 | Stop reason: ALTCHOICE

## 2022-06-06 NOTE — PROGRESS NOTES
After obtaining consent, and per orders of Dr. Olmedo, injection of Tdap (Lot: [id number] Exp: 04/01/2024) given by Margarita Mena. Patient instructed to remain in clinic for 20 minutes afterwards, and to report any adverse reaction to me immediately.

## 2022-06-06 NOTE — PATIENT INSTRUCTIONS
I recommend starting a daily vitamin D3 (cholecalciferol) 2,000 IU supplement. This can be obtained without a prescription at most pharmacies or grocery stores. We can recheck your vitamin D at a later time.

## 2022-06-06 NOTE — PROGRESS NOTES
Subjective:       Patient ID: Kim Padilla is a 46 y.o. female.    Chief Complaint: Prediabetes [R73.03]    Patient is new to me, here to establish care.    Nephrolithiasis, migraines, ANNA (on CPAP), prediabetes    Health maintenance -   Never previously had colorectal cancer screening.  Denies family history of colorectal cancer.   Family history of breast cancers.  Family history of ovarian cancers.  Last pap performed FEB2022.   History of prior abnormal pap smears, had cervical ablation.  Denies significant family history of osteoporosis.  Significant family history of cardiac disease.  UTD on COVID19 primary vaccinations.  Due for COVID19 booster, Tdap vaccinations.  Never smoker.  Drinks alcohol 1-2 times monthly, 1 drinks per sitting.  Denies drug use.  Due for HIV and Hep C screening.  Due for lipid screening.  Lab Results       Component                Value               Date                       LDLCALC                  101.8               11/09/2021            The 10-year ASCVD risk score (Yanetadriel FORRESTER Jr., et al., 2013) is: 0.6%           No cycles with IUD  Began menarche at age 8.   2 total pregnancies. SAB at 4 months. SAB at 12 wga.  Currently sexually active with .   Uses Mirena for contraception. Placed summer 2018.    Seeing Dr. Brown for high risk breast clinic  Seeing Dr. Deutsch for gynecology  MRI, U/S, and biopsy completed previously  Mammogram completed today, BI-RADS 2  Has repeat MRI in 6 months     Was told has prediabetes by a prior physician   Hemoglobin A1C       Date                     Value               Ref Range           Status                11/09/2021               5.6                 4.0 - 5.6 %         Final              Has been experiencing chronic fatigue    Review of Systems   Constitutional: Positive for fatigue. Negative for appetite change, chills, fever and unexpected weight change.   Respiratory: Negative for cough and shortness of breath.    Cardiovascular:  Negative for chest pain, palpitations and leg swelling.   Gastrointestinal: Negative for abdominal pain, constipation, diarrhea, nausea and vomiting.   Skin: Negative for rash.   Neurological: Negative for dizziness, syncope, weakness and headaches.         Current Outpatient Medications   Medication Instructions    ALPRAZolam (XANAX) 0.5 mg, Oral, Nightly PRN    azelastine (ASTELIN) 137 mcg, Nasal, 2 times daily PRN    eletriptan (RELPAX) 40 MG tablet Relpax 40 mg tablet   Take 1 tablet as needed by oral route as needed for 30 days.    metFORMIN (GLUCOPHAGE-XR) 1,000 mg, Oral, With breakfast    potassium citrate (UROCIT-K) 5 mEq (540 mg) TbSR 5 mEq, Oral, 2 times daily with meals    tamsulosin (FLOMAX) 0.4 mg, Oral, Daily     Objective:      Vitals:    06/06/22 1345   BP: 123/76   Pulse: 87   SpO2: 99%   Weight: 87.6 kg (193 lb 2 oz)   PainSc: 0-No pain     Body mass index is 32.14 kg/m².    Physical Exam  Vitals reviewed.   Constitutional:       General: She is not in acute distress.     Appearance: Normal appearance. She is not ill-appearing or diaphoretic.   HENT:      Head: Normocephalic and atraumatic.      Right Ear: Tympanic membrane, ear canal and external ear normal. There is no impacted cerumen.      Left Ear: Tympanic membrane, ear canal and external ear normal. There is no impacted cerumen.      Nose: Nose normal. No rhinorrhea.      Mouth/Throat:      Mouth: Mucous membranes are moist.      Pharynx: Oropharynx is clear. No oropharyngeal exudate or posterior oropharyngeal erythema.   Eyes:      General: No scleral icterus.        Right eye: No discharge.         Left eye: No discharge.      Conjunctiva/sclera: Conjunctivae normal.   Neck:      Thyroid: No thyromegaly or thyroid tenderness.      Trachea: Trachea normal.   Cardiovascular:      Rate and Rhythm: Normal rate and regular rhythm.      Heart sounds: Normal heart sounds. No murmur heard.    No friction rub. No gallop.   Pulmonary:       Effort: Pulmonary effort is normal. No respiratory distress.      Breath sounds: Normal breath sounds. No stridor. No wheezing, rhonchi or rales.   Chest:   Breasts:      Right: No supraclavicular adenopathy.      Left: No supraclavicular adenopathy.       Abdominal:      General: Bowel sounds are normal. There is no distension.      Palpations: Abdomen is soft.      Tenderness: There is no abdominal tenderness. There is no guarding or rebound.   Musculoskeletal:         General: No swelling or deformity.      Cervical back: Neck supple.   Lymphadenopathy:      Head:      Right side of head: No submandibular or posterior auricular adenopathy.      Left side of head: No submandibular or posterior auricular adenopathy.      Cervical: No cervical adenopathy.      Right cervical: No superficial, deep or posterior cervical adenopathy.     Left cervical: No superficial, deep or posterior cervical adenopathy.      Upper Body:      Right upper body: No supraclavicular adenopathy.      Left upper body: No supraclavicular adenopathy.   Skin:     General: Skin is warm and dry.   Neurological:      General: No focal deficit present.      Mental Status: She is alert. Mental status is at baseline.      Gait: Gait normal.   Psychiatric:         Mood and Affect: Mood normal.         Behavior: Behavior normal.         Assessment:       1. Prediabetes    2. Fatigue, unspecified type    3. Health maintenance examination    4. Need for vaccination    5. Screening for colorectal cancer    6. Screening for HIV without presence of risk factors    7. Encounter for hepatitis C screening test for low risk patient        Plan:       Prediabetes  Start metformin  RTC in 1-3 months for follow up  -     metFORMIN (GLUCOPHAGE-XR) 500 MG ER 24hr tablet; Take 2 tablets (1,000 mg total) by mouth daily with breakfast.    Fatigue, unspecified type  RTC in 1-3 months for follow up  -     CBC Auto Differential; Future  -     Comprehensive Metabolic  Panel; Future  -     TSH; Future  -     Folate; Future  -     Vitamin B12; Future  -     Methylmalonic Acid, Serum; Future  -     Homocysteine, Serum; Future    Health maintenance examination  Need for vaccination  -     (In Office Administered) Tdap Vaccine    Screening for colorectal cancer  -     Cologuard Screening (Multitarget Stool DNA); Future    Screening for HIV without presence of risk factors  -     HIV 1/2 Ag/Ab (4th Gen); Future    Encounter for hepatitis C screening test for low risk patient  -     Hepatitis C Antibody; Future        Chani Olmedo MD  6/6/2022

## 2022-06-07 PROBLEM — R73.03 PREDIABETES: Status: ACTIVE | Noted: 2022-06-07

## 2022-06-08 ENCOUNTER — PATIENT MESSAGE (OUTPATIENT)
Dept: INTERNAL MEDICINE | Facility: CLINIC | Age: 47
End: 2022-06-08
Payer: COMMERCIAL

## 2022-06-10 DIAGNOSIS — R79.89 ELEVATED LFTS: Primary | ICD-10-CM

## 2022-06-10 LAB — METHYLMALONATE SERPL-SCNC: 0.14 UMOL/L

## 2022-06-21 ENCOUNTER — HOSPITAL ENCOUNTER (OUTPATIENT)
Dept: RADIOLOGY | Facility: HOSPITAL | Age: 47
Discharge: HOME | End: 2022-06-21
Attending: ORTHOPAEDIC SURGERY
Payer: COMMERCIAL

## 2022-06-21 ENCOUNTER — OFFICE VISIT (OUTPATIENT)
Dept: SPORTS MEDICINE | Facility: CLINIC | Age: 47
End: 2022-06-21
Payer: COMMERCIAL

## 2022-06-21 VITALS
BODY MASS INDEX: 32.95 KG/M2 | DIASTOLIC BLOOD PRESSURE: 90 MMHG | HEIGHT: 64 IN | SYSTOLIC BLOOD PRESSURE: 145 MMHG | WEIGHT: 193 LBS

## 2022-06-21 DIAGNOSIS — M25.531 RIGHT WRIST PAIN: ICD-10-CM

## 2022-06-21 DIAGNOSIS — M25.531 RIGHT WRIST PAIN: Primary | ICD-10-CM

## 2022-06-21 DIAGNOSIS — M65.4 DE QUERVAIN'S DISEASE (TENOSYNOVITIS): ICD-10-CM

## 2022-06-21 DIAGNOSIS — M79.646 THUMB PAIN, UNSPECIFIED LATERALITY: ICD-10-CM

## 2022-06-21 DIAGNOSIS — M79.644 PAIN OF RIGHT THUMB: ICD-10-CM

## 2022-06-21 PROCEDURE — 1159F PR MEDICATION LIST DOCUMENTED IN MEDICAL RECORD: ICD-10-PCS | Mod: CPTII,S$GLB,, | Performed by: ORTHOPAEDIC SURGERY

## 2022-06-21 PROCEDURE — 3077F PR MOST RECENT SYSTOLIC BLOOD PRESSURE >= 140 MM HG: ICD-10-PCS | Mod: CPTII,S$GLB,, | Performed by: ORTHOPAEDIC SURGERY

## 2022-06-21 PROCEDURE — 73140 XR FINGER 2 OR MORE VIEWS RIGHT: ICD-10-PCS | Mod: 26,RT,, | Performed by: RADIOLOGY

## 2022-06-21 PROCEDURE — 1159F MED LIST DOCD IN RCRD: CPT | Mod: CPTII,S$GLB,, | Performed by: ORTHOPAEDIC SURGERY

## 2022-06-21 PROCEDURE — 99999 PR PBB SHADOW E&M-EST. PATIENT-LVL III: ICD-10-PCS | Mod: PBBFAC,,, | Performed by: ORTHOPAEDIC SURGERY

## 2022-06-21 PROCEDURE — 73140 X-RAY EXAM OF FINGER(S): CPT | Mod: 26,RT,, | Performed by: RADIOLOGY

## 2022-06-21 PROCEDURE — 3008F PR BODY MASS INDEX (BMI) DOCUMENTED: ICD-10-PCS | Mod: CPTII,S$GLB,, | Performed by: ORTHOPAEDIC SURGERY

## 2022-06-21 PROCEDURE — 73110 X-RAY EXAM OF WRIST: CPT | Mod: 26,RT,, | Performed by: RADIOLOGY

## 2022-06-21 PROCEDURE — 3077F SYST BP >= 140 MM HG: CPT | Mod: CPTII,S$GLB,, | Performed by: ORTHOPAEDIC SURGERY

## 2022-06-21 PROCEDURE — 99999 PR PBB SHADOW E&M-EST. PATIENT-LVL III: CPT | Mod: PBBFAC,,, | Performed by: ORTHOPAEDIC SURGERY

## 2022-06-21 PROCEDURE — 73110 X-RAY EXAM OF WRIST: CPT | Mod: TC,RT

## 2022-06-21 PROCEDURE — 73140 X-RAY EXAM OF FINGER(S): CPT | Mod: TC,RT

## 2022-06-21 PROCEDURE — 3080F DIAST BP >= 90 MM HG: CPT | Mod: CPTII,S$GLB,, | Performed by: ORTHOPAEDIC SURGERY

## 2022-06-21 PROCEDURE — 1160F PR REVIEW ALL MEDS BY PRESCRIBER/CLIN PHARMACIST DOCUMENTED: ICD-10-PCS | Mod: CPTII,S$GLB,, | Performed by: ORTHOPAEDIC SURGERY

## 2022-06-21 PROCEDURE — 99214 OFFICE O/P EST MOD 30 MIN: CPT | Mod: S$GLB,,, | Performed by: ORTHOPAEDIC SURGERY

## 2022-06-21 PROCEDURE — 73110 XR WRIST COMPLETE 3 VIEWS RIGHT: ICD-10-PCS | Mod: 26,RT,, | Performed by: RADIOLOGY

## 2022-06-21 PROCEDURE — 99214 PR OFFICE/OUTPT VISIT, EST, LEVL IV, 30-39 MIN: ICD-10-PCS | Mod: S$GLB,,, | Performed by: ORTHOPAEDIC SURGERY

## 2022-06-21 PROCEDURE — 3080F PR MOST RECENT DIASTOLIC BLOOD PRESSURE >= 90 MM HG: ICD-10-PCS | Mod: CPTII,S$GLB,, | Performed by: ORTHOPAEDIC SURGERY

## 2022-06-21 PROCEDURE — 3008F BODY MASS INDEX DOCD: CPT | Mod: CPTII,S$GLB,, | Performed by: ORTHOPAEDIC SURGERY

## 2022-06-21 PROCEDURE — 1160F RVW MEDS BY RX/DR IN RCRD: CPT | Mod: CPTII,S$GLB,, | Performed by: ORTHOPAEDIC SURGERY

## 2022-06-21 RX ORDER — METHYLPREDNISOLONE 4 MG/1
TABLET ORAL
Qty: 1 EACH | Refills: 0 | Status: SHIPPED | OUTPATIENT
Start: 2022-06-21 | End: 2022-08-01 | Stop reason: ALTCHOICE

## 2022-06-21 NOTE — PROGRESS NOTES
CC: bilateral hand pain    46 y.o. Female presents today for evaluation of her bilateral hand pain. She admits to right worse than left wrist and thumb pain for the past six weeks that she attributes to deep cleaning her home. She gestures to the radial side of the wrist when asked where she hurts and also to her thumb and forearm.   How long: Admits to bilateral wrist and thumb pain for the past six weeks.   What makes it better: Admits to improved pain while at rest.   What makes it worse: Admits to increased pain with getting dress, writing and using the bathroom.   Does it radiate: Denies radiating pain.   Attempted treatments: She has been taking ibuprofen 800 mg as needed without significant improvement. She admits to seeing a chiropractor twice per week where she has been having combo therapy and dry needling. She feels as though chiropractic treatment has been helpful for her left hand. She has also been wearing a thumb splint which has been helpful.   Pain score: 6/10  Any mechanical symptoms: Denies mechanical symptoms.   Feelings of instability: Denies feelings of instability.   Affecting ADL: Admits to this problem affecting her ability to perform ADLs.    REVIEW OF SYSTEMS:   Constitution: Patient denies fever, chills, night sweats, and weight changes.  Eyes: Patient denies eye pain or vision changes.  HENT: Patient denies headache, ear pain, sore throat, or nasal discharge.  CVS: Patient denies chest pain.  Lungs: Patient denies shortness of breath or cough.  Abd: Patient denies stomach pain, nausea, or vomiting.  Skin: Patient denies skin rash or itching.    Hematologic/Lymphatic: Patient denies easy bruising.   Musculoskeletal: Patient denies any falls. See HPI.  Psych: Patient denies any current anxiety or nervousness.    PAST MEDICAL HISTORY:   Past Medical History:   Diagnosis Date    Diabetes mellitus     Infertility, female        PAST SURGICAL HISTORY:   Past Surgical History:   Procedure  Laterality Date    BREAST BIOPSY Right     benign    BREAST LUMPECTOMY Left 2014    Dr Brown - benign    DILATION AND CURETTAGE OF UTERUS  1999    for SAB    DILATION AND CURETTAGE OF UTERUS  2018    TONSILLECTOMY      WISDOM TOOTH EXTRACTION         FAMILY HISTORY:   Family History   Problem Relation Age of Onset    Graves' disease Mother     Hypertension Father     Hyperlipidemia Father     Prostate cancer Father     Insulin resistance Father     Thyroid cancer Sister     Ovarian cancer Sister     Thyroid disease Maternal Grandmother         s/p thyroidectomy    Heart attack Maternal Grandfather         in early 60's    Coronary artery disease Maternal Grandfather     Lung disease Paternal Grandmother     Heart attack Paternal Grandfather     Coronary artery disease Paternal Grandfather     Stroke Paternal Grandfather     Breast cancer Maternal Cousin     Colon cancer Neg Hx        SOCIAL HISTORY:   Social History     Socioeconomic History    Marital status:    Tobacco Use    Smoking status: Never Smoker    Smokeless tobacco: Never Used   Substance and Sexual Activity    Alcohol use: Not Currently     Comment: rarely    Drug use: No    Sexual activity: Yes     Partners: Male     Birth control/protection: I.U.D.     Comment: mirena        MEDICATIONS:     Current Outpatient Medications:     ALPRAZolam (XANAX) 0.5 MG tablet, Take 1 tablet (0.5 mg total) by mouth nightly as needed for Insomnia or Anxiety., Disp: 30 tablet, Rfl: 0    azelastine (ASTELIN) 137 mcg (0.1 %) nasal spray, 1 spray (137 mcg total) by Nasal route 2 (two) times daily as needed for Rhinitis., Disp: 30 mL, Rfl: 0    eletriptan (RELPAX) 40 MG tablet, Relpax 40 mg tablet  Take 1 tablet as needed by oral route as needed for 30 days., Disp: , Rfl:     metFORMIN (GLUCOPHAGE-XR) 500 MG ER 24hr tablet, Take 2 tablets (1,000 mg total) by mouth daily with breakfast., Disp: 180 tablet, Rfl: 3    potassium citrate  "(UROCIT-K) 5 mEq (540 mg) TbSR, Take 1 tablet (5 mEq total) by mouth 2 (two) times daily with meals., Disp: 60 tablet, Rfl: 11    tamsulosin (FLOMAX) 0.4 mg Cap, Take 1 capsule (0.4 mg total) by mouth once daily., Disp: 30 capsule, Rfl: 11    methylPREDNISolone (MEDROL DOSEPACK) 4 mg tablet, use as directed, Disp: 1 each, Rfl: 0    ALLERGIES:   Review of patient's allergies indicates:   Allergen Reactions    Penicillins Hives        PHYSICAL EXAMINATION:  BP (!) 145/90   Ht 5' 4" (1.626 m)   Wt 87.5 kg (193 lb)   BMI 33.13 kg/m²   Vitals signs and nursing note have been reviewed.  General: In no acute distress, well developed, well nourished, no diaphoresis  Eyes: EOM full and smooth, no eye redness or discharge  HENT: normocephalic and atraumatic, neck supple, trachea midline, no nasal discharge, no external ear redness or discharge  Cardiovascular: 2+ and symmetric radial pulses bilaterally, no LE edema  Lungs: respirations non-labored, no conversational dyspnea   Abd: non-distended, no rigidity  MSK: no amputation or deformity, no swelling of extremities  Neuro: AAOx3, CN2-12 grossly intact  Skin: No rashes, warm and dry  Psychiatric: cooperative, pleasant, mood and affect appropriate for age    HAND: BILATERAL  The affected hand is compared to the contralateral hand .    Observation:  No evidence of edema, erythema, ecchymosis, or effusion.  No asymmetries; muscle atrophy; ganglion cysts; or bony abnormalities including ulnar deviation.  No Heberdens or Brouchards nodes.  No swan-neck or boutonnière deformities.    No clubbing of the digits.    Tenderness:  No tenderness at radial styloid, Keo's tubercle, ulnar styloid.  No tenderness at anatomic snuff box, scaphoid tubercle, scapholunate junction.  No tenderness at TFCC.  No tenderness at pisiform, hamate, metacarpals and phalanges.  No tenderness over median nerve at the carpal tunnel.  + tenderness over the ECU and APB tendons    Range of " Motion:  Active wrist extension to 70° on left and 70° on right.    Active wrist flexion to 80° on left and 80° on right.    Active radial deviation to 20° on left and 20° on right.    Active ulnar deviation to 30° on left and 30° on right.    Active pronation to 80° on left and 80° on right.    Active supination to 80° on left and 80° on right.    Full active flexion and extension at the MCP, PIP, and DIP bilaterally.   Active thumb motion full in all planes.    Strength Testing:  Wrist extension - 5/5 on left and 5/5 on right  Wrist flexion - 5/5 on left and 5/5 on right  Ulnar deviation - 5/5 on left and 5/5 on right  Radial deviation - 5/5 on left and 5/5 on right  Pronation - 5/5 on left and 5/5 on right  Supination - 5/5 on left and 5/5 on right    Finger flexion - 5/5 on left and 5/5 on right  Finger extension - 5/5 on left and 5/5 on right  Finger abduction - 5/5 on left and 5/5 on right  Finger adduction - 5/5 on left and 5/5 on right    Thumb flexion - 5/5 on left and 5/5 on right.  Pain on the right  Thumb extension - 5/5 on left and 5/5 on right. Pain on the right  Thumb abduction - 5/5 on left and 5/5 on right. Pain on the right  Thumb adduction - 5/5 on left and 5/5 on right. Pain on the right  Thumb opposition - 5/5 on left and 5/5 on right. Pain on the right    Special Tests:  Phalen's test - negative  Tinel's test at wrist - negative  Carpal compression test - negative    Ulnar compression test - negative  Froment's sign - negative  Tinel's test of Guyon's canal - negative    No laxity with distal radioulnar joint translation.  Negative Molinas test.     No laxity with phalangeal varus/valgus stress testing.    Finkelsteins test - positive    Axial grind of first CMC joint - negative  No laxity at the MCP UCL of the thumb    Normal fist alignment of the phalanges  No laxity at the RCL and UCL of the PIPs and DIPs of the phalanges.  Normal finger flexion of the FDP and FDS in all phalanges  bilaterally.  Able to perform OK sign.    Neurovascular Exam:  Sensation intact to light touch in the median, ulnar, and radial distributions on the hands bilaterally.  Radial and ulnar pulses intact and symmetric.  Capillary refill intact to <2 seconds in all digits.        IMAGIN. X-ray ordered due to right wrist pain   2. X-ray images were reviewed personally by me and then directly with patient.  3. FINDINGS: X-ray images obtained demonstrate no fracture or dislocation.   4. IMPRESSION: As above.     1. X-ray ordered due to right thumb pain   2. X-ray images were reviewed personally by me and then directly with patient.  3. FINDINGS: X-ray images obtained demonstrate no fracture or dislocation.   4. IMPRESSION: As above.       ASSESSMENT:    1. Right wrist pain    2. Pain of right thumb    3. De Quervain's disease (tenosynovitis)          PLAN:  1-3.  Right wrist pain/thumb pain/de Quervain tenosynovitis - new issue to provider    - Kim admits to right worse than left wrist and thumb pain for the past six weeks that she attributes to deep cleaning her new home. She has been seeking care from a chiropractor twice per week, and feels as though her left sided pain has greatly improved.    - XRs ordered in the office today and images were personally reviewed with the patient. See above for further detail.    - Symptoms, exam, and imaging are most consistent with de Quervain tenosynovitis secondary to overuse from cleaning her home.  We discussed the importance of decreasing inflammation and strengthening and stabilizing to help promote and maintain symptom improvement/resolution.  This is commonly accomplished with a short course of an anti-inflammatory and icing in addition to osteopathic manipulation, a home exercise program or physical therapy.    - HEP for DeQuervain's tenosynovitis prescribed today. Handouts provided, explained, and exercises were demonstrated as needed. Encouraged to do daily. [id number]  HOME EXERCISE PROGRAM (HEP):  The patient was taught a homegoing physical therapy regimen as described above by provider with assistance of sports medicine assistant. The patient demonstrated understanding of the exercises and proper technique of their execution. This interaction took 15 minutes.     - Patient fitted for right thumb spica by DME.  Advised to wear at all times for the next 2 weeks    - Medrol dosepack to calm down inflammation.  Icing to the affected area once to twice daily for 15-20 minutes as well can be helpful.      Future planning includes - diagnostic ultrasound of the area, possible ultrasound-guided tendon sheath injection, OT    All questions were answered to the best of my ability and all concerns were addressed at this time.    Follow up as needed.     Total time spent face-to face with patient counseling or coordinating care including prognosis, differential diagnosis, risks and benefits of treatment, instructions, compliance risk reductions as well as non-face-to-face time personally spent reviewing medial record, medical documentation, and coordination of care.     EST MINUTES X   [id number] 10-19    [id number] 20-29    [id number] 30-39 X   [id number] 40-54    NEW     04941 15-29    [id number] 30-44    24104 45-59    [id number] 60-74    PHONE      5-10    [id number] 11-20    [id number] 21-30

## 2022-06-24 ENCOUNTER — LAB VISIT (OUTPATIENT)
Dept: LAB | Facility: HOSPITAL | Age: 47
End: 2022-06-24
Attending: STUDENT IN AN ORGANIZED HEALTH CARE EDUCATION/TRAINING PROGRAM
Payer: COMMERCIAL

## 2022-06-24 DIAGNOSIS — R79.89 ELEVATED LFTS: ICD-10-CM

## 2022-06-24 DIAGNOSIS — R79.89 ELEVATED LFTS: Primary | ICD-10-CM

## 2022-06-24 DIAGNOSIS — Z00.00 HEALTH MAINTENANCE EXAMINATION: ICD-10-CM

## 2022-06-24 DIAGNOSIS — Z11.59 ENCOUNTER FOR HEPATITIS C SCREENING TEST FOR LOW RISK PATIENT: ICD-10-CM

## 2022-06-24 DIAGNOSIS — Z11.4 SCREENING FOR HIV WITHOUT PRESENCE OF RISK FACTORS: ICD-10-CM

## 2022-06-24 LAB
ALBUMIN SERPL BCP-MCNC: 4.1 G/DL (ref 3.5–5.2)
ALP SERPL-CCNC: 73 U/L (ref 55–135)
ALT SERPL W/O P-5'-P-CCNC: 55 U/L (ref 10–44)
ANION GAP SERPL CALC-SCNC: 12 MMOL/L (ref 8–16)
AST SERPL-CCNC: 37 U/L (ref 10–40)
BILIRUB SERPL-MCNC: 0.5 MG/DL (ref 0.1–1)
BUN SERPL-MCNC: 18 MG/DL (ref 6–20)
CALCIUM SERPL-MCNC: 9.5 MG/DL (ref 8.7–10.5)
CHLORIDE SERPL-SCNC: 108 MMOL/L (ref 95–110)
CO2 SERPL-SCNC: 19 MMOL/L (ref 23–29)
CREAT SERPL-MCNC: 0.7 MG/DL (ref 0.5–1.4)
EST. GFR  (AFRICAN AMERICAN): >60 ML/MIN/1.73 M^2
EST. GFR  (NON AFRICAN AMERICAN): >60 ML/MIN/1.73 M^2
FERRITIN SERPL-MCNC: 273 NG/ML (ref 20–300)
GLUCOSE SERPL-MCNC: 135 MG/DL (ref 70–110)
POTASSIUM SERPL-SCNC: 4 MMOL/L (ref 3.5–5.1)
PROT SERPL-MCNC: 7.2 G/DL (ref 6–8.4)
SODIUM SERPL-SCNC: 139 MMOL/L (ref 136–145)

## 2022-06-24 PROCEDURE — 36415 COLL VENOUS BLD VENIPUNCTURE: CPT | Mod: PO | Performed by: STUDENT IN AN ORGANIZED HEALTH CARE EDUCATION/TRAINING PROGRAM

## 2022-06-24 PROCEDURE — 82728 ASSAY OF FERRITIN: CPT | Performed by: STUDENT IN AN ORGANIZED HEALTH CARE EDUCATION/TRAINING PROGRAM

## 2022-06-24 PROCEDURE — 87389 HIV-1 AG W/HIV-1&-2 AB AG IA: CPT | Performed by: STUDENT IN AN ORGANIZED HEALTH CARE EDUCATION/TRAINING PROGRAM

## 2022-06-24 PROCEDURE — 80074 ACUTE HEPATITIS PANEL: CPT | Performed by: STUDENT IN AN ORGANIZED HEALTH CARE EDUCATION/TRAINING PROGRAM

## 2022-06-24 PROCEDURE — 80053 COMPREHEN METABOLIC PANEL: CPT | Performed by: STUDENT IN AN ORGANIZED HEALTH CARE EDUCATION/TRAINING PROGRAM

## 2022-06-27 LAB
HAV IGM SERPL QL IA: NEGATIVE
HBV CORE IGM SERPL QL IA: NEGATIVE
HBV SURFACE AG SERPL QL IA: NEGATIVE
HCV AB SERPL QL IA: NEGATIVE
HCV AB SERPL QL IA: NEGATIVE
HIV 1+2 AB+HIV1 P24 AG SERPL QL IA: NEGATIVE

## 2022-06-28 DIAGNOSIS — R79.89 ELEVATED LFTS: Primary | ICD-10-CM

## 2022-06-29 ENCOUNTER — TELEPHONE (OUTPATIENT)
Dept: INTERNAL MEDICINE | Facility: CLINIC | Age: 47
End: 2022-06-29
Payer: COMMERCIAL

## 2022-06-29 NOTE — TELEPHONE ENCOUNTER
----- Message from Chani Olmedo MD sent at 6/28/2022  9:11 PM CDT -----  Please assist with scheduling u/s, thank you!

## 2022-06-30 LAB — NONINV COLON CA DNA+OCC BLD SCRN STL QL: NEGATIVE

## 2022-07-07 ENCOUNTER — PATIENT MESSAGE (OUTPATIENT)
Dept: INTERNAL MEDICINE | Facility: CLINIC | Age: 47
End: 2022-07-07
Payer: COMMERCIAL

## 2022-07-07 NOTE — TELEPHONE ENCOUNTER
Please set up appointment with patient to discuss Ozempic for weight reduction, ok for virtual, thank you!

## 2022-07-14 ENCOUNTER — HOSPITAL ENCOUNTER (OUTPATIENT)
Dept: RADIOLOGY | Facility: OTHER | Age: 47
Discharge: HOME | End: 2022-07-14
Attending: STUDENT IN AN ORGANIZED HEALTH CARE EDUCATION/TRAINING PROGRAM
Payer: COMMERCIAL

## 2022-07-14 DIAGNOSIS — R79.89 ELEVATED LFTS: ICD-10-CM

## 2022-07-14 PROCEDURE — 76705 ECHO EXAM OF ABDOMEN: CPT | Mod: TC

## 2022-07-14 PROCEDURE — 76705 US ABDOMEN LIMITED_LIVER: ICD-10-PCS | Mod: 26,,, | Performed by: INTERNAL MEDICINE

## 2022-07-14 PROCEDURE — 76705 ECHO EXAM OF ABDOMEN: CPT | Mod: 26,,, | Performed by: INTERNAL MEDICINE

## 2022-07-18 ENCOUNTER — OFFICE VISIT (OUTPATIENT)
Dept: INTERNAL MEDICINE | Facility: CLINIC | Age: 47
End: 2022-07-18
Payer: COMMERCIAL

## 2022-07-18 ENCOUNTER — PATIENT MESSAGE (OUTPATIENT)
Dept: INTERNAL MEDICINE | Facility: CLINIC | Age: 47
End: 2022-07-18

## 2022-07-18 DIAGNOSIS — K76.0 FATTY LIVER: ICD-10-CM

## 2022-07-18 DIAGNOSIS — R73.03 PREDIABETES: Primary | ICD-10-CM

## 2022-07-18 DIAGNOSIS — Z71.3 ENCOUNTER FOR WEIGHT LOSS COUNSELING: ICD-10-CM

## 2022-07-18 PROCEDURE — 99214 PR OFFICE/OUTPT VISIT, EST, LEVL IV, 30-39 MIN: ICD-10-PCS | Mod: 95,,, | Performed by: STUDENT IN AN ORGANIZED HEALTH CARE EDUCATION/TRAINING PROGRAM

## 2022-07-18 PROCEDURE — 99214 OFFICE O/P EST MOD 30 MIN: CPT | Mod: 95,,, | Performed by: STUDENT IN AN ORGANIZED HEALTH CARE EDUCATION/TRAINING PROGRAM

## 2022-07-18 NOTE — PROGRESS NOTES
The patient's location is:  Patient's car   The chief complaint leading to consultation is:  Prediabetes [R73.03]    Visit type: audiovisual    Time spent in discussion with the patient: 24 minutes  37 minutes of total time spent on the encounter. This includes time spent in discussion with the patient and time preparing for the patient appointment: review of tests, obtaining and/or reviewing separately obtained history, documenting clinical information in the electronic or other health record, independently interpreting results (not separately reported), and communicating results to the patient/family/caregiver or care coordination (not separately reported).     Each patient to whom he or she provides medical services by telemedicine is: (1) informed of the relationship between the physician and patient and the respective role of any other health care provider with respect to management of the patient; and (2) notified that he or she may decline to receive medical services by telemedicine and may withdraw from such care at any time.      Subjective:   Kim Padilla is a 46 y.o. female who presents for Prediabetes [R73.03].       Patient is established with me, here today for the following:    Nephrolithiasis, migraines, ANNA (on CPAP), prediabetes, fatty liver     Weight management -  Prediabetes -     Dietary:   Small meals throughout the day  Breakfast: low fat greek yogurt, granola  Lunch: string cheese, tuna packet, humus and nan bread  Dinner: meatballs and spaghetti, steamed broccoli, Ham salad, sweet potatoes, green beans  Drinking mostly water, coffee, sugar free redbull, Za tea    Exercise:  Not currently exercising routinely  Has spin bike at home   Target HR: 148 bpm    Has not noticed significant weight reduction with metformin  Interested in starting Ozempic  No prior weight reduction surgeries    Sister with history of thyroid cancer in ovary and thyroid  Mother and maternal grandmother  with hypothyroidism    Wt Readings from Last 5 Encounters:  06/21/22 : 87.5 kg (193 lb)  06/06/22 : 87.6 kg (193 lb 2 oz)  02/21/22 : 85.2 kg (187 lb 13.3 oz)  11/16/21 : 83.5 kg (184 lb 1.4 oz)  11/09/21 : 83.4 kg (183 lb 13.8 oz)    Fatty liver disease -  Noted on ultrasound from this month  Also noted with splenomegaly   Never previously evaluated by hepatology           Review of Systems   Constitutional: Positive for activity change and unexpected weight change.   HENT: Negative for hearing loss, rhinorrhea and trouble swallowing.    Eyes: Negative for discharge and visual disturbance.   Respiratory: Negative for chest tightness and wheezing.    Cardiovascular: Negative for chest pain and palpitations.   Gastrointestinal: Positive for diarrhea. Negative for blood in stool, constipation and vomiting.   Endocrine: Positive for polyuria. Negative for polydipsia.   Genitourinary: Negative for difficulty urinating, dysuria, hematuria and menstrual problem.   Musculoskeletal: Negative for arthralgias, joint swelling and neck pain.   Neurological: Positive for headaches. Negative for weakness.   Psychiatric/Behavioral: Negative for confusion and dysphoric mood.         Current Outpatient Medications   Medication Instructions    ALPRAZolam (XANAX) 0.5 mg, Oral, Nightly PRN    azelastine (ASTELIN) 137 mcg, Nasal, 2 times daily PRN    eletriptan (RELPAX) 40 MG tablet Relpax 40 mg tablet   Take 1 tablet as needed by oral route as needed for 30 days.    metFORMIN (GLUCOPHAGE-XR) 1,000 mg, Oral, With breakfast    methylPREDNISolone (MEDROL DOSEPACK) 4 mg tablet use as directed    potassium citrate (UROCIT-K) 5 mEq (540 mg) TbSR 5 mEq, Oral, 2 times daily with meals    tamsulosin (FLOMAX) 0.4 mg, Oral, Daily       Objective:   No home vitals reported.     Physical Exam  Constitutional:       General: She is not in acute distress.     Appearance: Normal appearance. She is not ill-appearing or diaphoretic.    Neurological:      Mental Status: She is alert.   Psychiatric:         Attention and Perception: Attention normal.         Mood and Affect: Mood and affect normal.         Speech: Speech normal.           Assessment/Plan:       Prediabetes  Discontinue metformin  Start Ozempic 0.25 mg weekly x1 month, plan to increase to 0.5 mg after  Has had prior injection training, will call if would like nurse appt for injection training  Discussed risk of thyroid cancer, will follow up sister's cancer type to ensure safe to continue medication  RTC in 1 month for follow up  -     semaglutide (OZEMPIC) 0.25 mg or 0.5 mg(2 mg/1.5 mL) pen injector; Inject 0.25 mg into the skin every 7 days.    Encounter for weight loss counseling  Discussed the following:      Goal of at least 150 minutes aerobic exercise weekly. Recommend 30 mins, 5 days weekly.      Target heart rate while performing aerobic activity 148 bpm.       Organizing plate with half vegetables, quarter whole grain starches or starchy vegetables, and quarter lean protein.       Goal of at least 1 vegetarian meal weekly and emphasizing fresh vegetables and fruits in diet.       Plans to start Mediterranean diet       Weight loss goal of 1-2 lbs per week.       Increasing healthy based fats such as avocados, olive oil, nuts, and freshwater, cold-water fish.      Decreasing red meat to no more than once weekly.   RTC in 1 month to follow up    Fatty liver  Discussed implications of fatty liver and importance of diet and lifestyle changes  -     Ambulatory referral/consult to Hepatology; Future      Chani Olmedo MD  07/18/2022

## 2022-08-01 ENCOUNTER — PROCEDURE VISIT (OUTPATIENT)
Dept: HEPATOLOGY | Facility: CLINIC | Age: 47
End: 2022-08-01
Payer: COMMERCIAL

## 2022-08-01 ENCOUNTER — OFFICE VISIT (OUTPATIENT)
Dept: HEPATOLOGY | Facility: CLINIC | Age: 47
End: 2022-08-01
Payer: COMMERCIAL

## 2022-08-01 VITALS
HEART RATE: 84 BPM | SYSTOLIC BLOOD PRESSURE: 136 MMHG | WEIGHT: 192.88 LBS | HEIGHT: 64 IN | BODY MASS INDEX: 32.93 KG/M2 | TEMPERATURE: 98 F | DIASTOLIC BLOOD PRESSURE: 85 MMHG | OXYGEN SATURATION: 97 % | RESPIRATION RATE: 18 BRPM

## 2022-08-01 DIAGNOSIS — R16.2 HEPATOSPLENOMEGALY: ICD-10-CM

## 2022-08-01 DIAGNOSIS — R74.8 ELEVATED LIVER ENZYMES: ICD-10-CM

## 2022-08-01 DIAGNOSIS — E66.9 OBESITY, UNSPECIFIED CLASSIFICATION, UNSPECIFIED OBESITY TYPE, UNSPECIFIED WHETHER SERIOUS COMORBIDITY PRESENT: ICD-10-CM

## 2022-08-01 DIAGNOSIS — K76.0 FATTY LIVER: Primary | ICD-10-CM

## 2022-08-01 DIAGNOSIS — R73.03 PREDIABETES: ICD-10-CM

## 2022-08-01 PROCEDURE — 99999 PR PBB SHADOW E&M-EST. PATIENT-LVL V: CPT | Mod: PBBFAC,,, | Performed by: NURSE PRACTITIONER

## 2022-08-01 PROCEDURE — 1159F MED LIST DOCD IN RCRD: CPT | Mod: CPTII,S$GLB,, | Performed by: NURSE PRACTITIONER

## 2022-08-01 PROCEDURE — 3079F PR MOST RECENT DIASTOLIC BLOOD PRESSURE 80-89 MM HG: ICD-10-PCS | Mod: CPTII,S$GLB,, | Performed by: NURSE PRACTITIONER

## 2022-08-01 PROCEDURE — 99204 OFFICE O/P NEW MOD 45 MIN: CPT | Mod: S$GLB,,, | Performed by: NURSE PRACTITIONER

## 2022-08-01 PROCEDURE — 3075F PR MOST RECENT SYSTOLIC BLOOD PRESS GE 130-139MM HG: ICD-10-PCS | Mod: CPTII,S$GLB,, | Performed by: NURSE PRACTITIONER

## 2022-08-01 PROCEDURE — 3075F SYST BP GE 130 - 139MM HG: CPT | Mod: CPTII,S$GLB,, | Performed by: NURSE PRACTITIONER

## 2022-08-01 PROCEDURE — 99999 PR PBB SHADOW E&M-EST. PATIENT-LVL V: ICD-10-PCS | Mod: PBBFAC,,, | Performed by: NURSE PRACTITIONER

## 2022-08-01 PROCEDURE — 91200 FIBROSCAN (VIBRATION CONTROLLED TRANSIENT ELASTOGRAPHY): ICD-10-PCS | Mod: S$GLB,,, | Performed by: NURSE PRACTITIONER

## 2022-08-01 PROCEDURE — 3008F PR BODY MASS INDEX (BMI) DOCUMENTED: ICD-10-PCS | Mod: CPTII,S$GLB,, | Performed by: NURSE PRACTITIONER

## 2022-08-01 PROCEDURE — 1159F PR MEDICATION LIST DOCUMENTED IN MEDICAL RECORD: ICD-10-PCS | Mod: CPTII,S$GLB,, | Performed by: NURSE PRACTITIONER

## 2022-08-01 PROCEDURE — 99204 PR OFFICE/OUTPT VISIT, NEW, LEVL IV, 45-59 MIN: ICD-10-PCS | Mod: S$GLB,,, | Performed by: NURSE PRACTITIONER

## 2022-08-01 PROCEDURE — 91200 LIVER ELASTOGRAPHY: CPT | Mod: S$GLB,,, | Performed by: NURSE PRACTITIONER

## 2022-08-01 PROCEDURE — 3079F DIAST BP 80-89 MM HG: CPT | Mod: CPTII,S$GLB,, | Performed by: NURSE PRACTITIONER

## 2022-08-01 PROCEDURE — 3008F BODY MASS INDEX DOCD: CPT | Mod: CPTII,S$GLB,, | Performed by: NURSE PRACTITIONER

## 2022-08-01 NOTE — PROCEDURES
FibroScan (Vibration Controlled Transient Elastography)    Date/Time: 8/1/2022 3:00 PM  Performed by: Chani Benjamin NP  Authorized by: Chani Benjamin NP     Diagnosis:  NAFLD    Probe:     Universal Protocol: Patient's identity, procedure and site were verified, confirmatory pause was performed. Discussed procedure including risks and potential complications.  Questions answered.  Patient verbalizes understanding and wishes to proceed with VCTE.     Procedure: After providing explanations of the procedure, patient was placed in the supine position with right arm in maximum abduction to allow optimal exposure of right lateral abdomen.  Patient was briefly assessed, Testing was performed in the mid-axillary location, 50Hz Shear Wave pulses were applied and the resulting Shear Wave and Propagation Speed detected with a 3.5 MHz ultrasonic signal, using the FibroScan probe, Skin to liver capsule distance and liver parenchyma were accessed during the entire examination with the FibroScan probe, Patient was instructed to breathe normally and to abstain from sudden movements during the procedure, allowing for random measurements of liver stiffness. At least 10 Shear Waves were produced, Individual measurements of each Shear Wave were calculated.  Patient tolerated the procedure well with no complications.  Meets discharge criteria as was dismissed.  Rates pain 0 out of 10.  Patient will follow up with ordering provider to review results.      Findings  Median liver stiffness score:  4.6  CAP Reading: dB/m:  337    IQR/med %:  13  Interpretation  Fibrosis interpretation is based on medial liver stiffness - Kilopascal (kPa).    Fibrosis Stage:  F 0-1  Steatosis interpretation is based on controlled attenuation parameter - (dB/m).    Steatosis Grade:  S3

## 2022-08-01 NOTE — PATIENT INSTRUCTIONS
1. Fibroscan today to assess for fat and scar tissue in the liver.  2. Ultrasound of the liver every 2 years, next due 7/2024.  3. Repeat liver function tests in September.   4. We will also check immunity markers for Hepatitis A and B and arrange for vaccination if needed.  5. Recommend a referral to a nutritionist to discuss dietary changes. Please call [phone] or email [email or web address] to get scheduled.   6. Return to clinic to be determined by lab results.    There is no FDA approved therapy for non-alcoholic fatty liver disease. Therefore, these things are important:  1. Weight loss goal of 20 lbs.  2. Low carb/sugar, high fiber and protein diet.Try to limit your carb intake to LESS than 30-45 grams of carbs with a meal or LESS than 5-10 grams with any snack (total of any snack foods eaten during that time). Use MyFitness Pal jose c to add up your carbs through the day. Do NOT drink any beverages with calories or carbs (this can lead to high blood sugar and weight gain). Also, some of our patients have been very successful with weight loss using the pre made/planned meal planning services that limit calories and portion size (one example is Sensible Meals but there are many out there).  3. Exercise, 5 days per week, 30 minutes per day, as tolerated.  4. Recommend good control of cholesterol, blood pressure, & blood sugar levels.    In some people, fatty liver can progress to steatohepatitis (inflamatory fatty liver) and possibly to cirrhosis, putting one at increased risk for liver cancer, liver failure, and death. Therefore, the lifestyle changes are very important to decrease this risk.     Website with information about fatty liver and inflammation related to fatty liver ([place]) = www.nashtruth.com  AND [email or web address].com

## 2022-08-01 NOTE — PROGRESS NOTES
[facility] New Patient Visit    Reason for Visit: Fatty Liver     PCP: Chani Olmedo    HPI:  This is a 46 y.o. female with PMH noted below, here for evaluation of fatty liver.    The patient's risk factors for fatty liver disease include:     · Obesity                                        Yes; BMI 33.11  · Dyslipidemia                                No; Last lipid panel normal in 11/2021.  · Insulin resistance/Diabetes         Yes; On Ozempic. Last HgbA1c was 5.6% (11/2021)  · Family history of diabetes           Yes; Father with history of insulin resistance.    She has had mildly elevated liver enzymes in a hepatocellular pattern since 6/2022. Most recent LFT's in 6/2022 showed a mildly elevated ALT of 55; the rest of her LFT's are normal. US in July showed hepatosplenomegaly with echogenic liver parenchyma suggestive of hepatic steatosis. She has no known family history of liver disease. She does not drink alcohol heavily or use illicit drugs. Acute viral hepatitis testing and HIV negative in June. Fibroscan today to stage her liver disease is suggestive of severe fatty infiltration of the liver (S3), with F0-F1 fibrosis, and a low likelihood of cirrhosis. She is well appearing, and denies jaundice, dark urine, pruritus, abdominal distention, lower extremity edema, hematemesis, melena, or periods of confusion suggestive of hepatic encephalopathy.      PMHX:  has a past medical history of Diabetes mellitus, Fatty liver disease, nonalcoholic, and Infertility, female.    PSHX:  has a past surgical history that includes Breast lumpectomy (Left, 2014); Dilation and curettage of uterus (1999); Breast biopsy (Right); Dilation and curettage of uterus (2018); Tonsillectomy; and [place] tooth extraction.    The patient's social and family histories were reviewed by me and updated in the appropriate section of the electronic medical record.    Review of patient's allergies indicates:   Allergen Reactions     Penicillins Hives       Current Outpatient Medications on File Prior to Visit   Medication Sig Dispense Refill    ALPRAZolam (XANAX) 0.5 MG tablet Take 1 tablet (0.5 mg total) by mouth nightly as needed for Insomnia or Anxiety. 30 tablet 0    azelastine (ASTELIN) 137 mcg (0.1 %) nasal spray 1 spray (137 mcg total) by Nasal route 2 (two) times daily as needed for Rhinitis. 30 mL 0    eletriptan (RELPAX) 40 MG tablet Relpax 40 mg tablet   Take 1 tablet as needed by oral route as needed for 30 days.      potassium citrate (UROCIT-K) 5 mEq (540 mg) TbSR Take 1 tablet (5 mEq total) by mouth 2 (two) times daily with meals. 60 tablet 11    semaglutide (OZEMPIC) 0.25 mg or 0.5 mg(2 mg/1.5 mL) pen injector Inject 0.25 mg into the skin every 7 days. 1 pen 0    tamsulosin (FLOMAX) 0.4 mg Cap Take 1 capsule (0.4 mg total) by mouth once daily. 30 capsule 11    methylPREDNISolone (MEDROL DOSEPACK) 4 mg tablet use as directed (Patient not taking: Reported on 8/1/2022) 1 each 0     No current facility-administered medications on file prior to visit.       SOCIAL HISTORY:   Social History     Tobacco Use   Smoking Status Never Smoker   Smokeless Tobacco Never Used       Social History     Substance and Sexual Activity   Alcohol Use Not Currently    Comment: rarely       Social History     Substance and Sexual Activity   Drug Use No       ROS:   GENERAL: Denies fever, chills, weight loss/gain, fatigue  HEENT: Denies headaches, dizziness, vision/hearing changes  CARDIOVASCULAR: Denies chest pain, palpitations, or edema  RESPIRATORY: Denies dyspnea, cough  GI: Denies abdominal pain, rectal bleeding, nausea, vomiting. No change in bowel pattern or color  : Denies dysuria, hematuria   SKIN: Denies rash, itching   NEURO: Denies confusion, memory loss, or mood changes  PSYCH: Denies depression or anxiety  HEME/LYMPH: Denies easy bruising or bleeding    Objective Findings:    PHYSICAL EXAM:   Friendly White female, in no acute  "distress; alert and oriented to person, place and time.  VITALS: /85 (BP Location: Right arm, Patient Position: Sitting, BP Method: Medium (Automatic))   Pulse 84   Temp 97.8 °F (36.6 °C) (Oral)   Resp 18   Ht 5' 4" (1.626 m)   Wt 87.5 kg (192 lb 14.4 oz)   SpO2 97%   BMI 33.11 kg/m²   HENT: Normocephalic, without obvious abnormality.   EYES: Sclerae anicteric.   NECK: No obvious masses.  CARDIOVASCULAR: Regular rate. No peripheral edema.  RESPIRATORY: Normal respiratory effort.   GI: Soft, non-distended, obese abdomen.  EXTREMITIES:  No clubbing, cyanosis or edema.  SKIN: Warm and dry. No jaundice. No rashes noted to exposed skin.   NEURO:  Normal gait. No asterixis.  PSYCH:  Memory intact. Thought and speech pattern appropriate. Behavior normal. No depression or anxiety noted.    DIAGNOSTIC STUDIES:    US Abdomen Limited_Liver  Narrative: EXAMINATION:  US ABDOMEN LIMITED_LIVER    CLINICAL HISTORY:  Other specified abnormal findings of blood chemistry    TECHNIQUE:  Limited ultrasound of the right upper quadrant of the abdomen (including pancreas, liver, gallbladder, common bile duct, and spleen) was performed.    COMPARISON:  Retroperitoneal ultrasound 11/29/2021    FINDINGS:  Pancreas: Visualized portions demonstrate normal contours.    Liver: Enlarged, measuring 18.7 cm.  Echogenic liver parenchyma.  There are hypoechoic regions near the gallbladder fossa.    Gallbladder: Unremarkable.  Negative sonographic De La Cruz sign.  No biliary ductal dilatation.  CBD measures 4 mm.    Spleen: Mildly enlarged, measuring 12.4 cm.    Miscellaneous: No ascites.  Impression: Hepatosplenomegaly and echogenic liver parenchyma, which could reflect steatosis and/or chronic liver disease.  No focal hepatic lesions.    Electronically signed by: Ruy Price  Date:    07/14/2022  Time:    15:09    FIBROSCAN 8/1/2022:    4.6kPa with an IQR 13%;  dB/m    EDUCATION:  Per AVS.    ASSESSMENT & PLAN:  46 y.o. White female " with:    1. Fatty liver  Ambulatory referral/consult to Hepatology    FibroScan (Vibration Controlled Transient Elastography)    Hepatic Function Panel    Hepatitis A antibody, IgG    Hepatitis B Core Antibody, Total    Hepatitis B Surface Antibody, Qual/Quant    Ambulatory Referral/Consult to Nutrition Services - [facility]   2. Hepatosplenomegaly  FibroScan (Vibration Controlled Transient Elastography)    Hepatic Function Panel    Hepatitis A antibody, IgG    Hepatitis B Core Antibody, Total    Hepatitis B Surface Antibody, Qual/Quant   3. Elevated liver enzymes  FibroScan (Vibration Controlled Transient Elastography)    Hepatic Function Panel    Hepatitis A antibody, IgG    Hepatitis B Core Antibody, Total    Hepatitis B Surface Antibody, Qual/Quant   4. Obesity, unspecified classification, unspecified obesity type, unspecified whether serious comorbidity present  Hepatic Function Panel    Hepatitis A antibody, IgG    Hepatitis B Core Antibody, Total    Hepatitis B Surface Antibody, Qual/Quant    Ambulatory Referral/Consult to Nutrition Services - [facility]   5. Prediabetes  Hepatic Function Panel    Hepatitis A antibody, IgG    Hepatitis B Core Antibody, Total    Hepatitis B Surface Antibody, Qual/Quant    Ambulatory Referral/Consult to Nutrition Services - [facility]     - Fibroscan today to stage liver disease.  - Recommend an Ultrasound of the liver every 2 years, next due 7/2024.  - Repeat liver function tests in September.   - Will also check titer levels for Hepatitis A and B with next blood draw, and arrange for vaccination if needed.  - Recommend a referral to a nutritionist to discuss dietary changes.   - Recommend weight loss of 20 lbs, through diet and exercise.  - Recommend good control of cholesterol, blood pressure, & blood sugar levels.    Thank you for allowing me to participate in the care of Kim Padilla       Hepatology Nurse Practitioner  [facility]  8/1/2022 @ 1510    CC'ed note to:   Chani Olmedo MD Lauren McMahill, MD

## 2022-08-31 ENCOUNTER — PATIENT MESSAGE (OUTPATIENT)
Dept: INTERNAL MEDICINE | Facility: CLINIC | Age: 47
End: 2022-08-31
Payer: COMMERCIAL

## 2022-09-05 ENCOUNTER — PATIENT MESSAGE (OUTPATIENT)
Dept: INTERNAL MEDICINE | Facility: CLINIC | Age: 47
End: 2022-09-05
Payer: COMMERCIAL

## 2022-09-05 DIAGNOSIS — R73.03 PREDIABETES: Primary | ICD-10-CM

## 2022-09-06 ENCOUNTER — LAB VISIT (OUTPATIENT)
Dept: LAB | Facility: HOSPITAL | Age: 47
End: 2022-09-06
Attending: STUDENT IN AN ORGANIZED HEALTH CARE EDUCATION/TRAINING PROGRAM
Payer: COMMERCIAL

## 2022-09-06 DIAGNOSIS — R79.89 ELEVATED LFTS: ICD-10-CM

## 2022-09-06 DIAGNOSIS — R74.8 ELEVATED LIVER ENZYMES: ICD-10-CM

## 2022-09-06 DIAGNOSIS — R16.2 HEPATOSPLENOMEGALY: ICD-10-CM

## 2022-09-06 DIAGNOSIS — E66.9 OBESITY, UNSPECIFIED CLASSIFICATION, UNSPECIFIED OBESITY TYPE, UNSPECIFIED WHETHER SERIOUS COMORBIDITY PRESENT: ICD-10-CM

## 2022-09-06 DIAGNOSIS — K76.0 FATTY LIVER: ICD-10-CM

## 2022-09-06 DIAGNOSIS — R73.03 PREDIABETES: ICD-10-CM

## 2022-09-06 LAB
ALBUMIN SERPL BCP-MCNC: 4.1 G/DL (ref 3.5–5.2)
ALP SERPL-CCNC: 81 U/L (ref 55–135)
ALT SERPL W/O P-5'-P-CCNC: 58 U/L (ref 10–44)
AST SERPL-CCNC: 37 U/L (ref 10–40)
BILIRUB DIRECT SERPL-MCNC: 0.2 MG/DL (ref 0.1–0.3)
BILIRUB SERPL-MCNC: 0.4 MG/DL (ref 0.1–1)
HAV IGG SER QL IA: NORMAL
HBV CORE AB SERPL QL IA: NORMAL
PROT SERPL-MCNC: 7 G/DL (ref 6–8.4)

## 2022-09-06 PROCEDURE — 80076 HEPATIC FUNCTION PANEL: CPT | Performed by: NURSE PRACTITIONER

## 2022-09-06 PROCEDURE — 86706 HEP B SURFACE ANTIBODY: CPT | Performed by: NURSE PRACTITIONER

## 2022-09-06 PROCEDURE — 82525 ASSAY OF COPPER: CPT | Performed by: STUDENT IN AN ORGANIZED HEALTH CARE EDUCATION/TRAINING PROGRAM

## 2022-09-06 PROCEDURE — 36415 COLL VENOUS BLD VENIPUNCTURE: CPT | Mod: PO | Performed by: STUDENT IN AN ORGANIZED HEALTH CARE EDUCATION/TRAINING PROGRAM

## 2022-09-06 PROCEDURE — 86704 HEP B CORE ANTIBODY TOTAL: CPT | Performed by: NURSE PRACTITIONER

## 2022-09-06 PROCEDURE — 86790 VIRUS ANTIBODY NOS: CPT | Performed by: NURSE PRACTITIONER

## 2022-09-09 LAB
COPPER SERPL-MCNC: 1095 UG/L (ref 810–1990)
HBV SURFACE AB SER QL IA: NEGATIVE
HBV SURFACE AB SERPL IA-ACNC: <3 MIU/ML

## 2022-09-11 DIAGNOSIS — R73.03 PREDIABETES: ICD-10-CM

## 2022-09-11 RX ORDER — SEMAGLUTIDE 1.34 MG/ML
INJECTION, SOLUTION SUBCUTANEOUS
OUTPATIENT
Start: 2022-09-11

## 2022-09-11 NOTE — TELEPHONE ENCOUNTER
Refill Decision Note   Kim Padilla  is requesting a refill authorization.  Brief Assessment and Rationale for Refill:  Quick Discontinue     Medication Therapy Plan:  PCP reordered 9/6/22 with dose change    Medication Reconciliation Completed: No   Comments:     No Care Gaps recommended.     Note composed:10:08 AM 09/11/2022

## 2022-09-11 NOTE — TELEPHONE ENCOUNTER
No new care gaps identified.  [facility] Embedded Care Gaps. Reference number: [id number]. 9/11/2022   7:32:14 AM CDT

## 2022-09-14 ENCOUNTER — OFFICE VISIT (OUTPATIENT)
Dept: HEPATOLOGY | Facility: CLINIC | Age: 47
End: 2022-09-14
Payer: COMMERCIAL

## 2022-09-14 VITALS
DIASTOLIC BLOOD PRESSURE: 99 MMHG | OXYGEN SATURATION: 97 % | SYSTOLIC BLOOD PRESSURE: 140 MMHG | RESPIRATION RATE: 18 BRPM | TEMPERATURE: 98 F | HEART RATE: 92 BPM | HEIGHT: 64 IN | WEIGHT: 188.25 LBS | BODY MASS INDEX: 32.14 KG/M2

## 2022-09-14 DIAGNOSIS — E66.9 CLASS 1 OBESITY WITH BODY MASS INDEX (BMI) OF 32.0 TO 32.9 IN ADULT, UNSPECIFIED OBESITY TYPE, UNSPECIFIED WHETHER SERIOUS COMORBIDITY PRESENT: ICD-10-CM

## 2022-09-14 DIAGNOSIS — R74.01 ELEVATED ALT MEASUREMENT: ICD-10-CM

## 2022-09-14 DIAGNOSIS — R16.2 HEPATOSPLENOMEGALY: ICD-10-CM

## 2022-09-14 DIAGNOSIS — K76.0 FATTY LIVER: Primary | ICD-10-CM

## 2022-09-14 DIAGNOSIS — Z23 NEED FOR PROPHYLACTIC VACCINATION AGAINST HEPATITIS A AND HEPATITIS B: ICD-10-CM

## 2022-09-14 PROCEDURE — 3080F DIAST BP >= 90 MM HG: CPT | Mod: CPTII,S$GLB,, | Performed by: NURSE PRACTITIONER

## 2022-09-14 PROCEDURE — 3008F BODY MASS INDEX DOCD: CPT | Mod: CPTII,S$GLB,, | Performed by: NURSE PRACTITIONER

## 2022-09-14 PROCEDURE — 99999 PR PBB SHADOW E&M-EST. PATIENT-LVL V: CPT | Mod: PBBFAC,,, | Performed by: NURSE PRACTITIONER

## 2022-09-14 PROCEDURE — 99214 PR OFFICE/OUTPT VISIT, EST, LEVL IV, 30-39 MIN: ICD-10-PCS | Mod: S$GLB,,, | Performed by: NURSE PRACTITIONER

## 2022-09-14 PROCEDURE — 3077F PR MOST RECENT SYSTOLIC BLOOD PRESSURE >= 140 MM HG: ICD-10-PCS | Mod: CPTII,S$GLB,, | Performed by: NURSE PRACTITIONER

## 2022-09-14 PROCEDURE — 3077F SYST BP >= 140 MM HG: CPT | Mod: CPTII,S$GLB,, | Performed by: NURSE PRACTITIONER

## 2022-09-14 PROCEDURE — 1160F PR REVIEW ALL MEDS BY PRESCRIBER/CLIN PHARMACIST DOCUMENTED: ICD-10-PCS | Mod: CPTII,S$GLB,, | Performed by: NURSE PRACTITIONER

## 2022-09-14 PROCEDURE — 3008F PR BODY MASS INDEX (BMI) DOCUMENTED: ICD-10-PCS | Mod: CPTII,S$GLB,, | Performed by: NURSE PRACTITIONER

## 2022-09-14 PROCEDURE — 1159F PR MEDICATION LIST DOCUMENTED IN MEDICAL RECORD: ICD-10-PCS | Mod: CPTII,S$GLB,, | Performed by: NURSE PRACTITIONER

## 2022-09-14 PROCEDURE — 3080F PR MOST RECENT DIASTOLIC BLOOD PRESSURE >= 90 MM HG: ICD-10-PCS | Mod: CPTII,S$GLB,, | Performed by: NURSE PRACTITIONER

## 2022-09-14 PROCEDURE — 99999 PR PBB SHADOW E&M-EST. PATIENT-LVL V: ICD-10-PCS | Mod: PBBFAC,,, | Performed by: NURSE PRACTITIONER

## 2022-09-14 PROCEDURE — 99214 OFFICE O/P EST MOD 30 MIN: CPT | Mod: S$GLB,,, | Performed by: NURSE PRACTITIONER

## 2022-09-14 PROCEDURE — 1160F RVW MEDS BY RX/DR IN RCRD: CPT | Mod: CPTII,S$GLB,, | Performed by: NURSE PRACTITIONER

## 2022-09-14 PROCEDURE — 1159F MED LIST DOCD IN RCRD: CPT | Mod: CPTII,S$GLB,, | Performed by: NURSE PRACTITIONER

## 2022-09-14 RX ORDER — PHENTERMINE HYDROCHLORIDE 37.5 MG/1
18.75 TABLET ORAL 2 TIMES DAILY
COMMUNITY
Start: 2022-03-31 | End: 2023-04-20 | Stop reason: ALTCHOICE

## 2022-09-14 RX ORDER — METFORMIN HYDROCHLORIDE 500 MG/1
TABLET, EXTENDED RELEASE ORAL
COMMUNITY
Start: 2022-09-03 | End: 2023-04-20 | Stop reason: ALTCHOICE

## 2022-09-14 RX ORDER — GLYCOPYRRONIUM 2.4 G/100G
CLOTH TOPICAL
COMMUNITY
Start: 2022-08-30 | End: 2023-04-20 | Stop reason: ALTCHOICE

## 2022-09-14 RX ORDER — TRANEXAMIC ACID 650 MG/1
325 TABLET ORAL 2 TIMES DAILY
COMMUNITY
Start: 2022-09-06 | End: 2023-04-20 | Stop reason: ALTCHOICE

## 2022-09-14 RX ORDER — IBUPROFEN 800 MG/1
800 TABLET ORAL 3 TIMES DAILY PRN
COMMUNITY
Start: 2022-09-04 | End: 2023-11-29

## 2022-09-14 RX ORDER — MUPIROCIN 20 MG/G
OINTMENT TOPICAL 2 TIMES DAILY PRN
COMMUNITY
Start: 2022-09-03 | End: 2023-05-15

## 2022-09-14 RX ORDER — ISOTRETINOIN 40 MG/1
40 CAPSULE, LIQUID FILLED ORAL DAILY
COMMUNITY
Start: 2022-08-31 | End: 2023-10-19 | Stop reason: ALTCHOICE

## 2022-09-14 RX ORDER — FLUTICASONE PROPIONATE 50 MCG
SPRAY, SUSPENSION (ML) NASAL 2 TIMES DAILY PRN
COMMUNITY
Start: 2022-09-03 | End: 2023-11-29

## 2022-09-14 NOTE — PROGRESS NOTES
[facility] Established Patient Visit    Reason for Visit: Fatty Liver     PCP: Chani Olmedo    HPI:  This is a 46 y.o. female with PMH noted below, here for evaluation of fatty liver. She was last seen in clinic by myself on 8/1/2022.    The patient's risk factors for fatty liver disease include:     Obesity                                        Yes; BMI 32.32  Dyslipidemia                                No; Last lipid panel normal in 11/2021.  Insulin resistance/Diabetes         Yes; On Ozempic. Last HgbA1c was 5.6% (11/2021)  Family history of diabetes           Yes; Father with history of insulin resistance.    She has had mildly elevated liver enzymes in a hepatocellular pattern since 6/2022. Fibroscan to stage her liver disease in 8/2022 was suggestive of severe fatty infiltration of the liver (S3), with F0-F1 fibrosis, and a low likelihood of cirrhosis.   She has lost 10 pounds over the past few months through dietary changes; Ozempic dose was also increased recently. Most recent LFT's in 9/2022 showed a mildly elevated ALT of 58; the rest of her LFT's are normal. Abdominal US in 7/2022 showed hepatosplenomegaly with echogenic liver parenchyma suggestive of hepatic steatosis. She has no known family history of liver disease. She does not drink alcohol heavily or use illicit drugs. Acute viral hepatitis testing and HIV negative.  She is not immune to Hepatitis A or B. She is well appearing, and denies jaundice, dark urine, pruritus, abdominal distention, lower extremity edema, hematemesis, melena, or periods of confusion suggestive of hepatic encephalopathy.      PMHX:  has a past medical history of Diabetes mellitus, Fatty liver disease, nonalcoholic, and Infertility, female.    PSHX:  has a past surgical history that includes Breast lumpectomy (Left, 2014); Dilation and curettage of uterus (1999); Breast biopsy (Right); Dilation and curettage of uterus (2018); Tonsillectomy; and [place]  tooth extraction.    The patient's social and family histories were reviewed by me and updated in the appropriate section of the electronic medical record.    Review of patient's allergies indicates:   Allergen Reactions    Penicillins Hives       Current Outpatient Medications on File Prior to Visit   Medication Sig Dispense Refill    ALPRAZolam (XANAX) 0.5 MG tablet Take 1 tablet (0.5 mg total) by mouth nightly as needed for Insomnia or Anxiety. 30 tablet 0    azelastine (ASTELIN) 137 mcg (0.1 %) nasal spray 1 spray (137 mcg total) by Nasal route 2 (two) times daily as needed for Rhinitis. 30 mL 0    eletriptan (RELPAX) 40 MG tablet Relpax 40 mg tablet   Take 1 tablet as needed by oral route as needed for 30 days.      fluticasone propionate (FLONASE) 50 mcg/actuation nasal spray by Each Nostril route 2 (two) times daily as needed.      ibuprofen (ADVIL,MOTRIN) 800 MG tablet Take 800 mg by mouth 3 (three) times daily as needed.      metFORMIN (GLUCOPHAGE-XR) 500 MG ER 24hr tablet       mupirocin (BACTROBAN) 2 % ointment Apply topically 2 (two) times daily as needed.      MYORISAN 40 mg capsule Take 40 mg by mouth once daily.      phentermine (ADIPEX-P) 37.5 mg tablet Take 18.75 mg by mouth 2 (two) times daily.      potassium citrate (UROCIT-K) 5 mEq (540 mg) TbSR Take 1 tablet (5 mEq total) by mouth 2 (two) times daily with meals. 60 tablet 11    QBREXZA 2.4 % Towl Apply topically.      semaglutide (OZEMPIC) 0.25 mg or 0.5 mg(2 mg/1.5 mL) pen injector Inject 0.5 mg into the skin every 7 days. 1 pen 0    tamsulosin (FLOMAX) 0.4 mg Cap Take 1 capsule (0.4 mg total) by mouth once daily. 30 capsule 11    tranexamic acid (LYSTEDA) 650 mg tablet Take 325 mg by mouth 2 (two) times daily.       No current facility-administered medications on file prior to visit.     SOCIAL HISTORY:   Social History     Tobacco Use   Smoking Status Never   Smokeless Tobacco Never     Social History     Substance and Sexual Activity  "  Alcohol Use Not Currently    Comment: rarely     Social History     Substance and Sexual Activity   Drug Use No       ROS:   GENERAL: Denies fever, chills, weight loss/gain, fatigue  HEENT: Denies headaches, dizziness, vision/hearing changes  CARDIOVASCULAR: Denies chest pain, palpitations, or edema  RESPIRATORY: Denies dyspnea, cough  GI: Denies abdominal pain, rectal bleeding, nausea, vomiting. No change in bowel pattern or color  : Denies dysuria, hematuria   SKIN: Denies rash, itching   NEURO: Denies confusion, memory loss, or mood changes  PSYCH: Denies depression or anxiety  HEME/LYMPH: Denies easy bruising or bleeding    Objective Findings:    PHYSICAL EXAM:   Friendly White female, in no acute distress; alert and oriented to person, place and time.  VITALS: BP (!) 140/99 (BP Location: Right arm, Patient Position: Sitting, BP Method: Medium (Automatic))   Pulse 92   Temp 98.1 °F (36.7 °C) (Oral)   Resp 18   Ht 5' 4" (1.626 m)   Wt 85.4 kg (188 lb 4.4 oz)   SpO2 97%   BMI 32.32 kg/m²   HENT: Normocephalic, without obvious abnormality.   EYES: Sclerae anicteric.   NECK: No obvious masses.  CARDIOVASCULAR: Regular rate. No peripheral edema.  RESPIRATORY: Normal respiratory effort.   GI: Soft, non-distended, obese abdomen.  EXTREMITIES:  No clubbing, cyanosis or edema.  SKIN: Warm and dry. No jaundice. No rashes noted to exposed skin.   NEURO:  Normal gait. No asterixis.  PSYCH:  Memory intact. Thought and speech pattern appropriate. Behavior normal. No depression or anxiety noted.    DIAGNOSTIC STUDIES:    US Abdomen Limited_Liver  Narrative: EXAMINATION:  US ABDOMEN LIMITED_LIVER    CLINICAL HISTORY:  Other specified abnormal findings of blood chemistry    TECHNIQUE:  Limited ultrasound of the right upper quadrant of the abdomen (including pancreas, liver, gallbladder, common bile duct, and spleen) was performed.    COMPARISON:  Retroperitoneal ultrasound 11/29/2021    FINDINGS:  Pancreas: Visualized " portions demonstrate normal contours.    Liver: Enlarged, measuring 18.7 cm.  Echogenic liver parenchyma.  There are hypoechoic regions near the gallbladder fossa.    Gallbladder: Unremarkable.  Negative sonographic De La Cruz sign.  No biliary ductal dilatation.  CBD measures 4 mm.    Spleen: Mildly enlarged, measuring 12.4 cm.    Miscellaneous: No ascites.  Impression: Hepatosplenomegaly and echogenic liver parenchyma, which could reflect steatosis and/or chronic liver disease.  No focal hepatic lesions.    Electronically signed by: Ruy Price  Date:    07/14/2022  Time:    15:09    FIBROSCAN 8/1/2022:    Findings  Median liver stiffness score:  4.6  CAP Reading: dB/m:  337     IQR/med %:  13  Interpretation  Fibrosis interpretation is based on medial liver stiffness - Kilopascal (kPa).     Fibrosis Stage:  F 0-1  Steatosis interpretation is based on controlled attenuation parameter - (dB/m).     Steatosis Grade:  S3    EDUCATION:  Per AVS.    ASSESSMENT & PLAN:  46 y.o. White female with:    1. Fatty liver  Hepatic Function Panel    Lipid Panel    Hemoglobin A1C    FibroScan [place] (Vibration Controlled Transient Elastography)      2. Elevated ALT measurement  Hepatic Function Panel    Lipid Panel    Hemoglobin A1C    FibroScan [place] (Vibration Controlled Transient Elastography)      3. Hepatosplenomegaly        4. Class 1 obesity with body mass index (BMI) of 32.0 to 32.9 in adult, unspecified obesity type, unspecified whether serious comorbidity present        5. Need for prophylactic vaccination against hepatitis A and hepatitis B  hepatitis A and B vaccine, PF, (TWINRIX) 720 ETIENNE unit- 20 mcg/mL Syrg suspension        - Repeat liver function tests in 3 months.  - Recommend Fibroscan to re-stage liver disease in 1 year.  - Recommend an Ultrasound of the liver every 2 years, next due 7/2024.  - Recommend vaccination for Hepatitis A and B (RX sent to [facility]).  - Recommend additional weight  loss of 15 lbs, through diet and exercise.  - Recommend good control of cholesterol, blood pressure, & blood sugar levels.  - Return to clinic in 1 year with Fibroscan prior to visit.    Thank you for allowing me to participate in the care of Kim Padilla       Hepatology Nurse Practitioner  [facility]  9/14/2022 @ 0955    CC'ed note to:   No ref. provider found  Chani Olmedo MD

## 2022-09-14 NOTE — PATIENT INSTRUCTIONS
1. Repeat Fibroscan to restage liver disease in 1 year.  2. Ultrasound of the liver every 2 years, next due 7/2024.  3. Repeat liver function tests in 3 months.  4. Recommend vaccination for Hepatitis A and B (RX sent to [facility].  5. Return to clinic in 1 year.    There is no FDA approved therapy for non-alcoholic fatty liver disease. Therefore, these things are important:  1. Weight loss goal of additional 15 lbs.  2. Low carb/sugar, high fiber and protein diet.Try to limit your carb intake to LESS than 30-45 grams of carbs with a meal or LESS than 5-10 grams with any snack (total of any snack foods eaten during that time). Use MyFitness Pal jose c to add up your carbs through the day. Do NOT drink any beverages with calories or carbs (this can lead to high blood sugar and weight gain). Also, some of our patients have been very successful with weight loss using the pre made/planned meal planning services that limit calories and portion size (one example is Sensible Meals but there are many out there).  3. Exercise, 5 days per week, 30 minutes per day, as tolerated.  4. Recommend good control of cholesterol, blood pressure, & blood sugar levels.    In some people, fatty liver can progress to steatohepatitis (inflamatory fatty liver) and possibly to cirrhosis, putting one at increased risk for liver cancer, liver failure, and death. Therefore, the lifestyle changes are very important to decrease this risk.     Website with information about fatty liver and inflammation related to fatty liver ([place]) = www.nashtruth.com  AND [email or web address].com

## 2022-09-16 ENCOUNTER — HOSPITAL ENCOUNTER (EMERGENCY)
Facility: HOSPITAL | Age: 47
Discharge: HOME | End: 2022-09-17
Attending: EMERGENCY MEDICINE
Payer: COMMERCIAL

## 2022-09-16 ENCOUNTER — PATIENT MESSAGE (OUTPATIENT)
Dept: INTERNAL MEDICINE | Facility: CLINIC | Age: 47
End: 2022-09-16
Payer: COMMERCIAL

## 2022-09-16 ENCOUNTER — HOSPITAL ENCOUNTER (EMERGENCY)
Facility: HOSPITAL | Age: 47
Discharge: ANOTHER HOSPITAL | End: 2022-09-16
Attending: EMERGENCY MEDICINE
Payer: COMMERCIAL

## 2022-09-16 VITALS
DIASTOLIC BLOOD PRESSURE: 83 MMHG | SYSTOLIC BLOOD PRESSURE: 163 MMHG | OXYGEN SATURATION: 97 % | HEIGHT: 64 IN | RESPIRATION RATE: 18 BRPM | HEART RATE: 94 BPM | BODY MASS INDEX: 31.58 KG/M2 | WEIGHT: 185 LBS | TEMPERATURE: 99 F

## 2022-09-16 VITALS
RESPIRATION RATE: 18 BRPM | HEART RATE: 89 BPM | HEIGHT: 64 IN | BODY MASS INDEX: 31.58 KG/M2 | TEMPERATURE: 98 F | DIASTOLIC BLOOD PRESSURE: 90 MMHG | OXYGEN SATURATION: 98 % | WEIGHT: 185 LBS | SYSTOLIC BLOOD PRESSURE: 142 MMHG

## 2022-09-16 DIAGNOSIS — H57.02 ANISOCORIA: Primary | ICD-10-CM

## 2022-09-16 LAB
ALBUMIN SERPL BCP-MCNC: 4.2 G/DL (ref 3.5–5.2)
ALP SERPL-CCNC: 81 U/L (ref 55–135)
ALT SERPL W/O P-5'-P-CCNC: 59 U/L (ref 10–44)
ANION GAP SERPL CALC-SCNC: 12 MMOL/L (ref 8–16)
AST SERPL-CCNC: 32 U/L (ref 10–40)
B-HCG UR QL: NEGATIVE
BASOPHILS # BLD AUTO: 0.04 K/UL (ref 0–0.2)
BASOPHILS NFR BLD: 0.3 % (ref 0–1.9)
BILIRUB SERPL-MCNC: 0.7 MG/DL (ref 0.1–1)
BUN SERPL-MCNC: 11 MG/DL (ref 6–20)
CALCIUM SERPL-MCNC: 10.4 MG/DL (ref 8.7–10.5)
CHLORIDE SERPL-SCNC: 106 MMOL/L (ref 95–110)
CO2 SERPL-SCNC: 22 MMOL/L (ref 23–29)
CREAT SERPL-MCNC: 0.5 MG/DL (ref 0.5–1.4)
CREAT SERPL-MCNC: 0.7 MG/DL (ref 0.5–1.4)
CTP QC/QA: YES
DIFFERENTIAL METHOD: ABNORMAL
EOSINOPHIL # BLD AUTO: 0.2 K/UL (ref 0–0.5)
EOSINOPHIL NFR BLD: 1.3 % (ref 0–8)
ERYTHROCYTE [DISTWIDTH] IN BLOOD BY AUTOMATED COUNT: 11.6 % (ref 11.5–14.5)
EST. GFR  (NO RACE VARIABLE): >60 ML/MIN/1.73 M^2
GLUCOSE SERPL-MCNC: 82 MG/DL (ref 70–110)
HCT VFR BLD AUTO: 40.3 % (ref 37–48.5)
HGB BLD-MCNC: 13.9 G/DL (ref 12–16)
IMM GRANULOCYTES # BLD AUTO: 0.06 K/UL (ref 0–0.04)
IMM GRANULOCYTES NFR BLD AUTO: 0.5 % (ref 0–0.5)
LYMPHOCYTES # BLD AUTO: 3.4 K/UL (ref 1–4.8)
LYMPHOCYTES NFR BLD: 26.4 % (ref 18–48)
MCH RBC QN AUTO: 29.6 PG (ref 27–31)
MCHC RBC AUTO-ENTMCNC: 34.5 G/DL (ref 32–36)
MCV RBC AUTO: 86 FL (ref 82–98)
MONOCYTES # BLD AUTO: 1.1 K/UL (ref 0.3–1)
MONOCYTES NFR BLD: 8.4 % (ref 4–15)
NEUTROPHILS # BLD AUTO: 8.2 K/UL (ref 1.8–7.7)
NEUTROPHILS NFR BLD: 63.1 % (ref 38–73)
NRBC BLD-RTO: 0 /100 WBC
PLATELET # BLD AUTO: 278 K/UL (ref 150–450)
PMV BLD AUTO: 9.9 FL (ref 9.2–12.9)
POCT GLUCOSE: 83 MG/DL (ref 70–110)
POTASSIUM SERPL-SCNC: 3.6 MMOL/L (ref 3.5–5.1)
PROT SERPL-MCNC: 7.4 G/DL (ref 6–8.4)
RBC # BLD AUTO: 4.7 M/UL (ref 4–5.4)
SAMPLE: NORMAL
SODIUM SERPL-SCNC: 140 MMOL/L (ref 136–145)
WBC # BLD AUTO: 12.92 K/UL (ref 3.9–12.7)

## 2022-09-16 PROCEDURE — 99900035 HC TECH TIME PER 15 MIN (STAT)

## 2022-09-16 PROCEDURE — 82962 GLUCOSE BLOOD TEST: CPT

## 2022-09-16 PROCEDURE — 85025 COMPLETE CBC W/AUTO DIFF WBC: CPT | Performed by: EMERGENCY MEDICINE

## 2022-09-16 PROCEDURE — 99284 EMERGENCY DEPT VISIT MOD MDM: CPT | Mod: ,,, | Performed by: EMERGENCY MEDICINE

## 2022-09-16 PROCEDURE — 99282 EMERGENCY DEPT VISIT SF MDM: CPT | Mod: 25,27

## 2022-09-16 PROCEDURE — 80053 COMPREHEN METABOLIC PANEL: CPT | Performed by: EMERGENCY MEDICINE

## 2022-09-16 PROCEDURE — 81025 URINE PREGNANCY TEST: CPT | Performed by: EMERGENCY MEDICINE

## 2022-09-16 PROCEDURE — 25500020 PHARM REV CODE 255: Performed by: EMERGENCY MEDICINE

## 2022-09-16 PROCEDURE — 25000003 PHARM REV CODE 250: Performed by: EMERGENCY MEDICINE

## 2022-09-16 PROCEDURE — 99284 PR EMERGENCY DEPT VISIT,LEVEL IV: ICD-10-PCS | Mod: ,,, | Performed by: EMERGENCY MEDICINE

## 2022-09-16 PROCEDURE — 99285 EMERGENCY DEPT VISIT HI MDM: CPT | Mod: 25

## 2022-09-16 PROCEDURE — 82565 ASSAY OF CREATININE: CPT

## 2022-09-16 RX ORDER — TETRACAINE HYDROCHLORIDE 5 MG/ML
2 SOLUTION OPHTHALMIC
Status: DISCONTINUED | OUTPATIENT
Start: 2022-09-16 | End: 2022-09-16 | Stop reason: HOSPADM

## 2022-09-16 RX ADMIN — IOHEXOL 100 ML: 350 INJECTION, SOLUTION INTRAVENOUS at 07:09

## 2022-09-16 NOTE — ED PROVIDER NOTES
Encounter Date: 9/16/2022       History     Chief Complaint   Patient presents with    Headache    Blurred Vision     Pt presents to ED today reports blurry vision to both eyes onset this am upon waking up 0530   Headache to right side of head 1430   Right eye dilated 1615   Pt reports having stiff neck onset x 1 week ago.   Denies head trauma or injury      Kim Padilla is a 46 y.o. female who  has a past medical history of Diabetes mellitus, Fatty liver disease, nonalcoholic, and Infertility, female.    The patient presents to the ED due to blurred vision.  Patient states that her vision felt blurry this morning when she woke up.  A few hours ago a person had noticed that her pupils were unequal.  She states that 4 hours ago she began having a right-sided headache.  She states her vision is blurred mostly in her right eye which has the larger pupil.  She states that she has been having some intermittent stiffness to her neck for the past week and sore throat. It is better today. She was recently diagnosed with diabetes. She denies any new medications.  She denies any weakness numbness dizziness or double vision.  No exacerbating relieving factors.  Patient does wear contacts.      Review of patient's allergies indicates:   Allergen Reactions    Penicillins Hives     Past Medical History:   Diagnosis Date    Diabetes mellitus     Fatty liver disease, nonalcoholic     Infertility, female      Past Surgical History:   Procedure Laterality Date    BREAST BIOPSY Right     benign    BREAST LUMPECTOMY Left 2014    Dr Brown - benign    DILATION AND CURETTAGE OF UTERUS  1999    for SAB    DILATION AND CURETTAGE OF UTERUS  2018    TONSILLECTOMY      WISDOM TOOTH EXTRACTION       Family History   Problem Relation Age of Onset    Graves' disease Mother     Hypertension Father     Hyperlipidemia Father     Prostate cancer Father     Insulin resistance Father     Thyroid cancer Sister     Ovarian cancer Sister      Thyroid disease Maternal Grandmother         s/p thyroidectomy    Heart attack Maternal Grandfather         in early 60's    Coronary artery disease Maternal Grandfather     Lung disease Paternal Grandmother     Heart attack Paternal Grandfather     Coronary artery disease Paternal Grandfather     Stroke Paternal Grandfather     Breast cancer Maternal Cousin     Colon cancer Neg Hx      Social History     Tobacco Use    Smoking status: Never    Smokeless tobacco: Never   Substance Use Topics    Alcohol use: Not Currently     Comment: rarely    Drug use: No     Review of Systems   Constitutional:  Negative for chills and fever.   HENT:  Negative for sore throat.    Eyes:  Positive for visual disturbance. Negative for photophobia and pain.   Respiratory:  Negative for cough and shortness of breath.    Cardiovascular:  Negative for chest pain.   Gastrointestinal:  Negative for nausea and vomiting.   Genitourinary:  Negative for dysuria, frequency and urgency.   Musculoskeletal:  Negative for back pain, neck pain and neck stiffness.   Skin:  Negative for rash and wound.   Neurological:  Positive for headaches ((mild)). Negative for syncope and weakness.   Hematological:  Does not bruise/bleed easily.   Psychiatric/Behavioral:  Negative for agitation, behavioral problems and confusion.      Physical Exam     Initial Vitals [09/16/22 1809]   BP Pulse Resp Temp SpO2   (!) 147/89 83 18 98.3 °F (36.8 °C) 100 %      MAP       --         Physical Exam    Constitutional: No distress.   HENT:   Head: Normocephalic and atraumatic.   Eyes: Conjunctivae and EOM are normal. Right eye exhibits no discharge. Left eye exhibits no discharge.   Negative for ptosis   Cardiovascular:  Intact distal pulses.           Pulmonary/Chest: No respiratory distress.     Neurological: She is alert.   Dilated right pupil left pupil within normal limits.  When light is shined in the left pupil the right pupil does not constrict  Extraocular movements  are intact, otherwise cranial nerves 2-12 are intact  Finger to nose within normal limits  Negative romberg  Gait normal  Equal strength to bilateral upper extremities, SILT  Equal strength to bilateral lower extremities, SILT     Skin: Skin is warm and dry.   Psychiatric: She has a normal mood and affect.       ED Course   Procedures  Labs Reviewed   COMPREHENSIVE METABOLIC PANEL - Abnormal; Notable for the following components:       Result Value    CO2 22 (*)     ALT 59 (*)     All other components within normal limits   CBC W/ AUTO DIFFERENTIAL - Abnormal; Notable for the following components:    WBC 12.92 (*)     Gran # (ANC) 8.2 (*)     Immature Grans (Abs) 0.06 (*)     Mono # 1.1 (*)     All other components within normal limits   POCT URINE PREGNANCY   POCT GLUCOSE   ISTAT CREATININE   POCT GLUCOSE MONITORING CONTINUOUS          Imaging Results              CTA Head and Neck (xpd) (Final result)  Result time 09/16/22 20:33:04      Final result by Magi Hsu MD (09/16/22 20:33:04)                   Impression:      No acute intracranial abnormality.    No high-grade stenosis or major vessel occlusion.      Electronically signed by: Magi Hsu  Date:    09/16/2022  Time:    20:33               Narrative:    EXAMINATION:  CTA HEAD AND NECK (XPD)    CLINICAL HISTORY:  Vision loss, monocular;right afferent pupillary defect, dilated left pupil.;    TECHNIQUE:  Non contrast low dose axial images were obtained through the head. CT angiogram was performed from the level of the payam to the top of the head following the IV administration of 100mL of Omnipaque 350.   Sagittal and coronal reconstructions and maximum intensity projection reconstructions were performed. Arterial stenosis percentages are based on NASCET measurement criteria.  [employer] software was utilized to evaluate for intracranial angiography.    COMPARISON:  None    FINDINGS:  Intracranial Compartment:    Ventricles and sulci are normal  in size for age without evidence of hydrocephalus. No extra-axial acute hemorrhage or fluid collections.    The brain parenchyma appears normal. No parenchymal mass, acute hemorrhage, edema, or major vascular distribution infarct.    Skull/Extracranial Contents (limited evaluation): No fracture. Mastoid air cells and paranasal sinuses are essentially clear.    Non-Vascular Structures of the Neck/Thoracic Inlet (limited evaluation): Normal.    Aorta: Normal 3 vessel arch.    Extracranial carotid circulation: No hemodynamically significant stenosis, aneurysmal dilatation, or dissection.    Extracranial vertebral circulation: No hemodynamically significant stenosis, aneurysmal dilatation, or dissection.    Intracranial Arteries: No focal high-grade stenosis, occlusion, or aneurysm.    Venous structures (limited evaluation): Normal.                                       Medications   TETRAcaine HCl (PF) 0.5 % Drop 2 drop (has no administration in time range)   fluorescein ophthalmic strip 1 each (has no administration in time range)   iohexoL (OMNIPAQUE 350) injection 100 mL (100 mLs Intravenous Given 9/16/22 1954)     Medical Decision Making:   Initial Assessment:   46-year-old woman presenting with asymmetrical pupils and blurred vision and her affect pupil her vital signs are stable.  She denies any complete blindness or intermittent blindness to suggest CRAO or any ocular pain. CVA felt to be unlikely given exam and history. She would be out of window for TPA based on onset of symptoms.  Will plan to obtain a CTA of her head and neck to evaluate for arterial dissection aneurysm or other mass and will consult Ophthalmology.  Differential Diagnosis:   Differential Diagnosis includes, but is not limited to:  Acute glaucoma, open globe, ocular foreign body, retinal detachment, vitreous hemorrhage, endophthalmitis, traumatic injury, orbital fracture, corneal abrasion/ulcer, retinal vascular occlusion, optic neuritis,  periorbital/orbital cellulitis, hyphema, hypopion, iritis, UV keratitis, subconjunctival hemorrhage, conjunctivitis, blepharitis, chalazion/hordeolum, benign vitreous floaters, CVA    ED Management:  Patient's labs are without significant abnormality.  CTA head and neck without acute findings  Other:   I have discussed this case with another health care provider.       <> Summary of the Discussion: Dicussed with Dr. Yeung ophthalmology, after reviewing patient's medications she uses a QBREXZA towel for axillary hyperhidrosis which is an anticholinergic.  Suspect patient may have gotten some in her eye  causing pupillary dilation given her lack of other neurologic findings and CTA head neck results.  He recommends transfer for ophthalmology evaluation.                        Clinical Impression:   Final diagnoses:  [H57.02] Anisocoria (Primary)      ED Disposition Condition    Transfer to Another Facility Stable               Portions of this note were dictated using voice recognition software and may contain dictation related errors in spelling/grammar/syntax not found on text review       Reji May Jr., MD  09/16/22 2148       Reji May Jr., MD  09/16/22 2145

## 2022-09-17 NOTE — ED TRIAGE NOTES
Kim Padilla, a 46 y.o. female presents to the ED transfer from [facility] w/ complaint of pupil enlargement in right eye since 1600. States she has HA on right side with light sensitivity. Blurry vision in both eyes.    Triage note:  Chief Complaint   Patient presents with    Transfer     Pt reports as transfer from [facility] for ophthalmology consult.     Review of patient's allergies indicates:   Allergen Reactions    Penicillins Hives     Past Medical History:   Diagnosis Date    Diabetes mellitus     Fatty liver disease, nonalcoholic     Infertility, female

## 2022-09-17 NOTE — ED PROVIDER NOTES
Encounter Date: 9/16/2022    SCRIBE #1 NOTE: I, Karla Hayes, am scribing for, and in the presence of,  Taqueria Bonner DO. I have scribed the following portions of the note - Other sections scribed: HPI, ROS.     History     Chief Complaint   Patient presents with    Transfer     Pt reports as transfer from [facility] for ophthalmology consult.     Kim Padilla is a 46 y.o. female with a prior medical history of infertility, DM, liver disease, migraines, URI 1.5 weeks ago presenting with unequal pupils with onset today. Associated symptoms include blurry vision, photophobia, and headache. She also notes neck stiffness for the past 6 days which is improved.  She denies use of eye drops. She went to another hospital where she received a CT which was normal.  The history is provided by the patient and medical records. No  was used.   Review of patient's allergies indicates:   Allergen Reactions    Penicillins Hives     Past Medical History:   Diagnosis Date    Diabetes mellitus     Fatty liver disease, nonalcoholic     Infertility, female      Past Surgical History:   Procedure Laterality Date    BREAST BIOPSY Right     benign    BREAST LUMPECTOMY Left 2014    Dr Brown - benign    DILATION AND CURETTAGE OF UTERUS  1999    for SAB    DILATION AND CURETTAGE OF UTERUS  2018    TONSILLECTOMY      WISDOM TOOTH EXTRACTION       Family History   Problem Relation Age of Onset    Graves' disease Mother     Hypertension Father     Hyperlipidemia Father     Prostate cancer Father     Insulin resistance Father     Thyroid cancer Sister     Ovarian cancer Sister     Thyroid disease Maternal Grandmother         s/p thyroidectomy    Heart attack Maternal Grandfather         in early 60's    Coronary artery disease Maternal Grandfather     Lung disease Paternal Grandmother     Heart attack Paternal Grandfather     Coronary artery disease Paternal Grandfather     Stroke Paternal Grandfather      Breast cancer Maternal Cousin     Colon cancer Neg Hx      Social History     Tobacco Use    Smoking status: Never    Smokeless tobacco: Never   Substance Use Topics    Alcohol use: Not Currently     Comment: rarely    Drug use: No     Review of Systems   Constitutional:  Negative for diaphoresis, fatigue and fever.   HENT:  Negative for congestion and sore throat.    Eyes:  Positive for photophobia and visual disturbance (blurry). Negative for pain.   Respiratory:  Negative for chest tightness, shortness of breath and wheezing.    Cardiovascular:  Negative for chest pain and palpitations.   Gastrointestinal:  Negative for abdominal pain, nausea and vomiting.   Genitourinary:  Negative for dysuria, frequency and hematuria.   Musculoskeletal:  Positive for neck stiffness. Negative for neck pain.   Skin:  Negative for color change and wound.   Neurological:  Positive for headaches. Negative for tremors and light-headedness.   Psychiatric/Behavioral:  Negative for confusion.      Physical Exam     Initial Vitals [09/16/22 2218]   BP Pulse Resp Temp SpO2   (!) 163/83 94 18 98.9 °F (37.2 °C) 97 %      MAP       --         Physical Exam    Nursing note and vitals reviewed.    Constitutional: Well-developed. Well-nourished. Moderate emotional distress.  HENT: NCAT. OP moist. Uvula midline. No OP masses. Posterior pharynx with no erythema. No tonsillar edema. No exudate. Floor of the mouth is soft, tongue is not elevated. No rhinorrhea. TMs clear bilaterally. Mastoid process nontender bilaterally.  EYES: No conjunctival injection or discharge.  Right pupil enlarged with anisocoria.  NECK: Full ROM. Supple. No rigidity. No cervical LAD. No stridor. Brudzinskis test negative.  CARDIAC: RRR. No murmurs or rubs. Strong distal pulses.  PULM: Normal effort. Breath sounds clear bilaterally. No wheezes. No crackles.  ABD: Soft, non-tender, non-distended, normal BS. No guarding. No rebound.  : No CVA tenderness.  MS: Warm and  well perfused. No edema..  NEURO: Alert and oriented x3.  No sensory or motor deficits.  Negative Romberg.  Normal gate.  SKIN: Dry. No rashes.  No cyanosis or jaundice.  PSYCH: Normal judgment. Normal affect.      ED Course   Procedures  Labs Reviewed - No data to display       Imaging Results    None          Medications - No data to display  Medical Decision Making:   History:   Old Medical Records: I decided to obtain old medical records.  Initial Assessment:   Kim Padilla is a 46 y.o. female with a prior medical history of infertility, DM, liver disease, migraines, URI 1.5 weeks ago presenting with unequal pupils with onset today.  Other:   I have discussed this case with another health care provider.       <> Summary of the Discussion: 23:26  Ophthalmology     Urgent evaluation of patient transferred from outside hospital for evaluation of anisocoria.  Basic pupil exam shows dilated pupil on right side but there is no evidence of APD, both pupils reactive with swinging flashlight test.  She was sent here for ophthalmology evaluation.  She had a negative CTA at outside hospital without signs of ischemia or hemorrhage.  My fundal exam without any abnormalities.  Discussed case with Ophthalmology who evaluated her bedside.  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  Discussed findings with patient, they will follow-up with regular optometrist.  Strict ED precautions return instructions as anticipate resolution of mydriasis in 12-24 hours. Patient agreeable to discharge plan. Strict ED precautions and return instructions discussed at length and patient verbalized understanding. All questions were answered and ample time was given for questions.      Complexity:  Moderate high risk       Scribe Attestation:   Scribe #1: I performed the above scribed service and the documentation accurately describes the services I performed. I attest to the accuracy of the note.            I, Dr. Taqueria NGUYEN  Ha, personally performed the services described in this documentation. All medical record entries made by the scribe were at my direction and in my presence.  I have reviewed the chart and agree that the record reflects my personal performance and is accurate and complete.          Clinical Impression:   Final diagnoses:  [H57.02] Anisocoria (Primary)      ED Disposition Condition    Discharge Stable          ED Prescriptions    None       Follow-up Information       Follow up With Specialties Details Why Contact Info    your ophthalmologist  Schedule an appointment as soon as possible for a visit in 1 week As needed, If symptoms worsen         Taqueria Bonner DO, [facility]  Emergency Staff Physician   Dept of Emergency Medicine   [facility]  Spectralink: [id number]        Disclaimer: This note has been generated using voice-recognition software. There may be typographical errors that have been missed during proof-reading.      Taqueria Bonner DO  09/17/22 [id number]

## 2022-09-17 NOTE — ED NOTES
"Pt. Reports awaking this morning with rt. Sided headache behind her Rt. Eye. Associated symptoms are waves of brief nausea, and "brightness" in her eye and blurred vision. This afternoon a friend noticed that her Rt. Pupil was larger than her Lt. And not very reactive when she shined a flashlight in her eye. She reports recent history of:  -stiffness in her neck for 6 days not relieved by anything  -URI symptoms-cough, nasal congestion, sore throat (mild) for a week. She was treated at urgent care for this and given steroid injection.   -negative Covid tests x2 this week-last 2 days ago.   -newly diagnosed diabetic (few months ago) with blood sugar currently on 120's range.   Neurological exam per MD reveals no deficits. She has migraine headaches but no symptoms today are similar. She does not appear in distress. Her (L) pupil is approx. 3mm and reactive, (R) is approx. 5mm and minimally reactive.   "
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
## 2022-09-17 NOTE — DISCHARGE INSTRUCTIONS
Today, your evaluated by Ophthalmology.  They feel that your eye dilation is generally due to pharmacology.  Please follow-up with your ophthalmologist if your symptoms do not improve.    Our goal in the emergency department is to always give you outstanding care and exceptional service. You may receive a survey by mail or e-mail in the next week regarding your experience in our ED. We would greatly appreciate your completing and returning the survey. Your feedback provides us with a way to recognize our staff who give very good care and it helps us learn how to improve when your experience was below our aspiration of excellence.

## 2022-09-18 ENCOUNTER — PATIENT MESSAGE (OUTPATIENT)
Dept: OBSTETRICS AND GYNECOLOGY | Facility: CLINIC | Age: 47
End: 2022-09-18
Payer: COMMERCIAL

## 2022-09-18 DIAGNOSIS — N92.0 MENORRHAGIA WITH REGULAR CYCLE: ICD-10-CM

## 2022-09-19 RX ORDER — ALPRAZOLAM 0.5 MG/1
0.5 TABLET ORAL NIGHTLY PRN
Qty: 30 TABLET | Refills: 0 | Status: SHIPPED | OUTPATIENT
Start: 2022-09-19 | End: 2023-10-19 | Stop reason: ALTCHOICE

## 2022-10-21 ENCOUNTER — PATIENT MESSAGE (OUTPATIENT)
Dept: INTERNAL MEDICINE | Facility: CLINIC | Age: 47
End: 2022-10-21
Payer: COMMERCIAL

## 2022-10-21 DIAGNOSIS — R73.03 PREDIABETES: Primary | ICD-10-CM

## 2022-10-21 DIAGNOSIS — Z71.3 ENCOUNTER FOR WEIGHT LOSS COUNSELING: ICD-10-CM

## 2022-10-24 NOTE — TELEPHONE ENCOUNTER
Sent Ozempic 1 mg dose to pharmacy, please let patient know and schedule follow up for DEC2022, thank you!

## 2022-11-03 ENCOUNTER — PATIENT MESSAGE (OUTPATIENT)
Dept: INTERNAL MEDICINE | Facility: CLINIC | Age: 47
End: 2022-11-03
Payer: COMMERCIAL

## 2023-01-10 ENCOUNTER — OFFICE VISIT (OUTPATIENT)
Dept: URGENT CARE | Facility: CLINIC | Age: 48
End: 2023-01-10
Payer: COMMERCIAL

## 2023-01-10 VITALS
SYSTOLIC BLOOD PRESSURE: 154 MMHG | BODY MASS INDEX: 31.58 KG/M2 | HEART RATE: 85 BPM | DIASTOLIC BLOOD PRESSURE: 96 MMHG | TEMPERATURE: 98 F | OXYGEN SATURATION: 98 % | WEIGHT: 185 LBS | RESPIRATION RATE: 18 BRPM | HEIGHT: 64 IN

## 2023-01-10 DIAGNOSIS — R10.2 PELVIC PAIN: ICD-10-CM

## 2023-01-10 DIAGNOSIS — R10.9 ACUTE RIGHT FLANK PAIN: Primary | ICD-10-CM

## 2023-01-10 LAB
B-HCG UR QL: NEGATIVE
BILIRUB UR QL STRIP: NEGATIVE
CTP QC/QA: YES
GLUCOSE UR QL STRIP: NEGATIVE
KETONES UR QL STRIP: NEGATIVE
LEUKOCYTE ESTERASE UR QL STRIP: NEGATIVE
PH, POC UA: 5.5
POC BLOOD, URINE: POSITIVE
POC NITRATES, URINE: NEGATIVE
PROT UR QL STRIP: POSITIVE
SP GR UR STRIP: 1 (ref 1–1.03)
UROBILINOGEN UR STRIP-ACNC: ABNORMAL (ref 0.1–1.1)

## 2023-01-10 PROCEDURE — 3077F PR MOST RECENT SYSTOLIC BLOOD PRESSURE >= 140 MM HG: ICD-10-PCS | Mod: CPTII,S$GLB,, | Performed by: NURSE PRACTITIONER

## 2023-01-10 PROCEDURE — 96372 THER/PROPH/DIAG INJ SC/IM: CPT | Mod: S$GLB,,, | Performed by: NURSE PRACTITIONER

## 2023-01-10 PROCEDURE — 3080F PR MOST RECENT DIASTOLIC BLOOD PRESSURE >= 90 MM HG: ICD-10-PCS | Mod: CPTII,S$GLB,, | Performed by: NURSE PRACTITIONER

## 2023-01-10 PROCEDURE — 1159F MED LIST DOCD IN RCRD: CPT | Mod: CPTII,S$GLB,, | Performed by: NURSE PRACTITIONER

## 2023-01-10 PROCEDURE — 96372 PR INJECTION,THERAP/PROPH/DIAG2ST, IM OR SUBCUT: ICD-10-PCS | Mod: S$GLB,,, | Performed by: NURSE PRACTITIONER

## 2023-01-10 PROCEDURE — 81003 POCT URINALYSIS, DIPSTICK, AUTOMATED, W/O SCOPE: ICD-10-PCS | Mod: QW,S$GLB,, | Performed by: NURSE PRACTITIONER

## 2023-01-10 PROCEDURE — 74018 XR KUB: ICD-10-PCS | Mod: FY,S$GLB,, | Performed by: RADIOLOGY

## 2023-01-10 PROCEDURE — 1159F PR MEDICATION LIST DOCUMENTED IN MEDICAL RECORD: ICD-10-PCS | Mod: CPTII,S$GLB,, | Performed by: NURSE PRACTITIONER

## 2023-01-10 PROCEDURE — 1160F RVW MEDS BY RX/DR IN RCRD: CPT | Mod: CPTII,S$GLB,, | Performed by: NURSE PRACTITIONER

## 2023-01-10 PROCEDURE — 81025 URINE PREGNANCY TEST: CPT | Mod: S$GLB,,, | Performed by: NURSE PRACTITIONER

## 2023-01-10 PROCEDURE — 3077F SYST BP >= 140 MM HG: CPT | Mod: CPTII,S$GLB,, | Performed by: NURSE PRACTITIONER

## 2023-01-10 PROCEDURE — 99213 OFFICE O/P EST LOW 20 MIN: CPT | Mod: 25,S$GLB,, | Performed by: NURSE PRACTITIONER

## 2023-01-10 PROCEDURE — 74018 RADEX ABDOMEN 1 VIEW: CPT | Mod: FY,S$GLB,, | Performed by: RADIOLOGY

## 2023-01-10 PROCEDURE — 3008F PR BODY MASS INDEX (BMI) DOCUMENTED: ICD-10-PCS | Mod: CPTII,S$GLB,, | Performed by: NURSE PRACTITIONER

## 2023-01-10 PROCEDURE — 1160F PR REVIEW ALL MEDS BY PRESCRIBER/CLIN PHARMACIST DOCUMENTED: ICD-10-PCS | Mod: CPTII,S$GLB,, | Performed by: NURSE PRACTITIONER

## 2023-01-10 PROCEDURE — 99213 PR OFFICE/OUTPT VISIT, EST, LEVL III, 20-29 MIN: ICD-10-PCS | Mod: 25,S$GLB,, | Performed by: NURSE PRACTITIONER

## 2023-01-10 PROCEDURE — 81003 URINALYSIS AUTO W/O SCOPE: CPT | Mod: QW,S$GLB,, | Performed by: NURSE PRACTITIONER

## 2023-01-10 PROCEDURE — 3008F BODY MASS INDEX DOCD: CPT | Mod: CPTII,S$GLB,, | Performed by: NURSE PRACTITIONER

## 2023-01-10 PROCEDURE — 3080F DIAST BP >= 90 MM HG: CPT | Mod: CPTII,S$GLB,, | Performed by: NURSE PRACTITIONER

## 2023-01-10 PROCEDURE — 87086 URINE CULTURE/COLONY COUNT: CPT | Performed by: NURSE PRACTITIONER

## 2023-01-10 PROCEDURE — 81025 POCT URINE PREGNANCY: ICD-10-PCS | Mod: S$GLB,,, | Performed by: NURSE PRACTITIONER

## 2023-01-10 RX ORDER — SULFAMETHOXAZOLE AND TRIMETHOPRIM 800; 160 MG/1; MG/1
2 TABLET ORAL 2 TIMES DAILY
Qty: 28 TABLET | Refills: 0 | Status: SHIPPED | OUTPATIENT
Start: 2023-01-10 | End: 2023-02-07 | Stop reason: CLARIF

## 2023-01-10 RX ORDER — INDOMETHACIN 75 MG/1
75 CAPSULE, EXTENDED RELEASE ORAL 2 TIMES DAILY
Qty: 30 CAPSULE | Refills: 0 | Status: SHIPPED | OUTPATIENT
Start: 2023-01-10 | End: 2023-02-07 | Stop reason: CLARIF

## 2023-01-10 RX ORDER — KETOROLAC TROMETHAMINE 30 MG/ML
30 INJECTION, SOLUTION INTRAMUSCULAR; INTRAVENOUS
Status: COMPLETED | OUTPATIENT
Start: 2023-01-10 | End: 2023-01-10

## 2023-01-10 RX ORDER — TAMSULOSIN HYDROCHLORIDE 0.4 MG/1
0.4 CAPSULE ORAL DAILY
Qty: 30 CAPSULE | Refills: 0 | Status: SHIPPED | OUTPATIENT
Start: 2023-01-10 | End: 2023-11-29

## 2023-01-10 RX ADMIN — KETOROLAC TROMETHAMINE 30 MG: 30 INJECTION, SOLUTION INTRAMUSCULAR; INTRAVENOUS at 05:01

## 2023-01-10 NOTE — PROGRESS NOTES
"Subjective:       Patient ID: Kim Padilla is a 47 y.o. female.    Vitals:  height is 5' 4" (1.626 m) and weight is 83.9 kg (185 lb). Her tympanic temperature is 98 °F (36.7 °C). Her blood pressure is 154/96 (abnormal) and her pulse is 85. Her respiration is 18 and oxygen saturation is 98%.     Chief Complaint: Flank Pain    This is a 47 y.o. female who presents today with a chief complaint of sudden right flank pain that started at 1354.  Now radiating to R pelvis. Pt state she took a norco . + hx kidney stones. + vomiting. + urinary frequency. No fever or chills. During her visit here in clinic, patient feels she may have passed the stone because she felt some "chunks" expelled     Flank Pain  This is a recurrent problem. The current episode started today. The problem occurs constantly. The problem has been gradually worsening since onset. The pain is present in the sacro-iliac. The quality of the pain is described as shooting. The pain is at a severity of 10/10. The pain is severe. The pain is The same all the time. The symptoms are aggravated by bending, lying down, stress, urinating, twisting, standing and position. Stiffness is present All day. Associated symptoms include dysuria, leg pain, pelvic pain and weight loss. Pertinent negatives include no abdominal pain, chest pain, fever or weakness. Risk factors include renal stones. The treatment provided no relief.     Constitution: Negative for chills, sweating, fatigue and fever.   Neck: Negative for painful lymph nodes.   Cardiovascular:  Negative for chest pain, palpitations and sob on exertion.   Respiratory:  Negative for chest tightness, cough and shortness of breath.    Gastrointestinal:  Negative for abdominal pain, nausea, vomiting, constipation, diarrhea, bright red blood in stool, dark colored stools and rectal bleeding.   Genitourinary:  Positive for dysuria, frequency, flank pain and pelvic pain. Negative for urgency, hematuria, vaginal " pain, vaginal discharge, vaginal bleeding, vaginal odor and genital sore.   Musculoskeletal:  Negative for muscle ache.   Skin:  Negative for color change, pale and rash.   Hematologic/Lymphatic: Negative for swollen lymph nodes.     Objective:      Physical Exam   Constitutional: She is oriented to person, place, and time. She appears well-developed.   HENT:   Head: Normocephalic and atraumatic.   Ears:   Right Ear: External ear normal.   Left Ear: External ear normal.   Nose: Nose normal.   Mouth/Throat: Mucous membranes are normal.   Eyes: Conjunctivae and lids are normal.   Neck: Trachea normal. Neck supple.   Cardiovascular: Normal rate.   Pulmonary/Chest: Effort normal. No respiratory distress.   Abdominal: Normal appearance and bowel sounds are normal. She exhibits no distension and no mass. Soft. There is no abdominal tenderness. There is right CVA tenderness. There is no rebound, no guarding and no left CVA tenderness.      Comments: Patient unable to find comfortable position   Musculoskeletal: Normal range of motion.         General: Normal range of motion.   Neurological: She is alert and oriented to person, place, and time. She has normal strength.   Skin: Skin is warm, dry, intact, not diaphoretic and not pale.   Psychiatric: Her speech is normal and behavior is normal. Judgment and thought content normal.   Nursing note and vitals reviewed.      Results for orders placed or performed in visit on 01/10/23   POCT Urinalysis, Dipstick, Automated, W/O Scope   Result Value Ref Range    POC Blood, Urine Positive (A) Negative    POC Bilirubin, Urine Negative Negative    POC Urobilinogen, Urine norm 0.1 - 1.1    POC Ketones, Urine Negative Negative    POC Protein, Urine Positive (A) Negative    POC Nitrates, Urine Negative Negative    POC Glucose, Urine Negative Negative    pH, UA 5.5     POC Specific Gravity, Urine 1.005 1.003 - 1.029    POC Leukocytes, Urine Negative Negative   POCT urine pregnancy   Result  Value Ref Range    POC Preg Test, Ur Negative Negative     Acceptable Yes      XR KUB    Result Date: 1/10/2023  EXAMINATION: XR KUB CLINICAL HISTORY: hx nephrolithiasis;Unspecified abdominal pain TECHNIQUE: Single AP supine view of the abdomen (KUB) was performed COMPARISON: None FINDINGS: There is a normal, nonobstructive bowel gas pattern. Free air cannot be excluded on this supine radiograph, although none is seen.  There is no abnormal calcification. The visualized osseous structures are unremarkable. The visualized lung bases are clear.  There is an IUD.     No abnormal calcification. Electronically signed by: Chico Sanchez Date:    01/10/2023 Time:    17:23     Assessment:       1. Acute right flank pain    2. Pelvic pain        Plan discussed with Dr. Sharp  Suspicion of nephrolithiasis, will cover for possible UTI given urinary symptoms, culture pending  -provided urine strainer for home   -advised to go to ER for worsening of pain  -f/u with PCP for further management  Plan:         Acute right flank pain  -     XR KUB; Future; Expected date: 01/10/2023  -     ketorolac injection 30 mg  -     tamsulosin (FLOMAX) 0.4 mg Cap; Take 1 capsule (0.4 mg total) by mouth once daily.  Dispense: 30 capsule; Refill: 0  -     indomethacin (INDOCIN SR) 75 mg CpSR CR capsule; Take 1 capsule (75 mg total) by mouth 2 (two) times daily.  Dispense: 30 capsule; Refill: 0  -     Urine culture  -     sulfamethoxazole-trimethoprim 800-160mg (BACTRIM DS) 800-160 mg Tab; Take 2 tablets by mouth 2 (two) times daily. for 7 days  Dispense: 28 tablet; Refill: 0    Pelvic pain  -     POCT Urinalysis, Dipstick, Automated, W/O Scope  -     POCT urine pregnancy  -     Urine culture  -     sulfamethoxazole-trimethoprim 800-160mg (BACTRIM DS) 800-160 mg Tab; Take 2 tablets by mouth 2 (two) times daily. for 7 days  Dispense: 28 tablet; Refill: 0                    Patient Instructions   See additional patient Instructions  provided    Patient Instructions   - You must understand that you have received an Urgent Care treatment only and that you may be released before all of your medical problems are known or treated.   - You, the patient, will arrange for follow up care as instructed.   - If your condition worsens or fails to improve we recommend that you receive another evaluation at the ER immediately or contact your PCP to discuss your concerns or return here.     Advised on return/follow-up precautions. Advised on ER precautions. Answered all patient questions. Patient verbalized understanding and voiced agreement with current treatment plan.

## 2023-01-11 ENCOUNTER — PATIENT MESSAGE (OUTPATIENT)
Dept: NEPHROLOGY | Facility: CLINIC | Age: 48
End: 2023-01-11
Payer: COMMERCIAL

## 2023-01-12 LAB — BACTERIA UR CULT: NORMAL

## 2023-01-13 ENCOUNTER — TELEPHONE (OUTPATIENT)
Dept: NEPHROLOGY | Facility: CLINIC | Age: 48
End: 2023-01-13
Payer: COMMERCIAL

## 2023-01-13 ENCOUNTER — TELEPHONE (OUTPATIENT)
Dept: URGENT CARE | Facility: CLINIC | Age: 48
End: 2023-01-13
Payer: COMMERCIAL

## 2023-01-13 DIAGNOSIS — N20.0 KIDNEY STONES: Primary | ICD-10-CM

## 2023-02-03 ENCOUNTER — LAB VISIT (OUTPATIENT)
Dept: LAB | Facility: HOSPITAL | Age: 48
End: 2023-02-03
Attending: HOSPITALIST
Payer: COMMERCIAL

## 2023-02-03 DIAGNOSIS — N20.0 KIDNEY STONES: ICD-10-CM

## 2023-02-03 PROCEDURE — 81003 URINALYSIS AUTO W/O SCOPE: CPT | Performed by: HOSPITALIST

## 2023-02-03 PROCEDURE — 84156 ASSAY OF PROTEIN URINE: CPT | Performed by: HOSPITALIST

## 2023-02-04 LAB
BILIRUB UR QL STRIP: NEGATIVE
CLARITY UR REFRACT.AUTO: ABNORMAL
COLOR UR AUTO: YELLOW
CREAT UR-MCNC: 159 MG/DL (ref 15–325)
GLUCOSE UR QL STRIP: NEGATIVE
HGB UR QL STRIP: NEGATIVE
KETONES UR QL STRIP: NEGATIVE
LEUKOCYTE ESTERASE UR QL STRIP: NEGATIVE
NITRITE UR QL STRIP: NEGATIVE
PH UR STRIP: 5 [PH] (ref 5–8)
PROT UR QL STRIP: NEGATIVE
PROT UR-MCNC: 9 MG/DL (ref 0–15)
PROT/CREAT UR: 0.06 MG/G{CREAT} (ref 0–0.2)
SP GR UR STRIP: 1.02 (ref 1–1.03)
URN SPEC COLLECT METH UR: ABNORMAL

## 2023-02-07 ENCOUNTER — OFFICE VISIT (OUTPATIENT)
Dept: NEPHROLOGY | Facility: CLINIC | Age: 48
End: 2023-02-07
Payer: COMMERCIAL

## 2023-02-07 VITALS
WEIGHT: 176.56 LBS | DIASTOLIC BLOOD PRESSURE: 75 MMHG | BODY MASS INDEX: 30.14 KG/M2 | HEART RATE: 93 BPM | OXYGEN SATURATION: 96 % | SYSTOLIC BLOOD PRESSURE: 124 MMHG | HEIGHT: 64 IN

## 2023-02-07 DIAGNOSIS — R73.03 PREDIABETES: ICD-10-CM

## 2023-02-07 DIAGNOSIS — E66.9 CLASS 1 OBESITY WITH BODY MASS INDEX (BMI) OF 32.0 TO 32.9 IN ADULT, UNSPECIFIED OBESITY TYPE, UNSPECIFIED WHETHER SERIOUS COMORBIDITY PRESENT: ICD-10-CM

## 2023-02-07 DIAGNOSIS — R53.83 FATIGUE, UNSPECIFIED TYPE: ICD-10-CM

## 2023-02-07 DIAGNOSIS — R74.8 ELEVATED LIVER ENZYMES: ICD-10-CM

## 2023-02-07 DIAGNOSIS — N20.0 CALCIUM OXALATE CALCULUS OF KIDNEY: ICD-10-CM

## 2023-02-07 DIAGNOSIS — R53.1 WEAKNESS: ICD-10-CM

## 2023-02-07 DIAGNOSIS — N20.0 KIDNEY STONES: Primary | ICD-10-CM

## 2023-02-07 PROCEDURE — 99999 PR PBB SHADOW E&M-EST. PATIENT-LVL III: ICD-10-PCS | Mod: PBBFAC,,, | Performed by: HOSPITALIST

## 2023-02-07 PROCEDURE — 3066F PR DOCUMENTATION OF TREATMENT FOR NEPHROPATHY: ICD-10-PCS | Mod: CPTII,S$GLB,, | Performed by: HOSPITALIST

## 2023-02-07 PROCEDURE — 3074F PR MOST RECENT SYSTOLIC BLOOD PRESSURE < 130 MM HG: ICD-10-PCS | Mod: CPTII,S$GLB,, | Performed by: HOSPITALIST

## 2023-02-07 PROCEDURE — 3008F BODY MASS INDEX DOCD: CPT | Mod: CPTII,S$GLB,, | Performed by: HOSPITALIST

## 2023-02-07 PROCEDURE — 3074F SYST BP LT 130 MM HG: CPT | Mod: CPTII,S$GLB,, | Performed by: HOSPITALIST

## 2023-02-07 PROCEDURE — 3066F NEPHROPATHY DOC TX: CPT | Mod: CPTII,S$GLB,, | Performed by: HOSPITALIST

## 2023-02-07 PROCEDURE — 99215 PR OFFICE/OUTPT VISIT, EST, LEVL V, 40-54 MIN: ICD-10-PCS | Mod: S$GLB,,, | Performed by: HOSPITALIST

## 2023-02-07 PROCEDURE — 3008F PR BODY MASS INDEX (BMI) DOCUMENTED: ICD-10-PCS | Mod: CPTII,S$GLB,, | Performed by: HOSPITALIST

## 2023-02-07 PROCEDURE — 99215 OFFICE O/P EST HI 40 MIN: CPT | Mod: S$GLB,,, | Performed by: HOSPITALIST

## 2023-02-07 PROCEDURE — 3078F PR MOST RECENT DIASTOLIC BLOOD PRESSURE < 80 MM HG: ICD-10-PCS | Mod: CPTII,S$GLB,, | Performed by: HOSPITALIST

## 2023-02-07 PROCEDURE — 3078F DIAST BP <80 MM HG: CPT | Mod: CPTII,S$GLB,, | Performed by: HOSPITALIST

## 2023-02-07 PROCEDURE — 99999 PR PBB SHADOW E&M-EST. PATIENT-LVL III: CPT | Mod: PBBFAC,,, | Performed by: HOSPITALIST

## 2023-02-07 PROCEDURE — 1159F MED LIST DOCD IN RCRD: CPT | Mod: CPTII,S$GLB,, | Performed by: HOSPITALIST

## 2023-02-07 PROCEDURE — 1159F PR MEDICATION LIST DOCUMENTED IN MEDICAL RECORD: ICD-10-PCS | Mod: CPTII,S$GLB,, | Performed by: HOSPITALIST

## 2023-02-07 RX ORDER — POTASSIUM CITRATE 5 MEQ/1
10 TABLET, EXTENDED RELEASE ORAL 2 TIMES DAILY WITH MEALS
Qty: 120 TABLET | Refills: 11 | Status: SHIPPED | OUTPATIENT
Start: 2023-02-07 | End: 2023-03-27

## 2023-02-08 PROBLEM — N20.0 CALCIUM OXALATE CALCULUS OF KIDNEY: Status: ACTIVE | Noted: 2023-02-08

## 2023-02-08 NOTE — PROGRESS NOTES
REASON FOR CONSULT/CHIEF COMPLAIN: Kidney stones     REFERRING PHYSICIAN: Chani Olmedo MD      HISTORY OF PRESENT ILLNESS: 47 y.o. female who is established to me  has a past medical history of Diabetes mellitus, Fatty liver disease, nonalcoholic, and Infertility, female. patient was referred here for kidney stones.     Pt mentions she has had one episode of symptomatic kidney stone 6 years agio and recently 1 week ago, she did have another kidney stone episode which made her to go to urgent care.Received Toradol shot along with flomax. Pain better now. Has family h/o kidney sone in father. Once or twice a year. None of them required any interventions. Denied other kidney problems. Unsure of type of kidney stone. Pt mentioned she is trying to lose weight.    No chronic NSAID use, no known exposure to lithium, lead.  Denied any issues with urination including dysuria, hematuria, urgency, hesitancy, nocturia, incomplete voiding. Denied chest pain, nausea, vomiting, abd pain, nausea, vomiting, diarrhea, shortness of breath, pedal edema, Orthopnea, PND.     Interval H/o 2/8/23   Pt comes for regular follow up visit. Mentions of not very complaint with Potassium citrate. Has had symptomatic kidney stones episodes that prompted her to go to ER.  Was advised to continue Flomax and take NSAIDS for pain.    ROS:  General: negative for chills, or fatigue  ENT: No epistaxis or headaches  Hematological and Lymphatic: No bleeding problems or blood clots.  Endocrine: No skin changes or temperature intolerance  Respiratory: No cough, shortness of breath, or wheezing  Cardiovascular: No chest pain or dyspnea   Gastrointestinal: No abdominal pain, change in bowel habits  Genito-Urinary: No dysuria, trouble voiding, or hematuria  Musculoskeletal: ROS: negative for - joint pain, joint stiffness, joint swelling, muscle pain or muscular weakness  Neurological: No new focal weakness, no numbness  Dermatological: No rash or  ulcers.    PAST MEDICAL HISTORY:  Past Medical History:   Diagnosis Date    Diabetes mellitus     Fatty liver disease, nonalcoholic     Infertility, female        PAST SURGICAL HISTORY:  Past Surgical History:   Procedure Laterality Date    BREAST BIOPSY Right     benign    BREAST LUMPECTOMY Left 2014    Dr Brown - benign    DILATION AND CURETTAGE OF UTERUS  1999    for SAB    DILATION AND CURETTAGE OF UTERUS  2018    TONSILLECTOMY      WISDOM TOOTH EXTRACTION         FAMILY HISTORY:   Family History   Problem Relation Age of Onset    Graves' disease Mother     Hypertension Father     Hyperlipidemia Father     Prostate cancer Father     Insulin resistance Father     Thyroid cancer Sister     Ovarian cancer Sister     Thyroid disease Maternal Grandmother         s/p thyroidectomy    Heart attack Maternal Grandfather         in early 60's    Coronary artery disease Maternal Grandfather     Lung disease Paternal Grandmother     Heart attack Paternal Grandfather     Coronary artery disease Paternal Grandfather     Stroke Paternal Grandfather     Breast cancer Maternal Cousin     Colon cancer Neg Hx        SOCIAL HISTORY:  Social History     Socioeconomic History    Marital status:    Tobacco Use    Smoking status: Never    Smokeless tobacco: Never   Substance and Sexual Activity    Alcohol use: Not Currently     Comment: rarely    Drug use: No    Sexual activity: Yes     Partners: Male     Birth control/protection: I.U.D.     Comment: mirena        ALLERGIES:  Review of patient's allergies indicates:   Allergen Reactions    Penicillins Hives       MEDICATIONS:    Current Outpatient Medications:     ALPRAZolam (XANAX) 0.5 MG tablet, Take 1 tablet (0.5 mg total) by mouth nightly as needed for Insomnia or Anxiety., Disp: 30 tablet, Rfl: 0    azelastine (ASTELIN) 137 mcg (0.1 %) nasal spray, 1 spray (137 mcg total) by Nasal route 2 (two) times daily as needed for Rhinitis., Disp: 30 mL, Rfl: 0    eletriptan  (RELPAX) 40 MG tablet, Relpax 40 mg tablet  Take 1 tablet as needed by oral route as needed for 30 days., Disp: , Rfl:     fluticasone propionate (FLONASE) 50 mcg/actuation nasal spray, by Each Nostril route 2 (two) times daily as needed., Disp: , Rfl:     ibuprofen (ADVIL,MOTRIN) 800 MG tablet, Take 800 mg by mouth 3 (three) times daily as needed., Disp: , Rfl:     mupirocin (BACTROBAN) 2 % ointment, Apply topically 2 (two) times daily as needed., Disp: , Rfl:     MYORISAN 40 mg capsule, Take 40 mg by mouth once daily., Disp: , Rfl:     QBREXZA 2.4 % Towl, Apply topically., Disp: , Rfl:     semaglutide (OZEMPIC) 1 mg/dose (2 mg/1.5 mL) PnIj, Inject 1 mg into the skin every 7 days., Disp: 6 pen, Rfl: 1    tamsulosin (FLOMAX) 0.4 mg Cap, Take 1 capsule (0.4 mg total) by mouth once daily., Disp: 30 capsule, Rfl: 0    metFORMIN (GLUCOPHAGE-XR) 500 MG ER 24hr tablet, , Disp: , Rfl:     phentermine (ADIPEX-P) 37.5 mg tablet, Take 18.75 mg by mouth 2 (two) times daily., Disp: , Rfl:     potassium citrate (UROCIT-K) 5 mEq (540 mg) TbSR, Take 2 tablets (10 mEq total) by mouth 2 (two) times daily with meals., Disp: 120 tablet, Rfl: 11    tranexamic acid (LYSTEDA) 650 mg tablet, Take 325 mg by mouth 2 (two) times daily., Disp: , Rfl:    Medication List with Changes/Refills   New Medications    POTASSIUM CITRATE (UROCIT-K) 5 MEQ (540 MG) TBSR    Take 2 tablets (10 mEq total) by mouth 2 (two) times daily with meals.   Current Medications    ALPRAZOLAM (XANAX) 0.5 MG TABLET    Take 1 tablet (0.5 mg total) by mouth nightly as needed for Insomnia or Anxiety.    AZELASTINE (ASTELIN) 137 MCG (0.1 %) NASAL SPRAY    1 spray (137 mcg total) by Nasal route 2 (two) times daily as needed for Rhinitis.    ELETRIPTAN (RELPAX) 40 MG TABLET    Relpax 40 mg tablet   Take 1 tablet as needed by oral route as needed for 30 days.    FLUTICASONE PROPIONATE (FLONASE) 50 MCG/ACTUATION NASAL SPRAY    by Each Nostril route 2 (two) times daily as  "needed.    IBUPROFEN (ADVIL,MOTRIN) 800 MG TABLET    Take 800 mg by mouth 3 (three) times daily as needed.    METFORMIN (GLUCOPHAGE-XR) 500 MG ER 24HR TABLET        MUPIROCIN (BACTROBAN) 2 % OINTMENT    Apply topically 2 (two) times daily as needed.    MYORISAN 40 MG CAPSULE    Take 40 mg by mouth once daily.    PHENTERMINE (ADIPEX-P) 37.5 MG TABLET    Take 18.75 mg by mouth 2 (two) times daily.    QBREXZA 2.4 % TOWL    Apply topically.    SEMAGLUTIDE (OZEMPIC) 1 MG/DOSE (2 MG/1.5 ML) PNIJ    Inject 1 mg into the skin every 7 days.    TAMSULOSIN (FLOMAX) 0.4 MG CAP    Take 1 capsule (0.4 mg total) by mouth once daily.    TAMSULOSIN (FLOMAX) 0.4 MG CAP    Take 1 capsule (0.4 mg total) by mouth once daily.    TRANEXAMIC ACID (LYSTEDA) 650 MG TABLET    Take 325 mg by mouth 2 (two) times daily.   Discontinued Medications    INDOMETHACIN (INDOCIN SR) 75 MG CPSR CR CAPSULE    Take 1 capsule (75 mg total) by mouth 2 (two) times daily.    POTASSIUM CITRATE (UROCIT-K) 5 MEQ (540 MG) TBSR    Take 1 tablet (5 mEq total) by mouth 2 (two) times daily with meals.    SULFAMETHOXAZOLE-TRIMETHOPRIM 800-160MG (BACTRIM DS) 800-160 MG TAB    Take 2 tablets by mouth 2 (two) times daily. for 7 days        PHYSICAL EXAM:  /75   Pulse 93   Ht 5' 4" (1.626 m)   Wt 80.1 kg (176 lb 9.4 oz)   SpO2 96%   BMI 30.31 kg/m²     General: No distress, No fever or chills  Head: Normocephalic,atraumatic  Eyes: conjunctivae/corneas clear. PERRL, EOM's intact.  Nose: Nares normal. Mucosa normal. No drainage or sinus tenderness.  Neck: No adenopathy,no carotid bruit,no JVD  Lungs:Clear to auscultation bilaterally, No Crackles  Heart: Regular rate and rhythm, no murmur, gallops or rubs  Abdomen: Soft, no tenderness, bowel sounds normal  Extremities: Atraumatic, no edema in LE  Skin: Turgor normal. No rashes or ulcers  Neurologic: No focal weakness, oriented.          LABS:  Lab Results   Component Value Date    HGB 14.5 02/03/2023        Lab " Results   Component Value Date    CREATININE 0.7 02/03/2023       Prot/Creat Ratio, Urine   Date Value Ref Range Status   02/03/2023 0.06 0.00 - 0.20 Final       Lab Results   Component Value Date     02/03/2023    K 4.0 02/03/2023    CO2 23 02/03/2023       last PTH   Lab Results   Component Value Date    PTH 65.2 11/16/2021    CALCIUM 10.0 02/03/2023    PHOS 3.8 02/03/2023       Lab Results   Component Value Date    HGBA1C 5.6 11/09/2021       Lab Results   Component Value Date    LDLCALC 101.8 11/09/2021         Creatinine, Urine   Date Value Ref Range Status   02/03/2023 159.0 15.0 - 325.0 mg/dL Final       Protein, Urine Random   Date Value Ref Range Status   02/03/2023 9 0 - 15 mg/dL Final       Prot/Creat Ratio, Urine   Date Value Ref Range Status   02/03/2023 0.06 0.00 - 0.20 Final         No images are attached to the encounter.       Problem List     Nephrolithiasis    Pre diabetes    ASSESSMENT and PLAN   Pt comes to clinic with 2 symptomatic episodes of kidney stones. Had a recent kidney stone episode 3 weeks ago. Which prompted her to go to ER. 24 hour urine for stone risk analysis consistent with U Na 88 mm/h, U josé manuel 124 and U citrate on lower side. Pt mentioned of family h/o kidney stones. Unsure of stone type. Calcium levels with in normal. Pt not very consistent with potassium citrate supplements and urine pH consistently < 5      Plan    PTH , Vitamin D and Uric Acid levels with in limits   Renal ultrasound with non obstructive kidney stones    Avoid NSAIDS.   Low salt diet   Water intake > 3 lit per day   Decrease animal protein per meal   Discussed the importance of Potassium Citrate supplementation to be taken twice a day and adequate water intake.     Total time spend in counseling patient > 40 mins      RTC in 4 months    Diagnosis and plan of care explained to the patient.  Pt Verbalized understanding. Answered all questions.  Thanks for allowing me to participate in the care of this  patient.       Cherie Stone MD  Nephrology  [facility].

## 2023-03-04 ENCOUNTER — PATIENT MESSAGE (OUTPATIENT)
Dept: INTERNAL MEDICINE | Facility: CLINIC | Age: 48
End: 2023-03-04
Payer: COMMERCIAL

## 2023-03-04 DIAGNOSIS — R73.03 PREDIABETES: ICD-10-CM

## 2023-03-04 DIAGNOSIS — Z71.3 ENCOUNTER FOR WEIGHT LOSS COUNSELING: ICD-10-CM

## 2023-03-06 NOTE — TELEPHONE ENCOUNTER
Care Due:                  Date            Visit Type   Department     Provider  --------------------------------------------------------------------------------                                ESTABLISHED                              PATIENT -    [facility] INTERNAL  Last Visit: 07-      [facility]       Chani Olmedo  Next Visit: None Scheduled  None         None Found                                                            Last  Test          Frequency    Reason                     Performed    Due Date  --------------------------------------------------------------------------------    HBA1C.......  6 months...  semaglutide..............  11- 05-    [facility] Embedded Care Gaps. Reference number: [id number]. 3/06/2023   9:38:26 AM CST

## 2023-04-08 ENCOUNTER — PATIENT MESSAGE (OUTPATIENT)
Dept: SLEEP MEDICINE | Facility: CLINIC | Age: 48
End: 2023-04-08
Payer: COMMERCIAL

## 2023-04-11 ENCOUNTER — PATIENT MESSAGE (OUTPATIENT)
Dept: INTERNAL MEDICINE | Facility: CLINIC | Age: 48
End: 2023-04-11
Payer: COMMERCIAL

## 2023-04-11 DIAGNOSIS — Z71.3 ENCOUNTER FOR WEIGHT LOSS COUNSELING: ICD-10-CM

## 2023-04-11 DIAGNOSIS — R73.03 PREDIABETES: ICD-10-CM

## 2023-04-11 NOTE — TELEPHONE ENCOUNTER
No new care gaps identified.  [facility] Embedded Care Gaps. Reference number: [id number]. 4/11/2023   2:31:20 PM CDT

## 2023-04-12 ENCOUNTER — TELEPHONE (OUTPATIENT)
Dept: PHARMACY | Facility: CLINIC | Age: 48
End: 2023-04-12
Payer: COMMERCIAL

## 2023-04-20 ENCOUNTER — OFFICE VISIT (OUTPATIENT)
Dept: INTERNAL MEDICINE | Facility: CLINIC | Age: 48
End: 2023-04-20
Payer: COMMERCIAL

## 2023-04-20 ENCOUNTER — LAB VISIT (OUTPATIENT)
Dept: LAB | Facility: OTHER | Age: 48
End: 2023-04-20
Attending: STUDENT IN AN ORGANIZED HEALTH CARE EDUCATION/TRAINING PROGRAM
Payer: COMMERCIAL

## 2023-04-20 VITALS
SYSTOLIC BLOOD PRESSURE: 112 MMHG | DIASTOLIC BLOOD PRESSURE: 78 MMHG | HEART RATE: 60 BPM | WEIGHT: 166 LBS | OXYGEN SATURATION: 98 % | BODY MASS INDEX: 28.49 KG/M2

## 2023-04-20 DIAGNOSIS — K76.0 FATTY LIVER: ICD-10-CM

## 2023-04-20 DIAGNOSIS — N20.0 NEPHROLITHIASIS: ICD-10-CM

## 2023-04-20 DIAGNOSIS — Z71.3 ENCOUNTER FOR WEIGHT LOSS COUNSELING: ICD-10-CM

## 2023-04-20 DIAGNOSIS — R73.03 PREDIABETES: ICD-10-CM

## 2023-04-20 DIAGNOSIS — G47.33 OSA (OBSTRUCTIVE SLEEP APNEA): ICD-10-CM

## 2023-04-20 DIAGNOSIS — M62.838 MUSCLE SPASM: ICD-10-CM

## 2023-04-20 DIAGNOSIS — L70.9 ADULT ACNE: ICD-10-CM

## 2023-04-20 DIAGNOSIS — E55.9 VITAMIN D DEFICIENCY: ICD-10-CM

## 2023-04-20 DIAGNOSIS — Z13.6 SCREENING FOR CARDIOVASCULAR CONDITION: ICD-10-CM

## 2023-04-20 DIAGNOSIS — Z00.00 HEALTH MAINTENANCE EXAMINATION: Primary | ICD-10-CM

## 2023-04-20 DIAGNOSIS — Z00.00 HEALTH MAINTENANCE EXAMINATION: ICD-10-CM

## 2023-04-20 DIAGNOSIS — Z12.31 SCREENING MAMMOGRAM FOR BREAST CANCER: ICD-10-CM

## 2023-04-20 LAB
25(OH)D3+25(OH)D2 SERPL-MCNC: 22 NG/ML (ref 30–96)
ALBUMIN SERPL BCP-MCNC: 4.2 G/DL (ref 3.5–5.2)
ALP SERPL-CCNC: 69 U/L (ref 55–135)
ALT SERPL W/O P-5'-P-CCNC: 28 U/L (ref 10–44)
ANION GAP SERPL CALC-SCNC: 11 MMOL/L (ref 8–16)
AST SERPL-CCNC: 19 U/L (ref 10–40)
BASOPHILS # BLD AUTO: 0.06 K/UL (ref 0–0.2)
BASOPHILS NFR BLD: 0.5 % (ref 0–1.9)
BILIRUB SERPL-MCNC: 0.7 MG/DL (ref 0.1–1)
BUN SERPL-MCNC: 12 MG/DL (ref 6–20)
CALCIUM SERPL-MCNC: 10.1 MG/DL (ref 8.7–10.5)
CHLORIDE SERPL-SCNC: 108 MMOL/L (ref 95–110)
CHOLEST SERPL-MCNC: 164 MG/DL (ref 120–199)
CHOLEST/HDLC SERPL: 3.7 {RATIO} (ref 2–5)
CO2 SERPL-SCNC: 24 MMOL/L (ref 23–29)
CREAT SERPL-MCNC: 0.8 MG/DL (ref 0.5–1.4)
DIFFERENTIAL METHOD: NORMAL
EOSINOPHIL # BLD AUTO: 0.3 K/UL (ref 0–0.5)
EOSINOPHIL NFR BLD: 2.1 % (ref 0–8)
ERYTHROCYTE [DISTWIDTH] IN BLOOD BY AUTOMATED COUNT: 12.1 % (ref 11.5–14.5)
EST. GFR  (NO RACE VARIABLE): >60 ML/MIN/1.73 M^2
ESTIMATED AVG GLUCOSE: 94 MG/DL (ref 68–131)
GLUCOSE SERPL-MCNC: 93 MG/DL (ref 70–110)
HBA1C MFR BLD: 4.9 % (ref 4–5.6)
HCT VFR BLD AUTO: 44.1 % (ref 37–48.5)
HDLC SERPL-MCNC: 44 MG/DL (ref 40–75)
HDLC SERPL: 26.8 % (ref 20–50)
HGB BLD-MCNC: 15.1 G/DL (ref 12–16)
IMM GRANULOCYTES # BLD AUTO: 0.04 K/UL (ref 0–0.04)
IMM GRANULOCYTES NFR BLD AUTO: 0.3 % (ref 0–0.5)
LDLC SERPL CALC-MCNC: 82.2 MG/DL (ref 63–159)
LYMPHOCYTES # BLD AUTO: 3.3 K/UL (ref 1–4.8)
LYMPHOCYTES NFR BLD: 27.2 % (ref 18–48)
MCH RBC QN AUTO: 30.2 PG (ref 27–31)
MCHC RBC AUTO-ENTMCNC: 34.2 G/DL (ref 32–36)
MCV RBC AUTO: 88 FL (ref 82–98)
MONOCYTES # BLD AUTO: 0.9 K/UL (ref 0.3–1)
MONOCYTES NFR BLD: 7.4 % (ref 4–15)
NEUTROPHILS # BLD AUTO: 7.5 K/UL (ref 1.8–7.7)
NEUTROPHILS NFR BLD: 62.5 % (ref 38–73)
NONHDLC SERPL-MCNC: 120 MG/DL
NRBC BLD-RTO: 0 /100 WBC
PLATELET # BLD AUTO: 297 K/UL (ref 150–450)
PMV BLD AUTO: 9.6 FL (ref 9.2–12.9)
POTASSIUM SERPL-SCNC: 4.3 MMOL/L (ref 3.5–5.1)
PROT SERPL-MCNC: 7.4 G/DL (ref 6–8.4)
RBC # BLD AUTO: 5 M/UL (ref 4–5.4)
SODIUM SERPL-SCNC: 143 MMOL/L (ref 136–145)
TRIGL SERPL-MCNC: 189 MG/DL (ref 30–150)
TSH SERPL DL<=0.005 MIU/L-ACNC: 0.89 UIU/ML (ref 0.4–4)
WBC # BLD AUTO: 11.97 K/UL (ref 3.9–12.7)

## 2023-04-20 PROCEDURE — 82306 VITAMIN D 25 HYDROXY: CPT | Performed by: STUDENT IN AN ORGANIZED HEALTH CARE EDUCATION/TRAINING PROGRAM

## 2023-04-20 PROCEDURE — 3008F BODY MASS INDEX DOCD: CPT | Mod: CPTII,S$GLB,, | Performed by: STUDENT IN AN ORGANIZED HEALTH CARE EDUCATION/TRAINING PROGRAM

## 2023-04-20 PROCEDURE — 1159F MED LIST DOCD IN RCRD: CPT | Mod: CPTII,S$GLB,, | Performed by: STUDENT IN AN ORGANIZED HEALTH CARE EDUCATION/TRAINING PROGRAM

## 2023-04-20 PROCEDURE — 1159F PR MEDICATION LIST DOCUMENTED IN MEDICAL RECORD: ICD-10-PCS | Mod: CPTII,S$GLB,, | Performed by: STUDENT IN AN ORGANIZED HEALTH CARE EDUCATION/TRAINING PROGRAM

## 2023-04-20 PROCEDURE — 99396 PREV VISIT EST AGE 40-64: CPT | Mod: S$GLB,,, | Performed by: STUDENT IN AN ORGANIZED HEALTH CARE EDUCATION/TRAINING PROGRAM

## 2023-04-20 PROCEDURE — 83036 HEMOGLOBIN GLYCOSYLATED A1C: CPT | Performed by: STUDENT IN AN ORGANIZED HEALTH CARE EDUCATION/TRAINING PROGRAM

## 2023-04-20 PROCEDURE — 3066F PR DOCUMENTATION OF TREATMENT FOR NEPHROPATHY: ICD-10-PCS | Mod: CPTII,S$GLB,, | Performed by: STUDENT IN AN ORGANIZED HEALTH CARE EDUCATION/TRAINING PROGRAM

## 2023-04-20 PROCEDURE — 3008F PR BODY MASS INDEX (BMI) DOCUMENTED: ICD-10-PCS | Mod: CPTII,S$GLB,, | Performed by: STUDENT IN AN ORGANIZED HEALTH CARE EDUCATION/TRAINING PROGRAM

## 2023-04-20 PROCEDURE — 99999 PR PBB SHADOW E&M-EST. PATIENT-LVL III: ICD-10-PCS | Mod: PBBFAC,,, | Performed by: STUDENT IN AN ORGANIZED HEALTH CARE EDUCATION/TRAINING PROGRAM

## 2023-04-20 PROCEDURE — 80053 COMPREHEN METABOLIC PANEL: CPT | Performed by: STUDENT IN AN ORGANIZED HEALTH CARE EDUCATION/TRAINING PROGRAM

## 2023-04-20 PROCEDURE — 1160F PR REVIEW ALL MEDS BY PRESCRIBER/CLIN PHARMACIST DOCUMENTED: ICD-10-PCS | Mod: CPTII,S$GLB,, | Performed by: STUDENT IN AN ORGANIZED HEALTH CARE EDUCATION/TRAINING PROGRAM

## 2023-04-20 PROCEDURE — 3044F HG A1C LEVEL LT 7.0%: CPT | Mod: CPTII,S$GLB,, | Performed by: STUDENT IN AN ORGANIZED HEALTH CARE EDUCATION/TRAINING PROGRAM

## 2023-04-20 PROCEDURE — 3066F NEPHROPATHY DOC TX: CPT | Mod: CPTII,S$GLB,, | Performed by: STUDENT IN AN ORGANIZED HEALTH CARE EDUCATION/TRAINING PROGRAM

## 2023-04-20 PROCEDURE — 85025 COMPLETE CBC W/AUTO DIFF WBC: CPT | Performed by: STUDENT IN AN ORGANIZED HEALTH CARE EDUCATION/TRAINING PROGRAM

## 2023-04-20 PROCEDURE — 3078F PR MOST RECENT DIASTOLIC BLOOD PRESSURE < 80 MM HG: ICD-10-PCS | Mod: CPTII,S$GLB,, | Performed by: STUDENT IN AN ORGANIZED HEALTH CARE EDUCATION/TRAINING PROGRAM

## 2023-04-20 PROCEDURE — 3044F PR MOST RECENT HEMOGLOBIN A1C LEVEL <7.0%: ICD-10-PCS | Mod: CPTII,S$GLB,, | Performed by: STUDENT IN AN ORGANIZED HEALTH CARE EDUCATION/TRAINING PROGRAM

## 2023-04-20 PROCEDURE — 3078F DIAST BP <80 MM HG: CPT | Mod: CPTII,S$GLB,, | Performed by: STUDENT IN AN ORGANIZED HEALTH CARE EDUCATION/TRAINING PROGRAM

## 2023-04-20 PROCEDURE — 99396 PR PREVENTIVE VISIT,EST,40-64: ICD-10-PCS | Mod: S$GLB,,, | Performed by: STUDENT IN AN ORGANIZED HEALTH CARE EDUCATION/TRAINING PROGRAM

## 2023-04-20 PROCEDURE — 99214 OFFICE O/P EST MOD 30 MIN: CPT | Mod: 25,S$GLB,, | Performed by: STUDENT IN AN ORGANIZED HEALTH CARE EDUCATION/TRAINING PROGRAM

## 2023-04-20 PROCEDURE — 1160F RVW MEDS BY RX/DR IN RCRD: CPT | Mod: CPTII,S$GLB,, | Performed by: STUDENT IN AN ORGANIZED HEALTH CARE EDUCATION/TRAINING PROGRAM

## 2023-04-20 PROCEDURE — 3074F SYST BP LT 130 MM HG: CPT | Mod: CPTII,S$GLB,, | Performed by: STUDENT IN AN ORGANIZED HEALTH CARE EDUCATION/TRAINING PROGRAM

## 2023-04-20 PROCEDURE — 84443 ASSAY THYROID STIM HORMONE: CPT | Performed by: STUDENT IN AN ORGANIZED HEALTH CARE EDUCATION/TRAINING PROGRAM

## 2023-04-20 PROCEDURE — 99214 PR OFFICE/OUTPT VISIT, EST, LEVL IV, 30-39 MIN: ICD-10-PCS | Mod: 25,S$GLB,, | Performed by: STUDENT IN AN ORGANIZED HEALTH CARE EDUCATION/TRAINING PROGRAM

## 2023-04-20 PROCEDURE — 99999 PR PBB SHADOW E&M-EST. PATIENT-LVL III: CPT | Mod: PBBFAC,,, | Performed by: STUDENT IN AN ORGANIZED HEALTH CARE EDUCATION/TRAINING PROGRAM

## 2023-04-20 PROCEDURE — 3074F PR MOST RECENT SYSTOLIC BLOOD PRESSURE < 130 MM HG: ICD-10-PCS | Mod: CPTII,S$GLB,, | Performed by: STUDENT IN AN ORGANIZED HEALTH CARE EDUCATION/TRAINING PROGRAM

## 2023-04-20 PROCEDURE — 36415 COLL VENOUS BLD VENIPUNCTURE: CPT | Performed by: STUDENT IN AN ORGANIZED HEALTH CARE EDUCATION/TRAINING PROGRAM

## 2023-04-20 PROCEDURE — 80061 LIPID PANEL: CPT | Performed by: STUDENT IN AN ORGANIZED HEALTH CARE EDUCATION/TRAINING PROGRAM

## 2023-04-20 RX ORDER — METHOCARBAMOL 750 MG/1
750 TABLET, FILM COATED ORAL 4 TIMES DAILY
Qty: 40 TABLET | Refills: 0 | Status: SHIPPED | OUTPATIENT
Start: 2023-04-20 | End: 2023-09-15

## 2023-04-20 NOTE — PROGRESS NOTES
Subjective:       Patient ID: Kim Padilla is a 47 y.o. female.    Chief Complaint: Health maintenance examination [Z00.00]    Patient is established with me, here today for the following:    Nephrolithiasis, migraines, ANNA (on CPAP), prediabetes, fatty liver     Weight management -    Dietary:   2 meals per day  Breakfast: [place] bread with avocado or half a bagel  Lunch: egg roll in a bowl, chicken and veggies, salad, meat loaf  1-2 snacks per day  Snacks: fruit or piece of bread  Drinking mostly water, one cup of coffee daily  Stopped redbull and loaded teas  Eating mostly at home, rarely going out to eat  Reducing red meat consumption  BMR: 1,373 kcal/day  Goal calories per day: 1200 - 1400 kcal/day    Exercise:  Obtaining 10K steps daily  Not currently exercising routinely.  Target HR: 147 bpm    Currently taking Ozempic 1 mg for weight reduction. Medication started 2022.  Denies personal history of pancreatitis   Vanessa family history of MEN syndrome  Sister with history of thyroid cancer in ovary and thyroid  No prior weight reduction surgeries    Wt Readings from Last 5 Encounters:  23 : 75.3 kg (166 lb 0.1 oz)  23 : 80.1 kg (176 lb 9.4 oz)  01/10/23 : 83.9 kg (185 lb)  22 : 83.9 kg (185 lb)  22 : 83.9 kg (185 lb)           Lab Results       Component                Value               Date                       LDLCALC                  101.8               2021              Prediabetes -   Due for diabetes screening.  Lab Results       Component                Value               Date                       HGBA1C                   5.6                 2021              Metabolic syndrome:   HDL cholesterol <50 mg/dL  Prediabetes  Fatty liver    Started Accutane 40 mg qOD in   Following routinely with Dr. Feliz     Taking eletriptan PRN for migraine  with good effect  Having migraines about 1-2 times monthly on average    ANNA -   Using CPAP  nightly with good effect    Nephrolithiasis -   Last episode middle of   Takes Flomax PRN episode  Intermittently with episodes of dehydration with work    Vitamin D deficiency -  Currently taking vitamin D3 2000 IU daily supplementation.  Lab Results       Component                Value               Date                       [id number]             24 (L)              2021                 [id number]             24 (L)              2021                    Endorses has been sitting in rotated position while at work watching a presentation  Began with left upper back spasm today after sitting in that position  Has not yet tried any interventions for pain  TTP and with pain on rotation of torso     Health maintenance -   Cologuard negative 2022.   Denies family history of colorectal cancer.  Mammogram BI-RADS 2 in 2022.  History of abnormal mammogram.  Seeing Dr. Brown for high risk breast clinic  Alternating MRI and mammogram q6 months.  Family history of breast cancer.  Family history of ovarian cancer.  Last pap performed .   History of prior abnormal pap smears, underwent cervical ablation.  Seeing Dr. Deutsch for gynecology  Denies family history of osteoporosis.  Family history of cardiac disease.  UTD on Tdap, COVID primary vaccinations.  Due for COVID bivalent vaccinations.  Never smoker.  Drinks alcohol 1-2 times monthly, 1 drinks per sitting.  Denies drug use.  Completed HIV and hepatitis C screening.  Due for lipid screening.  Lab Results       Component                Value               Date                       LDLCALC                  101.8               2021            The 10-year ASCVD risk score (Penny DK, et al., 2019) is: 0.8%         Not currently exercising routinely.  Endorses active lifestyle.   at [employer]     Not currently menstruating with IUD in place.   Began menarche at age 8.   [id number]  Currently sexually active  with .  Currently using Mirena for contraception.   Mirena placed summer 2018.       Review of Systems   Constitutional:  Negative for activity change and unexpected weight change.   HENT:  Negative for hearing loss, rhinorrhea and trouble swallowing.    Eyes:  Negative for discharge and visual disturbance.   Respiratory:  Negative for chest tightness and wheezing.    Cardiovascular:  Negative for chest pain and palpitations.   Gastrointestinal:  Negative for blood in stool, constipation, diarrhea and vomiting.   Endocrine: Negative for polydipsia and polyuria.   Genitourinary:  Negative for difficulty urinating, dysuria, hematuria and menstrual problem.   Musculoskeletal:  Positive for myalgias. Negative for arthralgias, joint swelling and neck pain.   Neurological:  Negative for weakness and headaches.   Psychiatric/Behavioral:  Negative for confusion and dysphoric mood.        Current Outpatient Medications   Medication Instructions    ALPRAZolam (XANAX) 0.5 mg, Oral, Nightly PRN    azelastine (ASTELIN) 137 mcg, Nasal, 2 times daily PRN    eletriptan (RELPAX) 40 MG tablet Relpax 40 mg tablet   Take 1 tablet as needed by oral route as needed for 30 days.    fluticasone propionate (FLONASE) 50 mcg/actuation nasal spray Each Nostril, 2 times daily PRN    ibuprofen (ADVIL,MOTRIN) 800 mg, Oral, 3 times daily PRN    methocarbamoL (ROBAXIN) 750 mg, Oral, 4 times daily    mupirocin (BACTROBAN) 2 % ointment Topical (Top), 2 times daily PRN    MYORISAN 40 mg, Oral, Daily    potassium citrate (UROCIT-K) 5 mEq (540 mg) TbSR TAKE 1 TABLET BY MOUTH TWICE DAILY WITH MEALS    semaglutide (OZEMPIC) 1 mg, Subcutaneous, Every 7 days    tamsulosin (FLOMAX) 0.4 mg, Oral, Daily     Objective:      Vitals:    04/20/23 1438   BP: 112/78   Pulse: 60   SpO2: 98%   Weight: 75.3 kg (166 lb 0.1 oz)     Body mass index is 28.49 kg/m².    Physical Exam  Vitals reviewed.   Constitutional:       General: She is not in acute distress.      Appearance: Normal appearance. She is not ill-appearing or diaphoretic.   HENT:      Head: Normocephalic and atraumatic.      Right Ear: Tympanic membrane, ear canal and external ear normal. There is no impacted cerumen.      Left Ear: Tympanic membrane, ear canal and external ear normal. There is no impacted cerumen.      Nose: Nose normal. No rhinorrhea.      Mouth/Throat:      Mouth: Mucous membranes are moist.      Pharynx: Oropharynx is clear. No oropharyngeal exudate or posterior oropharyngeal erythema.   Eyes:      General: No scleral icterus.        Right eye: No discharge.         Left eye: No discharge.      Conjunctiva/sclera: Conjunctivae normal.   Neck:      Thyroid: No thyromegaly or thyroid tenderness.      Trachea: Trachea normal.   Cardiovascular:      Rate and Rhythm: Normal rate and regular rhythm.      Heart sounds: Normal heart sounds. No murmur heard.    No friction rub. No gallop.   Pulmonary:      Effort: Pulmonary effort is normal. No respiratory distress.      Breath sounds: Normal breath sounds. No stridor. No wheezing, rhonchi or rales.   Abdominal:      General: There is no distension.      Palpations: Abdomen is soft.      Tenderness: There is no abdominal tenderness. There is no guarding or rebound.   Musculoskeletal:         General: No swelling or deformity.        Arms:       Cervical back: Neck supple.   Lymphadenopathy:      Head:      Right side of head: No submandibular or posterior auricular adenopathy.      Left side of head: No submandibular or posterior auricular adenopathy.      Cervical: No cervical adenopathy.      Right cervical: No superficial, deep or posterior cervical adenopathy.     Left cervical: No superficial, deep or posterior cervical adenopathy.      Upper Body:      Right upper body: No supraclavicular adenopathy.      Left upper body: No supraclavicular adenopathy.   Skin:     General: Skin is warm and dry.   Neurological:      General: No focal deficit  present.      Mental Status: She is alert. Mental status is at baseline.      Gait: Gait normal.   Psychiatric:         Mood and Affect: Mood normal.         Behavior: Behavior normal.       Assessment:       1. Health maintenance examination    2. Encounter for weight loss counseling    3. Prediabetes    4. Fatty liver    5. Adult acne    6. Nephrolithiasis    7. ANNA (obstructive sleep apnea)    8. Vitamin D deficiency    9. Muscle spasm    10. Screening mammogram for breast cancer    11. Screening for cardiovascular condition        Plan:       Encounter for weight loss counseling  Prediabetes  Continue Ozempic 1 mg weekly.  Recommend the following:      Goal of at least 150 minutes aerobic exercise weekly. Recommend 30 mins, 5 days weekly.      Target heart rate while performing aerobic activity 147 bpm.       Monitoring caloric intake for 1-2 days weekly, with a goal of 1200 - 1400 kcal/day.       Organizing plate with half vegetables, quarter whole grain starches or starchy vegetables, and quarter lean protein.       Goal of at least 1 vegetarian meal weekly and emphasizing fresh vegetables and fruits in diet.       Weight loss goal of 1-2 lbs per week.      Increasing healthy based fats such as avocados, olive oil, nuts, and freshwater, cold-water fish.      Decreasing red meat to no more than once weekly.       RTC in 6 months for weight management follow up.   -     semaglutide (OZEMPIC) 1 mg/dose (4 mg/3 mL); Inject 1 mg into the skin every 7 days.  -     Comprehensive Metabolic Panel; Future  -     TSH; Future  -     Hemoglobin A1C; Future  -     CBC Auto Differential; Future    Fatty liver  Reduction in risk factors as detailed above  -     TSH; Future  -     US Abdomen Limited_Liver; Future    Adult acne  Continue medication, evaluation, and management per dermatologist.    Nephrolithiasis  Continue hydration and reducing risk factors  Follow up with urology PRN    ANNA (obstructive sleep apnea)  Continue  evaluation and management per sleep medicine.  Continue CPAP nightly    Vitamin D deficiency  Continue supplementation.  RTC in 6 months for follow up.  -     Vitamin D; Future    Muscle spasm  Discussed conservative treatment at home: heat, gentle stretching, NSAIDs PRN, and muscle relaxer PRN.  Provided handout with home stretches and exercises. Do not perform stretches/exercises if they cause overt pain.  RTC in pain unimproved or worsening in the next 1-2 weeks.   -     methocarbamoL (ROBAXIN) 750 MG Tab; Take 1 tablet (750 mg total) by mouth 4 (four) times daily.    Health maintenance examination  Reviewed and discussed age appropriate screenings and immunizations.  -     Comprehensive Metabolic Panel; Future  -     TSH; Future  -     Lipid Panel; Future  -     Hemoglobin A1C; Future  -     CBC Auto Differential; Future  -     Vitamin D; Future    Screening mammogram for breast cancer  -     Mammo Digital Screening Bilat w/ Dwight; Future    Screening for cardiovascular condition  -     Lipid Panel; Future        Chani Olmedo MD  4/20/2023

## 2023-04-21 ENCOUNTER — TELEPHONE (OUTPATIENT)
Dept: INTERNAL MEDICINE | Facility: CLINIC | Age: 48
End: 2023-04-21

## 2023-04-21 ENCOUNTER — PATIENT MESSAGE (OUTPATIENT)
Dept: INTERNAL MEDICINE | Facility: CLINIC | Age: 48
End: 2023-04-21
Payer: COMMERCIAL

## 2023-04-25 ENCOUNTER — PATIENT MESSAGE (OUTPATIENT)
Dept: INTERNAL MEDICINE | Facility: CLINIC | Age: 48
End: 2023-04-25
Payer: COMMERCIAL

## 2023-05-15 ENCOUNTER — OFFICE VISIT (OUTPATIENT)
Dept: OBSTETRICS AND GYNECOLOGY | Facility: CLINIC | Age: 48
End: 2023-05-15
Attending: OBSTETRICS & GYNECOLOGY
Payer: COMMERCIAL

## 2023-05-15 VITALS
BODY MASS INDEX: 28.64 KG/M2 | WEIGHT: 167.75 LBS | HEIGHT: 64 IN | SYSTOLIC BLOOD PRESSURE: 110 MMHG | DIASTOLIC BLOOD PRESSURE: 83 MMHG

## 2023-05-15 DIAGNOSIS — L02.31 CUTANEOUS ABSCESS OF BUTTOCK: ICD-10-CM

## 2023-05-15 DIAGNOSIS — Z01.419 ENCOUNTER FOR WELL WOMAN EXAM WITH ROUTINE GYNECOLOGICAL EXAM: Primary | ICD-10-CM

## 2023-05-15 PROCEDURE — 99396 PREV VISIT EST AGE 40-64: CPT | Mod: S$GLB,,,

## 2023-05-15 PROCEDURE — 3066F NEPHROPATHY DOC TX: CPT | Mod: CPTII,S$GLB,,

## 2023-05-15 PROCEDURE — 3044F PR MOST RECENT HEMOGLOBIN A1C LEVEL <7.0%: ICD-10-PCS | Mod: CPTII,S$GLB,,

## 2023-05-15 PROCEDURE — 3074F SYST BP LT 130 MM HG: CPT | Mod: CPTII,S$GLB,,

## 2023-05-15 PROCEDURE — 1159F PR MEDICATION LIST DOCUMENTED IN MEDICAL RECORD: ICD-10-PCS | Mod: CPTII,S$GLB,,

## 2023-05-15 PROCEDURE — 3074F PR MOST RECENT SYSTOLIC BLOOD PRESSURE < 130 MM HG: ICD-10-PCS | Mod: CPTII,S$GLB,,

## 2023-05-15 PROCEDURE — 3079F PR MOST RECENT DIASTOLIC BLOOD PRESSURE 80-89 MM HG: ICD-10-PCS | Mod: CPTII,S$GLB,,

## 2023-05-15 PROCEDURE — 99999 PR PBB SHADOW E&M-EST. PATIENT-LVL IV: CPT | Mod: PBBFAC,,,

## 2023-05-15 PROCEDURE — 3079F DIAST BP 80-89 MM HG: CPT | Mod: CPTII,S$GLB,,

## 2023-05-15 PROCEDURE — 3008F BODY MASS INDEX DOCD: CPT | Mod: CPTII,S$GLB,,

## 2023-05-15 PROCEDURE — 3008F PR BODY MASS INDEX (BMI) DOCUMENTED: ICD-10-PCS | Mod: CPTII,S$GLB,,

## 2023-05-15 PROCEDURE — 3066F PR DOCUMENTATION OF TREATMENT FOR NEPHROPATHY: ICD-10-PCS | Mod: CPTII,S$GLB,,

## 2023-05-15 PROCEDURE — 3044F HG A1C LEVEL LT 7.0%: CPT | Mod: CPTII,S$GLB,,

## 2023-05-15 PROCEDURE — 1159F MED LIST DOCD IN RCRD: CPT | Mod: CPTII,S$GLB,,

## 2023-05-15 PROCEDURE — 99396 PR PREVENTIVE VISIT,EST,40-64: ICD-10-PCS | Mod: S$GLB,,,

## 2023-05-15 PROCEDURE — 99999 PR PBB SHADOW E&M-EST. PATIENT-LVL IV: ICD-10-PCS | Mod: PBBFAC,,,

## 2023-05-15 RX ORDER — CLINDAMYCIN PHOSPHATE AND BENZOYL PEROXIDE 10; 37.5 MG/G; MG/G
GEL TOPICAL
COMMUNITY
End: 2023-10-19 | Stop reason: ALTCHOICE

## 2023-05-15 RX ORDER — VALACYCLOVIR HYDROCHLORIDE 1 G/1
TABLET, FILM COATED ORAL
COMMUNITY
Start: 2023-01-25

## 2023-05-15 RX ORDER — MUPIROCIN 20 MG/G
OINTMENT TOPICAL 3 TIMES DAILY
Qty: 15 G | Refills: 0 | Status: SHIPPED | OUTPATIENT
Start: 2023-05-15

## 2023-05-15 RX ORDER — UBROGEPANT 100 MG/1
TABLET ORAL
COMMUNITY
Start: 2023-03-27

## 2023-05-15 RX ORDER — RIMEGEPANT SULFATE 75 MG/75MG
TABLET, ORALLY DISINTEGRATING ORAL
COMMUNITY
End: 2023-10-19 | Stop reason: ALTCHOICE

## 2023-05-15 NOTE — PROGRESS NOTES
"Chief Complaint: Well Woman Exam     HPI:      Kim Padilla is a 47 y.o.  who presents today for well woman exam.  LMP: No LMP recorded. (Menstrual status: Birth Control).  No issues, problems, or complaints. Specifically, patient denies abnormal vaginal bleeding, discharge, pelvic pain, urinary problems, or changes in appetite. Ms. Padilla is currently sexually active with a single male partner. She is currently using Mirena inserted in 2019 for contraception. She declines STD screening today.  Hx fibrocystic breasts with breast biopsy.  On ozepmic - lost 30 lbs.      Previous Pap: NILM, HPV negative (2022)  Previous Mammogram @  [facility]: 2 T-C Score: 15.4% (2022)   Most Recent Colonoscopy:  N/A    STD/STI Hx: Denies any history of STD's  Social: Wears seatbelts. Exercises. Feels safe at home.     FH:  Breast cancer: Maternal cousin  Colon cancer: none  Ovarian cancer: Sister  Endometrial cancer: none    Ms. Padilla confirms that she wears her seatbelt when riding in the car and does not text while driving.     OB History          1    Para        Term                AB   1    Living             SAB   1    IAB        Ectopic        Multiple        Live Births                     ROS:     GENERAL: Denies unintentional weight gain or weight loss. Feeling well overall.   SKIN: Denies rash or lesions.   HEENT: Denies headaches, or vision changes.   CARDIOVASCULAR: Denies palpitations or chest pain.   RESPIRATORY: Denies shortness of breath or dyspnea on exertion.  BREASTS: Denies lumps or nipple discharge.   ABDOMEN: Denies constipation, diarrhea, nausea, vomiting, change in appetite.  URINARY: Denies frequency, dysuria.  NEUROLOGIC: Denies syncope or weakness.   PSYCHIATRIC: Denies uncontrolled depression or anxiety.    Physical Exam:      PHYSICAL EXAM:  /83   Ht 5' 4" (1.626 m)   Wt 76.1 kg (167 lb 12.3 oz)   BMI 28.80 kg/m²   Body mass index is 28.8 kg/m². "     APPEARANCE: Well nourished, well developed, in no acute distress.  PSYCH: Appropriate mood and affect.  SKIN: No acne or hirsutism  NECK: Neck symmetric without masses  NODES: No inguinal, axillary, or supraclavicular lymph node enlargement  ABDOMEN: Soft.  No tenderness or masses.    CARDIOVASCULAR: No edema of peripheral extremities  BREASTS: Symmetrical, no visible skin lesions. No palpable masses. No nipple discharge bilaterally.  PELVIC: Normal external genitalia without lesions.  Normal hair distribution.  Adequate perineal body, normal urethral meatus.  Vagina moist and well rugated. Without lesions. Vagina without discharge.  Cervix pink, without lesions, discharge or tenderness.  No significant cystocele or rectocele.  Bimanual exam shows uterus to be normal size, regular, mobile and nontender.  Adnexa without masses or tenderness.    Note:  healing abscess to inside left buttocks, non-draining    Assessment/Plan:     Encounter for well woman exam with routine gynecological exam        -     Counseled patient regarding healthy diet and regular exercise, daily multivitamin, daily seat belt use.         -     BP normotensive        -     She denies abuse and feels safe at home.        -     Pap smear:  UTD - normal (next due 2025)        -     Contraception:  Mirena inserted in 2019, removal due in 2027        -     STD screening:  declined         -     MMG:  scheduled for June         -     Colonoscopy: N/A - neg cologaurd done in Oct.     Cutaneous abscess of buttock  -     mupirocin (BACTROBAN) 2 % ointment; Apply topically 3 (three) times daily.  Dispense: 15 g; Refill: 0    Follow up in 1 year for annual exam or sooner PRN.    Counseling:     Patient was counseled today on current ASCCP pap guidelines, the recommendation for yearly physical exams, healthy diet and exercise routines, safe driving habits, and annual mammograms. She is to see her PCP for other health maintenance.      Use of the  [facility] Patient Portal discussed and encouraged during today's visit.   Counseling time: 15 minutes    Julianne Campbell DNP (Maggie)  Obstetrics and Gynecology  [facility]

## 2023-06-08 ENCOUNTER — HOSPITAL ENCOUNTER (OUTPATIENT)
Dept: RADIOLOGY | Facility: OTHER | Age: 48
Discharge: HOME | End: 2023-06-08
Attending: STUDENT IN AN ORGANIZED HEALTH CARE EDUCATION/TRAINING PROGRAM
Payer: COMMERCIAL

## 2023-06-08 DIAGNOSIS — Z12.31 SCREENING MAMMOGRAM FOR BREAST CANCER: ICD-10-CM

## 2023-06-08 PROCEDURE — 77063 MAMMO DIGITAL SCREENING BILAT WITH TOMO: ICD-10-PCS | Mod: 26,,, | Performed by: RADIOLOGY

## 2023-06-08 PROCEDURE — 77067 SCR MAMMO BI INCL CAD: CPT | Mod: TC

## 2023-06-08 PROCEDURE — 77067 SCR MAMMO BI INCL CAD: CPT | Mod: 26,,, | Performed by: RADIOLOGY

## 2023-06-08 PROCEDURE — 77063 BREAST TOMOSYNTHESIS BI: CPT | Mod: 26,,, | Performed by: RADIOLOGY

## 2023-06-08 PROCEDURE — 77067 MAMMO DIGITAL SCREENING BILAT WITH TOMO: ICD-10-PCS | Mod: 26,,, | Performed by: RADIOLOGY

## 2023-07-13 ENCOUNTER — HOSPITAL ENCOUNTER (OUTPATIENT)
Dept: RADIOLOGY | Facility: OTHER | Age: 48
Discharge: HOME | End: 2023-07-13
Attending: STUDENT IN AN ORGANIZED HEALTH CARE EDUCATION/TRAINING PROGRAM
Payer: COMMERCIAL

## 2023-07-13 DIAGNOSIS — K76.0 FATTY LIVER: ICD-10-CM

## 2023-07-13 PROCEDURE — 76705 ECHO EXAM OF ABDOMEN: CPT | Mod: TC

## 2023-07-13 PROCEDURE — 76705 ECHO EXAM OF ABDOMEN: CPT | Mod: 26,,, | Performed by: RADIOLOGY

## 2023-07-13 PROCEDURE — 76705 US ABDOMEN LIMITED_LIVER: ICD-10-PCS | Mod: 26,,, | Performed by: RADIOLOGY

## 2023-09-06 LAB
LEFT EYE DM RETINOPATHY: NEGATIVE
RIGHT EYE DM RETINOPATHY: NEGATIVE

## 2023-09-07 ENCOUNTER — PATIENT OUTREACH (OUTPATIENT)
Dept: ADMINISTRATIVE | Facility: HOSPITAL | Age: 48
End: 2023-09-07
Payer: COMMERCIAL

## 2023-09-15 ENCOUNTER — PROCEDURE VISIT (OUTPATIENT)
Dept: HEPATOLOGY | Facility: CLINIC | Age: 48
End: 2023-09-15
Payer: COMMERCIAL

## 2023-09-15 ENCOUNTER — OFFICE VISIT (OUTPATIENT)
Dept: HEPATOLOGY | Facility: CLINIC | Age: 48
End: 2023-09-15
Payer: COMMERCIAL

## 2023-09-15 VITALS — BODY MASS INDEX: 28.76 KG/M2 | WEIGHT: 167.56 LBS

## 2023-09-15 DIAGNOSIS — R73.03 PREDIABETES: ICD-10-CM

## 2023-09-15 DIAGNOSIS — R74.8 ELEVATED LIVER ENZYMES: ICD-10-CM

## 2023-09-15 DIAGNOSIS — K76.0 FATTY LIVER: Primary | ICD-10-CM

## 2023-09-15 DIAGNOSIS — K76.0 FATTY LIVER: ICD-10-CM

## 2023-09-15 DIAGNOSIS — E66.3 OVERWEIGHT (BMI 25.0-29.9): ICD-10-CM

## 2023-09-15 DIAGNOSIS — E78.1 HYPERTRIGLYCERIDEMIA: ICD-10-CM

## 2023-09-15 PROBLEM — T50.905A ADVERSE REACTION TO DRUG: Status: ACTIVE | Noted: 2023-09-15

## 2023-09-15 PROBLEM — R42 DIZZINESS AND GIDDINESS: Status: ACTIVE | Noted: 2023-09-15

## 2023-09-15 PROBLEM — G44.85 IDIOPATHIC STABBING HEADACHE: Status: ACTIVE | Noted: 2023-09-15

## 2023-09-15 PROBLEM — G43.919 REFRACTORY MIGRAINE: Status: ACTIVE | Noted: 2023-09-15

## 2023-09-15 PROBLEM — H93.19 TINNITUS: Status: ACTIVE | Noted: 2023-09-15

## 2023-09-15 PROBLEM — G44.84 BENIGN EXERTIONAL HEADACHE: Status: ACTIVE | Noted: 2023-09-15

## 2023-09-15 PROCEDURE — 3008F BODY MASS INDEX DOCD: CPT | Mod: CPTII,S$GLB,, | Performed by: NURSE PRACTITIONER

## 2023-09-15 PROCEDURE — 99999 PR PBB SHADOW E&M-EST. PATIENT-LVL IV: CPT | Mod: PBBFAC,,, | Performed by: NURSE PRACTITIONER

## 2023-09-15 PROCEDURE — 76981 FIBROSCAN NEW ORLEANS (VIBRATION CONTROLLED TRANSIENT ELASTOGRAPHY): ICD-10-PCS | Mod: S$GLB,,, | Performed by: NURSE PRACTITIONER

## 2023-09-15 PROCEDURE — 3066F PR DOCUMENTATION OF TREATMENT FOR NEPHROPATHY: ICD-10-PCS | Mod: CPTII,S$GLB,, | Performed by: NURSE PRACTITIONER

## 2023-09-15 PROCEDURE — 1160F RVW MEDS BY RX/DR IN RCRD: CPT | Mod: CPTII,S$GLB,, | Performed by: NURSE PRACTITIONER

## 2023-09-15 PROCEDURE — 99999 PR PBB SHADOW E&M-EST. PATIENT-LVL IV: ICD-10-PCS | Mod: PBBFAC,,, | Performed by: NURSE PRACTITIONER

## 2023-09-15 PROCEDURE — 3044F PR MOST RECENT HEMOGLOBIN A1C LEVEL <7.0%: ICD-10-PCS | Mod: CPTII,S$GLB,, | Performed by: NURSE PRACTITIONER

## 2023-09-15 PROCEDURE — 99214 PR OFFICE/OUTPT VISIT, EST, LEVL IV, 30-39 MIN: ICD-10-PCS | Mod: S$GLB,,, | Performed by: NURSE PRACTITIONER

## 2023-09-15 PROCEDURE — 76981 USE PARENCHYMA: CPT | Mod: S$GLB,,, | Performed by: NURSE PRACTITIONER

## 2023-09-15 PROCEDURE — 99214 OFFICE O/P EST MOD 30 MIN: CPT | Mod: S$GLB,,, | Performed by: NURSE PRACTITIONER

## 2023-09-15 PROCEDURE — 3044F HG A1C LEVEL LT 7.0%: CPT | Mod: CPTII,S$GLB,, | Performed by: NURSE PRACTITIONER

## 2023-09-15 PROCEDURE — 3008F PR BODY MASS INDEX (BMI) DOCUMENTED: ICD-10-PCS | Mod: CPTII,S$GLB,, | Performed by: NURSE PRACTITIONER

## 2023-09-15 PROCEDURE — 1159F MED LIST DOCD IN RCRD: CPT | Mod: CPTII,S$GLB,, | Performed by: NURSE PRACTITIONER

## 2023-09-15 PROCEDURE — 1159F PR MEDICATION LIST DOCUMENTED IN MEDICAL RECORD: ICD-10-PCS | Mod: CPTII,S$GLB,, | Performed by: NURSE PRACTITIONER

## 2023-09-15 PROCEDURE — 1160F PR REVIEW ALL MEDS BY PRESCRIBER/CLIN PHARMACIST DOCUMENTED: ICD-10-PCS | Mod: CPTII,S$GLB,, | Performed by: NURSE PRACTITIONER

## 2023-09-15 PROCEDURE — 3066F NEPHROPATHY DOC TX: CPT | Mod: CPTII,S$GLB,, | Performed by: NURSE PRACTITIONER

## 2023-09-15 RX ORDER — FLUOXETINE 10 MG/1
CAPSULE ORAL
COMMUNITY
End: 2023-10-19 | Stop reason: ALTCHOICE

## 2023-09-15 RX ORDER — SEMAGLUTIDE 1.34 MG/ML
1 INJECTION, SOLUTION SUBCUTANEOUS
COMMUNITY
Start: 2023-08-14 | End: 2023-10-19

## 2023-09-15 RX ORDER — ZOLPIDEM TARTRATE 10 MG/1
TABLET ORAL
COMMUNITY
End: 2023-10-19 | Stop reason: ALTCHOICE

## 2023-09-15 RX ORDER — TRANEXAMIC ACID 650 MG/1
0.5 TABLET ORAL 2 TIMES DAILY
COMMUNITY
End: 2023-10-19 | Stop reason: ALTCHOICE

## 2023-09-15 RX ORDER — DICLOFENAC SODIUM 75 MG/1
TABLET, DELAYED RELEASE ORAL
COMMUNITY
End: 2023-10-19 | Stop reason: ALTCHOICE

## 2023-09-15 RX ORDER — METFORMIN HYDROCHLORIDE 500 MG/1
TABLET, EXTENDED RELEASE ORAL
COMMUNITY
Start: 2023-06-04 | End: 2023-10-19 | Stop reason: ALTCHOICE

## 2023-09-15 RX ORDER — GLYCOPYRRONIUM 2.4 G/100G
CLOTH TOPICAL
COMMUNITY
End: 2023-11-29

## 2023-09-15 RX ORDER — TIZANIDINE 4 MG/1
TABLET ORAL
COMMUNITY
End: 2023-10-19 | Stop reason: ALTCHOICE

## 2023-09-15 RX ORDER — PREGABALIN 25 MG/1
CAPSULE ORAL
COMMUNITY
End: 2023-10-19 | Stop reason: ALTCHOICE

## 2023-09-15 NOTE — PROCEDURES
FibroScan [place] (Vibration Controlled Transient Elastography)    Date/Time: 9/15/2023 9:15 AM    Performed by: Chani Benjamin NP  Authorized by: Chani Benjamin NP    Diagnosis:  NAFLD    Probe:  M    Universal Protocol: Patient's identity, procedure and site were verified, confirmatory pause was performed.  Discussed procedure including risks and potential complications.  Questions answered.  Patient verbalizes understanding and wishes to proceed with VCTE.     Procedure: After providing explanations of the procedure, patient was placed in the supine position with right arm in maximum abduction to allow optimal exposure of right lateral abdomen.  Patient was briefly assessed, Testing was performed in the mid-axillary location, 50Hz Shear Wave pulses were applied and the resulting Shear Wave and Propagation Speed detected with a 3.5 MHz ultrasonic signal, using the FibroScan probe, Skin to liver capsule distance and liver parenchyma were accessed during the entire examination with the FibroScan probe, Patient was instructed to breathe normally and to abstain from sudden movements during the procedure, allowing for random measurements of liver stiffness. At least 10 Shear Waves were produced, Individual measurements of each Shear Wave were calculated.  Patient tolerated the procedure well with no complications.  Meets discharge criteria as was dismissed.  Rates pain 0 out of 10.  Patient will follow up with ordering provider to review results.    Findings  Median liver stiffness score:  5.1  CAP Reading: dB/m:  303    IQR/med %:  14  Interpretation  Fibrosis interpretation is based on medial liver stiffness - Kilopascal (kPa).    Fibrosis Stage:  F 0-1  Steatosis interpretation is based on controlled attenuation parameter - (dB/m).    Steatosis Grade:  S3

## 2023-09-15 NOTE — PROGRESS NOTES
[facility] Established Patient Visit    Reason for Visit: Fatty Liver     PCP: Chani Olmedo    HPI:  This is a 47 y.o. female with PMH noted below, here for evaluation of fatty liver. She was last seen in clinic by myself in 9/2022.    The patient's risk factors for fatty liver disease include:     Obesity                                        Yes; BMI 28.76 - Net weight loss of 26 lbs since 6/2022  Dyslipidemia                                Yes; Mild hypertriglyceridemia on most recent lipid panel in 4/2023    Latest Reference Range & Units 04/20/23 15:28   Cholesterol 120 - 199 mg/dL 164   HDL 40 - 75 mg/dL 44   HDL/Cholesterol Ratio 20.0 - 50.0 % 26.8   LDL Cholesterol External 63.0 - 159.0 mg/dL 82.2   Non-HDL Cholesterol mg/dL 120   Total Cholesterol/HDL Ratio 2.0 - 5.0  3.7   Triglycerides 30 - 150 mg/dL 189 (H)     Insulin resistance/Diabetes         Yes; On Ozempic. Last HgbA1c was 4.9% (4/2023)  Family history of diabetes           Yes; Father with history of insulin resistance.    She has had mildly elevated liver enzymes in a hepatocellular pattern since 6/2022. Fibroscan to stage her liver disease in 8/2022 was suggestive of severe fatty infiltration of the liver (S3), with F0-F1 fibrosis, and a low likelihood of cirrhosis.   She has lost 26 pounds since 6/2022 - on Ozempic. LFT's have normalized with weight loss. Abdominal US in 7/2022 showed hepatosplenomegaly with echogenic liver parenchyma suggestive of hepatic steatosis. Follow US in 7/2023 showed:    US Abdomen Limited_Liver  Narrative: EXAMINATION:  US ABDOMEN LIMITED_LIVER    CLINICAL HISTORY:  Fatty (change of) liver, not elsewhere classified    FINDINGS:  Pancreas demonstrates no abnormality.  The gallbladder demonstrates no wall thickening De La Cruz sign the bile duct 2 mm.  Liver measures 16.6 cm, and the spleen 11.5.  There is focal fatty sparing near the gallbladder fossa.  Impression: No acute process seen.    Fatty  change the liver with geographic focal fatty sparing near the gallbladder fossa.    Mild hepatomegaly.    Electronically signed by: Neeraj Hearn MD  Date:    07/13/2023  Time:    14:58    She has no known family history of liver disease. She does not drink alcohol heavily or use illicit drugs. Acute viral hepatitis testing and HIV negative. She is not immune to Hepatitis A or B. Repeat Fibroscan today is again suggestive of severe fatty infiltration of the liver (S3), with F0-F1 fibrosis, however the CAP score is improved with weight loss. She is well appearing, and has no signs or symptoms of hepatic decompensation including jaundice, dark urine, pruritus, abdominal distention, lower extremity edema, hematemesis, melena, or periods of confusion suggestive of hepatic encephalopathy.      PMHX:  has a past medical history of Diabetes mellitus, Fatty liver disease, nonalcoholic, and Infertility, female.    PSHX:  has a past surgical history that includes Dilation and curettage of uterus (1999); Breast biopsy (Right); Dilation and curettage of uterus (2018); Tonsillectomy; [place] tooth extraction; Breast cyst excision; and Breast biopsy (Left, 2014).    The patient's social and family histories were reviewed by me and updated in the appropriate section of the electronic medical record.    Review of patient's allergies indicates:   Allergen Reactions    Penicillins Hives     Current Outpatient Medications on File Prior to Visit   Medication Sig Dispense Refill    ALPRAZolam (XANAX) 0.5 MG tablet Take 1 tablet (0.5 mg total) by mouth nightly as needed for Insomnia or Anxiety. 30 tablet 0    clindamycin-benzoyl peroxide (ONEXTON) 1.2 %(1 % base) -3.75 % Gel Onexton 1.2 % (1 % base)-3.75 % topical gel with pump   APPLY TOPICALLY TO THE AFFECTED AREA IN THE MORNING FOR SPOT TREATMENT      diclofenac (VOLTAREN) 75 MG EC tablet       eletriptan (RELPAX) 40 MG tablet Relpax 40 mg tablet   Take 1 tablet as needed by oral  route as needed for 30 days.      FLUoxetine 10 MG capsule       fluticasone propionate (FLONASE) 50 mcg/actuation nasal spray by Each Nostril route 2 (two) times daily as needed.      glycopyrronium tosylate (QBREXZA) 2.4 % Towl USE ONE WIPE ONCE DAILY FOR UNDERARM AREA.      ibuprofen (ADVIL,MOTRIN) 800 MG tablet Take 800 mg by mouth 3 (three) times daily as needed.      metFORMIN (GLUCOPHAGE-XR) 500 MG ER 24hr tablet       mupirocin (BACTROBAN) 2 % ointment Apply topically 3 (three) times daily. 15 g 0    MYORISAN 40 mg capsule Take 40 mg by mouth once daily.      OZEMPIC 1 mg/dose (4 mg/3 mL) Inject 1 mg into the skin every 7 days.      potassium citrate (UROCIT-K) 5 mEq (540 mg) TbSR TAKE 1 TABLET BY MOUTH TWICE DAILY WITH MEALS 60 tablet 4    pregabalin (LYRICA) 25 MG capsule       rimegepant (NURTEC) 75 mg odt Nurtec ODT 75 mg disintegrating tablet   Take 1 tablet as needed by oral route as directed for 30 days.      tamsulosin (FLOMAX) 0.4 mg Cap Take 1 capsule (0.4 mg total) by mouth once daily. 30 capsule 0    tiZANidine (ZANAFLEX) 4 MG tablet       tranexamic acid (LYSTEDA) 650 mg tablet Take 0.5 tablets by mouth 2 (two) times daily.      UBRELVY 100 mg tablet SMARTSI Tablet(s) By Mouth Every 2-4 Hours PRN      valACYclovir (VALTREX) 1000 MG tablet TAKE 2 TABLETS BY MOUTH TWICE A DAY FOR 2-3 DAYS AS NEEDED      zolpidem (AMBIEN) 10 mg Tab TK 1 T PO NIGHTLY PRN      [DISCONTINUED] azelastine (ASTELIN) 137 mcg (0.1 %) nasal spray 1 spray (137 mcg total) by Nasal route 2 (two) times daily as needed for Rhinitis. (Patient not taking: Reported on 5/15/2023) 30 mL 0    [DISCONTINUED] methocarbamoL (ROBAXIN) 750 MG Tab Take 1 tablet (750 mg total) by mouth 4 (four) times daily. (Patient not taking: Reported on 5/15/2023) 40 tablet 0     No current facility-administered medications on file prior to visit.     SOCIAL HISTORY:   Social History     Tobacco Use   Smoking Status Never   Smokeless Tobacco Never      Social History     Substance and Sexual Activity   Alcohol Use Not Currently    Comment: rarely     Social History     Substance and Sexual Activity   Drug Use No       ROS:   GENERAL: Denies fever, chills, weight loss/gain, fatigue  HEENT: Denies headaches, dizziness, vision/hearing changes  CARDIOVASCULAR: Denies chest pain, palpitations, or edema  RESPIRATORY: Denies dyspnea, cough  GI: Denies abdominal pain, rectal bleeding, nausea, vomiting. No change in bowel pattern or color  : Denies dysuria, hematuria   SKIN: Denies rash, itching   NEURO: Denies confusion, memory loss, or mood changes  PSYCH: Denies depression or anxiety  HEME/LYMPH: Denies easy bruising or bleeding    Objective Findings:    PHYSICAL EXAM:   Friendly White female, in no acute distress; alert and oriented to person, place and time.  VITALS: Wt 76 kg (167 lb 8.8 oz)   BMI 28.76 kg/m²   HENT: Normocephalic, without obvious abnormality.   EYES: Sclerae anicteric.   NECK: No obvious masses.  CARDIOVASCULAR: Regular rate. No peripheral edema.  RESPIRATORY: Normal respiratory effort.   GI: Soft, non-distended, obese abdomen.  EXTREMITIES:  No clubbing, cyanosis or edema.  SKIN: Warm and dry. No jaundice. No rashes noted to exposed skin.   NEURO:  Normal gait. No asterixis.  PSYCH:  Memory intact. Thought and speech pattern appropriate. Behavior normal. No depression or anxiety noted.    DIAGNOSTIC STUDIES:    US ABDOMEN LIMITED 7/14/2022:    FINDINGS:    Pancreas: Visualized portions demonstrate normal contours.     Liver: Enlarged, measuring 18.7 cm.  Echogenic liver parenchyma.  There are hypoechoic regions near the gallbladder fossa.     Gallbladder: Unremarkable.  Negative sonographic De La Cruz sign.  No biliary ductal dilatation.  CBD measures 4 mm.     Spleen: Mildly enlarged, measuring 12.4 cm.     Miscellaneous: No ascites.     Impression:     Hepatosplenomegaly and echogenic liver parenchyma, which could reflect steatosis and/or  chronic liver disease.  No focal hepatic lesions.    FIBROSCAN 8/1/2022:    Findings  Median liver stiffness score:  4.6  CAP Reading: dB/m:  337     IQR/med %:  13  Interpretation  Fibrosis interpretation is based on medial liver stiffness - Kilopascal (kPa).     Fibrosis Stage:  F 0-1  Steatosis interpretation is based on controlled attenuation parameter - (dB/m).     Steatosis Grade:  S3    US Abdomen Limited_Liver  Narrative: EXAMINATION:  US ABDOMEN LIMITED_LIVER    CLINICAL HISTORY:  Fatty (change of) liver, not elsewhere classified    FINDINGS:  Pancreas demonstrates no abnormality.  The gallbladder demonstrates no wall thickening De La Cruz sign the bile duct 2 mm.  Liver measures 16.6 cm, and the spleen 11.5.  There is focal fatty sparing near the gallbladder fossa.  Impression: No acute process seen.    Fatty change the liver with geographic focal fatty sparing near the gallbladder fossa.    Mild hepatomegaly.    Electronically signed by: Neeraj Hearn MD  Date:    07/13/2023  Time:    14:58    FIBROSCAN 9/15/2023:    Findings  Median liver stiffness score:  5.1  CAP Reading: dB/m:  303     IQR/med %:  14  Interpretation  Fibrosis interpretation is based on medial liver stiffness - Kilopascal (kPa).     Fibrosis Stage:  F 0-1  Steatosis interpretation is based on controlled attenuation parameter - (dB/m).     Steatosis Grade:  S3    EDUCATION:  Per AVS.    ASSESSMENT & PLAN:  47 y.o. White female with:    1. Fatty liver  FibroScan [place] (Vibration Controlled Transient Elastography)      2. History of elevated liver enzymes        3. History of Prediabetes        4. Hypertriglyceridemia        5. Overweight (BMI 25.0-29.9)          - Fibroscan today to restage liver disease.  - Repeat liver function tests annually through PCP.  - Recommend additional weight loss of 10-15 lbs, through diet and exercise.  - Recommend good control of cholesterol, blood pressure, & blood sugar levels.  - Limit alcohol intake  to no more than 3-5 drinks per week, ideally less.  - Avoid herbal supplements/alternative remedies.  - Return to clinic PRN.    Thank you for allowing me to participate in the care of Kim Padilla       Hepatology Nurse Practitioner  [facility]    CC'ed note to:   No ref. provider found  Chani Olmedo MD

## 2023-10-03 ENCOUNTER — PATIENT MESSAGE (OUTPATIENT)
Dept: INTERNAL MEDICINE | Facility: CLINIC | Age: 48
End: 2023-10-03
Payer: COMMERCIAL

## 2023-10-19 ENCOUNTER — OFFICE VISIT (OUTPATIENT)
Dept: INTERNAL MEDICINE | Facility: CLINIC | Age: 48
End: 2023-10-19
Payer: COMMERCIAL

## 2023-10-19 VITALS
BODY MASS INDEX: 28.42 KG/M2 | SYSTOLIC BLOOD PRESSURE: 129 MMHG | OXYGEN SATURATION: 99 % | HEART RATE: 99 BPM | DIASTOLIC BLOOD PRESSURE: 66 MMHG | WEIGHT: 165.56 LBS

## 2023-10-19 DIAGNOSIS — G43.909 MIGRAINE WITHOUT STATUS MIGRAINOSUS, NOT INTRACTABLE, UNSPECIFIED MIGRAINE TYPE: ICD-10-CM

## 2023-10-19 DIAGNOSIS — Z23 NEED FOR VACCINATION: ICD-10-CM

## 2023-10-19 DIAGNOSIS — K76.0 FATTY LIVER: ICD-10-CM

## 2023-10-19 DIAGNOSIS — F90.9 ATTENTION DEFICIT HYPERACTIVITY DISORDER (ADHD), UNSPECIFIED ADHD TYPE: ICD-10-CM

## 2023-10-19 DIAGNOSIS — E55.9 VITAMIN D DEFICIENCY: ICD-10-CM

## 2023-10-19 DIAGNOSIS — Z00.00 HEALTH MAINTENANCE EXAMINATION: ICD-10-CM

## 2023-10-19 DIAGNOSIS — N20.0 RECURRENT NEPHROLITHIASIS: ICD-10-CM

## 2023-10-19 DIAGNOSIS — G47.33 OSA (OBSTRUCTIVE SLEEP APNEA): ICD-10-CM

## 2023-10-19 DIAGNOSIS — R73.03 PREDIABETES: Primary | ICD-10-CM

## 2023-10-19 DIAGNOSIS — Z71.3 ENCOUNTER FOR WEIGHT LOSS COUNSELING: ICD-10-CM

## 2023-10-19 PROCEDURE — 3044F PR MOST RECENT HEMOGLOBIN A1C LEVEL <7.0%: ICD-10-PCS | Mod: CPTII,S$GLB,, | Performed by: STUDENT IN AN ORGANIZED HEALTH CARE EDUCATION/TRAINING PROGRAM

## 2023-10-19 PROCEDURE — 3066F NEPHROPATHY DOC TX: CPT | Mod: CPTII,S$GLB,, | Performed by: STUDENT IN AN ORGANIZED HEALTH CARE EDUCATION/TRAINING PROGRAM

## 2023-10-19 PROCEDURE — 99999 PR PBB SHADOW E&M-EST. PATIENT-LVL IV: CPT | Mod: PBBFAC,,, | Performed by: STUDENT IN AN ORGANIZED HEALTH CARE EDUCATION/TRAINING PROGRAM

## 2023-10-19 PROCEDURE — 3074F SYST BP LT 130 MM HG: CPT | Mod: CPTII,S$GLB,, | Performed by: STUDENT IN AN ORGANIZED HEALTH CARE EDUCATION/TRAINING PROGRAM

## 2023-10-19 PROCEDURE — 99999 PR PBB SHADOW E&M-EST. PATIENT-LVL IV: ICD-10-PCS | Mod: PBBFAC,,, | Performed by: STUDENT IN AN ORGANIZED HEALTH CARE EDUCATION/TRAINING PROGRAM

## 2023-10-19 PROCEDURE — 1159F PR MEDICATION LIST DOCUMENTED IN MEDICAL RECORD: ICD-10-PCS | Mod: CPTII,S$GLB,, | Performed by: STUDENT IN AN ORGANIZED HEALTH CARE EDUCATION/TRAINING PROGRAM

## 2023-10-19 PROCEDURE — 3008F PR BODY MASS INDEX (BMI) DOCUMENTED: ICD-10-PCS | Mod: CPTII,S$GLB,, | Performed by: STUDENT IN AN ORGANIZED HEALTH CARE EDUCATION/TRAINING PROGRAM

## 2023-10-19 PROCEDURE — 99396 PR PREVENTIVE VISIT,EST,40-64: ICD-10-PCS | Mod: S$GLB,,, | Performed by: STUDENT IN AN ORGANIZED HEALTH CARE EDUCATION/TRAINING PROGRAM

## 2023-10-19 PROCEDURE — 1160F PR REVIEW ALL MEDS BY PRESCRIBER/CLIN PHARMACIST DOCUMENTED: ICD-10-PCS | Mod: CPTII,S$GLB,, | Performed by: STUDENT IN AN ORGANIZED HEALTH CARE EDUCATION/TRAINING PROGRAM

## 2023-10-19 PROCEDURE — 3066F PR DOCUMENTATION OF TREATMENT FOR NEPHROPATHY: ICD-10-PCS | Mod: CPTII,S$GLB,, | Performed by: STUDENT IN AN ORGANIZED HEALTH CARE EDUCATION/TRAINING PROGRAM

## 2023-10-19 PROCEDURE — 3008F BODY MASS INDEX DOCD: CPT | Mod: CPTII,S$GLB,, | Performed by: STUDENT IN AN ORGANIZED HEALTH CARE EDUCATION/TRAINING PROGRAM

## 2023-10-19 PROCEDURE — 1159F MED LIST DOCD IN RCRD: CPT | Mod: CPTII,S$GLB,, | Performed by: STUDENT IN AN ORGANIZED HEALTH CARE EDUCATION/TRAINING PROGRAM

## 2023-10-19 PROCEDURE — 3074F PR MOST RECENT SYSTOLIC BLOOD PRESSURE < 130 MM HG: ICD-10-PCS | Mod: CPTII,S$GLB,, | Performed by: STUDENT IN AN ORGANIZED HEALTH CARE EDUCATION/TRAINING PROGRAM

## 2023-10-19 PROCEDURE — 1160F RVW MEDS BY RX/DR IN RCRD: CPT | Mod: CPTII,S$GLB,, | Performed by: STUDENT IN AN ORGANIZED HEALTH CARE EDUCATION/TRAINING PROGRAM

## 2023-10-19 PROCEDURE — 3044F HG A1C LEVEL LT 7.0%: CPT | Mod: CPTII,S$GLB,, | Performed by: STUDENT IN AN ORGANIZED HEALTH CARE EDUCATION/TRAINING PROGRAM

## 2023-10-19 PROCEDURE — 3078F DIAST BP <80 MM HG: CPT | Mod: CPTII,S$GLB,, | Performed by: STUDENT IN AN ORGANIZED HEALTH CARE EDUCATION/TRAINING PROGRAM

## 2023-10-19 PROCEDURE — 99396 PREV VISIT EST AGE 40-64: CPT | Mod: S$GLB,,, | Performed by: STUDENT IN AN ORGANIZED HEALTH CARE EDUCATION/TRAINING PROGRAM

## 2023-10-19 PROCEDURE — 3078F PR MOST RECENT DIASTOLIC BLOOD PRESSURE < 80 MM HG: ICD-10-PCS | Mod: CPTII,S$GLB,, | Performed by: STUDENT IN AN ORGANIZED HEALTH CARE EDUCATION/TRAINING PROGRAM

## 2023-10-19 RX ORDER — SEMAGLUTIDE 2.68 MG/ML
2 INJECTION, SOLUTION SUBCUTANEOUS
Qty: 9 ML | Refills: 1 | Status: SHIPPED | OUTPATIENT
Start: 2023-10-19

## 2023-10-19 NOTE — PATIENT INSTRUCTIONS
Call to schedule with [facility] or psychology: [phone] or [phone]    [facility]  [address] [place] [address] [place]  [address]  [email or web address]  Tel: [phone]  Fax: [phone]    [facility] [address], [facility] [address]   Phone: [phone]   You can email for an appointment at: [email or web address]     - Hydration goals of 2-3 liters of water daily.   - Increase dietary fiber. Start a psyllium husk based supplement such as Metamucil or wheat dextrin based supplement such as Benefiber daily.   - Recommend Miralax for constipation symptoms including decreased frequency, bloating, straining, or discomfort. Start 17g daily, then increase to twice daily if ineffective or decrease to as needed if constipation relieved.   - May try stool softeners such as Colace daily.   - If symptoms still uncontrolled with the above, may try stimulant laxatives such as mag citrate, milk of mag, or senna. There is a risk for abdominal cramping with stimulant laxatives. Avoid using frequently.

## 2023-10-20 PROBLEM — E55.9 VITAMIN D DEFICIENCY: Status: ACTIVE | Noted: 2023-10-20

## 2023-10-29 ENCOUNTER — PATIENT MESSAGE (OUTPATIENT)
Dept: INTERNAL MEDICINE | Facility: CLINIC | Age: 48
End: 2023-10-29
Payer: COMMERCIAL

## 2023-10-29 DIAGNOSIS — Z91.89 AT HIGH RISK FOR BREAST CANCER: ICD-10-CM

## 2023-10-29 DIAGNOSIS — Z12.31 SCREENING MAMMOGRAM FOR BREAST CANCER: Primary | ICD-10-CM

## 2023-11-07 ENCOUNTER — PATIENT MESSAGE (OUTPATIENT)
Dept: INTERNAL MEDICINE | Facility: CLINIC | Age: 48
End: 2023-11-07
Payer: COMMERCIAL

## 2023-11-07 DIAGNOSIS — F90.9 ATTENTION DEFICIT HYPERACTIVITY DISORDER (ADHD), UNSPECIFIED ADHD TYPE: Primary | ICD-10-CM

## 2023-11-16 ENCOUNTER — LAB VISIT (OUTPATIENT)
Dept: LAB | Facility: HOSPITAL | Age: 48
End: 2023-11-16
Attending: ALLERGY & IMMUNOLOGY
Payer: COMMERCIAL

## 2023-11-16 ENCOUNTER — OFFICE VISIT (OUTPATIENT)
Dept: ALLERGY | Facility: CLINIC | Age: 48
End: 2023-11-16
Payer: COMMERCIAL

## 2023-11-16 VITALS
WEIGHT: 165.38 LBS | BODY MASS INDEX: 28.38 KG/M2 | OXYGEN SATURATION: 98 % | SYSTOLIC BLOOD PRESSURE: 129 MMHG | DIASTOLIC BLOOD PRESSURE: 87 MMHG | HEART RATE: 89 BPM

## 2023-11-16 DIAGNOSIS — J32.9 CHRONIC SINUSITIS, UNSPECIFIED LOCATION: ICD-10-CM

## 2023-11-16 DIAGNOSIS — Z87.01 H/O RECURRENT PNEUMONIA: ICD-10-CM

## 2023-11-16 DIAGNOSIS — J31.0 CHRONIC RHINITIS: Primary | ICD-10-CM

## 2023-11-16 DIAGNOSIS — J31.0 CHRONIC RHINITIS: ICD-10-CM

## 2023-11-16 LAB
BASOPHILS # BLD AUTO: 0.04 K/UL (ref 0–0.2)
BASOPHILS NFR BLD: 0.5 % (ref 0–1.9)
DIFFERENTIAL METHOD: NORMAL
EOSINOPHIL # BLD AUTO: 0.2 K/UL (ref 0–0.5)
EOSINOPHIL NFR BLD: 2.8 % (ref 0–8)
ERYTHROCYTE [DISTWIDTH] IN BLOOD BY AUTOMATED COUNT: 12 % (ref 11.5–14.5)
HCT VFR BLD AUTO: 42.7 % (ref 37–48.5)
HGB BLD-MCNC: 14.7 G/DL (ref 12–16)
IGA SERPL-MCNC: 114 MG/DL (ref 40–350)
IGE SERPL-ACNC: <35 IU/ML (ref 0–100)
IGG SERPL-MCNC: 961 MG/DL (ref 650–1600)
IGM SERPL-MCNC: 61 MG/DL (ref 50–300)
IMM GRANULOCYTES # BLD AUTO: 0.03 K/UL (ref 0–0.04)
IMM GRANULOCYTES NFR BLD AUTO: 0.4 % (ref 0–0.5)
LYMPHOCYTES # BLD AUTO: 2.6 K/UL (ref 1–4.8)
LYMPHOCYTES NFR BLD: 30.7 % (ref 18–48)
MCH RBC QN AUTO: 29.5 PG (ref 27–31)
MCHC RBC AUTO-ENTMCNC: 34.4 G/DL (ref 32–36)
MCV RBC AUTO: 86 FL (ref 82–98)
MONOCYTES # BLD AUTO: 0.8 K/UL (ref 0.3–1)
MONOCYTES NFR BLD: 9.6 % (ref 4–15)
NEUTROPHILS # BLD AUTO: 4.7 K/UL (ref 1.8–7.7)
NEUTROPHILS NFR BLD: 56 % (ref 38–73)
NRBC BLD-RTO: 0 /100 WBC
PLATELET # BLD AUTO: 282 K/UL (ref 150–450)
PMV BLD AUTO: 10.2 FL (ref 9.2–12.9)
RBC # BLD AUTO: 4.99 M/UL (ref 4–5.4)
WBC # BLD AUTO: 8.3 K/UL (ref 3.9–12.7)

## 2023-11-16 PROCEDURE — 99999 PR PBB SHADOW E&M-EST. PATIENT-LVL III: CPT | Mod: PBBFAC,,, | Performed by: ALLERGY & IMMUNOLOGY

## 2023-11-16 PROCEDURE — 3074F PR MOST RECENT SYSTOLIC BLOOD PRESSURE < 130 MM HG: ICD-10-PCS | Mod: CPTII,S$GLB,, | Performed by: ALLERGY & IMMUNOLOGY

## 2023-11-16 PROCEDURE — 36415 COLL VENOUS BLD VENIPUNCTURE: CPT | Performed by: ALLERGY & IMMUNOLOGY

## 2023-11-16 PROCEDURE — 3079F PR MOST RECENT DIASTOLIC BLOOD PRESSURE 80-89 MM HG: ICD-10-PCS | Mod: CPTII,S$GLB,, | Performed by: ALLERGY & IMMUNOLOGY

## 2023-11-16 PROCEDURE — 3066F PR DOCUMENTATION OF TREATMENT FOR NEPHROPATHY: ICD-10-PCS | Mod: CPTII,S$GLB,, | Performed by: ALLERGY & IMMUNOLOGY

## 2023-11-16 PROCEDURE — 3008F PR BODY MASS INDEX (BMI) DOCUMENTED: ICD-10-PCS | Mod: CPTII,S$GLB,, | Performed by: ALLERGY & IMMUNOLOGY

## 2023-11-16 PROCEDURE — 86003 ALLG SPEC IGE CRUDE XTRC EA: CPT | Mod: 59 | Performed by: ALLERGY & IMMUNOLOGY

## 2023-11-16 PROCEDURE — 3066F NEPHROPATHY DOC TX: CPT | Mod: CPTII,S$GLB,, | Performed by: ALLERGY & IMMUNOLOGY

## 2023-11-16 PROCEDURE — 3008F BODY MASS INDEX DOCD: CPT | Mod: CPTII,S$GLB,, | Performed by: ALLERGY & IMMUNOLOGY

## 2023-11-16 PROCEDURE — 99999 PR PBB SHADOW E&M-EST. PATIENT-LVL III: ICD-10-PCS | Mod: PBBFAC,,, | Performed by: ALLERGY & IMMUNOLOGY

## 2023-11-16 PROCEDURE — 86317 IMMUNOASSAY INFECTIOUS AGENT: CPT | Performed by: ALLERGY & IMMUNOLOGY

## 2023-11-16 PROCEDURE — 1159F MED LIST DOCD IN RCRD: CPT | Mod: CPTII,S$GLB,, | Performed by: ALLERGY & IMMUNOLOGY

## 2023-11-16 PROCEDURE — 82784 ASSAY IGA/IGD/IGG/IGM EACH: CPT | Performed by: ALLERGY & IMMUNOLOGY

## 2023-11-16 PROCEDURE — 99204 PR OFFICE/OUTPT VISIT, NEW, LEVL IV, 45-59 MIN: ICD-10-PCS | Mod: S$GLB,,, | Performed by: ALLERGY & IMMUNOLOGY

## 2023-11-16 PROCEDURE — 86003 ALLG SPEC IGE CRUDE XTRC EA: CPT | Performed by: ALLERGY & IMMUNOLOGY

## 2023-11-16 PROCEDURE — 3074F SYST BP LT 130 MM HG: CPT | Mod: CPTII,S$GLB,, | Performed by: ALLERGY & IMMUNOLOGY

## 2023-11-16 PROCEDURE — 3044F PR MOST RECENT HEMOGLOBIN A1C LEVEL <7.0%: ICD-10-PCS | Mod: CPTII,S$GLB,, | Performed by: ALLERGY & IMMUNOLOGY

## 2023-11-16 PROCEDURE — 3044F HG A1C LEVEL LT 7.0%: CPT | Mod: CPTII,S$GLB,, | Performed by: ALLERGY & IMMUNOLOGY

## 2023-11-16 PROCEDURE — 1159F PR MEDICATION LIST DOCUMENTED IN MEDICAL RECORD: ICD-10-PCS | Mod: CPTII,S$GLB,, | Performed by: ALLERGY & IMMUNOLOGY

## 2023-11-16 PROCEDURE — 99204 OFFICE O/P NEW MOD 45 MIN: CPT | Mod: S$GLB,,, | Performed by: ALLERGY & IMMUNOLOGY

## 2023-11-16 PROCEDURE — 82785 ASSAY OF IGE: CPT | Performed by: ALLERGY & IMMUNOLOGY

## 2023-11-16 PROCEDURE — 3079F DIAST BP 80-89 MM HG: CPT | Mod: CPTII,S$GLB,, | Performed by: ALLERGY & IMMUNOLOGY

## 2023-11-16 PROCEDURE — 85025 COMPLETE CBC W/AUTO DIFF WBC: CPT | Performed by: ALLERGY & IMMUNOLOGY

## 2023-11-16 RX ORDER — AZELASTINE 1 MG/ML
2 SPRAY, METERED NASAL 2 TIMES DAILY
Qty: 30 ML | Refills: 6 | Status: SHIPPED | OUTPATIENT
Start: 2023-11-16 | End: 2024-11-15

## 2023-11-16 RX ORDER — CEFDINIR 300 MG/1
300 CAPSULE ORAL 2 TIMES DAILY
Qty: 20 CAPSULE | Refills: 0 | Status: SHIPPED | OUTPATIENT
Start: 2023-11-16 | End: 2023-11-26

## 2023-11-16 RX ORDER — FLUTICASONE PROPIONATE 50 MCG
2 SPRAY, SUSPENSION (ML) NASAL 2 TIMES DAILY
Qty: 16 G | Refills: 11 | Status: SHIPPED | OUTPATIENT
Start: 2023-11-16

## 2023-11-16 NOTE — PROGRESS NOTES
Subjective:       Patient ID: Kim Padilla is a 48 y.o. female.    Chief Complaint:  Allergies, Sinusitis, Sinus Problem, Nasal Congestion, and Other (First consultation for Allergies and Sinus Problems presenting as itchy, watery eyes, congestion and sinus pressure)      HPI:   Patient's symptoms include itchy eyes, nasal congestion, sinus pressure, sneezing, and watery eyes. Also rhinorrhea. Congestion and sneezing are worse sx's.These symptoms are perennial. Current triggers include exposure to no known precipitant. Worse inside. Often worse at school, work. The patient has been suffering from these symptoms for approximately most of life. Worse in last 6-8 months. The patient has tried  allegra, sudafed, claritin, zyrtec  with poor relief of symptoms. No prev routine nasal steroid. Immunotherapy has never been tried. The patient has never had nasal polyps. The patient has no history of asthma. The patient has no history of eczema. The patient takes antibiotics for sinusitis 2 times per year. Symptoms do improve with antibiotics. The patient has not had sinus surgery in the past.   Covid x 3   Tonisllectomy at 28  Pneumonia x 4, last 4 yrs ago  No prev allergy testing        Environmental History: Pets in the home: cats (3).  [place]: hardwood floors  Tobacco Smoke in Home: no    Past Medical History:   Diagnosis Date    Allergy     Diabetes mellitus     Fatty liver disease, nonalcoholic     Infertility, female          Family History   Problem Relation Age of Onset    Graves' disease Mother     Hypertension Father     Hyperlipidemia Father     Prostate cancer Father     Insulin resistance Father     Thyroid cancer Sister     Ovarian cancer Sister     Thyroid disease Maternal Grandmother         s/p thyroidectomy    Heart attack Maternal Grandfather         in early 60's    Coronary artery disease Maternal Grandfather     Lung disease Paternal Grandmother     Heart attack Paternal Grandfather     Coronary  artery disease Paternal Grandfather     Stroke Paternal Grandfather     Breast cancer Maternal Cousin     Colon cancer Neg Hx    Mother w hx recurrent sinus infections, s/p sinus surgery  Maternal aunt w immune deficiency, on IgG replacement      Review of Systems   Constitutional:  Negative for activity change, fatigue and fever.   HENT:  Positive for congestion, postnasal drip and sinus pressure. Negative for rhinorrhea and sneezing.    Eyes:  Negative for discharge, redness and itching.   Respiratory:  Negative for cough, shortness of breath and wheezing.    Cardiovascular:  Negative for chest pain.   Gastrointestinal:  Negative for diarrhea, nausea and vomiting.   Genitourinary:  Negative for dysuria.   Musculoskeletal:  Negative for arthralgias and joint swelling.   Skin:  Negative for rash.   Neurological:  Negative for headaches.   Hematological:  Does not bruise/bleed easily.   Psychiatric/Behavioral:  Negative for behavioral problems and sleep disturbance.           Objective:   Physical Exam  Vitals and nursing note reviewed.   Constitutional:       General: She is not in acute distress.     Appearance: She is well-developed.   HENT:      Head: Normocephalic.      Right Ear: Tympanic membrane and external ear normal.      Left Ear: Tympanic membrane and external ear normal.      Nose: No septal deviation, mucosal edema or rhinorrhea.      Right Sinus: No maxillary sinus tenderness or frontal sinus tenderness.      Left Sinus: No maxillary sinus tenderness or frontal sinus tenderness.      Mouth/Throat:      Pharynx: Uvula midline. No uvula swelling.   Eyes:      General:         Right eye: No discharge.         Left eye: No discharge.      Conjunctiva/sclera: Conjunctivae normal.   Cardiovascular:      Rate and Rhythm: Normal rate and regular rhythm.   Pulmonary:      Effort: Pulmonary effort is normal. No respiratory distress.      Breath sounds: Normal breath sounds. No wheezing.   Abdominal:       "General: Bowel sounds are normal.      Palpations: Abdomen is soft.      Tenderness: There is no abdominal tenderness.   Musculoskeletal:         General: No tenderness. Normal range of motion.      Cervical back: Normal range of motion and neck supple.   Lymphadenopathy:      Cervical: No cervical adenopathy.   Skin:     General: Skin is warm.      Findings: No erythema or rash.   Neurological:      Mental Status: She is alert and oriented to person, place, and time.   Psychiatric:         Behavior: Behavior normal.         Thought Content: Thought content normal.         Judgment: Judgment normal.             No results found for: "IGGSERUM", "IGM", "IGA"     No results found for: "IGE"    Eos #   Date Value Ref Range Status   04/20/2023 0.3 0.0 - 0.5 K/uL Final   02/03/2023 0.3 0.0 - 0.5 K/uL Final   09/16/2022 0.2 0.0 - 0.5 K/uL Final     Eosinophil %   Date Value Ref Range Status   04/20/2023 2.1 0.0 - 8.0 % Final   02/03/2023 3.1 0.0 - 8.0 % Final   09/16/2022 1.3 0.0 - 8.0 % Final           Assessment:       1. Chronic rhinitis    2. Chronic sinusitis, unspecified location    3. H/O recurrent pneumonia         Plan:       Kim was seen today for allergies, sinusitis, sinus problem, nasal congestion and other.    Diagnoses and all orders for this visit:    Chronic rhinitis  -     Immunoglobulins (IgG, IgA, IgM) Quantitative; Future  -     IgE; Future  -     CBC Auto Differential; Future  -     Cat epithelium IgE; Future  -     Dog dander IgE; Future  -     D. farinae IgE; Future  -     D. pteronyssinus IgE; Future  -     Aspergillus fumagatus IgE; Future  -     Allergen-Alternaria Alternata; Future  -     Cockroach, American IgE; Future  -     Bahia grass IgE; Future  -     Odin IgE; Future  -     Oak, white IgE; Future  -     Allergen-Cedar; Future  -     Allergen, Pecan Tree IgE; Future  -     Ragweed, short, common IgE; Future  -     Marsh elder, rough IgE; Future  -     Plantain, English IgE; Future  -  "    S.pneumoniae IgG Serotypes; Future    Chronic sinusitis, unspecified location    H/O recurrent pneumonia    Other orders  -     cefdinir (OMNICEF) 300 MG capsule; Take 1 capsule (300 mg total) by mouth 2 (two) times daily. for 10 days  -     azelastine (ASTELIN) 137 mcg (0.1 %) nasal spray; 2 sprays (274 mcg total) by Nasal route 2 (two) times daily.  -     fluticasone propionate (FLONASE) 50 mcg/actuation nasal spray; 2 sprays (100 mcg total) by Each Nostril route 2 (two) times daily.

## 2023-11-18 ENCOUNTER — TELEPHONE (OUTPATIENT)
Dept: INTERNAL MEDICINE | Facility: CLINIC | Age: 48
End: 2023-11-18
Payer: COMMERCIAL

## 2023-11-18 DIAGNOSIS — Z12.31 SCREENING MAMMOGRAM FOR BREAST CANCER: Primary | ICD-10-CM

## 2023-11-18 NOTE — TELEPHONE ENCOUNTER
----- Message from Dallas Randle, RT sent at 11/17/2023  6:11 PM CST -----  Regarding: STAT BMP Lab Order  Good evening, we have this patient scheduled Monday afternoon for a breast MRI w/wo contrast, and we see she takes medication for diabetes. In order to be able to give her contrast, we would need some current lab work. Would anybody be able to input a STAT BMP lab order so we can do her scan? Thanks! I will inform patient.

## 2023-11-20 ENCOUNTER — HOSPITAL ENCOUNTER (OUTPATIENT)
Dept: RADIOLOGY | Facility: OTHER | Age: 48
Discharge: HOME | End: 2023-11-20
Attending: STUDENT IN AN ORGANIZED HEALTH CARE EDUCATION/TRAINING PROGRAM
Payer: COMMERCIAL

## 2023-11-20 DIAGNOSIS — Z91.89 AT HIGH RISK FOR BREAST CANCER: ICD-10-CM

## 2023-11-20 DIAGNOSIS — Z12.31 SCREENING MAMMOGRAM FOR BREAST CANCER: ICD-10-CM

## 2023-11-20 PROCEDURE — 77049 MRI BREAST C-+ W/CAD BI: CPT | Mod: TC

## 2023-11-20 PROCEDURE — 77049 MRI BREAST C-+ W/CAD BI: CPT | Mod: 26,,, | Performed by: RADIOLOGY

## 2023-11-20 PROCEDURE — A9577 INJ MULTIHANCE: HCPCS | Performed by: STUDENT IN AN ORGANIZED HEALTH CARE EDUCATION/TRAINING PROGRAM

## 2023-11-20 PROCEDURE — 25500020 PHARM REV CODE 255: Performed by: STUDENT IN AN ORGANIZED HEALTH CARE EDUCATION/TRAINING PROGRAM

## 2023-11-20 PROCEDURE — 77049 MRI BREAST W/WO CONTRAST, W/CAD, BILATERAL: ICD-10-PCS | Mod: 26,,, | Performed by: RADIOLOGY

## 2023-11-20 RX ADMIN — GADOBENATE DIMEGLUMINE 15 ML: 529 INJECTION, SOLUTION INTRAVENOUS at 12:11

## 2023-11-21 LAB
A ALTERNATA IGE QN: <0.1 KU/L
A FUMIGATUS IGE QN: <0.1 KU/L
BAHIA GRASS IGE QN: <0.1 KU/L
CAT DANDER IGE QN: <0.1 KU/L
CEDAR IGE QN: <0.1 KU/L
COMMON RAGWEED IGE QN: <0.1 KU/L
D FARINAE IGE QN: <0.1 KU/L
D PTERONYSS IGE QN: <0.1 KU/L
DEPRECATED A ALTERNATA IGE RAST QL: NORMAL
DEPRECATED A FUMIGATUS IGE RAST QL: NORMAL
DEPRECATED BAHIA GRASS IGE RAST QL: NORMAL
DEPRECATED CAT DANDER IGE RAST QL: NORMAL
DEPRECATED CEDAR IGE RAST QL: NORMAL
DEPRECATED COMMON RAGWEED IGE RAST QL: NORMAL
DEPRECATED D FARINAE IGE RAST QL: NORMAL
DEPRECATED D PTERONYSS IGE RAST QL: NORMAL
DEPRECATED DOG DANDER IGE RAST QL: NORMAL
DEPRECATED ELDER IGE RAST QL: NORMAL
DEPRECATED ENGL PLANTAIN IGE RAST QL: NORMAL
DEPRECATED PECAN/HICK TREE IGE RAST QL: NORMAL
DEPRECATED ROACH IGE RAST QL: NORMAL
DEPRECATED TIMOTHY IGE RAST QL: NORMAL
DEPRECATED WHITE OAK IGE RAST QL: NORMAL
DOG DANDER IGE QN: <0.1 KU/L
ELDER IGE QN: <0.1 KU/L
ENGL PLANTAIN IGE QN: <0.1 KU/L
PECAN/HICK TREE IGE QN: <0.1 KU/L
ROACH IGE QN: <0.1 KU/L
TIMOTHY IGE QN: <0.1 KU/L
WHITE OAK IGE QN: <0.1 KU/L

## 2023-11-24 LAB
DEPRECATED S PNEUM12 IGG SER-MCNC: <0.3 UG/ML
DEPRECATED S PNEUM23 IGG SER-MCNC: 0.3 UG/ML
DEPRECATED S PNEUM4 IGG SER-MCNC: <0.3 UG/ML
DEPRECATED S PNEUM8 IGG SER-MCNC: <0.3 UG/ML
DEPRECATED S PNEUM9 IGG SER-MCNC: <0.3 UG/ML
S PN DA SERO 19F IGG SER-MCNC: 0.9 UG/ML
S PNEUM DA 1 IGG SER-MCNC: 0.6 UG/ML
S PNEUM DA 14 IGG SER-MCNC: 2.9
S PNEUM DA 18C IGG SER-MCNC: <0.3
S PNEUM DA 3 IGG SER-MCNC: 1.5 UG/ML
S PNEUM DA 5 IGG SER-MCNC: 0.8 UG/ML
S PNEUM DA 6B IGG SER-MCNC: <0.3 UG/ML
S PNEUM DA 7F IGG SER-MCNC: 0.9 UG/ML
S PNEUM DA 9V IGG SER-MCNC: <0.3 UG/ML

## 2023-11-27 NOTE — PROGRESS NOTES
Please call to let know that evaluation of pt's immune system showed that pt may benefit from an extra vaccine, Pneumovax, to decrease frequency of infections. Please schedule follow up appointment to review labs and discuss Pneumovax vaccine.  Allergy tests are negative

## 2023-11-28 ENCOUNTER — TELEPHONE (OUTPATIENT)
Dept: ALLERGY | Facility: CLINIC | Age: 48
End: 2023-11-28
Payer: COMMERCIAL

## 2023-11-28 NOTE — TELEPHONE ENCOUNTER
Good morning ,    Kindly note the labs results message was relayed and a follow up appointment was scheduled.    Kind regards  Des Ford MA    ----- Message from Dante Small MD sent at 11/27/2023  5:38 PM CST -----  Please call to let know that evaluation of pt's immune system showed that pt may benefit from an extra vaccine, Pneumovax, to decrease frequency of infections. Please schedule follow up appointment to review labs and discuss Pneumovax vaccine.  Allergy tests are negative

## 2023-11-28 NOTE — TELEPHONE ENCOUNTER
----- Message from Dante Small MD sent at 11/27/2023  5:38 PM CST -----  Please call to let know that evaluation of pt's immune system showed that pt may benefit from an extra vaccine, Pneumovax, to decrease frequency of infections. Please schedule follow up appointment to review labs and discuss Pneumovax vaccine.  Allergy tests are negative

## 2023-11-29 ENCOUNTER — OFFICE VISIT (OUTPATIENT)
Dept: ALLERGY | Facility: CLINIC | Age: 48
End: 2023-11-29
Payer: COMMERCIAL

## 2023-11-29 VITALS — WEIGHT: 165.56 LBS | HEIGHT: 64 IN | BODY MASS INDEX: 28.27 KG/M2

## 2023-11-29 DIAGNOSIS — R76.0 ABNORMAL ANTIBODY TITER: Primary | ICD-10-CM

## 2023-11-29 DIAGNOSIS — J31.0 CHRONIC RHINITIS: ICD-10-CM

## 2023-11-29 DIAGNOSIS — Z87.01 H/O RECURRENT PNEUMONIA: ICD-10-CM

## 2023-11-29 DIAGNOSIS — J32.9 CHRONIC SINUSITIS, UNSPECIFIED LOCATION: ICD-10-CM

## 2023-11-29 PROCEDURE — 99214 OFFICE O/P EST MOD 30 MIN: CPT | Mod: 25,S$GLB,, | Performed by: ALLERGY & IMMUNOLOGY

## 2023-11-29 PROCEDURE — 1159F PR MEDICATION LIST DOCUMENTED IN MEDICAL RECORD: ICD-10-PCS | Mod: CPTII,S$GLB,, | Performed by: ALLERGY & IMMUNOLOGY

## 2023-11-29 PROCEDURE — 90732 PPSV23 VACC 2 YRS+ SUBQ/IM: CPT | Mod: S$GLB,,, | Performed by: ALLERGY & IMMUNOLOGY

## 2023-11-29 PROCEDURE — 3066F PR DOCUMENTATION OF TREATMENT FOR NEPHROPATHY: ICD-10-PCS | Mod: CPTII,S$GLB,, | Performed by: ALLERGY & IMMUNOLOGY

## 2023-11-29 PROCEDURE — 99214 PR OFFICE/OUTPT VISIT, EST, LEVL IV, 30-39 MIN: ICD-10-PCS | Mod: 25,S$GLB,, | Performed by: ALLERGY & IMMUNOLOGY

## 2023-11-29 PROCEDURE — 3008F PR BODY MASS INDEX (BMI) DOCUMENTED: ICD-10-PCS | Mod: CPTII,S$GLB,, | Performed by: ALLERGY & IMMUNOLOGY

## 2023-11-29 PROCEDURE — 90471 IMMUNIZATION ADMIN: CPT | Mod: S$GLB,,, | Performed by: ALLERGY & IMMUNOLOGY

## 2023-11-29 PROCEDURE — 3044F HG A1C LEVEL LT 7.0%: CPT | Mod: CPTII,S$GLB,, | Performed by: ALLERGY & IMMUNOLOGY

## 2023-11-29 PROCEDURE — 90732 PNEUMOCOCCAL POLYSACCHARIDE VACCINE 23-VALENT =>2YO SQ IM: ICD-10-PCS | Mod: S$GLB,,, | Performed by: ALLERGY & IMMUNOLOGY

## 2023-11-29 PROCEDURE — 3044F PR MOST RECENT HEMOGLOBIN A1C LEVEL <7.0%: ICD-10-PCS | Mod: CPTII,S$GLB,, | Performed by: ALLERGY & IMMUNOLOGY

## 2023-11-29 PROCEDURE — 99999 PR PBB SHADOW E&M-EST. PATIENT-LVL III: ICD-10-PCS | Mod: PBBFAC,,, | Performed by: ALLERGY & IMMUNOLOGY

## 2023-11-29 PROCEDURE — 99999 PR PBB SHADOW E&M-EST. PATIENT-LVL III: CPT | Mod: PBBFAC,,, | Performed by: ALLERGY & IMMUNOLOGY

## 2023-11-29 PROCEDURE — 90471 PNEUMOCOCCAL POLYSACCHARIDE VACCINE 23-VALENT =>2YO SQ IM: ICD-10-PCS | Mod: S$GLB,,, | Performed by: ALLERGY & IMMUNOLOGY

## 2023-11-29 PROCEDURE — 3008F BODY MASS INDEX DOCD: CPT | Mod: CPTII,S$GLB,, | Performed by: ALLERGY & IMMUNOLOGY

## 2023-11-29 PROCEDURE — 3066F NEPHROPATHY DOC TX: CPT | Mod: CPTII,S$GLB,, | Performed by: ALLERGY & IMMUNOLOGY

## 2023-11-29 PROCEDURE — 1159F MED LIST DOCD IN RCRD: CPT | Mod: CPTII,S$GLB,, | Performed by: ALLERGY & IMMUNOLOGY

## 2023-11-29 RX ORDER — ONDANSETRON 4 MG/1
TABLET, ORALLY DISINTEGRATING ORAL
COMMUNITY
Start: 2023-10-03

## 2023-11-29 RX ORDER — MELOXICAM 15 MG/1
TABLET ORAL
COMMUNITY

## 2023-11-29 RX ORDER — IBUPROFEN 800 MG/1
1 TABLET ORAL 3 TIMES DAILY PRN
COMMUNITY

## 2023-11-29 NOTE — PROGRESS NOTES
Subjective:       Patient ID: Kim Padilla is a 48 y.o. female.    Chief Complaint:  Follow-up      HPI:       Pt presents for fu chronic rhinitis, recurrent sinusitis. She is feeling much better since starting cefdinir and continuing routine nasal steroid.    Hx from 11/16/23:  Patient's symptoms include itchy eyes, nasal congestion, sinus pressure, sneezing, and watery eyes. Also rhinorrhea. Congestion and sneezing are worse sx's.These symptoms are perennial. Current triggers include exposure to no known precipitant. Worse inside. Often worse at school, work. The patient has been suffering from these symptoms for approximately most of life. Worse in last 6-8 months. The patient has tried  allegra, sudafed, claritin, zyrtec  with poor relief of symptoms. No prev routine nasal steroid. Immunotherapy has never been tried. The patient has never had nasal polyps. The patient has no history of asthma. The patient has no history of eczema. The patient takes antibiotics for sinusitis 2 times per year. Symptoms do improve with antibiotics. The patient has not had sinus surgery in the past.   Covid x 3   Tonisllectomy at 28  Pneumonia x 4, last 4 yrs ago  No prev allergy testing        Environmental History: Pets in the home: cats (3).  [place]: hardwood floors  Tobacco Smoke in Home: no    Past Medical History:   Diagnosis Date    Allergy     Diabetes mellitus     Fatty liver disease, nonalcoholic     Infertility, female          Family History   Problem Relation Age of Onset    Allergies Mother     Graves' disease Mother     Hypertension Father     Hyperlipidemia Father     Prostate cancer Father     Insulin resistance Father     Thyroid cancer Sister     Ovarian cancer Sister     Thyroid disease Maternal Grandmother         s/p thyroidectomy    Heart attack Maternal Grandfather         in early 60's    Coronary artery disease Maternal Grandfather     Lung disease Paternal Grandmother     Heart attack Paternal  Grandfather     Coronary artery disease Paternal Grandfather     Stroke Paternal Grandfather     Breast cancer Maternal Cousin     Colon cancer Neg Hx    Mother w hx recurrent sinus infections, s/p sinus surgery  Maternal aunt w immune deficiency, on IgG replacement      Answers submitted by the patient for this visit:  Allergy and Asthma Questionnaire  (Submitted on 11/28/2023)  facial swelling: No  Sinus pain?: No  sinus pressure : No  nosebleeds: No  postnasal drip: No  sneezing: Yes  runny nose: No  congestion: Yes       Objective:   Physical Exam  Vitals and nursing note reviewed.   Constitutional:       General: She is not in acute distress.     Appearance: She is well-developed.   HENT:      Head: Normocephalic.      Right Ear: Tympanic membrane and external ear normal.      Left Ear: Tympanic membrane and external ear normal.      Nose: No septal deviation, mucosal edema or rhinorrhea.      Right Sinus: No maxillary sinus tenderness or frontal sinus tenderness.      Left Sinus: No maxillary sinus tenderness or frontal sinus tenderness.      Mouth/Throat:      Pharynx: Uvula midline. No uvula swelling.   Eyes:      General:         Right eye: No discharge.         Left eye: No discharge.      Conjunctiva/sclera: Conjunctivae normal.   Cardiovascular:      Rate and Rhythm: Normal rate and regular rhythm.   Pulmonary:      Effort: Pulmonary effort is normal. No respiratory distress.      Breath sounds: Normal breath sounds. No wheezing.   Abdominal:      General: Bowel sounds are normal.      Palpations: Abdomen is soft.      Tenderness: There is no abdominal tenderness.   Musculoskeletal:         General: No tenderness. Normal range of motion.      Cervical back: Normal range of motion and neck supple.   Lymphadenopathy:      Cervical: No cervical adenopathy.   Skin:     General: Skin is warm.      Findings: No erythema or rash.   Neurological:      Mental Status: She is alert and oriented to person, place,  and time.   Psychiatric:         Behavior: Behavior normal.         Thought Content: Thought content normal.         Judgment: Judgment normal.             IgG   Date Value Ref Range Status   11/16/2023 961 650 - 1600 mg/dL Final     Comment:     IgG Cord Blood Reference Range: 650-1600 mg/dL.     IgM   Date Value Ref Range Status   11/16/2023 61 50 - 300 mg/dL Final     Comment:     IgM Cord Blood Reference Range: <25 mg/dL.     IgA   Date Value Ref Range Status   11/16/2023 114 40 - 350 mg/dL Final     Comment:     IgA Cord Blood Reference Range: <5 mg/dL.        IgE   Date Value Ref Range Status   11/16/2023 <35 0 - 100 IU/mL Final       Eos #   Date Value Ref Range Status   11/16/2023 0.2 0.0 - 0.5 K/uL Final   04/20/2023 0.3 0.0 - 0.5 K/uL Final   02/03/2023 0.3 0.0 - 0.5 K/uL Final     Eosinophil %   Date Value Ref Range Status   11/16/2023 2.8 0.0 - 8.0 % Final   04/20/2023 2.1 0.0 - 8.0 % Final   02/03/2023 3.1 0.0 - 8.0 % Final     IgE   Date Value Ref Range Status   11/16/2023 <35 0 - 100 IU/mL Final       Negative immnoCAPs  Low pneumococcal titers    Assessment:       1. Abnormal antibody titer    2. Chronic rhinitis    3. Chronic sinusitis, unspecified location    4. H/O recurrent pneumonia         Plan:       Kim was seen today for follow-up.    Diagnoses and all orders for this visit:    Abnormal antibody titer  -     S.pneumoniae IgG Serotypes; Future    Chronic rhinitis  flonase    Chronic sinusitis, unspecified location    H/O recurrent pneumonia    Other orders  -     Pneumococcal Polysaccharide Vaccine (23 Valent) (SQ/IM)    Challenge with 23-valent pneumococcal polysaccharide vaccine, Pneumovax. Repeat pneumococcal titers in 4-6 weeks. Follow up in clinic if poor response. Counseled that if good response to Pneumovax is demonstrated, expect decreased frequency and severity of mucosal infections, and can then follow up prn. Follow up iveth if recurrence of frequent mucosal infections.            Total of 30 minutes on encounter the day of the visit. This includes face to face time and non-face to face time preparing to see the patient (eg, review of tests), obtaining and/or reviewing separately obtained history, documenting clinical information in the electronic or other health record, independently interpreting results and communicating results to the patient/family/caregiver, or care coordinator.

## 2023-12-06 ENCOUNTER — PATIENT OUTREACH (OUTPATIENT)
Dept: ADMINISTRATIVE | Facility: HOSPITAL | Age: 48
End: 2023-12-06
Payer: COMMERCIAL

## 2023-12-06 NOTE — PROGRESS NOTES
Population Health Chart Review & Patient Outreach Details        Updates Requested / Reviewed:     [x]  Care Everywhere    []     []  External Sources ([facility], [facility], [facility], etc.)    [] [facility]   [] [facility]   [] Other:    [x]  Care Team Updated   [x]  Removed  or Duplicate Orders   [x]  Immunization Reconciliation Completed / Queried    [x] [place]   [] [place]   [] [place]   [] [place]      Health Maintenance Topics Addressed and Outreach Outcomes / Actions Taken:             Breast Cancer Screening []  Mammogram Order Placed    []  Mammogram Screening Scheduled    []  External Records Requested & Care Team Updated if Applicable    []  External Records Uploaded & Care Team Updated if Applicable    []  Pt Declined Scheduling Mammogram    []  Pt Will Schedule with External Provider / Order Routed & Care Team Updated if Applicable              Cervical Cancer Screening []  Pap Smear Scheduled in Primary Care or OBGYN    []  External Records Requested & Care Team Updated if Applicable       []  External Records Uploaded, Care Team Updated, & History Updated if Applicable    []  Patient Declined Scheduling Pap Smear    []  Patient Will Schedule with External Provider & Care Team Updated if Applicable                  Colorectal Cancer Screening []  Colonoscopy Case Request / Referral / Home Test Order Placed    []  External Records Requested & Care Team Updated if Applicable    []  External Records Uploaded, Care Team Updated, & History Updated if Applicable    []  Patient Declined Completing Colon Cancer Screening    []  Patient Will Schedule with External Provider & Care Team Updated if Applicable    []  Fit Kit Mailed (add the SmartPhrase under additional notes)    []  Reminded Patient to Complete Home Test                Diabetic Eye Exam []  Eye Exam Screening Order Placed    []  Eye Camera Scheduled or Optometry/Ophthalmology Referral Placed    []  External Records Requested & Care Team  Updated if Applicable    []  External Records Uploaded, Care Team Updated, & History Updated if Applicable    []  Patient Declined Scheduling Eye Exam    []  Patient Will Schedule with External Provider & Care Team Updated if Applicable             Blood Pressure Control []  Primary Care Follow Up Visit Scheduled     []  Remote Blood Pressure Reading Captured    []  Patient Declined Remote Reading or Scheduling Appt - Escalated to PCP    []  Patient Will Call Back or Send Portal Message with Reading                 HbA1c & Other Labs []  Overdue Lab(s) Ordered    []  Overdue Lab(s) Scheduled    []  External Records Uploaded & Care Team Updated if Applicable    []  Primary Care Follow Up Visit Scheduled     []  Reminded Patient to Complete A1c Home Test    []  Patient Declined Scheduling Labs or Will Call Back to Schedule    []  Patient Will Schedule with External Provider / Order Routed, & Care Team Updated if Applicable           Primary Care Appointment []  Primary Care Appt Scheduled    []  Patient Declined Scheduling or Will Call Back to Schedule    []  Pt Established with External Provider, Updated Care Team, & Informed Pt to Notify Payor if Applicable           Medication Adherence /    Statin Use []  Primary Care Appointment Scheduled    []  Patient Reminded to  Prescription    []  Patient Declined, Provider Notified if Needed    []  Sent Provider Message to Review to Evaluate Pt for Statin, Add Exclusion Dx Codes, Document   Exclusion in Problem List, Change Statin Intensity Level to Moderate or High Intensity if Applicable                Osteoporosis Screening []  Dexa Order Placed    []  Dexa Appointment Scheduled    []  External Records Requested & Care Team Updated    []  External Records Uploaded, Care Team Updated, & History Updated if Applicable    []  Patient Declined Scheduling Dexa or Will Call Back to Schedule    []  Patient Will Schedule with External Provider / Order Routed & Care Team  Updated if Applicable       Additional Notes:     Ms. Padilla is currently on gap report for overdue KED Measure and eye exam. Upon reviewing Ms. Padilla's chart, she is being pulled into the measure due to being prescribed ozempic.

## 2023-12-19 ENCOUNTER — PATIENT MESSAGE (OUTPATIENT)
Dept: SLEEP MEDICINE | Facility: CLINIC | Age: 48
End: 2023-12-19
Payer: COMMERCIAL

## 2024-01-16 ENCOUNTER — LAB VISIT (OUTPATIENT)
Dept: LAB | Facility: HOSPITAL | Age: 49
End: 2024-01-16
Attending: ALLERGY & IMMUNOLOGY
Payer: COMMERCIAL

## 2024-01-16 DIAGNOSIS — R76.0 ABNORMAL ANTIBODY TITER: ICD-10-CM

## 2024-01-16 PROCEDURE — 36415 COLL VENOUS BLD VENIPUNCTURE: CPT | Performed by: ALLERGY & IMMUNOLOGY

## 2024-01-16 PROCEDURE — 86317 IMMUNOASSAY INFECTIOUS AGENT: CPT | Mod: 59 | Performed by: ALLERGY & IMMUNOLOGY

## 2024-01-23 LAB
DEPRECATED S PNEUM12 IGG SER-MCNC: 2.3 UG/ML
DEPRECATED S PNEUM23 IGG SER-MCNC: 0.9 UG/ML
DEPRECATED S PNEUM4 IGG SER-MCNC: 1.4 UG/ML
DEPRECATED S PNEUM8 IGG SER-MCNC: 6.9 UG/ML
DEPRECATED S PNEUM9 IGG SER-MCNC: 4 UG/ML
S PN DA SERO 19F IGG SER-MCNC: 3.3 UG/ML
S PNEUM DA 1 IGG SER-MCNC: 22.7 UG/ML
S PNEUM DA 14 IGG SER-MCNC: 7.3
S PNEUM DA 18C IGG SER-MCNC: 1.8
S PNEUM DA 3 IGG SER-MCNC: 8.9 UG/ML
S PNEUM DA 5 IGG SER-MCNC: 33.5 UG/ML
S PNEUM DA 6B IGG SER-MCNC: 1.5 UG/ML
S PNEUM DA 7F IGG SER-MCNC: 9.4 UG/ML
S PNEUM DA 9V IGG SER-MCNC: <0.3 UG/ML

## 2024-02-14 ENCOUNTER — CLINICAL SUPPORT (OUTPATIENT)
Dept: PSYCHIATRY | Facility: CLINIC | Age: 49
End: 2024-02-14
Payer: COMMERCIAL

## 2024-02-14 ENCOUNTER — PATIENT MESSAGE (OUTPATIENT)
Dept: PSYCHIATRY | Facility: CLINIC | Age: 49
End: 2024-02-14
Payer: COMMERCIAL

## 2024-02-14 ENCOUNTER — OFFICE VISIT (OUTPATIENT)
Dept: PSYCHIATRY | Facility: CLINIC | Age: 49
End: 2024-02-14
Payer: COMMERCIAL

## 2024-02-14 VITALS
DIASTOLIC BLOOD PRESSURE: 79 MMHG | HEART RATE: 90 BPM | SYSTOLIC BLOOD PRESSURE: 136 MMHG | WEIGHT: 162.06 LBS | BODY MASS INDEX: 27.81 KG/M2

## 2024-02-14 DIAGNOSIS — F90.9 ATTENTION DEFICIT HYPERACTIVITY DISORDER (ADHD), UNSPECIFIED ADHD TYPE: ICD-10-CM

## 2024-02-14 DIAGNOSIS — F90.9 ATTENTION DEFICIT HYPERACTIVITY DISORDER (ADHD), UNSPECIFIED ADHD TYPE: Primary | ICD-10-CM

## 2024-02-14 LAB
AMPHET+METHAMPHET UR QL: NEGATIVE
BARBITURATES UR QL SCN>200 NG/ML: NEGATIVE
BENZODIAZ UR QL SCN>200 NG/ML: NEGATIVE
BZE UR QL SCN: NEGATIVE
CANNABINOIDS UR QL SCN: NEGATIVE
CREAT UR-MCNC: 205 MG/DL (ref 15–325)
ETHANOL UR-MCNC: <10 MG/DL
METHADONE UR QL SCN>300 NG/ML: NEGATIVE
OPIATES UR QL SCN: NEGATIVE
PCP UR QL SCN>25 NG/ML: NEGATIVE
TOXICOLOGY INFORMATION: NORMAL

## 2024-02-14 PROCEDURE — 3078F DIAST BP <80 MM HG: CPT | Mod: CPTII,S$GLB,, | Performed by: NURSE PRACTITIONER

## 2024-02-14 PROCEDURE — 80307 DRUG TEST PRSMV CHEM ANLYZR: CPT | Performed by: NURSE PRACTITIONER

## 2024-02-14 PROCEDURE — 99205 OFFICE O/P NEW HI 60 MIN: CPT | Mod: S$GLB,,, | Performed by: NURSE PRACTITIONER

## 2024-02-14 PROCEDURE — 3075F SYST BP GE 130 - 139MM HG: CPT | Mod: CPTII,S$GLB,, | Performed by: NURSE PRACTITIONER

## 2024-02-14 PROCEDURE — 99999 PR PBB SHADOW E&M-EST. PATIENT-LVL I: CPT | Mod: PBBFAC,,,

## 2024-02-14 PROCEDURE — 3008F BODY MASS INDEX DOCD: CPT | Mod: CPTII,S$GLB,, | Performed by: NURSE PRACTITIONER

## 2024-02-14 PROCEDURE — 99999 PR PBB SHADOW E&M-EST. PATIENT-LVL III: CPT | Mod: PBBFAC,,, | Performed by: NURSE PRACTITIONER

## 2024-02-14 RX ORDER — METHYLPHENIDATE HYDROCHLORIDE 10 MG/1
10 TABLET ORAL 2 TIMES DAILY
Qty: 60 TABLET | Refills: 0 | Status: SHIPPED | OUTPATIENT
Start: 2024-03-13

## 2024-02-14 RX ORDER — METHYLPHENIDATE HYDROCHLORIDE 10 MG/1
10 TABLET ORAL 2 TIMES DAILY
Qty: 60 TABLET | Refills: 0 | Status: SHIPPED | OUTPATIENT
Start: 2024-02-14

## 2024-02-14 RX ORDER — METHYLPHENIDATE HYDROCHLORIDE 10 MG/1
10 TABLET ORAL 2 TIMES DAILY
Qty: 60 TABLET | Refills: 0 | Status: SHIPPED | OUTPATIENT
Start: 2024-04-13

## 2024-02-14 NOTE — PATIENT INSTRUCTIONS
RITALIN 10 mg once or twice daily for ADHD symptoms  Follow-up with me every 3 month by office or virtual visit

## 2024-02-14 NOTE — PROGRESS NOTES
"Outpatient Psychiatry Initial Visit (MD/NP)    2/14/2024    Kim Padilla, a 48 y.o. female, presenting for initial evaluation visit. Met with patient.    Reason for Encounter: self-referral. Patient complains of attention problems.    History of Present Illness:      Pt is a 48 year old female who presents for psychiatric evaluation for ADHD. Reports hx of AHD with Ritalin with good response. Reports trouble with sustained focus and attention. Denies depression or unmanaged anxiety symptoms. Affect appears bright with euthymic mood. Thought processes are linear, clear, and organized. Denies SI/HI/AVH.        ADHD Adult:  Have difficulty sustaining attention in tasks or fun activities?  yes   Don't follow through on instructions and fail to finish work?  yes   Have difficulty organizing tasks and activities?  yes   Avoid, dislike, or are reluctant to engage in work thar requires sustained mental effort?  yes   Easily distracted?  yes   Forgetful in daily activities?  yes   Fidget with hands or feet, or squirm in seat?  yes -  Have difficulty engaging in leisure activities or doing fun things quietly?  yes   Feel "on the go" or "driven by a motor"?  yes   Blurt out answers before questions have been completed?  yes   Have difficulty waiting your turn, are impatient?  yes   Interrupt or intrude on others?  yes      SIGECAPS  Sleep: negative for symptoms  Interest: negative for symptoms  Guilt: negative for symptoms  Energy: negative for symptoms  Concentration: positive  Appetite: negative for symptoms  Psychomotor agitation: negative for symptoms  Suicidal intentions: no  Suicidal plan: no    Psychiatric Medications: currently taking (none)      Past trials include: Ritalin     Psychosocial History:Elementary school principle. Denies use of tobacco, alcohol, or elicit drugs. Denies known family hx of mental illness.    Medical History: noncontributory    Review Of Systems:     GENERAL:  No weight gain or " loss  SKIN:  No rashes or lacerations  HEAD:  No headaches  EYES:  No exophthalmos, jaundice or blindness  EARS:  No dizziness, tinnitus or hearing loss  NOSE:  No changes in smell  MOUTH & THROAT:  No dyskinetic movements or obvious goiter  CHEST:  No shortness of breath, hyperventilation or cough  CARDIOVASCULAR:  No tachycardia or chest pain  ABDOMEN:  No nausea, vomiting, pain, constipation or diarrhea  URINARY:  No frequency, dysuria or sexual dysfunction  ENDOCRINE:  No polydipsia, polyuria  MUSCULOSKELETAL:  No pain or stiffness of the joints  NEUROLOGIC:  No weakness, sensory changes, seizures, confusion, memory loss, tremor or other abnormal movements    Current Evaluation:     Nutritional Screening: Considering the patient's height and weight, medications, medical history and preferences, should a referral be made to the dietitian? no    Constitutional  Vitals:  Most recent vital signs, dated greater than 90 days prior to this appointment, were reviewed.    Vitals:    02/14/24 0803   BP: 136/79   Pulse: 90   Weight: 73.5 kg (162 lb 0.6 oz)        General:  unremarkable, age appropriate     Musculoskeletal  Muscle Strength/Tone:  not examined   Gait & Station:  non-ataxic     Psychiatric  Speech:  no latency; no press   Mood & Affect:  steady  congruent and appropriate   Thought Process:  normal and logical   Associations:  intact   Thought Content:  normal, no suicidality, no homicidality, delusions, or paranoia   Insight:  intact   Judgement: behavior is adequate to circumstances   Orientation:  grossly intact   Memory: intact for content of interview   Language: grossly intact   Attention Span & Concentration:  able to focus   Fund of Knowledge:  intact and appropriate to age and level of education       Relevant Elements of Neurological Exam: normal gait    Functioning in Relationships:  Spouse/partner: see above HPI  Peers: see above HPI  Employers: see above HPI    Laboratory Data  Lab Visit on  01/16/2024   Component Date Value Ref Range Status    S.pneumoniae Type 1 01/16/2024 22.7   Final    S.pneumoniae Type 3 01/16/2024 8.9   Final    Strep pneumo Type 4 01/16/2024 1.4   Final    S.pneumoniae Type 5 01/16/2024 33.5   Final    S.pneumoniae Type 8 01/16/2024 6.9   Final    S.pneumoniae Type 9N 01/16/2024 4.0   Final    S.pneumoniae Type 12F 01/16/2024 2.3   Final    Strep pneumo Type 14 01/16/2024 7.3   Final    S.pneumoniae Type 19F 01/16/2024 3.3   Final    S.pneumoniae Type 23F 01/16/2024 0.9   Final    S.pneumoniae Type 6B 01/16/2024 1.5   Final    S.pneumoniae Type 7F 01/16/2024 9.4   Final    S.pneumoniae Type 18C 01/16/2024 1.8   Final    S.pneumoniae Type 9V Abs 01/16/2024 <0.3   Final         Medications  Outpatient Encounter Medications as of 2/14/2024   Medication Sig Dispense Refill    azelastine (ASTELIN) 137 mcg (0.1 %) nasal spray 2 sprays (274 mcg total) by Nasal route 2 (two) times daily. 30 mL 6    cholecalciferol, vitamin D3, (VITAMIN D3 ORAL) Take 2,000 Units by mouth.      eletriptan (RELPAX) 40 MG tablet Relpax 40 mg tablet   Take 1 tablet as needed by oral route as needed for 30 days.      fluticasone propionate (FLONASE) 50 mcg/actuation nasal spray 2 sprays (100 mcg total) by Each Nostril route 2 (two) times daily. 16 g 11    ibuprofen (ADVIL,MOTRIN) 800 MG tablet Take 1 tablet by mouth 3 times daily as needed.      meloxicam (MOBIC) 15 MG tablet       methylphenidate HCl (RITALIN) 10 MG tablet Take 1 tablet (10 mg total) by mouth 2 (two) times daily. 60 tablet 0    [START ON 3/13/2024] methylphenidate HCl (RITALIN) 10 MG tablet Take 1 tablet (10 mg total) by mouth 2 (two) times daily. 60 tablet 0    [START ON 4/13/2024] methylphenidate HCl (RITALIN) 10 MG tablet Take 1 tablet (10 mg total) by mouth 2 (two) times daily. 60 tablet 0    mupirocin (BACTROBAN) 2 % ointment Apply topically 3 (three) times daily. 15 g 0    ondansetron (ZOFRAN-ODT) 4 MG TbDL Place 1 tablet 3 times a day  by translingual route as needed for 30 days.      potassium citrate (UROCIT-K) 5 mEq (540 mg) TbSR TAKE 1 TABLET BY MOUTH TWICE DAILY WITH MEALS 60 tablet 4    semaglutide (OZEMPIC) 2 mg/dose (8 mg/3 mL) PnIj Inject 2 mg into the skin every 7 days. 9 mL 1    tamsulosin (FLOMAX) 0.4 mg Cap Take 1 capsule (0.4 mg total) by mouth once daily. 30 capsule 0    UBRELVY 100 mg tablet SMARTSI Tablet(s) By Mouth Every 2-4 Hours PRN      valACYclovir (VALTREX) 1000 MG tablet TAKE 2 TABLETS BY MOUTH TWICE A DAY FOR 2-3 DAYS AS NEEDED       No facility-administered encounter medications on file as of 2024.         Assessment - Diagnosis - Goals:     Impression:       ICD-10-CM ICD-9-CM   1. Attention deficit hyperactivity disorder (ADHD), unspecified ADHD type  F90.9 314.01       Strengths and Liabilities: Strength: Patient accepts guidance/feedback, Strength: Patient is expressive/articulate., Strength: Patient is intelligent., Strength: Patient is motivated for change.    Treatment Goals:  Specify outcomes written in observable, behavioral terms:     ADHD: will improve capacity for sustained attention and limit symptoms of distracibility    Treatment Plan/Recommendations:   Medication Management: The risks and benefits of medication were discussed with the patient.  The treatment plan and follow up plan were reviewed with the patient.  Reviewed available medical records and labs  RITALIN 10 mg once or twice daily for ADHD symptoms  Follow-up with me every 3 month by office or virtual visit  Counseling focused on behavior modification and adaptive strategies for adults with ADHD. .    Return to Clinic: 3 months    Counseling time: 35 minutes  Total time: 60 minutes

## 2024-03-14 ENCOUNTER — PATIENT MESSAGE (OUTPATIENT)
Dept: SLEEP MEDICINE | Facility: CLINIC | Age: 49
End: 2024-03-14
Payer: COMMERCIAL

## 2024-03-18 ENCOUNTER — PATIENT MESSAGE (OUTPATIENT)
Dept: ALLERGY | Facility: CLINIC | Age: 49
End: 2024-03-18
Payer: COMMERCIAL

## 2024-03-21 ENCOUNTER — PATIENT MESSAGE (OUTPATIENT)
Dept: INTERNAL MEDICINE | Facility: CLINIC | Age: 49
End: 2024-03-21
Payer: COMMERCIAL

## 2024-03-22 ENCOUNTER — PATIENT MESSAGE (OUTPATIENT)
Dept: ALLERGY | Facility: CLINIC | Age: 49
End: 2024-03-22
Payer: COMMERCIAL

## 2024-03-27 ENCOUNTER — PATIENT OUTREACH (OUTPATIENT)
Dept: ADMINISTRATIVE | Facility: HOSPITAL | Age: 49
End: 2024-03-27
Payer: COMMERCIAL

## 2024-03-27 DIAGNOSIS — Z12.31 ENCOUNTER FOR MAMMOGRAM TO ESTABLISH BASELINE MAMMOGRAM: Primary | ICD-10-CM

## 2024-03-27 DIAGNOSIS — Z12.31 ENCOUNTER FOR SCREENING MAMMOGRAM FOR MALIGNANT NEOPLASM OF BREAST: ICD-10-CM

## 2024-04-10 ENCOUNTER — PATIENT MESSAGE (OUTPATIENT)
Dept: PSYCHIATRY | Facility: CLINIC | Age: 49
End: 2024-04-10
Payer: COMMERCIAL

## 2024-04-11 DIAGNOSIS — F90.9 ATTENTION DEFICIT HYPERACTIVITY DISORDER (ADHD), UNSPECIFIED ADHD TYPE: Primary | ICD-10-CM

## 2024-04-11 RX ORDER — METHYLPHENIDATE HYDROCHLORIDE 20 MG/1
10 TABLET ORAL 2 TIMES DAILY
Qty: 30 TABLET | Refills: 0 | Status: SHIPPED | OUTPATIENT
Start: 2024-04-11 | End: 2024-06-01 | Stop reason: SDUPTHER

## 2024-04-15 ENCOUNTER — TELEPHONE (OUTPATIENT)
Dept: PHARMACY | Facility: CLINIC | Age: 49
End: 2024-04-15
Payer: COMMERCIAL

## 2024-05-21 ENCOUNTER — OFFICE VISIT (OUTPATIENT)
Dept: URGENT CARE | Facility: CLINIC | Age: 49
End: 2024-05-21
Payer: COMMERCIAL

## 2024-05-21 VITALS
HEART RATE: 87 BPM | HEIGHT: 64 IN | WEIGHT: 162 LBS | TEMPERATURE: 99 F | BODY MASS INDEX: 27.66 KG/M2 | SYSTOLIC BLOOD PRESSURE: 149 MMHG | DIASTOLIC BLOOD PRESSURE: 76 MMHG | RESPIRATION RATE: 18 BRPM | OXYGEN SATURATION: 98 %

## 2024-05-21 DIAGNOSIS — N20.0 KIDNEY STONE ON RIGHT SIDE: Primary | ICD-10-CM

## 2024-05-21 DIAGNOSIS — M54.9 BACK PAIN, UNSPECIFIED BACK LOCATION, UNSPECIFIED BACK PAIN LATERALITY, UNSPECIFIED CHRONICITY: ICD-10-CM

## 2024-05-21 LAB
B-HCG UR QL: NEGATIVE
BILIRUB UR QL STRIP: POSITIVE
CTP QC/QA: YES
GLUCOSE UR QL STRIP: NEGATIVE
KETONES UR QL STRIP: NEGATIVE
LEUKOCYTE ESTERASE UR QL STRIP: NEGATIVE
PH, POC UA: 5 (ref 5–8)
POC BLOOD, URINE: POSITIVE
POC NITRATES, URINE: NEGATIVE
PROT UR QL STRIP: NEGATIVE
SP GR UR STRIP: 1.02 (ref 1–1.03)
UROBILINOGEN UR STRIP-ACNC: ABNORMAL (ref 0.1–1.1)

## 2024-05-21 PROCEDURE — 81003 URINALYSIS AUTO W/O SCOPE: CPT | Mod: QW,S$GLB,, | Performed by: PHYSICIAN ASSISTANT

## 2024-05-21 PROCEDURE — 99213 OFFICE O/P EST LOW 20 MIN: CPT | Mod: 25,S$GLB,, | Performed by: PHYSICIAN ASSISTANT

## 2024-05-21 PROCEDURE — 96372 THER/PROPH/DIAG INJ SC/IM: CPT | Mod: S$GLB,,, | Performed by: FAMILY MEDICINE

## 2024-05-21 PROCEDURE — 81025 URINE PREGNANCY TEST: CPT | Mod: S$GLB,,, | Performed by: PHYSICIAN ASSISTANT

## 2024-05-21 RX ORDER — TAMSULOSIN HYDROCHLORIDE 0.4 MG/1
0.4 CAPSULE ORAL DAILY
Qty: 7 CAPSULE | Refills: 0 | Status: SHIPPED | OUTPATIENT
Start: 2024-05-21 | End: 2024-05-28

## 2024-05-21 RX ORDER — KETOROLAC TROMETHAMINE 30 MG/ML
30 INJECTION, SOLUTION INTRAMUSCULAR; INTRAVENOUS
Status: COMPLETED | OUTPATIENT
Start: 2024-05-21 | End: 2024-05-21

## 2024-05-21 RX ORDER — KETOROLAC TROMETHAMINE 10 MG/1
10 TABLET, FILM COATED ORAL EVERY 6 HOURS
Qty: 20 TABLET | Refills: 0 | Status: SHIPPED | OUTPATIENT
Start: 2024-05-21 | End: 2024-05-26

## 2024-05-21 RX ORDER — TRANEXAMIC ACID 650 MG/1
325 TABLET ORAL 2 TIMES DAILY
COMMUNITY
Start: 2024-05-03

## 2024-05-21 RX ADMIN — KETOROLAC TROMETHAMINE 30 MG: 30 INJECTION, SOLUTION INTRAMUSCULAR; INTRAVENOUS at 09:05

## 2024-05-21 NOTE — LETTER
May 21, 2024      [facility]  [address] [place] [address]  Phone: [phone]  Fax: [phone]       Patient: Kim Padilla   YOB: 1975  Date of Visit: 05/21/2024    To Whom It May Concern:    Dameon Padilla  was at [facility] on 05/21/2024. The patient may return to work/school on 05/22/2024 with no restrictions with symptom improvement. If you have any questions or concerns, or if I can be of further assistance, please do not hesitate to contact me.    Sincerely,      Rekha León PA-C

## 2024-05-21 NOTE — PATIENT INSTRUCTIONS
You were given a Toradol injection today.  I have sent over prescriptions for Flomax and Toradol as well.  Make sure to take with food and as prescribed.  Follow up with Urology.  If your symptoms worsen please go to the ER for evaluation

## 2024-05-21 NOTE — PROGRESS NOTES
Subjective:      Patient ID: Kim Padilla is a 48 y.o. female.    Vitals:  vitals were not taken for this visit.     Chief Complaint: Flank Pain    This is a 48 y.o. female who presents today with a chief complaint of        ROS   Objective:     Physical Exam    Assessment:     No diagnosis found.    Plan:       There are no diagnoses linked to this encounter.

## 2024-05-21 NOTE — PROGRESS NOTES
"Subjective:      Patient ID: Kim Padilla is a 48 y.o. female.    Vitals:  height is 5' 4.02" (1.626 m) and weight is 73.5 kg (162 lb). Her tympanic temperature is 98.7 °F (37.1 °C). Her blood pressure is 149/76 (abnormal) and her pulse is 87. Her respiration is 18 and oxygen saturation is 98%.     Chief Complaint: Flank Pain    This is a 48 y.o. female who presents today with a chief complaint of right flank pain. Patient states she has a history of kidney stones and it started yesterday she felt it in the bladder. Patient is doubled over in pain and states the pain is over a 10 at this present moment.  Patient follows with Nephrology has not a stone over a year.    Flank Pain  This is a new problem. The current episode started today. The problem occurs constantly. The problem has been gradually worsening since onset. The pain is present in the sacro-iliac. The quality of the pain is described as aching, cramping, stabbing and shooting. The pain is at a severity of 10/10. The pain is severe. The pain is The same all the time. The symptoms are aggravated by lying down, sitting and position. Stiffness is present All day. Associated symptoms include abdominal pain. Pertinent negatives include no bladder incontinence, bowel incontinence, chest pain, dysuria, fever, headaches, leg pain, paresis, paresthesias, pelvic pain, perianal numbness, tingling or weight loss. She has tried nothing for the symptoms. The treatment provided no relief.       Constitution: Negative for appetite change, chills, fatigue and fever.   Cardiovascular:  Negative for chest pain.   Gastrointestinal:  Positive for abdominal pain and nausea. Negative for vomiting, constipation, diarrhea, bright red blood in stool, dark colored stools and bowel incontinence.        Nausea from pain   Genitourinary:  Positive for flank pain. Negative for dysuria, frequency, urgency, urine decreased, bladder incontinence, hematuria, vaginal pain and pelvic " pain.   Musculoskeletal:  Positive for pain, back pain and muscle ache. Negative for trauma.   Skin:  Negative for rash.   Neurological:  Negative for headaches.      Past Medical History:   Diagnosis Date    Allergy     Diabetes mellitus     Fatty liver disease, nonalcoholic     Infertility, female        Past Surgical History:   Procedure Laterality Date    BREAST BIOPSY Right     benign    BREAST BIOPSY Left 2014    Excisional bx, benign    BREAST CYST EXCISION      DILATION AND CURETTAGE OF UTERUS  1999    for SAB    DILATION AND CURETTAGE OF UTERUS  2018    TONSILLECTOMY      TONSILLECTOMY      TYMPANOSTOMY TUBE PLACEMENT      WISDOM TOOTH EXTRACTION         Family History   Problem Relation Name Age of Onset    Allergies Mother      Graves' disease Mother      Hypertension Father      Hyperlipidemia Father      Prostate cancer Father      Insulin resistance Father      Thyroid cancer Sister      Ovarian cancer Sister      Thyroid disease Maternal Grandmother          s/p thyroidectomy    Heart attack Maternal Grandfather          in early 60's    Coronary artery disease Maternal Grandfather      Lung disease Paternal Grandmother      Heart attack Paternal Grandfather      Coronary artery disease Paternal Grandfather      Stroke Paternal Grandfather      Breast cancer Maternal Cousin      Colon cancer Neg Hx         Social History     Socioeconomic History    Marital status:    Tobacco Use    Smoking status: Never     Passive exposure: Never    Smokeless tobacco: Never   Substance and Sexual Activity    Alcohol use: Not Currently     Comment: rarely    Drug use: No    Sexual activity: Yes     Partners: Male     Birth control/protection: I.U.D.     Comment: mirena      Social Determinants of Health     Financial Resource Strain: Low Risk  (2/13/2024)    Overall Financial Resource Strain (CARDIA)     Difficulty of Paying Living Expenses: Not hard at all   Food Insecurity: No Food Insecurity (2/13/2024)     Hunger Vital Sign     Worried About Running Out of Food in the Last Year: Never true     Ran Out of Food in the Last Year: Never true   Transportation Needs: No Transportation Needs (2/13/2024)    PRAPARE - Transportation     Lack of Transportation (Medical): No     Lack of Transportation (Non-Medical): No   Physical Activity: Insufficiently Active (2/13/2024)    Exercise Vital Sign     Days of Exercise per Week: 1 day     Minutes of Exercise per Session: 10 min   Stress: Stress Concern Present (2/13/2024)    Citizen of [place] of Occupational Health - Occupational Stress Questionnaire     Feeling of Stress : Very much   Housing Stability: Low Risk  (2/13/2024)    Housing Stability Vital Sign     Unable to Pay for Housing in the Last Year: No     Number of Places Lived in the Last Year: 1     Unstable Housing in the Last Year: No       Current Outpatient Medications   Medication Sig Dispense Refill    tranexamic acid (LYSTEDA) 650 mg tablet Take 325 mg by mouth 2 (two) times daily.      azelastine (ASTELIN) 137 mcg (0.1 %) nasal spray 2 sprays (274 mcg total) by Nasal route 2 (two) times daily. 30 mL 6    cholecalciferol, vitamin D3, (VITAMIN D3 ORAL) Take 2,000 Units by mouth.      eletriptan (RELPAX) 40 MG tablet Relpax 40 mg tablet   Take 1 tablet as needed by oral route as needed for 30 days.      fluticasone propionate (FLONASE) 50 mcg/actuation nasal spray 2 sprays (100 mcg total) by Each Nostril route 2 (two) times daily. 16 g 11    ibuprofen (ADVIL,MOTRIN) 800 MG tablet Take 1 tablet by mouth 3 times daily as needed.      ketorolac (TORADOL) 10 mg tablet Take 1 tablet (10 mg total) by mouth every 6 (six) hours. for 5 days 20 tablet 0    methylphenidate HCl (RITALIN) 10 MG tablet Take 1 tablet (10 mg total) by mouth 2 (two) times daily. 60 tablet 0    methylphenidate HCl (RITALIN) 10 MG tablet Take 1 tablet (10 mg total) by mouth 2 (two) times daily. 60 tablet 0    methylphenidate HCl (RITALIN) 10 MG tablet  Take 1 tablet (10 mg total) by mouth 2 (two) times daily. 60 tablet 0    methylphenidate HCl (RITALIN) 20 MG tablet Take 0.5 tablets (10 mg total) by mouth 2 (two) times daily. 30 tablet 0    mupirocin (BACTROBAN) 2 % ointment Apply topically 3 (three) times daily. 15 g 0    ondansetron (ZOFRAN-ODT) 4 MG TbDL Place 1 tablet 3 times a day by translingual route as needed for 30 days.      potassium citrate (UROCIT-K) 5 mEq (540 mg) TbSR TAKE 1 TABLET BY MOUTH TWICE DAILY WITH MEALS 60 tablet 4    semaglutide (OZEMPIC) 2 mg/dose (8 mg/3 mL) PnIj Inject 2 mg into the skin every 7 days. 9 mL 1    tamsulosin (FLOMAX) 0.4 mg Cap Take 1 capsule (0.4 mg total) by mouth once daily. for 7 days 7 capsule 0    UBRELVY 100 mg tablet SMARTSI Tablet(s) By Mouth Every 2-4 Hours PRN      valACYclovir (VALTREX) 1000 MG tablet TAKE 2 TABLETS BY MOUTH TWICE A DAY FOR 2-3 DAYS AS NEEDED       No current facility-administered medications for this visit.       Review of patient's allergies indicates:   Allergen Reactions    Penicillins Hives and Other (See Comments)    Sumatriptan Other (See Comments)       Objective:     Physical Exam   Constitutional: She is oriented to person, place, and time. She appears well-developed. She is cooperative.  Non-toxic appearance. She does not appear ill. No distress.   HENT:   Head: Normocephalic and atraumatic.   Ears:   Right Ear: Hearing normal.   Left Ear: Hearing normal.   Nose: No mucosal edema or nasal deformity. No epistaxis. Right sinus exhibits no maxillary sinus tenderness and no frontal sinus tenderness. Left sinus exhibits no maxillary sinus tenderness and no frontal sinus tenderness.   Mouth/Throat: Uvula is midline, oropharynx is clear and moist and mucous membranes are normal. No trismus in the jaw. Normal dentition. No uvula swelling. No posterior oropharyngeal edema.   Eyes: Conjunctivae and lids are normal. No scleral icterus.   Neck: Trachea normal and phonation normal. Neck  supple. No edema present. No erythema present. No neck rigidity present.   Cardiovascular: Normal rate, regular rhythm, normal heart sounds and normal pulses.   No murmur heard.Exam reveals no gallop and no friction rub.   Pulmonary/Chest: Effort normal and breath sounds normal. No stridor. No respiratory distress. She has no decreased breath sounds. She has no wheezes. She has no rhonchi. She has no rales.   Abdominal: Normal appearance and bowel sounds are normal. She exhibits no distension and no mass. Soft. flat abdomen There is no abdominal tenderness. There is right CVA tenderness. There is no rebound, no guarding, no tenderness at McBurney's point, negative De La Cruz's sign, no left CVA tenderness and negative Rovsing's sign. No hernia.   Musculoskeletal:      Cervical back: She exhibits no tenderness.      Thoracic back: Normal.      Lumbar back: She exhibits normal range of motion, no tenderness, no bony tenderness, no swelling, no edema, no deformity and no spasm.        Back:    Lymphadenopathy:     She has no cervical adenopathy.   Neurological: She is alert and oriented to person, place, and time. She exhibits normal muscle tone.   Skin: Skin is warm, dry, intact, not diaphoretic, not pale and no rash.   Psychiatric: Her speech is normal and behavior is normal. Mood, judgment and thought content normal.   Nursing note and vitals reviewed.    Results for orders placed or performed in visit on 05/21/24   POCT Urinalysis, Dipstick, Automated, W/O Scope   Result Value Ref Range    POC Blood, Urine Positive (A) Negative    POC Bilirubin, Urine Positive (A) Negative    POC Urobilinogen, Urine norm 0.1 - 1.1    POC Ketones, Urine Negative Negative    POC Protein, Urine Negative Negative    POC Nitrates, Urine Negative Negative    POC Glucose, Urine Negative Negative    pH, UA 5.0 5 - 8    POC Specific Gravity, Urine 1.025 1.003 - 1.029    POC Leukocytes, Urine Negative Negative   POCT urine pregnancy   Result  Value Ref Range    POC Preg Test, Ur Negative Negative     Acceptable Yes        Assessment:     1. Kidney stone on right side    2. Back pain, unspecified back location, unspecified back pain laterality, unspecified chronicity        Plan:       Kidney stone on right side  -     POCT Urinalysis, Dipstick, Automated, W/O Scope  -     POCT urine pregnancy  -     ketorolac injection 30 mg  -     ketorolac (TORADOL) 10 mg tablet; Take 1 tablet (10 mg total) by mouth every 6 (six) hours. for 5 days  Dispense: 20 tablet; Refill: 0  -     tamsulosin (FLOMAX) 0.4 mg Cap; Take 1 capsule (0.4 mg total) by mouth once daily. for 7 days  Dispense: 7 capsule; Refill: 0    Back pain, unspecified back location, unspecified back pain laterality, unspecified chronicity    Results reviewed  I have reviewed the patient chart and pertinent past imaging/labs.  Patient Instructions   You were given a Toradol injection today.  I have sent over prescriptions for Flomax and Toradol as well.  Make sure to take with food and as prescribed.  Follow up with Urology.  If your symptoms worsen please go to the ER for evaluation

## 2024-05-22 ENCOUNTER — PATIENT MESSAGE (OUTPATIENT)
Dept: NEPHROLOGY | Facility: CLINIC | Age: 49
End: 2024-05-22
Payer: COMMERCIAL

## 2024-05-28 ENCOUNTER — OFFICE VISIT (OUTPATIENT)
Dept: SLEEP MEDICINE | Facility: CLINIC | Age: 49
End: 2024-05-28
Payer: COMMERCIAL

## 2024-05-28 DIAGNOSIS — G47.33 OSA (OBSTRUCTIVE SLEEP APNEA): Primary | ICD-10-CM

## 2024-05-28 DIAGNOSIS — R40.0 SOMNOLENCE: ICD-10-CM

## 2024-05-28 DIAGNOSIS — Z99.89 INSUFFICIENT TREATMENT WITH NASAL CPAP: ICD-10-CM

## 2024-05-28 PROCEDURE — 99214 OFFICE O/P EST MOD 30 MIN: CPT | Mod: 95,,, | Performed by: INTERNAL MEDICINE

## 2024-05-28 NOTE — PROGRESS NOTES
"The patient location is: [place]  The chief complaint leading to consultation is: trouble with sleep    Visit type: audiovisual    10 minutes of total time spent on the encounter, which includes face to face time and non-face to face time preparing to see the patient (eg, review of tests), Obtaining and/or reviewing separately obtained history, Documenting clinical information in the electronic or other health record, Independently interpreting results (not separately reported) and communicating results to the patient/family/caregiver, or Care coordination (not separately reported).     Each patient to whom he or she provides medical services by telemedicine is:  (1) informed of the relationship between the physician and patient and the respective role of any other health care provider with respect to management of the patient; and (2) notified that he or she may decline to receive medical services by telemedicine and may withdraw from such care at any time.    ESTABLISHED PATIENT VISIT     Kim Padilla  is a pleasant 48 y.o. female  with history of PMH:  BMI 30,  ANNA dx 2020 (AHI 9.9, CPAP =7, HME) established with [facility]    Here today for: CPAP follow-up    Since last visit:   See assessment below      There were no vitals filed for this visit.   Physical Exam:    GEN:   Well-appearing  Psych:  Appropriate affect, demonstrates insight  SKIN:  No rash on the face or bridge of the nose    LABS:  No results found for: "HGB", "CO2"        RECORDS REVIEWED PREVIOUSLY:          4.10.23: 6-10 too High!! lost 40#, decrease?  -> 4-8  3.16.24: pressure too low, waking up gasping and taking off  3.15.24: 14/30 x [id number], 4-8 (5.8/7.1/7.5), LL 0s, AHI 0.8  -> needs office visit and probable CPAP titration      PROBLEM DESCRIPTION/ Sx on Presentation Interval Hx STATUS PLAN   ANNA   Mild by AHI          PAP history   Dx Study    Position    Frustr?    Mask nasal   DME HME   My Air    CPAP age " Circa 2020   PAP altn    Benefits Sleeps better   PROBS Some gasping arousals    Hard to wear with sinus congestion        Since last visit:       PSG 1.17.2020: AHI 9.9      4.10.23: 6-10 too High after 40# wt loss  -> decreased pressure to 4-8    3.16.24: waking up gasping and taking off    3.15.24: 14/30 x [id number], 4-8   (5.8/7.1/7.5), LL 0s, AHI 0.8    Having trouble wearing of late due to sinus congestion         uncontrolled     PAP PLAN   E min 4 cwp (continue for now)   I max 8 cwp (continue)   PS/epr    RAMP    Other    Altn.        If continues to have difficulty:  -consider titration   due to RESIDUAL SLEEP DISRUPTION on auto-CPAP despite adequate usage           The patient is using and benefitting from PAP therapy when she can tolerate       Hypersomnia     Nasal mask  ESS 21/24  Was better with CPAP    SLEEP SCHEDULE   Bed Time 8:30-9P   Sleep Latency 30 min   Arousals 6-7   Nocturia once   Back to sleep 30-45min   Wake time 4:30-5 (7:30-8:40)   Naps Dozing in evening at times   Work            Some residual sleepiness    Dozing in the evening   uncontrolled   Resume PAP as above     Sinus congestion       Antihistamine:   Nasal sprays: astelin and flonase help  Surgeries:        Intermittent congestion       uncontrolled   -consider resuming flonase or astelin   Comorbidities:     RTC:  yearly or sooner if problems arise

## 2024-05-30 ENCOUNTER — LAB VISIT (OUTPATIENT)
Dept: LAB | Facility: HOSPITAL | Age: 49
End: 2024-05-30
Attending: HOSPITALIST
Payer: COMMERCIAL

## 2024-05-30 ENCOUNTER — OFFICE VISIT (OUTPATIENT)
Dept: NEPHROLOGY | Facility: CLINIC | Age: 49
End: 2024-05-30
Payer: COMMERCIAL

## 2024-05-30 VITALS
SYSTOLIC BLOOD PRESSURE: 114 MMHG | BODY MASS INDEX: 26.34 KG/M2 | DIASTOLIC BLOOD PRESSURE: 77 MMHG | HEART RATE: 80 BPM | OXYGEN SATURATION: 99 % | WEIGHT: 154.31 LBS | HEIGHT: 64 IN

## 2024-05-30 DIAGNOSIS — R73.03 PREDIABETES: ICD-10-CM

## 2024-05-30 DIAGNOSIS — N20.0 CALCIUM OXALATE CALCULUS OF KIDNEY: ICD-10-CM

## 2024-05-30 DIAGNOSIS — N20.0 KIDNEY STONES: ICD-10-CM

## 2024-05-30 DIAGNOSIS — N20.0 KIDNEY STONES: Primary | ICD-10-CM

## 2024-05-30 DIAGNOSIS — E66.9 CLASS 1 OBESITY WITH BODY MASS INDEX (BMI) OF 32.0 TO 32.9 IN ADULT, UNSPECIFIED OBESITY TYPE, UNSPECIFIED WHETHER SERIOUS COMORBIDITY PRESENT: ICD-10-CM

## 2024-05-30 LAB
ALBUMIN SERPL BCP-MCNC: 4 G/DL (ref 3.5–5.2)
ANION GAP SERPL CALC-SCNC: 6 MMOL/L (ref 8–16)
BUN SERPL-MCNC: 10 MG/DL (ref 6–20)
CALCIUM SERPL-MCNC: 9.5 MG/DL (ref 8.7–10.5)
CHLORIDE SERPL-SCNC: 106 MMOL/L (ref 95–110)
CO2 SERPL-SCNC: 27 MMOL/L (ref 23–29)
CREAT SERPL-MCNC: 0.7 MG/DL (ref 0.5–1.4)
EST. GFR  (NO RACE VARIABLE): >60 ML/MIN/1.73 M^2
GLUCOSE SERPL-MCNC: 70 MG/DL (ref 70–110)
PHOSPHATE SERPL-MCNC: 3.3 MG/DL (ref 2.7–4.5)
POTASSIUM SERPL-SCNC: 3.8 MMOL/L (ref 3.5–5.1)
PTH-INTACT SERPL-MCNC: 71.8 PG/ML (ref 9–77)
SODIUM SERPL-SCNC: 139 MMOL/L (ref 136–145)

## 2024-05-30 PROCEDURE — 99214 OFFICE O/P EST MOD 30 MIN: CPT | Mod: S$GLB,,, | Performed by: HOSPITALIST

## 2024-05-30 PROCEDURE — 99999 PR PBB SHADOW E&M-EST. PATIENT-LVL IV: CPT | Mod: PBBFAC,,, | Performed by: HOSPITALIST

## 2024-05-30 PROCEDURE — 1159F MED LIST DOCD IN RCRD: CPT | Mod: CPTII,S$GLB,, | Performed by: HOSPITALIST

## 2024-05-30 PROCEDURE — 3008F BODY MASS INDEX DOCD: CPT | Mod: CPTII,S$GLB,, | Performed by: HOSPITALIST

## 2024-05-30 PROCEDURE — 3078F DIAST BP <80 MM HG: CPT | Mod: CPTII,S$GLB,, | Performed by: HOSPITALIST

## 2024-05-30 PROCEDURE — 83970 ASSAY OF PARATHORMONE: CPT | Performed by: HOSPITALIST

## 2024-05-30 PROCEDURE — 3066F NEPHROPATHY DOC TX: CPT | Mod: CPTII,S$GLB,, | Performed by: HOSPITALIST

## 2024-05-30 PROCEDURE — 80069 RENAL FUNCTION PANEL: CPT | Performed by: HOSPITALIST

## 2024-05-30 PROCEDURE — 36415 COLL VENOUS BLD VENIPUNCTURE: CPT | Performed by: HOSPITALIST

## 2024-05-30 PROCEDURE — 3074F SYST BP LT 130 MM HG: CPT | Mod: CPTII,S$GLB,, | Performed by: HOSPITALIST

## 2024-05-30 RX ORDER — POTASSIUM CITRATE 5 MEQ/1
10 TABLET, EXTENDED RELEASE ORAL 2 TIMES DAILY WITH MEALS
Qty: 60 TABLET | Refills: 4 | Status: SHIPPED | OUTPATIENT
Start: 2024-05-30

## 2024-05-30 NOTE — PROGRESS NOTES
REASON FOR CONSULT/CHIEF COMPLAIN: Kidney stones     REFERRING PHYSICIAN: Chani Olmedo MD      HISTORY OF PRESENT ILLNESS: 48 y.o. female who is established to me  has a past medical history of Allergy, Diabetes mellitus, Fatty liver disease, nonalcoholic, and Infertility, female. patient was referred here for kidney stones.     Pt mentions she has had one episode of symptomatic kidney stone 6 years agio and recently 1 week ago, she did have another kidney stone episode which made her to go to urgent care.Received Toradol shot along with flomax. Pain better now. Has family h/o kidney sone in father. Once or twice a year. None of them required any interventions. Denied other kidney problems. Unsure of type of kidney stone. Pt mentioned she is trying to lose weight.    No chronic NSAID use, no known exposure to lithium, lead.  Denied any issues with urination including dysuria, hematuria, urgency, hesitancy, nocturia, incomplete voiding. Denied chest pain, nausea, vomiting, abd pain, nausea, vomiting, diarrhea, shortness of breath, pedal edema, Orthopnea, PND.     Interval H/o 5/30/24   Pt comes for regular follow up visit. Mentions of a recent supra pubic pain which got worsening to her flank area. [facility] she was prescribed Flomax and a Toradol shot. Mentions pain is better and may be she might have passed a kidney stone. Was not complaint with potassium citrate     ROS:  General: negative for chills, or fatigue  ENT: No epistaxis or headaches  Hematological and Lymphatic: No bleeding problems or blood clots.  Endocrine: No skin changes or temperature intolerance  Respiratory: No cough, shortness of breath, or wheezing  Cardiovascular: No chest pain or dyspnea   Gastrointestinal: No abdominal pain, change in bowel habits  Genito-Urinary: No dysuria, trouble voiding, or hematuria  Musculoskeletal: ROS: negative for - joint pain, joint stiffness, joint swelling, muscle pain or  muscular weakness  Neurological: No new focal weakness, no numbness  Dermatological: No rash or ulcers.    PAST MEDICAL HISTORY:  Past Medical History:   Diagnosis Date    Allergy     Diabetes mellitus     Fatty liver disease, nonalcoholic     Infertility, female        PAST SURGICAL HISTORY:  Past Surgical History:   Procedure Laterality Date    BREAST BIOPSY Right     benign    BREAST BIOPSY Left 2014    Excisional bx, benign    BREAST CYST EXCISION      DILATION AND CURETTAGE OF UTERUS  1999    for SAB    DILATION AND CURETTAGE OF UTERUS  2018    TONSILLECTOMY      TONSILLECTOMY      TYMPANOSTOMY TUBE PLACEMENT      WISDOM TOOTH EXTRACTION         FAMILY HISTORY:   Family History   Problem Relation Name Age of Onset    Allergies Mother      Graves' disease Mother      Hypertension Father      Hyperlipidemia Father      Prostate cancer Father      Insulin resistance Father      Thyroid cancer Sister      Ovarian cancer Sister      Thyroid disease Maternal Grandmother          s/p thyroidectomy    Heart attack Maternal Grandfather          in early 60's    Coronary artery disease Maternal Grandfather      Lung disease Paternal Grandmother      Heart attack Paternal Grandfather      Coronary artery disease Paternal Grandfather      Stroke Paternal Grandfather      Breast cancer Maternal Cousin      Colon cancer Neg Hx         SOCIAL HISTORY:  Social History     Socioeconomic History    Marital status:    Tobacco Use    Smoking status: Never     Passive exposure: Never    Smokeless tobacco: Never   Substance and Sexual Activity    Alcohol use: Not Currently     Comment: rarely    Drug use: No    Sexual activity: Yes     Partners: Male     Birth control/protection: I.U.D.     Comment: mirena      Social Determinants of Health     Financial Resource Strain: Low Risk  (2/13/2024)    Overall Financial Resource Strain (CARDIA)     Difficulty of Paying Living Expenses: Not hard at all   Food Insecurity: No Food  Insecurity (2/13/2024)    Hunger Vital Sign     Worried About Running Out of Food in the Last Year: Never true     Ran Out of Food in the Last Year: Never true   Transportation Needs: No Transportation Needs (2/13/2024)    PRAPARE - Transportation     Lack of Transportation (Medical): No     Lack of Transportation (Non-Medical): No   Physical Activity: Insufficiently Active (2/13/2024)    Exercise Vital Sign     Days of Exercise per Week: 1 day     Minutes of Exercise per Session: 10 min   Stress: Stress Concern Present (2/13/2024)    [employer] - Occupational Stress Questionnaire     Feeling of Stress : Very much   Housing Stability: Low Risk  (2/13/2024)    Housing Stability Vital Sign     Unable to Pay for Housing in the Last Year: No     Number of Places Lived in the Last Year: 1     Unstable Housing in the Last Year: No       ALLERGIES:  Review of patient's allergies indicates:   Allergen Reactions    Penicillins Hives and Other (See Comments)    Sumatriptan Other (See Comments)       MEDICATIONS:    Current Outpatient Medications:     azelastine (ASTELIN) 137 mcg (0.1 %) nasal spray, 2 sprays (274 mcg total) by Nasal route 2 (two) times daily., Disp: 30 mL, Rfl: 6    cholecalciferol, vitamin D3, (VITAMIN D3 ORAL), Take 2,000 Units by mouth., Disp: , Rfl:     eletriptan (RELPAX) 40 MG tablet, Relpax 40 mg tablet  Take 1 tablet as needed by oral route as needed for 30 days., Disp: , Rfl:     fluticasone propionate (FLONASE) 50 mcg/actuation nasal spray, 2 sprays (100 mcg total) by Each Nostril route 2 (two) times daily., Disp: 16 g, Rfl: 11    methylphenidate HCl (RITALIN) 10 MG tablet, Take 1 tablet (10 mg total) by mouth 2 (two) times daily., Disp: 60 tablet, Rfl: 0    mupirocin (BACTROBAN) 2 % ointment, Apply topically 3 (three) times daily., Disp: 15 g, Rfl: 0    ondansetron (ZOFRAN-ODT) 4 MG TbDL, Place 1 tablet 3 times a day by translingual route as needed for 30 days., Disp:  , Rfl:     semaglutide (OZEMPIC) 2 mg/dose (8 mg/3 mL) PnIj, Inject 2 mg into the skin every 7 days., Disp: 9 mL, Rfl: 1    tranexamic acid (LYSTEDA) 650 mg tablet, Take 325 mg by mouth 2 (two) times daily., Disp: , Rfl:     UBRELVY 100 mg tablet, SMARTSI Tablet(s) By Mouth Every 2-4 Hours PRN, Disp: , Rfl:     valACYclovir (VALTREX) 1000 MG tablet, TAKE 2 TABLETS BY MOUTH TWICE A DAY FOR 2-3 DAYS AS NEEDED, Disp: , Rfl:     methylphenidate HCl (RITALIN) 10 MG tablet, Take 1 tablet (10 mg total) by mouth 2 (two) times daily. (Patient not taking: Reported on 2024), Disp: 60 tablet, Rfl: 0    methylphenidate HCl (RITALIN) 10 MG tablet, Take 1 tablet (10 mg total) by mouth 2 (two) times daily. (Patient not taking: Reported on 2024), Disp: 60 tablet, Rfl: 0    methylphenidate HCl (RITALIN) 20 MG tablet, Take 0.5 tablets (10 mg total) by mouth 2 (two) times daily. (Patient not taking: Reported on 2024), Disp: 30 tablet, Rfl: 0    potassium citrate (UROCIT-K) 5 mEq (540 mg) TbSR, Take 2 tablets (10 mEq total) by mouth 2 (two) times daily with meals., Disp: 60 tablet, Rfl: 4    tamsulosin (FLOMAX) 0.4 mg Cap, Take 1 capsule (0.4 mg total) by mouth once daily. for 7 days, Disp: 7 capsule, Rfl: 0   Medication List with Changes/Refills   Current Medications    AZELASTINE (ASTELIN) 137 MCG (0.1 %) NASAL SPRAY    2 sprays (274 mcg total) by Nasal route 2 (two) times daily.    CHOLECALCIFEROL, VITAMIN D3, (VITAMIN D3 ORAL)    Take 2,000 Units by mouth.    ELETRIPTAN (RELPAX) 40 MG TABLET    Relpax 40 mg tablet   Take 1 tablet as needed by oral route as needed for 30 days.    FLUTICASONE PROPIONATE (FLONASE) 50 MCG/ACTUATION NASAL SPRAY    2 sprays (100 mcg total) by Each Nostril route 2 (two) times daily.    METHYLPHENIDATE HCL (RITALIN) 10 MG TABLET    Take 1 tablet (10 mg total) by mouth 2 (two) times daily.    METHYLPHENIDATE HCL (RITALIN) 10 MG TABLET    Take 1 tablet (10 mg total) by mouth 2 (two) times  "daily.    METHYLPHENIDATE HCL (RITALIN) 10 MG TABLET    Take 1 tablet (10 mg total) by mouth 2 (two) times daily.    METHYLPHENIDATE HCL (RITALIN) 20 MG TABLET    Take 0.5 tablets (10 mg total) by mouth 2 (two) times daily.    MUPIROCIN (BACTROBAN) 2 % OINTMENT    Apply topically 3 (three) times daily.    ONDANSETRON (ZOFRAN-ODT) 4 MG TBDL    Place 1 tablet 3 times a day by translingual route as needed for 30 days.    SEMAGLUTIDE (OZEMPIC) 2 MG/DOSE (8 MG/3 ML) PNIJ    Inject 2 mg into the skin every 7 days.    TAMSULOSIN (FLOMAX) 0.4 MG CAP    Take 1 capsule (0.4 mg total) by mouth once daily. for 7 days    TRANEXAMIC ACID (LYSTEDA) 650 MG TABLET    Take 325 mg by mouth 2 (two) times daily.    UBRELVY 100 MG TABLET    SMARTSI Tablet(s) By Mouth Every 2-4 Hours PRN    VALACYCLOVIR (VALTREX) 1000 MG TABLET    TAKE 2 TABLETS BY MOUTH TWICE A DAY FOR 2-3 DAYS AS NEEDED   Changed and/or Refilled Medications    Modified Medication Previous Medication    POTASSIUM CITRATE (UROCIT-K) 5 MEQ (540 MG) TBSR potassium citrate (UROCIT-K) 5 mEq (540 mg) TbSR       Take 2 tablets (10 mEq total) by mouth 2 (two) times daily with meals.    TAKE 1 TABLET BY MOUTH TWICE DAILY WITH MEALS   Discontinued Medications    IBUPROFEN (ADVIL,MOTRIN) 800 MG TABLET    Take 1 tablet by mouth 3 times daily as needed.        PHYSICAL EXAM:  /77   Pulse 80   Ht 5' 4.02" (1.626 m)   Wt 70 kg (154 lb 5.2 oz)   SpO2 99%   BMI 26.47 kg/m²     General: No distress, No fever or chills  Head: Normocephalic,atraumatic  Eyes: conjunctivae/corneas clear. PERRL, EOM's intact.  Nose: Nares normal. Mucosa normal. No drainage or sinus tenderness.  Neck: No adenopathy,no carotid bruit,no JVD  Lungs:Clear to auscultation bilaterally, No Crackles  Heart: Regular rate and rhythm, no murmur, gallops or rubs  Abdomen: Soft, no tenderness, bowel sounds normal  Extremities: Atraumatic, no edema in LE  Skin: Turgor normal. No rashes or ulcers  Neurologic: No " focal weakness, oriented.          LABS:  Lab Results   Component Value Date    HGB 14.7 11/16/2023        Lab Results   Component Value Date    CREATININE 0.7 11/20/2023       Prot/Creat Ratio, Urine   Date Value Ref Range Status   02/03/2023 0.06 0.00 - 0.20 Final       Lab Results   Component Value Date     11/20/2023    K 4.2 11/20/2023    CO2 27 11/20/2023       last PTH   Lab Results   Component Value Date    PTH 65.2 11/16/2021    CALCIUM 9.8 11/20/2023    PHOS 3.8 02/03/2023       Lab Results   Component Value Date    HGBA1C 4.9 04/20/2023       Lab Results   Component Value Date    LDLCALC 82.2 04/20/2023         Creatinine, Urine   Date Value Ref Range Status   02/14/2024 205.0 15.0 - 325.0 mg/dL Final   02/03/2023 159.0 15.0 - 325.0 mg/dL Final       Protein, Urine Random   Date Value Ref Range Status   02/03/2023 9 0 - 15 mg/dL Final       Prot/Creat Ratio, Urine   Date Value Ref Range Status   02/03/2023 0.06 0.00 - 0.20 Final         No images are attached to the encounter.       Problem List     Nephrolithiasis    Pre diabetes    ASSESSMENT and PLAN  Pt comes to clinic  with a recent symptomatic episodes of kidney stones which made her to go to urgent care.  Pt was not complaint with drinking water and taking potassium citrate. Previous 24 hour urine for stone risk analysis consistent with U Na 88 mm/h, U josé manuel 124 and U citrate on lower side. Pt mentioned of family h/o kidney stones. Unsure of stone type. Calcium levels with in normal. Pt not very consistent with potassium citrate supplements and urine pH consistently < 5      Plan    PTH , Vitamin D and Uric Acid levels with in limits   Renal ultrasound with non obstructive kidney stones    Avoid NSAIDS.   Low salt diet   Water intake > 3 lit per day   Decrease animal protein per meal   Discussed the importance of Potassium Citrate supplementation 10 meq to be taken twice a day and adequate water intake.   Continue flomax   Check RFP, UA, UPCR  and a kidney ultrasound to make sure no pyelonephritis going on     Total time spend in counseling patient 34 mins      Class 1 Obesity BMI 26 kg/m2    On Ozempic      RTC in 4 months    Diagnosis and plan of care explained to the patient.  Pt Verbalized understanding. Answered all questions.  Thanks for allowing me to participate in the care of this patient.       Cherie Stone MD  Nephrology  [facility].

## 2024-06-01 DIAGNOSIS — F90.9 ATTENTION DEFICIT HYPERACTIVITY DISORDER (ADHD), UNSPECIFIED ADHD TYPE: ICD-10-CM

## 2024-06-02 DIAGNOSIS — Z71.3 ENCOUNTER FOR WEIGHT LOSS COUNSELING: ICD-10-CM

## 2024-06-02 DIAGNOSIS — K76.0 FATTY LIVER: ICD-10-CM

## 2024-06-02 DIAGNOSIS — R73.03 PREDIABETES: ICD-10-CM

## 2024-06-02 RX ORDER — METHYLPHENIDATE HYDROCHLORIDE 20 MG/1
10 TABLET ORAL 2 TIMES DAILY
Qty: 30 TABLET | Refills: 0 | Status: SHIPPED | OUTPATIENT
Start: 2024-06-02

## 2024-06-02 NOTE — TELEPHONE ENCOUNTER
Care Due:                  Date            Visit Type   Department     Provider  --------------------------------------------------------------------------------                                EP -                              PRIMARY      [facility] INTERNAL  Last Visit: 10-      CARE ([facility])   MEDICINE       Chani Olmedo  Next Visit: None Scheduled  None         None Found                                                            Last  Test          Frequency    Reason                     Performed    Due Date  --------------------------------------------------------------------------------    HBA1C.......  6 months...  semaglutide..............  04-   10-    [facility] Embedded Care Due Messages. Reference number: [id number].   6/02/2024 11:08:04 AM CDT

## 2024-06-03 RX ORDER — SEMAGLUTIDE 2.68 MG/ML
2 INJECTION, SOLUTION SUBCUTANEOUS
Qty: 9 ML | Refills: 1 | Status: SHIPPED | OUTPATIENT
Start: 2024-06-03

## 2024-06-07 ENCOUNTER — HOSPITAL ENCOUNTER (OUTPATIENT)
Dept: RADIOLOGY | Facility: HOSPITAL | Age: 49
Discharge: HOME | End: 2024-06-07
Attending: HOSPITALIST
Payer: COMMERCIAL

## 2024-06-07 DIAGNOSIS — N20.0 KIDNEY STONES: ICD-10-CM

## 2024-06-07 PROCEDURE — 76770 US EXAM ABDO BACK WALL COMP: CPT | Mod: TC

## 2024-06-07 PROCEDURE — 76770 US EXAM ABDO BACK WALL COMP: CPT | Mod: 26,,, | Performed by: RADIOLOGY

## 2024-06-19 ENCOUNTER — HOSPITAL ENCOUNTER (OUTPATIENT)
Dept: RADIOLOGY | Facility: HOSPITAL | Age: 49
Discharge: HOME | End: 2024-06-19
Attending: STUDENT IN AN ORGANIZED HEALTH CARE EDUCATION/TRAINING PROGRAM
Payer: COMMERCIAL

## 2024-06-19 VITALS — WEIGHT: 154 LBS | HEIGHT: 64 IN | BODY MASS INDEX: 26.29 KG/M2

## 2024-06-19 DIAGNOSIS — Z12.31 ENCOUNTER FOR SCREENING MAMMOGRAM FOR MALIGNANT NEOPLASM OF BREAST: ICD-10-CM

## 2024-06-19 PROCEDURE — 77067 SCR MAMMO BI INCL CAD: CPT | Mod: TC

## 2024-07-02 ENCOUNTER — PATIENT OUTREACH (OUTPATIENT)
Dept: ADMINISTRATIVE | Facility: HOSPITAL | Age: 49
End: 2024-07-02
Payer: COMMERCIAL

## 2024-07-05 ENCOUNTER — PATIENT MESSAGE (OUTPATIENT)
Dept: PSYCHIATRY | Facility: CLINIC | Age: 49
End: 2024-07-05
Payer: COMMERCIAL

## 2024-07-05 DIAGNOSIS — F90.9 ATTENTION DEFICIT HYPERACTIVITY DISORDER (ADHD), UNSPECIFIED ADHD TYPE: ICD-10-CM

## 2024-07-05 RX ORDER — METHYLPHENIDATE HYDROCHLORIDE 20 MG/1
10 TABLET ORAL 2 TIMES DAILY
Qty: 30 TABLET | Refills: 0 | Status: SHIPPED | OUTPATIENT
Start: 2024-07-05

## 2024-07-16 ENCOUNTER — PATIENT MESSAGE (OUTPATIENT)
Dept: INTERNAL MEDICINE | Facility: CLINIC | Age: 49
End: 2024-07-16
Payer: COMMERCIAL

## 2024-07-19 ENCOUNTER — OFFICE VISIT (OUTPATIENT)
Dept: PSYCHIATRY | Facility: CLINIC | Age: 49
End: 2024-07-19
Payer: COMMERCIAL

## 2024-07-19 DIAGNOSIS — F90.9 ATTENTION DEFICIT HYPERACTIVITY DISORDER (ADHD), UNSPECIFIED ADHD TYPE: ICD-10-CM

## 2024-07-19 PROCEDURE — 99213 OFFICE O/P EST LOW 20 MIN: CPT | Mod: 95,,, | Performed by: NURSE PRACTITIONER

## 2024-07-19 PROCEDURE — 3066F NEPHROPATHY DOC TX: CPT | Mod: CPTII,95,, | Performed by: NURSE PRACTITIONER

## 2024-07-19 RX ORDER — METHYLPHENIDATE HYDROCHLORIDE 20 MG/1
10 TABLET ORAL 2 TIMES DAILY
Qty: 30 TABLET | Refills: 0 | Status: SHIPPED | OUTPATIENT
Start: 2024-09-08

## 2024-07-19 RX ORDER — METHYLPHENIDATE HYDROCHLORIDE 20 MG/1
10 TABLET ORAL 2 TIMES DAILY
Qty: 30 TABLET | Refills: 0 | Status: SHIPPED | OUTPATIENT
Start: 2024-10-07

## 2024-07-19 RX ORDER — METHYLPHENIDATE HYDROCHLORIDE 20 MG/1
10 TABLET ORAL 2 TIMES DAILY
Qty: 30 TABLET | Refills: 0 | Status: SHIPPED | OUTPATIENT
Start: 2024-08-10

## 2024-07-19 NOTE — PROGRESS NOTES
"Outpatient Psychiatry Follow-Up Visit (MD/NP)    7/19/2024    Clinical Status of Patient:  Outpatient (Ambulatory)    Chief Complaint:  Kim Padilla is a 48 y.o. female who presents today for follow-up of attention problems.  Met with patient.      Last visit was:   The patient location is: home  The chief complaint leading to consultation is: attention problems    Visit type: audiovisual    Face to Face time with patient: 30 minutes  30 minutes of total time spent on the encounter, which includes face to face time and non-face to face time preparing to see the patient (eg, review of tests), Obtaining and/or reviewing separately obtained history, Documenting clinical information in the electronic or other health record, Independently interpreting results (not separately reported) and communicating results to the patient/family/caregiver, or Care coordination (not separately reported).     Each patient to whom he or she provides medical services by telemedicine is:  (1) informed of the relationship between the physician and patient and the respective role of any other health care provider with respect to management of the patient; and (2) notified that he or she may decline to receive medical services by telemedicine and may withdraw from such care at any time.    Interval History and Content of Current Session:  Current Psychiatric Medications/changes  RITALIN 10 mg once or twice daily for ADHD symptoms  Follow-up with me every 3 month by office or virtual visit    Virtual Visit: Pt presents bright affect and euthymic mood. Pt states, "I've been doing well" .Reports effective response to Ritalin and denies side effects. Denies any unmanaged mood, anxiety, or insomnia symptoms.  Denies SI/HI/AVH. Will continue current medications.     Psychotherapy:  Target symptoms: lack of focus  Why chosen therapy is appropriate versus another modality: relevant to diagnosis  Outcome monitoring methods: " self-report  Therapeutic intervention type: insight oriented psychotherapy  Topics discussed/themes: building skills sets for symptom management, symptom recognition  The patient's response to the intervention is accepting. The patient's progress toward treatment goals is good.   Duration of intervention: 15 minutes.    Review of Systems   PSYCHIATRIC: Pertinant items are noted in the narrative.  CONSTITUTIONAL: No weight gain or loss.   MUSCULOSKELETAL: No pain or stiffness of the joints.  NEUROLOGIC: No weakness, sensory changes, seizures, confusion, memory loss, tremor or other abnormal movements.  ENDOCRINE: No polydipsia or polyuria.  INTEGUMENTARY: No rashes or lacerations.  EYES: No exophthalmos, jaundice or blindness.  ENT: No dizziness, tinnitus or hearing loss.  RESPIRATORY: No shortness of breath.  CARDIOVASCULAR: No tachycardia or chest pain.  GASTROINTESTINAL: No nausea, vomiting, pain, constipation or diarrhea.  GENITOURINARY: No frequency, dysuria or sexual dysfunction.  HEMATOLOGIC/LYMPHATIC: No excessive bleeding, prolonged or excessive bleeding after dental extraction/injury.  ALLERGIC/IMMUNOLOGIC: No allergic response to materials, foods or animals at this time.    Past Medical, Family and Social History: The patient's past medical, family and social history have been reviewed and updated as appropriate within the electronic medical record - see encounter notes.    Compliance: yes    Side effects: None    Risk Parameters:  Patient reports no suicidal ideation  Patient reports no homicidal ideation  Patient reports no self-injurious behavior  Patient reports no violent behavior    Exam (detailed: at least 9 elements; comprehensive: all 15 elements)   Constitutional  Vitals:  Most recent vital signs, dated greater than 90 days prior to this appointment, were reviewed.   There were no vitals filed for this visit.     General:  unremarkable, age appropriate     Musculoskeletal  Muscle Strength/Tone:   not examined   Gait & Station:  non-ataxic     Psychiatric  Speech:  no latency; no press   Mood & Affect:  steady  congruent and appropriate   Thought Process:  normal and logical   Associations:  intact   Thought Content:  normal, no suicidality, no homicidality, delusions, or paranoia   Insight:  intact   Judgement: behavior is adequate to circumstances   Orientation:  grossly intact   Memory: intact for content of interview   Language: grossly intact   Attention Span & Concentration:  able to focus   Fund of Knowledge:  intact and appropriate to age and level of education     Assessment and Diagnosis   Status/Progress: Based on the examination today, the patient's problem(s) is/are improved.  New problems have not been presented today.   Co-morbidities and Lack of compliance are not complicating management of the primary condition.  There are no active rule-out diagnoses for this patient at this time.     General Impression:       ICD-10-CM ICD-9-CM   1. Attention deficit hyperactivity disorder (ADHD), unspecified ADHD type  F90.9 314.01       Intervention/Counseling/Treatment Plan   Medication Management: The risks and benefits of medication were discussed with the patient.  Ritalin 20 mg take 0.5 tab (10 mg) twice daily    Return to Clinic: 3 months    Risks, benefits, side effects and alternative treatments discussed with patient. Patient agrees with the current plan as documented.  Encouraged Patient to keep future appointments.  Take medications as prescribed and abstain from substance abuse.  Pt to present to ED for thoughts to harm herself or others

## 2024-07-26 ENCOUNTER — PATIENT MESSAGE (OUTPATIENT)
Dept: INTERNAL MEDICINE | Facility: CLINIC | Age: 49
End: 2024-07-26
Payer: COMMERCIAL

## 2024-07-29 NOTE — TELEPHONE ENCOUNTER
2 mg is the highest dose of Ozempic. Patient is due for follow up though, we can recheck her A1c and discuss alternatives if she's interested at the appointment. Please assist with scheduling, thank you!

## 2024-08-01 ENCOUNTER — TELEPHONE (OUTPATIENT)
Dept: NEPHROLOGY | Facility: CLINIC | Age: 49
End: 2024-08-01
Payer: COMMERCIAL

## 2024-09-10 ENCOUNTER — TELEPHONE (OUTPATIENT)
Dept: NEPHROLOGY | Facility: CLINIC | Age: 49
End: 2024-09-10
Payer: COMMERCIAL

## 2024-09-20 ENCOUNTER — TELEPHONE (OUTPATIENT)
Dept: NEPHROLOGY | Facility: CLINIC | Age: 49
End: 2024-09-20
Payer: COMMERCIAL

## 2024-09-23 ENCOUNTER — TELEPHONE (OUTPATIENT)
Dept: NEPHROLOGY | Facility: CLINIC | Age: 49
End: 2024-09-23
Payer: COMMERCIAL

## 2024-09-23 ENCOUNTER — LAB VISIT (OUTPATIENT)
Dept: LAB | Facility: HOSPITAL | Age: 49
End: 2024-09-23
Attending: STUDENT IN AN ORGANIZED HEALTH CARE EDUCATION/TRAINING PROGRAM
Payer: COMMERCIAL

## 2024-09-23 DIAGNOSIS — N20.0 KIDNEY STONES: ICD-10-CM

## 2024-09-23 DIAGNOSIS — N20.0 KIDNEY STONES: Primary | ICD-10-CM

## 2024-09-23 LAB
ALBUMIN SERPL BCP-MCNC: 4.1 G/DL (ref 3.5–5.2)
ANION GAP SERPL CALC-SCNC: 6 MMOL/L (ref 8–16)
BASOPHILS # BLD AUTO: 0.04 K/UL (ref 0–0.2)
BASOPHILS NFR BLD: 0.4 % (ref 0–1.9)
BUN SERPL-MCNC: 14 MG/DL (ref 6–20)
CALCIUM SERPL-MCNC: 9.6 MG/DL (ref 8.7–10.5)
CHLORIDE SERPL-SCNC: 108 MMOL/L (ref 95–110)
CO2 SERPL-SCNC: 25 MMOL/L (ref 23–29)
CREAT SERPL-MCNC: 0.7 MG/DL (ref 0.5–1.4)
DIFFERENTIAL METHOD BLD: NORMAL
EOSINOPHIL # BLD AUTO: 0.3 K/UL (ref 0–0.5)
EOSINOPHIL NFR BLD: 2.5 % (ref 0–8)
ERYTHROCYTE [DISTWIDTH] IN BLOOD BY AUTOMATED COUNT: 12.1 % (ref 11.5–14.5)
EST. GFR  (NO RACE VARIABLE): >60 ML/MIN/1.73 M^2
GLUCOSE SERPL-MCNC: 73 MG/DL (ref 70–110)
HCT VFR BLD AUTO: 40.1 % (ref 37–48.5)
HGB BLD-MCNC: 13.5 G/DL (ref 12–16)
IMM GRANULOCYTES # BLD AUTO: 0.03 K/UL (ref 0–0.04)
IMM GRANULOCYTES NFR BLD AUTO: 0.3 % (ref 0–0.5)
LYMPHOCYTES # BLD AUTO: 3 K/UL (ref 1–4.8)
LYMPHOCYTES NFR BLD: 30.2 % (ref 18–48)
MCH RBC QN AUTO: 29.3 PG (ref 27–31)
MCHC RBC AUTO-ENTMCNC: 33.7 G/DL (ref 32–36)
MCV RBC AUTO: 87 FL (ref 82–98)
MONOCYTES # BLD AUTO: 0.9 K/UL (ref 0.3–1)
MONOCYTES NFR BLD: 8.7 % (ref 4–15)
NEUTROPHILS # BLD AUTO: 5.7 K/UL (ref 1.8–7.7)
NEUTROPHILS NFR BLD: 57.9 % (ref 38–73)
NRBC BLD-RTO: 0 /100 WBC
PHOSPHATE SERPL-MCNC: 3 MG/DL (ref 2.7–4.5)
PLATELET # BLD AUTO: 268 K/UL (ref 150–450)
PMV BLD AUTO: 10 FL (ref 9.2–12.9)
POTASSIUM SERPL-SCNC: 3.6 MMOL/L (ref 3.5–5.1)
PTH-INTACT SERPL-MCNC: 89.8 PG/ML (ref 9–77)
RBC # BLD AUTO: 4.6 M/UL (ref 4–5.4)
SODIUM SERPL-SCNC: 139 MMOL/L (ref 136–145)
WBC # BLD AUTO: 9.91 K/UL (ref 3.9–12.7)

## 2024-09-23 PROCEDURE — 80069 RENAL FUNCTION PANEL: CPT | Performed by: HOSPITALIST

## 2024-09-23 PROCEDURE — 85025 COMPLETE CBC W/AUTO DIFF WBC: CPT | Performed by: HOSPITALIST

## 2024-09-23 PROCEDURE — 83970 ASSAY OF PARATHORMONE: CPT | Performed by: HOSPITALIST

## 2024-09-23 PROCEDURE — 36415 COLL VENOUS BLD VENIPUNCTURE: CPT | Performed by: HOSPITALIST

## 2024-09-23 NOTE — TELEPHONE ENCOUNTER
----- Message from Jael Loving sent at 9/23/2024  8:20 AM CDT -----  Regarding: pt advice  Contact: [phone]  Pt is returning a missed call from someone in the office and is asking for a return call back soon. Thanks.     Reason for call:MISS CALL     Patient's DX:     Patient requesting call back or [facility] msg: [phone]

## 2024-09-24 ENCOUNTER — OFFICE VISIT (OUTPATIENT)
Dept: NEPHROLOGY | Facility: CLINIC | Age: 49
End: 2024-09-24
Payer: COMMERCIAL

## 2024-09-24 VITALS
DIASTOLIC BLOOD PRESSURE: 88 MMHG | OXYGEN SATURATION: 99 % | SYSTOLIC BLOOD PRESSURE: 134 MMHG | BODY MASS INDEX: 26.72 KG/M2 | WEIGHT: 156.5 LBS | HEIGHT: 64 IN | HEART RATE: 85 BPM

## 2024-09-24 DIAGNOSIS — N20.0 RECURRENT NEPHROLITHIASIS: ICD-10-CM

## 2024-09-24 DIAGNOSIS — E66.3 OVERWEIGHT (BMI 25.0-29.9): ICD-10-CM

## 2024-09-24 DIAGNOSIS — E21.3 HYPERPARATHYROIDISM, UNSPECIFIED: ICD-10-CM

## 2024-09-24 DIAGNOSIS — N20.0 CALCIUM OXALATE CALCULUS OF KIDNEY: ICD-10-CM

## 2024-09-24 DIAGNOSIS — N20.0 KIDNEY STONES: Primary | ICD-10-CM

## 2024-09-24 PROCEDURE — 3008F BODY MASS INDEX DOCD: CPT | Mod: CPTII,S$GLB,, | Performed by: HOSPITALIST

## 2024-09-24 PROCEDURE — 99213 OFFICE O/P EST LOW 20 MIN: CPT | Mod: S$GLB,,, | Performed by: HOSPITALIST

## 2024-09-24 PROCEDURE — 1159F MED LIST DOCD IN RCRD: CPT | Mod: CPTII,S$GLB,, | Performed by: HOSPITALIST

## 2024-09-24 PROCEDURE — 99999 PR PBB SHADOW E&M-EST. PATIENT-LVL III: CPT | Mod: PBBFAC,,, | Performed by: HOSPITALIST

## 2024-09-24 PROCEDURE — 3079F DIAST BP 80-89 MM HG: CPT | Mod: CPTII,S$GLB,, | Performed by: HOSPITALIST

## 2024-09-24 PROCEDURE — 3075F SYST BP GE 130 - 139MM HG: CPT | Mod: CPTII,S$GLB,, | Performed by: HOSPITALIST

## 2024-09-24 PROCEDURE — 3066F NEPHROPATHY DOC TX: CPT | Mod: CPTII,S$GLB,, | Performed by: HOSPITALIST

## 2024-09-24 NOTE — PROGRESS NOTES
REASON FOR CONSULT/CHIEF COMPLAIN: Kidney stones     REFERRING PHYSICIAN: Chani Olmedo MD      HISTORY OF PRESENT ILLNESS: 48 y.o. female who is established to me  has a past medical history of Allergy, Diabetes mellitus, Fatty liver disease, nonalcoholic, and Infertility, female. patient was referred here for kidney stones.     Pt mentions she has had one episode of symptomatic kidney stone 6 years agio and recently 1 week ago, she did have another kidney stone episode which made her to go to urgent care.Received Toradol shot along with flomax. Pain better now. Has family h/o kidney sone in father. Once or twice a year. None of them required any interventions. Denied other kidney problems. Unsure of type of kidney stone. Pt mentioned she is trying to lose weight.    No chronic NSAID use, no known exposure to lithium, lead.  Denied any issues with urination including dysuria, hematuria, urgency, hesitancy, nocturia, incomplete voiding. Denied chest pain, nausea, vomiting, abd pain, nausea, vomiting, diarrhea, shortness of breath, pedal edema, Orthopnea, PND.     Interval H/o 9/24/24   Pt comes for regular follow up visit. Mentions of taking potassium citrate twice a day. No recent kidney stone episodes.  ROS:  General: negative for chills, or fatigue  ENT: No epistaxis or headaches  Hematological and Lymphatic: No bleeding problems or blood clots.  Endocrine: No skin changes or temperature intolerance  Respiratory: No cough, shortness of breath, or wheezing  Cardiovascular: No chest pain or dyspnea   Gastrointestinal: No abdominal pain, change in bowel habits  Genito-Urinary: No dysuria, trouble voiding, or hematuria  Musculoskeletal: ROS: negative for - joint pain, joint stiffness, joint swelling, muscle pain or muscular weakness  Neurological: No new focal weakness, no numbness  Dermatological: No rash or ulcers.    PAST MEDICAL HISTORY:  Past Medical History:   Diagnosis Date    Allergy     Diabetes  mellitus     Fatty liver disease, nonalcoholic     Infertility, female        PAST SURGICAL HISTORY:  Past Surgical History:   Procedure Laterality Date    BREAST BIOPSY Right     benign    BREAST BIOPSY Left 2014    Excisional bx, benign    BREAST CYST EXCISION      DILATION AND CURETTAGE OF UTERUS  1999    for SAB    DILATION AND CURETTAGE OF UTERUS  2018    TONSILLECTOMY      TONSILLECTOMY      TYMPANOSTOMY TUBE PLACEMENT      WISDOM TOOTH EXTRACTION         FAMILY HISTORY:   Family History   Problem Relation Name Age of Onset    Allergies Mother      Graves' disease Mother      Hypertension Father      Hyperlipidemia Father      Prostate cancer Father      Insulin resistance Father      Thyroid cancer Sister      Ovarian cancer Sister      Thyroid disease Maternal Grandmother          s/p thyroidectomy    Heart attack Maternal Grandfather          in early 60's    Coronary artery disease Maternal Grandfather      Lung disease Paternal Grandmother      Heart attack Paternal Grandfather      Coronary artery disease Paternal Grandfather      Stroke Paternal Grandfather      Breast cancer Maternal Cousin      Colon cancer Neg Hx         SOCIAL HISTORY:  Social History     Socioeconomic History    Marital status:    Tobacco Use    Smoking status: Never     Passive exposure: Never    Smokeless tobacco: Never   Substance and Sexual Activity    Alcohol use: Not Currently     Comment: rarely    Drug use: No    Sexual activity: Yes     Partners: Male     Birth control/protection: I.U.D.     Comment: mirena      Social Determinants of Health     Financial Resource Strain: Low Risk  (2/13/2024)    Overall Financial Resource Strain (CARDIA)     Difficulty of Paying Living Expenses: Not hard at all   Food Insecurity: No Food Insecurity (2/13/2024)    Hunger Vital Sign     Worried About Running Out of Food in the Last Year: Never true     Ran Out of Food in the Last Year: Never true   Transportation Needs: No  Transportation Needs (2/13/2024)    PRAPARE - Transportation     Lack of Transportation (Medical): No     Lack of Transportation (Non-Medical): No   Physical Activity: Insufficiently Active (2/13/2024)    Exercise Vital Sign     Days of Exercise per Week: 1 day     Minutes of Exercise per Session: 10 min   Stress: Stress Concern Present (2/13/2024)    [employer] - Occupational Stress Questionnaire     Feeling of Stress : Very much   Housing Stability: Low Risk  (2/13/2024)    Housing Stability Vital Sign     Unable to Pay for Housing in the Last Year: No     Number of Places Lived in the Last Year: 1     Unstable Housing in the Last Year: No       ALLERGIES:  Review of patient's allergies indicates:   Allergen Reactions    Penicillins Hives and Other (See Comments)    Sumatriptan Other (See Comments)       MEDICATIONS:    Current Outpatient Medications:     azelastine (ASTELIN) 137 mcg (0.1 %) nasal spray, 2 sprays (274 mcg total) by Nasal route 2 (two) times daily., Disp: 30 mL, Rfl: 6    cholecalciferol, vitamin D3, (VITAMIN D3 ORAL), Take 2,000 Units by mouth., Disp: , Rfl:     eletriptan (RELPAX) 40 MG tablet, Relpax 40 mg tablet  Take 1 tablet as needed by oral route as needed for 30 days., Disp: , Rfl:     fluticasone propionate (FLONASE) 50 mcg/actuation nasal spray, 2 sprays (100 mcg total) by Each Nostril route 2 (two) times daily., Disp: 16 g, Rfl: 11    methylphenidate HCl (RITALIN) 20 MG tablet, Take 0.5 tablets (10 mg total) by mouth 2 (two) times daily., Disp: 30 tablet, Rfl: 0    methylphenidate HCl (RITALIN) 20 MG tablet, Take 0.5 tablets (10 mg total) by mouth 2 (two) times daily., Disp: 30 tablet, Rfl: 0    [START ON 10/7/2024] methylphenidate HCl (RITALIN) 20 MG tablet, Take 0.5 tablets (10 mg total) by mouth 2 (two) times daily., Disp: 30 tablet, Rfl: 0    mupirocin (BACTROBAN) 2 % ointment, Apply topically 3 (three) times daily., Disp: 15 g, Rfl: 0    ondansetron  (ZOFRAN-ODT) 4 MG TbDL, Place 1 tablet 3 times a day by translingual route as needed for 30 days., Disp: , Rfl:     potassium citrate (UROCIT-K) 5 mEq (540 mg) TbSR, Take 2 tablets (10 mEq total) by mouth 2 (two) times daily with meals., Disp: 60 tablet, Rfl: 4    semaglutide (OZEMPIC) 2 mg/dose (8 mg/3 mL) PnIj, Inject 2 mg into the skin every 7 days., Disp: 9 mL, Rfl: 1    tranexamic acid (LYSTEDA) 650 mg tablet, Take 325 mg by mouth 2 (two) times daily., Disp: , Rfl:     UBRELVY 100 mg tablet, SMARTSI Tablet(s) By Mouth Every 2-4 Hours PRN, Disp: , Rfl:     valACYclovir (VALTREX) 1000 MG tablet, TAKE 2 TABLETS BY MOUTH TWICE A DAY FOR 2-3 DAYS AS NEEDED, Disp: , Rfl:     tamsulosin (FLOMAX) 0.4 mg Cap, Take 1 capsule (0.4 mg total) by mouth once daily. for 7 days, Disp: 7 capsule, Rfl: 0   Medication List with Changes/Refills   Current Medications    AZELASTINE (ASTELIN) 137 MCG (0.1 %) NASAL SPRAY    2 sprays (274 mcg total) by Nasal route 2 (two) times daily.    CHOLECALCIFEROL, VITAMIN D3, (VITAMIN D3 ORAL)    Take 2,000 Units by mouth.    ELETRIPTAN (RELPAX) 40 MG TABLET    Relpax 40 mg tablet   Take 1 tablet as needed by oral route as needed for 30 days.    FLUTICASONE PROPIONATE (FLONASE) 50 MCG/ACTUATION NASAL SPRAY    2 sprays (100 mcg total) by Each Nostril route 2 (two) times daily.    METHYLPHENIDATE HCL (RITALIN) 20 MG TABLET    Take 0.5 tablets (10 mg total) by mouth 2 (two) times daily.    METHYLPHENIDATE HCL (RITALIN) 20 MG TABLET    Take 0.5 tablets (10 mg total) by mouth 2 (two) times daily.    METHYLPHENIDATE HCL (RITALIN) 20 MG TABLET    Take 0.5 tablets (10 mg total) by mouth 2 (two) times daily.    MUPIROCIN (BACTROBAN) 2 % OINTMENT    Apply topically 3 (three) times daily.    ONDANSETRON (ZOFRAN-ODT) 4 MG TBDL    Place 1 tablet 3 times a day by translingual route as needed for 30 days.    POTASSIUM CITRATE (UROCIT-K) 5 MEQ (540 MG) TBSR    Take 2 tablets (10 mEq total) by mouth 2 (two)  "times daily with meals.    SEMAGLUTIDE (OZEMPIC) 2 MG/DOSE (8 MG/3 ML) PNIJ    Inject 2 mg into the skin every 7 days.    TAMSULOSIN (FLOMAX) 0.4 MG CAP    Take 1 capsule (0.4 mg total) by mouth once daily. for 7 days    TRANEXAMIC ACID (LYSTEDA) 650 MG TABLET    Take 325 mg by mouth 2 (two) times daily.    UBRELVY 100 MG TABLET    SMARTSI Tablet(s) By Mouth Every 2-4 Hours PRN    VALACYCLOVIR (VALTREX) 1000 MG TABLET    TAKE 2 TABLETS BY MOUTH TWICE A DAY FOR 2-3 DAYS AS NEEDED        PHYSICAL EXAM:  /88   Pulse 85   Ht 5' 4" (1.626 m)   Wt 71 kg (156 lb 8.4 oz)   SpO2 99%   BMI 26.87 kg/m²     General: No distress, No fever or chills  Head: Normocephalic,atraumatic  Eyes: conjunctivae/corneas clear. PERRL, EOM's intact.  Nose: Nares normal. Mucosa normal. No drainage or sinus tenderness.  Neck: No adenopathy,no carotid bruit,no JVD  Lungs:Clear to auscultation bilaterally, No Crackles  Heart: Regular rate and rhythm, no murmur, gallops or rubs  Abdomen: Soft, no tenderness, bowel sounds normal  Extremities: Atraumatic, no edema in LE  Skin: Turgor normal. No rashes or ulcers  Neurologic: No focal weakness, oriented.          LABS:  Lab Results   Component Value Date    HGB 13.5 2024        Lab Results   Component Value Date    CREATININE 0.7 2024       Prot/Creat Ratio, Urine   Date Value Ref Range Status   2024 Unable to calculate 0.00 - 0.20 Final   2024 0.05 0.00 - 0.20 Final   2023 0.06 0.00 - 0.20 Final       Lab Results   Component Value Date     2024    K 3.6 2024    CO2 25 2024       last PTH   Lab Results   Component Value Date    PTH 89.8 (H) 2024    CALCIUM 9.6 2024    PHOS 3.0 2024       Lab Results   Component Value Date    HGBA1C 4.9 2023       Lab Results   Component Value Date    LDLCALC 82.2 2023         Creatinine, Urine   Date Value Ref Range Status   2024 73.0 15.0 - 325.0 mg/dL Final "   05/30/2024 210.0 15.0 - 325.0 mg/dL Final   02/14/2024 205.0 15.0 - 325.0 mg/dL Final       Protein, Urine Random   Date Value Ref Range Status   09/23/2024 <7 0 - 15 mg/dL Final   05/30/2024 10 0 - 15 mg/dL Final   02/03/2023 9 0 - 15 mg/dL Final       Prot/Creat Ratio, Urine   Date Value Ref Range Status   09/23/2024 Unable to calculate 0.00 - 0.20 Final   05/30/2024 0.05 0.00 - 0.20 Final   02/03/2023 0.06 0.00 - 0.20 Final         No images are attached to the encounter.       Problem List     Nephrolithiasis    Pre diabetes    ASSESSMENT and PLAN  Pt comes to clinic  with a recent symptomatic episodes of kidney stones which made her to go to urgent care.  Pt was not complaint with drinking water and taking potassium citrate. Previous 24 hour urine for stone risk analysis consistent with U Na 88 mm/h, U josé manuel 124 and U citrate on lower side. Pt mentioned of family h/o kidney stones. Unsure of stone type. Calcium levels with in normal. Pt not very consistent with potassium citrate supplements and urine pH consistently < 5      Plan   Vitamin D and Uric Acid levels with in limits   Renal ultrasound with non obstructive kidney stones    Avoid NSAIDS.   Low salt diet   Water intake > 3 lit per day   Decrease animal protein per meal   Discussed the importance of Potassium Citrate supplementation 10 meq to be taken twice a day and adequate water intake.   Continue flomax   Renal ultrasound with  bilateral nephrolithiasis, however no obstruction identified.    Hyperparathyroidism   PTH elevated at 89   Would get a NM parathyroid scan looking for adenoma/ hypertrophy     Total time spend in counseling patient  22 mins      Class 1 Obesity BMI 26 kg/m2    On Ozempic      RTC in 6 months    Diagnosis and plan of care explained to the patient.  Pt Verbalized understanding. Answered all questions.  Thanks for allowing me to participate in the care of this patient.       Cherie Stone MD  Nephrology  [facility].

## 2024-11-04 ENCOUNTER — HOSPITAL ENCOUNTER (OUTPATIENT)
Dept: RADIOLOGY | Facility: HOSPITAL | Age: 49
Discharge: HOME | End: 2024-11-04
Attending: HOSPITALIST
Payer: COMMERCIAL

## 2024-11-04 DIAGNOSIS — N20.0 KIDNEY STONES: ICD-10-CM

## 2024-11-04 PROCEDURE — A9500 TC99M SESTAMIBI: HCPCS | Performed by: HOSPITALIST

## 2024-11-04 PROCEDURE — 78071 PARATHYRD PLANAR W/WO SUBTRJ: CPT | Mod: 26,,, | Performed by: STUDENT IN AN ORGANIZED HEALTH CARE EDUCATION/TRAINING PROGRAM

## 2024-11-04 PROCEDURE — 78071 PARATHYRD PLANAR W/WO SUBTRJ: CPT | Mod: TC

## 2024-11-04 RX ORDER — TETRAKIS(2-METHOXYISOBUTYLISOCYANIDE)COPPER(I) TETRAFLUOROBORATE 1 MG/ML
20 INJECTION, POWDER, LYOPHILIZED, FOR SOLUTION INTRAVENOUS
Status: COMPLETED | OUTPATIENT
Start: 2024-11-04 | End: 2024-11-04

## 2024-11-04 RX ADMIN — KIT FOR THE PREPARATION OF TECHNETIUM TC99M SESTAMIBI 19.8 MILLICURIE: 1 INJECTION, POWDER, LYOPHILIZED, FOR SOLUTION PARENTERAL at 08:11

## 2024-11-05 ENCOUNTER — OFFICE VISIT (OUTPATIENT)
Dept: OBSTETRICS AND GYNECOLOGY | Facility: CLINIC | Age: 49
End: 2024-11-05
Attending: OBSTETRICS & GYNECOLOGY
Payer: COMMERCIAL

## 2024-11-05 ENCOUNTER — OFFICE VISIT (OUTPATIENT)
Dept: INTERNAL MEDICINE | Facility: CLINIC | Age: 49
End: 2024-11-05
Payer: COMMERCIAL

## 2024-11-05 VITALS
SYSTOLIC BLOOD PRESSURE: 118 MMHG | DIASTOLIC BLOOD PRESSURE: 76 MMHG | BODY MASS INDEX: 26.17 KG/M2 | HEIGHT: 64 IN | WEIGHT: 153.31 LBS

## 2024-11-05 VITALS
HEART RATE: 97 BPM | WEIGHT: 153.25 LBS | HEIGHT: 64 IN | SYSTOLIC BLOOD PRESSURE: 120 MMHG | OXYGEN SATURATION: 98 % | BODY MASS INDEX: 26.16 KG/M2 | DIASTOLIC BLOOD PRESSURE: 82 MMHG

## 2024-11-05 DIAGNOSIS — Z01.419 ENCOUNTER FOR GYNECOLOGICAL EXAMINATION: Primary | ICD-10-CM

## 2024-11-05 DIAGNOSIS — Z00.00 WELLNESS EXAMINATION: Primary | ICD-10-CM

## 2024-11-05 DIAGNOSIS — F41.9 ANXIETY: ICD-10-CM

## 2024-11-05 PROCEDURE — 1159F MED LIST DOCD IN RCRD: CPT | Mod: CPTII,S$GLB,, | Performed by: OBSTETRICS & GYNECOLOGY

## 2024-11-05 PROCEDURE — 99999 PR PBB SHADOW E&M-EST. PATIENT-LVL III: CPT | Mod: PBBFAC,,, | Performed by: OBSTETRICS & GYNECOLOGY

## 2024-11-05 PROCEDURE — 3066F NEPHROPATHY DOC TX: CPT | Mod: CPTII,S$GLB,,

## 2024-11-05 PROCEDURE — 99396 PREV VISIT EST AGE 40-64: CPT | Mod: S$GLB,,,

## 2024-11-05 PROCEDURE — 3079F DIAST BP 80-89 MM HG: CPT | Mod: CPTII,S$GLB,,

## 2024-11-05 PROCEDURE — 3066F NEPHROPATHY DOC TX: CPT | Mod: CPTII,S$GLB,, | Performed by: OBSTETRICS & GYNECOLOGY

## 2024-11-05 PROCEDURE — 99999 PR PBB SHADOW E&M-EST. PATIENT-LVL IV: CPT | Mod: PBBFAC,,,

## 2024-11-05 PROCEDURE — 3074F SYST BP LT 130 MM HG: CPT | Mod: CPTII,S$GLB,,

## 2024-11-05 PROCEDURE — 3008F BODY MASS INDEX DOCD: CPT | Mod: CPTII,S$GLB,, | Performed by: OBSTETRICS & GYNECOLOGY

## 2024-11-05 PROCEDURE — 3074F SYST BP LT 130 MM HG: CPT | Mod: CPTII,S$GLB,, | Performed by: OBSTETRICS & GYNECOLOGY

## 2024-11-05 PROCEDURE — 3078F DIAST BP <80 MM HG: CPT | Mod: CPTII,S$GLB,, | Performed by: OBSTETRICS & GYNECOLOGY

## 2024-11-05 PROCEDURE — 3008F BODY MASS INDEX DOCD: CPT | Mod: CPTII,S$GLB,,

## 2024-11-05 PROCEDURE — 99396 PREV VISIT EST AGE 40-64: CPT | Mod: S$GLB,,, | Performed by: OBSTETRICS & GYNECOLOGY

## 2024-11-05 PROCEDURE — 1159F MED LIST DOCD IN RCRD: CPT | Mod: CPTII,S$GLB,,

## 2024-11-05 RX ORDER — ALPRAZOLAM 0.5 MG/1
0.5 TABLET ORAL 3 TIMES DAILY PRN
Qty: 30 TABLET | Refills: 0 | Status: SHIPPED | OUTPATIENT
Start: 2024-11-05 | End: 2025-11-05

## 2024-11-05 NOTE — Clinical Note
Pt would like for us to manage her eletriptan if you are ok with it. She currently sees a headache clinic once yearly on the [facility] and would like to not have to do that.

## 2024-11-05 NOTE — PROGRESS NOTES
Internal Medicine Annual Exam       CHIEF COMPLAINT     The patient, Kim Padilla, who is a 48 y.o. female presents for an annual exam.    HPI   Pt consents to AI recording of visit for documentation purposes.    History of Present Illness    CHIEF COMPLAINT:  Kim presents today for annual follow-up and lab work.    DIABETES MANAGEMENT:  She reports being on the border between prediabetes and diabetes. Currently taking Ozempic, which is working well. She discontinued metformin previously. Her A1C level have been consistently around 5.6, which she expresses pride in maintaining. She denies any issues with her current medication regimen and reports that the pharmacy coordinates refills with the office as needed.    THYROID CONCERNS:  She recently had a thyroid scan due to elevated parathyroid levels detected in labs ordered by her nephrologist two months ago. She expresses anxiety about thyroid issues due to significant family history: her sister had thyroid cancer at age 32, and her mother and grandmother have had their thyroids removed.    MIGRAINE MANAGEMENT:  She currently uses alatriptan for migraine management, which has been effective for years. Ubrelvy was ineffective in treating her headaches. She reports an adverse reaction to sumatriptan (Imitrex), resulting in palpitations that required an emergency room visit. She expresses desire to have migraine medication managed by PCP due to inconvenience of annual visits to [facility] for refills.    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 Her post-vaccination labs results were satisfactory. She plans to get her flu vaccine at her workplace in the coming week.    GENERAL HEALTH:  She denies any issues with bowels or bladder, and reports no numbness, tingling, or swelling in her feet.      ROS:  General: -fever, -chills, -fatigue, -weight gain, -weight loss  Eyes:  -vision changes, -redness, -discharge  ENT: -ear pain, -nasal congestion, -sore throat  Cardiovascular: -chest pain, +palpitations, -lower extremity edema  Respiratory: -cough, -shortness of breath  Gastrointestinal: -abdominal pain, -nausea, -vomiting, -diarrhea, -constipation, -blood in stool  Genitourinary: -dysuria, -hematuria, -frequency  Musculoskeletal: -joint pain, -muscle pain  Skin: -rash, -lesion  Neurological: -headache, -dizziness, -numbness, -tingling  Psychiatric: +anxiety, -depression, -sleep difficulty           Past Medical History:  Past Medical History:   Diagnosis Date    Allergy     Diabetes mellitus     Fatty liver disease, nonalcoholic     Infertility, female        Past Surgical History:   Procedure Laterality Date    BREAST BIOPSY Right     benign    BREAST BIOPSY Left 2014    Excisional bx, benign    BREAST CYST EXCISION      DILATION AND CURETTAGE OF UTERUS  1999    for SAB    DILATION AND CURETTAGE OF UTERUS  2018    TONSILLECTOMY      TONSILLECTOMY      TYMPANOSTOMY TUBE PLACEMENT      WISDOM TOOTH EXTRACTION          Family History   Problem Relation Name Age of Onset    Allergies Mother      Graves' disease Mother      Hypertension Father      Hyperlipidemia Father      Prostate cancer Father      Insulin resistance Father      Thyroid cancer Sister      Ovarian cancer Sister      Thyroid disease Maternal Grandmother          s/p thyroidectomy    Heart attack Maternal Grandfather          in early 60's    Coronary artery disease Maternal Grandfather      Lung disease Paternal Grandmother      Heart attack Paternal Grandfather      Coronary artery disease Paternal Grandfather      Stroke Paternal Grandfather      Breast cancer Maternal Cousin      Colon cancer Neg Hx          Social History     Socioeconomic History    Marital status:    Tobacco Use    Smoking status: Never     Passive exposure: Never    Smokeless tobacco: Never   Substance and Sexual Activity    Alcohol use:  Not Currently     Comment: rarely    Drug use: No    Sexual activity: Yes     Partners: Male     Birth control/protection: I.U.D.     Comment: mirena      Social Drivers of Health     Financial Resource Strain: Low Risk  (2/13/2024)    Overall Financial Resource Strain (CARDIA)     Difficulty of Paying Living Expenses: Not hard at all   Food Insecurity: No Food Insecurity (2/13/2024)    Hunger Vital Sign     Worried About Running Out of Food in the Last Year: Never true     Ran Out of Food in the Last Year: Never true   Transportation Needs: No Transportation Needs (2/13/2024)    PRAPARE - Transportation     Lack of Transportation (Medical): No     Lack of Transportation (Non-Medical): No   Physical Activity: Insufficiently Active (2/13/2024)    Exercise Vital Sign     Days of Exercise per Week: 1 day     Minutes of Exercise per Session: 10 min   Stress: Stress Concern Present (2/13/2024)    [employer] - Occupational Stress Questionnaire     Feeling of Stress : Very much   Housing Stability: Low Risk  (2/13/2024)    Housing Stability Vital Sign     Unable to Pay for Housing in the Last Year: No     Number of Places Lived in the Last Year: 1     Unstable Housing in the Last Year: No        Social History     Tobacco Use   Smoking Status Never    Passive exposure: Never   Smokeless Tobacco Never        Allergies as of 11/05/2024 - Reviewed 11/05/2024   Allergen Reaction Noted    Penicillins Hives and Other (See Comments) 11/01/2016    Sumatriptan Other (See Comments) 11/29/2023          Home Medications:  Prior to Admission medications    Medication Sig Start Date End Date Taking? Authorizing Provider   azelastine (ASTELIN) 137 mcg (0.1 %) nasal spray 2 sprays (274 mcg total) by Nasal route 2 (two) times daily. 11/16/23 11/15/24 Yes Dante Small MD   cholecalciferol, vitamin D3, (VITAMIN D3 ORAL) Take 2,000 Units by mouth.   Yes Provider, Historical   eletriptan (RELPAX) 40  MG tablet Relpax 40 mg tablet   Take 1 tablet as needed by oral route as needed for 30 days.   Yes Provider, Historical   fluticasone propionate (FLONASE) 50 mcg/actuation nasal spray 2 sprays (100 mcg total) by Each Nostril route 2 (two) times daily. 23  Yes Dante Small MD   mupirocin (BACTROBAN) 2 % ointment Apply topically 3 (three) times daily. 5/15/23  Yes Julianne Campbell DNP   ondansetron (ZOFRAN-ODT) 4 MG TbDL Place 1 tablet 3 times a day by translingual route as needed for 30 days. 10/3/23  Yes Provider, Historical   potassium citrate (UROCIT-K) 5 mEq (540 mg) TbSR Take 2 tablets (10 mEq total) by mouth 2 (two) times daily with meals. 24  Yes Cherie Stone MD   semaglutide (OZEMPIC) 2 mg/dose (8 mg/3 mL) PnIj Inject 2 mg into the skin every 7 days. 6/3/24  Yes Chani Olmedo MD   tranexamic acid (LYSTEDA) 650 mg tablet Take 325 mg by mouth 2 (two) times daily. 5/3/24  Yes Provider, Historical   UBRELVY 100 mg tablet SMARTSI Tablet(s) By Mouth Every 2-4 Hours PRN 3/27/23  Yes Provider, Historical   valACYclovir (VALTREX) 1000 MG tablet TAKE 2 TABLETS BY MOUTH TWICE A DAY FOR 2-3 DAYS AS NEEDED 23  Yes Provider, Historical   methylphenidate HCl (RITALIN) 20 MG tablet Take 0.5 tablets (10 mg total) by mouth 2 (two) times daily. 8/10/24   Samuel Cloud III, JOS   methylphenidate HCl (RITALIN) 20 MG tablet Take 0.5 tablets (10 mg total) by mouth 2 (two) times daily. 24   Samuel Cloud III, NP   methylphenidate HCl (RITALIN) 20 MG tablet Take 0.5 tablets (10 mg total) by mouth 2 (two) times daily. 10/7/24   Samuel Cloud III, NP   tamsulosin (FLOMAX) 0.4 mg Cap Take 1 capsule (0.4 mg total) by mouth once daily. for 7 days 24  Rekha León PA-C       Review of Systems:  Review of Systems   Constitutional:  Positive for fatigue.   Cardiovascular:  Negative for chest pain.   Endocrine: Negative for polydipsia, polyphagia and  "polyuria.   Skin:  Negative for pallor.   Neurological:  Positive for weakness and headaches. Negative for dizziness, tremors, seizures and speech difficulty.   Psychiatric/Behavioral:  Positive for confusion. The patient is nervous/anxious.        Health Maintainence:   Immunizations:  Health Maintenance         Date Due Completion Date    Hemoglobin A1c (Prediabetes) 04/20/2024 4/20/2023    Influenza Vaccine (1) 09/01/2024 1/4/2024    COVID-19 Vaccine (3 - 2024-25 season) 09/01/2024 3/25/2021    Pneumococcal Vaccines (Age 0-64) (2 of 2 - PCV) 11/29/2024 11/29/2023    Cervical Cancer Screening 02/21/2025 2/21/2022    Mammogram 06/19/2025 6/19/2024    Colorectal Cancer Screening 06/24/2025 6/24/2022    Lipid Panel 04/20/2028 4/20/2023    TETANUS VACCINE 06/06/2032 6/6/2022    RSV Vaccine (Age 60+ and Pregnant patients) (1 - 1-dose 75+ series) 11/10/2050 ---             PHYSICAL EXAM     /82 (BP Location: Left arm, Patient Position: Sitting)   Pulse 97   Ht 5' 4" (1.626 m)   Wt 69.5 kg (153 lb 3.5 oz)   SpO2 98%   BMI 26.30 kg/m²  Body mass index is 26.3 kg/m².    Physical Exam  Vitals reviewed.   Constitutional:       General: She is not in acute distress.     Appearance: She is well-developed. She is not diaphoretic.   HENT:      Head: Normocephalic and atraumatic.      Nose: Nose normal.   Eyes:      General:         Right eye: No discharge.         Left eye: No discharge.      Conjunctiva/sclera: Conjunctivae normal.      Pupils: Pupils are equal, round, and reactive to light.   Neck:      Thyroid: No thyromegaly.   Cardiovascular:      Rate and Rhythm: Normal rate and regular rhythm.      Heart sounds: No murmur heard.  Pulmonary:      Effort: Pulmonary effort is normal. No respiratory distress.      Breath sounds: Normal breath sounds.   Abdominal:      General: Abdomen is flat. There is no distension.      Palpations: Abdomen is soft.   Musculoskeletal:      Cervical back: Neck supple.   Skin:     " General: Skin is warm and dry.   Neurological:      Mental Status: She is alert and oriented to person, place, and time.   Psychiatric:         Behavior: Behavior normal.           ASSESSMENT/PLAN     Assessment & Plan    Reviewed patient's A1C history, noting consistently good control around 5.6  Evaluated potential for managing patient's migraine medication (alatriptan) in primary care setting to improve convenience for patient  Assessed for pneumonia vaccine eligibility, confirming patient already received 1 of the recommended vaccines    DIABETES:  - Continued Ozempic at current dose for diabetes management.  - Contact office for any Ozempic refills as needed.    LABS:  - Ordered comprehensive lab work including: complete blood count, metabolic panel (kidney, liver, electrolyte function), A1C, lipid panel, and thyroid stimulating hormone (TSH).  - Follow up when convenient for fasting lab work at [place] location.    FOLLOW UP:  - Review lab results in patient portal.  - Contact office if any concerns or need for discussion regarding lab results.         iKm was seen today for diabetes.    Diagnoses and all orders for this visit:    Wellness examination  -     Hemoglobin A1C; Future  -     CBC Auto Differential; Future  -     Comprehensive Metabolic Panel; Future  -     Lipid Panel; Future  -     TSH; Future           Teddy Hussein NP   Department of Internal Medicine - [facility]  1:49 PM  Answers submitted by the patient for this visit:  Diabetes Questionnaire (Submitted on 11/5/2024)  Chief Complaint: Diabetes problem  Diabetes type: type 2  MedicAlert ID: [id number]  Disease duration: 2 Years  blurred vision: No  foot paresthesias: No  foot ulcerations: No  visual change: Yes  weight loss: Yes  Symptom course: stable  hunger: No  mood changes: Yes  sleepiness: No  sweats: No  blackouts: No  hospitalization: No  nocturnal hypoglycemia: No  required assistance: No  required glucagon: No  CVA: No  heart  disease: No  nephropathy: No  peripheral neuropathy: No  PVD: No  retinopathy: No  autonomic neuropathy: No  CAD risks: stress, diabetes mellitus  Current treatments: oral agent (monotherapy)  Treatment compliance: all of the time  Weight trend: fluctuating minimally  Current diet: generally healthy  Meal planning: none  Exercise: intermittently  Dietitian visit: Yes  Eye exam current: Yes  Sees podiatrist: No

## 2024-11-05 NOTE — PROGRESS NOTES
Subjective:       Patient ID: Kim Padilla is a 48 y.o. female.    Chief Complaint:  Annual Exam (2/2022 Pap/HPV normal/6/2024 Mammo 1)      History of Present Illness  - here for annual. Amenorrheic with Mirena.  - requests Xanax refill. Uses them sparingly. Tried anti-anxiety meds in the past and didn't tolerate them.    Past Medical History:   Diagnosis Date    Allergy     Diabetes mellitus     Fatty liver disease, nonalcoholic     Infertility, female        Past Surgical History:   Procedure Laterality Date    BREAST BIOPSY Right     benign    BREAST BIOPSY Left 2014    Excisional bx, benign    BREAST CYST EXCISION      DILATION AND CURETTAGE OF UTERUS  1999    for SAB    DILATION AND CURETTAGE OF UTERUS  2018    TONSILLECTOMY      TONSILLECTOMY      TYMPANOSTOMY TUBE PLACEMENT      WISDOM TOOTH EXTRACTION          Family History   Problem Relation Name Age of Onset    Allergies Mother      Graves' disease Mother      Hypertension Father      Hyperlipidemia Father      Prostate cancer Father      Insulin resistance Father      Thyroid cancer Sister      Ovarian cancer Sister      Thyroid disease Maternal Grandmother          s/p thyroidectomy    Heart attack Maternal Grandfather          in early 60's    Coronary artery disease Maternal Grandfather      Lung disease Paternal Grandmother      Heart attack Paternal Grandfather      Coronary artery disease Paternal Grandfather      Stroke Paternal Grandfather      Breast cancer Maternal Cousin      Colon cancer Neg Hx          Social History     Socioeconomic History    Marital status:    Tobacco Use    Smoking status: Never     Passive exposure: Never    Smokeless tobacco: Never   Substance and Sexual Activity    Alcohol use: Not Currently     Comment: rarely    Drug use: No    Sexual activity: Yes     Partners: Male     Birth control/protection: I.U.D.     Comment: mirena      Social Drivers of Health     Financial Resource Strain: Low Risk   "(2/13/2024)    Overall Financial Resource Strain (CARDIA)     Difficulty of Paying Living Expenses: Not hard at all   Food Insecurity: No Food Insecurity (2/13/2024)    Hunger Vital Sign     Worried About Running Out of Food in the Last Year: Never true     Ran Out of Food in the Last Year: Never true   Transportation Needs: No Transportation Needs (2/13/2024)    PRAPARE - Transportation     Lack of Transportation (Medical): No     Lack of Transportation (Non-Medical): No   Physical Activity: Insufficiently Active (2/13/2024)    Exercise Vital Sign     Days of Exercise per Week: 1 day     Minutes of Exercise per Session: 10 min   Stress: Stress Concern Present (2/13/2024)    [employer] - Occupational Stress Questionnaire     Feeling of Stress : Very much   Housing Stability: Low Risk  (2/13/2024)    Housing Stability Vital Sign     Unable to Pay for Housing in the Last Year: No     Number of Places Lived in the Last Year: 1     Unstable Housing in the Last Year: No           Objective:     Vitals:    11/05/24 1357   BP: 118/76   Weight: 69.6 kg (153 lb 5.3 oz)   Height: 5' 4" (1.626 m)       Physical Exam:   Constitutional: She is oriented to person, place, and time. She appears well-developed and well-nourished.        Pulmonary/Chest: Right breast exhibits no mass, no nipple discharge, no skin change, no tenderness and no swelling. Left breast exhibits no mass, no nipple discharge, no skin change, no tenderness and no swelling. Breasts are symmetrical.        Abdominal: Soft. She exhibits no distension. There is no abdominal tenderness.     Genitourinary:    Vagina and uterus normal.   There is no tenderness or lesion on the right labia. There is no tenderness or lesion on the left labia. Cervix is normal. Right adnexum displays no mass, no tenderness and no fullness. Left adnexum displays no mass, no tenderness and no fullness. No vaginal discharge in the vagina. IUD strings " visualized.          Musculoskeletal: Moves all extremeties.       Neurological: She is alert and oriented to person, place, and time.     Psychiatric: She has a normal mood and affect.        A female chaperone was present for exam.    Assessment/ Plan:     Orders Placed This Encounter    ALPRAZolam (XANAX) 0.5 MG tablet       Kim was seen today for annual exam.    Diagnoses and all orders for this visit:    Encounter for gynecological examination    Anxiety  -     ALPRAZolam (XANAX) 0.5 MG tablet; Take 1 tablet (0.5 mg total) by mouth 3 (three) times daily as needed for Insomnia or Anxiety.        Follow up in about 1 year (around 11/5/2025) for annual exam.    As of April 1, 2021, the Cures Act has been passed nationally. This new law requires that all doctors progress notes, lab results, pathology reports and radiology reports be released IMMEDIATELY to the patient in the patient portal. That means that the results are released to you at the EXACT same time they are released to me. Therefore, with all of the patients that I have I am not able to reply to each patient exactly when the results come in. So there will be a delay from when you see the results to when I see them and have time to come up with a response to send you. Also I only see these results when I am on the computer at work. So if the results come in over the weekend or after 5 pm of a work day, I will not see them until the next business day. As you can tell, this is a challenge as a physician to give every patient the quick response they hope for and deserve. So please be patient!   Thanks for your understanding and patience.

## 2024-11-06 ENCOUNTER — PATIENT MESSAGE (OUTPATIENT)
Dept: INTERNAL MEDICINE | Facility: CLINIC | Age: 49
End: 2024-11-06
Payer: COMMERCIAL

## 2024-11-06 DIAGNOSIS — G43.909 MIGRAINE WITHOUT STATUS MIGRAINOSUS, NOT INTRACTABLE, UNSPECIFIED MIGRAINE TYPE: Primary | ICD-10-CM

## 2024-11-07 ENCOUNTER — LAB VISIT (OUTPATIENT)
Dept: LAB | Facility: HOSPITAL | Age: 49
End: 2024-11-07
Payer: COMMERCIAL

## 2024-11-07 DIAGNOSIS — Z00.00 WELLNESS EXAMINATION: ICD-10-CM

## 2024-11-07 LAB
ALBUMIN SERPL BCP-MCNC: 4.3 G/DL (ref 3.5–5.2)
ALP SERPL-CCNC: 61 U/L (ref 40–150)
ALT SERPL W/O P-5'-P-CCNC: 20 U/L (ref 10–44)
ANION GAP SERPL CALC-SCNC: 9 MMOL/L (ref 8–16)
AST SERPL-CCNC: 18 U/L (ref 10–40)
BASOPHILS # BLD AUTO: 0.05 K/UL (ref 0–0.2)
BASOPHILS NFR BLD: 0.6 % (ref 0–1.9)
BILIRUB SERPL-MCNC: 1 MG/DL (ref 0.1–1)
BUN SERPL-MCNC: 10 MG/DL (ref 6–20)
CALCIUM SERPL-MCNC: 9.5 MG/DL (ref 8.7–10.5)
CHLORIDE SERPL-SCNC: 108 MMOL/L (ref 95–110)
CHOLEST SERPL-MCNC: 174 MG/DL (ref 120–199)
CHOLEST/HDLC SERPL: 3.3 {RATIO} (ref 2–5)
CO2 SERPL-SCNC: 23 MMOL/L (ref 23–29)
CREAT SERPL-MCNC: 0.7 MG/DL (ref 0.5–1.4)
DIFFERENTIAL METHOD BLD: NORMAL
EOSINOPHIL # BLD AUTO: 0.2 K/UL (ref 0–0.5)
EOSINOPHIL NFR BLD: 2.7 % (ref 0–8)
ERYTHROCYTE [DISTWIDTH] IN BLOOD BY AUTOMATED COUNT: 12.1 % (ref 11.5–14.5)
EST. GFR  (NO RACE VARIABLE): >60 ML/MIN/1.73 M^2
ESTIMATED AVG GLUCOSE: 97 MG/DL (ref 68–131)
GLUCOSE SERPL-MCNC: 89 MG/DL (ref 70–110)
HBA1C MFR BLD: 5 % (ref 4–5.6)
HCT VFR BLD AUTO: 43.2 % (ref 37–48.5)
HDLC SERPL-MCNC: 52 MG/DL (ref 40–75)
HDLC SERPL: 29.9 % (ref 20–50)
HGB BLD-MCNC: 14.7 G/DL (ref 12–16)
IMM GRANULOCYTES # BLD AUTO: 0.02 K/UL (ref 0–0.04)
IMM GRANULOCYTES NFR BLD AUTO: 0.2 % (ref 0–0.5)
LDLC SERPL CALC-MCNC: 96.6 MG/DL (ref 63–159)
LYMPHOCYTES # BLD AUTO: 2.2 K/UL (ref 1–4.8)
LYMPHOCYTES NFR BLD: 26.9 % (ref 18–48)
MCH RBC QN AUTO: 29.3 PG (ref 27–31)
MCHC RBC AUTO-ENTMCNC: 34 G/DL (ref 32–36)
MCV RBC AUTO: 86 FL (ref 82–98)
MONOCYTES # BLD AUTO: 0.6 K/UL (ref 0.3–1)
MONOCYTES NFR BLD: 7.8 % (ref 4–15)
NEUTROPHILS # BLD AUTO: 5.1 K/UL (ref 1.8–7.7)
NEUTROPHILS NFR BLD: 61.8 % (ref 38–73)
NONHDLC SERPL-MCNC: 122 MG/DL
NRBC BLD-RTO: 0 /100 WBC
PLATELET # BLD AUTO: 293 K/UL (ref 150–450)
PMV BLD AUTO: 10 FL (ref 9.2–12.9)
POTASSIUM SERPL-SCNC: 4.4 MMOL/L (ref 3.5–5.1)
PROT SERPL-MCNC: 7.1 G/DL (ref 6–8.4)
RBC # BLD AUTO: 5.01 M/UL (ref 4–5.4)
SODIUM SERPL-SCNC: 140 MMOL/L (ref 136–145)
TRIGL SERPL-MCNC: 127 MG/DL (ref 30–150)
TSH SERPL DL<=0.005 MIU/L-ACNC: 1.04 UIU/ML (ref 0.4–4)
WBC # BLD AUTO: 8.25 K/UL (ref 3.9–12.7)

## 2024-11-07 PROCEDURE — 85025 COMPLETE CBC W/AUTO DIFF WBC: CPT

## 2024-11-07 PROCEDURE — 80053 COMPREHEN METABOLIC PANEL: CPT

## 2024-11-07 PROCEDURE — 83036 HEMOGLOBIN GLYCOSYLATED A1C: CPT

## 2024-11-07 PROCEDURE — 36415 COLL VENOUS BLD VENIPUNCTURE: CPT

## 2024-11-07 PROCEDURE — 84443 ASSAY THYROID STIM HORMONE: CPT

## 2024-11-07 PROCEDURE — 80061 LIPID PANEL: CPT

## 2024-11-07 RX ORDER — ELETRIPTAN HYDROBROMIDE 40 MG/1
40 TABLET, FILM COATED ORAL
Qty: 30 TABLET | Refills: 3 | Status: SHIPPED | OUTPATIENT
Start: 2024-11-07

## 2024-12-24 DIAGNOSIS — R73.03 PREDIABETES: ICD-10-CM

## 2024-12-24 DIAGNOSIS — K76.0 FATTY LIVER: ICD-10-CM

## 2024-12-24 DIAGNOSIS — Z71.3 ENCOUNTER FOR WEIGHT LOSS COUNSELING: ICD-10-CM

## 2024-12-24 RX ORDER — SEMAGLUTIDE 2.68 MG/ML
2 INJECTION, SOLUTION SUBCUTANEOUS
Qty: 9 ML | Refills: 1 | Status: SHIPPED | OUTPATIENT
Start: 2024-12-24

## 2024-12-24 NOTE — TELEPHONE ENCOUNTER
Care Due:                  Date            Visit Type   Department     Provider  --------------------------------------------------------------------------------                                EP -                              PRIMARY      [facility] INTERNAL  Last Visit: 10-      CARE ([facility])   MEDICINE       Chani Olmedo  Next Visit: None Scheduled  None         None Found                                                            Last  Test          Frequency    Reason                     Performed    Due Date  --------------------------------------------------------------------------------    Office Visit  15 months..  semaglutide..............  10-   01-    [facility] Embedded Care Due Messages. Reference number: [id number].   12/24/2024 11:13:25 AM CST

## 2025-01-22 ENCOUNTER — OFFICE VISIT (OUTPATIENT)
Dept: PSYCHIATRY | Facility: CLINIC | Age: 50
End: 2025-01-22
Payer: COMMERCIAL

## 2025-01-22 DIAGNOSIS — F90.9 ATTENTION DEFICIT HYPERACTIVITY DISORDER (ADHD), UNSPECIFIED ADHD TYPE: ICD-10-CM

## 2025-01-22 PROCEDURE — 98006 SYNCH AUDIO-VIDEO EST MOD 30: CPT | Mod: 95,,, | Performed by: NURSE PRACTITIONER

## 2025-01-22 RX ORDER — METHYLPHENIDATE HYDROCHLORIDE 20 MG/1
20 TABLET ORAL 2 TIMES DAILY
Qty: 60 TABLET | Refills: 0 | Status: SHIPPED | OUTPATIENT
Start: 2025-03-19

## 2025-01-22 RX ORDER — METHYLPHENIDATE HYDROCHLORIDE 20 MG/1
20 TABLET ORAL 2 TIMES DAILY
Qty: 6 TABLET | Refills: 0 | Status: SHIPPED | OUTPATIENT
Start: 2025-02-20

## 2025-01-22 RX ORDER — METHYLPHENIDATE HYDROCHLORIDE 20 MG/1
20 TABLET ORAL 2 TIMES DAILY
Qty: 60 TABLET | Refills: 0 | Status: SHIPPED | OUTPATIENT
Start: 2025-01-22

## 2025-01-22 NOTE — PROGRESS NOTES
"Outpatient Psychiatry Follow-Up Visit (MD/NP)    1/22/2025    Clinical Status of Patient:  Outpatient (Ambulatory)    Chief Complaint:  Kim Padilla is a 49 y.o. female who presents today for follow-up of attention problems.  Met with patient.      Last visit was:   The patient location is: home  The chief complaint leading to consultation is: attention problems    Visit type: audiovisual    Face to Face time with patient: 30 minutes  30 minutes of total time spent on the encounter, which includes face to face time and non-face to face time preparing to see the patient (eg, review of tests), Obtaining and/or reviewing separately obtained history, Documenting clinical information in the electronic or other health record, Independently interpreting results (not separately reported) and communicating results to the patient/family/caregiver, or Care coordination (not separately reported).     Each patient to whom he or she provides medical services by telemedicine is:  (1) informed of the relationship between the physician and patient and the respective role of any other health care provider with respect to management of the patient; and (2) notified that he or she may decline to receive medical services by telemedicine and may withdraw from such care at any time.    Interval History and Content of Current Session:  Current Psychiatric Medications/changes  RITALIN 10 mg once or twice daily for ADHD symptoms  Follow-up with me every 3 month by office or virtual visit    Virtual Visit: Pt presents bright affect and euthymic mood. Pt states, "I've been doing well" .Reports effective response to Ritalin but feels it is wearing off midday. Will increase to full dose BID. Denies side effects. Denies any unmanaged mood, anxiety, or insomnia symptoms.  Denies SI/HI/AVH. Will continue current medications.     Psychotherapy:  Target symptoms: lack of focus  Why chosen therapy is appropriate versus another modality: " relevant to diagnosis  Outcome monitoring methods: self-report  Therapeutic intervention type: insight oriented psychotherapy  Topics discussed/themes: building skills sets for symptom management, symptom recognition  The patient's response to the intervention is accepting. The patient's progress toward treatment goals is good.   Duration of intervention: 15 minutes.    Review of Systems   PSYCHIATRIC: Pertinant items are noted in the narrative.  CONSTITUTIONAL: No weight gain or loss.   MUSCULOSKELETAL: No pain or stiffness of the joints.  NEUROLOGIC: No weakness, sensory changes, seizures, confusion, memory loss, tremor or other abnormal movements.  ENDOCRINE: No polydipsia or polyuria.  INTEGUMENTARY: No rashes or lacerations.  EYES: No exophthalmos, jaundice or blindness.  ENT: No dizziness, tinnitus or hearing loss.  RESPIRATORY: No shortness of breath.  CARDIOVASCULAR: No tachycardia or chest pain.  GASTROINTESTINAL: No nausea, vomiting, pain, constipation or diarrhea.  GENITOURINARY: No frequency, dysuria or sexual dysfunction.  HEMATOLOGIC/LYMPHATIC: No excessive bleeding, prolonged or excessive bleeding after dental extraction/injury.  ALLERGIC/IMMUNOLOGIC: No allergic response to materials, foods or animals at this time.    Past Medical, Family and Social History: The patient's past medical, family and social history have been reviewed and updated as appropriate within the electronic medical record - see encounter notes.    Compliance: yes    Side effects: None    Risk Parameters:  Patient reports no suicidal ideation  Patient reports no homicidal ideation  Patient reports no self-injurious behavior  Patient reports no violent behavior    Exam (detailed: at least 9 elements; comprehensive: all 15 elements)   Constitutional  Vitals:  Most recent vital signs, dated greater than 90 days prior to this appointment, were reviewed.   There were no vitals filed for this visit.     General:  unremarkable, age  appropriate     Musculoskeletal  Muscle Strength/Tone:  not examined   Gait & Station:  non-ataxic     Psychiatric  Speech:  no latency; no press   Mood & Affect:  steady  congruent and appropriate   Thought Process:  normal and logical   Associations:  intact   Thought Content:  normal, no suicidality, no homicidality, delusions, or paranoia   Insight:  intact   Judgement: behavior is adequate to circumstances   Orientation:  grossly intact   Memory: intact for content of interview   Language: grossly intact   Attention Span & Concentration:  able to focus   Fund of Knowledge:  intact and appropriate to age and level of education     Assessment and Diagnosis   Status/Progress: Based on the examination today, the patient's problem(s) is/are improved.  New problems have not been presented today.   Co-morbidities and Lack of compliance are not complicating management of the primary condition.  There are no active rule-out diagnoses for this patient at this time.     General Impression:       ICD-10-CM ICD-9-CM   1. Attention deficit hyperactivity disorder (ADHD), unspecified ADHD type  F90.9 314.01       Intervention/Counseling/Treatment Plan   Medication Management: The risks and benefits of medication were discussed with the patient.  Increase to Ritalin 20 mg twice daily    Return to Clinic: 3 months    Risks, benefits, side effects and alternative treatments discussed with patient. Patient agrees with the current plan as documented.  Encouraged Patient to keep future appointments.  Take medications as prescribed and abstain from substance abuse.  Pt to present to ED for thoughts to harm herself or others

## 2025-02-28 ENCOUNTER — PATIENT MESSAGE (OUTPATIENT)
Dept: PSYCHIATRY | Facility: CLINIC | Age: 50
End: 2025-02-28
Payer: COMMERCIAL

## 2025-02-28 DIAGNOSIS — F90.9 ATTENTION DEFICIT HYPERACTIVITY DISORDER (ADHD), UNSPECIFIED ADHD TYPE: ICD-10-CM

## 2025-02-28 RX ORDER — METHYLPHENIDATE HYDROCHLORIDE 20 MG/1
20 TABLET ORAL 2 TIMES DAILY
Qty: 60 TABLET | Refills: 0 | Status: SHIPPED | OUTPATIENT
Start: 2025-02-28

## 2025-03-03 DIAGNOSIS — R73.03 PREDIABETES: ICD-10-CM

## 2025-03-03 DIAGNOSIS — K76.0 FATTY LIVER: ICD-10-CM

## 2025-03-03 DIAGNOSIS — Z71.3 ENCOUNTER FOR WEIGHT LOSS COUNSELING: ICD-10-CM

## 2025-03-03 RX ORDER — SEMAGLUTIDE 2.68 MG/ML
2 INJECTION, SOLUTION SUBCUTANEOUS
Qty: 9 ML | Refills: 1 | Status: SHIPPED | OUTPATIENT
Start: 2025-03-03

## 2025-03-03 NOTE — TELEPHONE ENCOUNTER
Care Due:                  Date            Visit Type   Department     Provider  --------------------------------------------------------------------------------                                EP -                              PRIMARY      [facility] INTERNAL  Last Visit: 10-      CARE ([facility])   MEDICINE       Chani Olmedo  Next Visit: None Scheduled  None         None Found                                                            Last  Test          Frequency    Reason                     Performed    Due Date  --------------------------------------------------------------------------------    Office Visit  15 months..  semaglutiderickglutide,  10-   01-    HBA1C.......  6 months...  semaglutide, semaglutide,  11- 05-    Health [facility] Embedded Care Due Messages. Reference number: [id number].   3/03/2025 3:28:31 PM CST

## 2025-03-24 ENCOUNTER — TELEPHONE (OUTPATIENT)
Dept: PHARMACY | Facility: CLINIC | Age: 50
End: 2025-03-24
Payer: COMMERCIAL

## 2025-03-24 DIAGNOSIS — K76.0 FATTY LIVER: ICD-10-CM

## 2025-03-24 DIAGNOSIS — Z71.3 ENCOUNTER FOR WEIGHT LOSS COUNSELING: ICD-10-CM

## 2025-03-24 DIAGNOSIS — R73.03 PREDIABETES: ICD-10-CM

## 2025-03-25 RX ORDER — SEMAGLUTIDE 2.68 MG/ML
2 INJECTION, SOLUTION SUBCUTANEOUS
Qty: 9 ML | Refills: 2 | Status: SHIPPED | OUTPATIENT
Start: 2025-03-25

## 2025-03-25 NOTE — TELEPHONE ENCOUNTER
Refill Routing Note   Medication(s) are not appropriate for processing by [facility] for the following reason(s):        Patient not seen by provider within 15 months    [facility] action(s):  Defer               Appointments  past 12m or future 3m with PCP    Date Provider   Last Visit   10/19/2023 Chani Olmedo MD   Next Visit   Visit date not found Chani Olmedo MD   ED visits in past 90 days: 0        Note composed:9:56 PM 03/24/2025

## 2025-03-25 NOTE — TELEPHONE ENCOUNTER
No care due was identified.  [facility] Embedded Care Due Messages. Reference number: [id number].   3/24/2025 9:52:29 PM CDT

## 2025-03-25 NOTE — TELEPHONE ENCOUNTER
[facility]/Population Health Chart Review & Patient Outreach Details For Medication Adherence Project    Reason for Outreach Encounter: 3rd Party payor non-compliance report ([employer], [employer], C, etc)  2.  Patient Outreach Method: ASSURED PHARMACYhart message  3.   Medication in question: ozempic   LAST FILLED: 11/10/24 for 28 day supply  Diabetes Medications              semaglutide (OZEMPIC) 2 mg/dose (8 mg/3 mL) PnIj Inject 2 mg into the skin every 7 days.    semaglutide, weight loss, (WEGOVY) 1.7 mg/0.75 mL PnIj Inject 1.7 mg into the skin every 7 days.              4.  Reviewed and or Updates Made To: Patient Chart  5. Outreach Outcomes and/or actions taken: Sent inquiry to patient: Waiting for response.   - SENT REFILL REQUEST TO PROVIDER

## 2025-04-03 ENCOUNTER — PATIENT MESSAGE (OUTPATIENT)
Dept: INTERNAL MEDICINE | Facility: CLINIC | Age: 50
End: 2025-04-03
Payer: COMMERCIAL

## 2025-04-08 ENCOUNTER — TELEPHONE (OUTPATIENT)
Dept: PHARMACY | Facility: CLINIC | Age: 50
End: 2025-04-08
Payer: COMMERCIAL

## 2025-04-08 NOTE — TELEPHONE ENCOUNTER
[facility]/Population Health Chart Review & Patient Outreach Details For Medication Adherence Project    Reason for Outreach Encounter: 3rd Party payor non-compliance report ([employer], [employer], C, etc)  2.  Patient Outreach Method: Reviewed patient chart   3.   Medication in question:    Diabetes Medications              semaglutide (OZEMPIC) 2 mg/dose (8 mg/3 mL) PnIj Inject 2 mg into the skin every 7 days.                 ozempic  last filled  4/1 via compounding pharmacy    4.  Reviewed and or Updates Made To: Patient Chart  5. Outreach Outcomes and/or actions taken: Patient filled medication and is on track to be adherent  Additional Notes:

## 2025-04-22 ENCOUNTER — OFFICE VISIT (OUTPATIENT)
Dept: PSYCHIATRY | Facility: CLINIC | Age: 50
End: 2025-04-22
Payer: COMMERCIAL

## 2025-04-22 DIAGNOSIS — F90.9 ATTENTION DEFICIT HYPERACTIVITY DISORDER (ADHD), UNSPECIFIED ADHD TYPE: ICD-10-CM

## 2025-04-22 PROCEDURE — 98006 SYNCH AUDIO-VIDEO EST MOD 30: CPT | Mod: 95,,, | Performed by: NURSE PRACTITIONER

## 2025-04-22 PROCEDURE — 1159F MED LIST DOCD IN RCRD: CPT | Mod: CPTII,95,, | Performed by: NURSE PRACTITIONER

## 2025-04-22 PROCEDURE — 1160F RVW MEDS BY RX/DR IN RCRD: CPT | Mod: CPTII,95,, | Performed by: NURSE PRACTITIONER

## 2025-04-22 RX ORDER — METHYLPHENIDATE HYDROCHLORIDE 20 MG/1
20 TABLET ORAL 2 TIMES DAILY
Qty: 60 TABLET | Refills: 0 | Status: SHIPPED | OUTPATIENT
Start: 2025-04-27

## 2025-04-22 RX ORDER — METHYLPHENIDATE HYDROCHLORIDE 20 MG/1
20 TABLET ORAL 2 TIMES DAILY
Qty: 60 TABLET | Refills: 0 | Status: SHIPPED | OUTPATIENT
Start: 2025-05-26

## 2025-04-22 RX ORDER — METHYLPHENIDATE HYDROCHLORIDE 20 MG/1
20 TABLET ORAL 2 TIMES DAILY
Qty: 60 TABLET | Refills: 0 | Status: SHIPPED | OUTPATIENT
Start: 2025-06-24

## 2025-04-22 NOTE — PROGRESS NOTES
"Outpatient Psychiatry Follow-Up Visit (MD/NP)    4/22/2025    Clinical Status of Patient:  Outpatient (Ambulatory)    Chief Complaint:  Kim Padilla is a 49 y.o. female who presents today for follow-up of attention problems.  Met with patient.      Last visit was:   The patient location is: home  The chief complaint leading to consultation is: attention problems    Visit type: audiovisual    Face to Face time with patient: 30 minutes  30 minutes of total time spent on the encounter, which includes face to face time and non-face to face time preparing to see the patient (eg, review of tests), Obtaining and/or reviewing separately obtained history, Documenting clinical information in the electronic or other health record, Independently interpreting results (not separately reported) and communicating results to the patient/family/caregiver, or Care coordination (not separately reported).     Each patient to whom he or she provides medical services by telemedicine is:  (1) informed of the relationship between the physician and patient and the respective role of any other health care provider with respect to management of the patient; and (2) notified that he or she may decline to receive medical services by telemedicine and may withdraw from such care at any time.    Interval History and Content of Current Session:  Current Psychiatric Medications/changes  Increase to Ritalin 20 mg twice daily    Virtual Visit: Pt presents bright affect and euthymic mood. Pt states, "I've been doing well" .Reports effective response to Ritalin increase.  Denies side effects. Denies any unmanaged mood, anxiety, or insomnia symptoms.  Denies SI/HI/AVH. Will continue current medications.     Psychotherapy:  Target symptoms: lack of focus  Why chosen therapy is appropriate versus another modality: relevant to diagnosis  Outcome monitoring methods: self-report  Therapeutic intervention type: insight oriented psychotherapy  Topics " discussed/themes: building skills sets for symptom management, symptom recognition  The patient's response to the intervention is accepting. The patient's progress toward treatment goals is good.   Duration of intervention: 15 minutes.    Review of Systems   PSYCHIATRIC: Pertinant items are noted in the narrative.  CONSTITUTIONAL: No weight gain or loss.   MUSCULOSKELETAL: No pain or stiffness of the joints.  NEUROLOGIC: No weakness, sensory changes, seizures, confusion, memory loss, tremor or other abnormal movements.  ENDOCRINE: No polydipsia or polyuria.  INTEGUMENTARY: No rashes or lacerations.  EYES: No exophthalmos, jaundice or blindness.  ENT: No dizziness, tinnitus or hearing loss.  RESPIRATORY: No shortness of breath.  CARDIOVASCULAR: No tachycardia or chest pain.  GASTROINTESTINAL: No nausea, vomiting, pain, constipation or diarrhea.  GENITOURINARY: No frequency, dysuria or sexual dysfunction.  HEMATOLOGIC/LYMPHATIC: No excessive bleeding, prolonged or excessive bleeding after dental extraction/injury.  ALLERGIC/IMMUNOLOGIC: No allergic response to materials, foods or animals at this time.    Past Medical, Family and Social History: The patient's past medical, family and social history have been reviewed and updated as appropriate within the electronic medical record - see encounter notes.    Compliance: yes    Side effects: None    Risk Parameters:  Patient reports no suicidal ideation  Patient reports no homicidal ideation  Patient reports no self-injurious behavior  Patient reports no violent behavior    Exam (detailed: at least 9 elements; comprehensive: all 15 elements)   Constitutional  Vitals:  Most recent vital signs, dated greater than 90 days prior to this appointment, were reviewed.   There were no vitals filed for this visit.     General:  unremarkable, age appropriate     Musculoskeletal  Muscle Strength/Tone:  not examined   Gait & Station:  non-ataxic     Psychiatric  Speech:  no latency; no  press   Mood & Affect:  steady  congruent and appropriate   Thought Process:  normal and logical   Associations:  intact   Thought Content:  normal, no suicidality, no homicidality, delusions, or paranoia   Insight:  intact   Judgement: behavior is adequate to circumstances   Orientation:  grossly intact   Memory: intact for content of interview   Language: grossly intact   Attention Span & Concentration:  able to focus   Fund of Knowledge:  intact and appropriate to age and level of education     Assessment and Diagnosis   Status/Progress: Based on the examination today, the patient's problem(s) is/are improved.  New problems have not been presented today.   Co-morbidities and Lack of compliance are not complicating management of the primary condition.  There are no active rule-out diagnoses for this patient at this time.     General Impression:       ICD-10-CM ICD-9-CM   1. Attention deficit hyperactivity disorder (ADHD), unspecified ADHD type  F90.9 314.01       Intervention/Counseling/Treatment Plan   Medication Management: The risks and benefits of medication were discussed with the patient.  Ritalin 20 mg twice daily    Return to Clinic: 3 months    Risks, benefits, side effects and alternative treatments discussed with patient. Patient agrees with the current plan as documented.  Encouraged Patient to keep future appointments.  Take medications as prescribed and abstain from substance abuse.  Pt to present to ED for thoughts to harm herself or others

## 2025-04-27 ENCOUNTER — PATIENT MESSAGE (OUTPATIENT)
Dept: INTERNAL MEDICINE | Facility: CLINIC | Age: 50
End: 2025-04-27
Payer: COMMERCIAL

## 2025-04-27 DIAGNOSIS — Z12.11 COLON CANCER SCREENING: Primary | ICD-10-CM

## 2025-04-29 ENCOUNTER — TELEPHONE (OUTPATIENT)
Dept: INTERNAL MEDICINE | Facility: CLINIC | Age: 50
End: 2025-04-29
Payer: COMMERCIAL

## 2025-05-03 ENCOUNTER — OFFICE VISIT (OUTPATIENT)
Dept: NEPHROLOGY | Facility: CLINIC | Age: 50
End: 2025-05-03
Payer: COMMERCIAL

## 2025-05-03 DIAGNOSIS — N20.0 KIDNEY STONES: Primary | ICD-10-CM

## 2025-05-03 PROCEDURE — 3066F NEPHROPATHY DOC TX: CPT | Mod: CPTII,95,, | Performed by: NURSE PRACTITIONER

## 2025-05-03 PROCEDURE — 98006 SYNCH AUDIO-VIDEO EST MOD 30: CPT | Mod: 95,,, | Performed by: NURSE PRACTITIONER

## 2025-05-03 RX ORDER — POTASSIUM CITRATE 540 MG/1
10 TABLET, EXTENDED RELEASE ORAL 2 TIMES DAILY WITH MEALS
Qty: 60 TABLET | Refills: 4 | Status: SHIPPED | OUTPATIENT
Start: 2025-05-03

## 2025-05-03 NOTE — PROGRESS NOTES
Subjective:       Patient ID: Kim Padilla is a 49 y.o.  female who presents for follow-up evaluation of kidney stones.    The patient location is: [place]  The chief complaint leading to consultation is: kidney stones  Visit type: audiovisual  Total time spent with patient: 15 mins  Each patient to whom he or she provides medical services by telemedicine is:  (1) informed of the relationship between the physician and patient and the respective role of any other health care provider with respect to management of the patient; and (2) notified that he or she may decline to receive medical services by telemedicine and may withdraw from such care at any time.    HPI     Patient is new to me. Last seen by Dr. Stone in September 2024.  Prior pertinent chart reviewed since this is patient's first appointment with me.    Patient presents for follow-up of kidney stones.  Baseline creatinine of 0.7-0.8.    Significant other medical problems include preDM, hepatic steatosis, ANNA, back pain.      No recent stones. No complaints today. Non compliant with potassium citrate. 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     Significant family hx includes: Father - kidney stones    Last renal US: 6/7/24 , reviewed.  FINDINGS:  Right kidney: The right kidney measures 12.2 cm. No cortical thinning. No loss of corticomedullary distinction. Resistive index measures 0.57.  No mass. Two hyper echogenic foci with twinkle artifact consistent with calculi the larger measures 0.5 cm.  No hydronephrosis.     Left kidney: The left kidney measures 1.8 cm. No cortical thinning. No loss of corticomedullary distinction. Resistive index measures 0.49.  No mass. Echogenic foci with twinkle artifact measuring 0.3 cm.  No hydronephrosis.     The bladder is partially distended at the time of scanning and has an unremarkable appearance.     Impression:     Bilateral nonobstructive  nephrolithiasis.    Review of Systems   Genitourinary:  Negative for difficulty urinating, flank pain and hematuria.   All other systems reviewed and are negative.      Objective:       There were no vitals taken for this visit.  Physical Exam  Vitals reviewed.   Constitutional:       General: She is not in acute distress.     Appearance: Normal appearance.   HENT:      Head: Normocephalic and atraumatic.   Eyes:      General: No scleral icterus.  Pulmonary:      Effort: Pulmonary effort is normal. No respiratory distress.   Neurological:      Mental Status: She is alert and oriented to person, place, and time.   Psychiatric:         Mood and Affect: Mood normal.         Behavior: Behavior normal.           Current Outpatient Medications   Medication Sig    ALPRAZolam (XANAX) 0.5 MG tablet Take 1 tablet (0.5 mg total) by mouth 3 (three) times daily as needed for Insomnia or Anxiety.    azelastine (ASTELIN) 137 mcg (0.1 %) nasal spray 2 sprays (274 mcg total) by Nasal route 2 (two) times daily.    cholecalciferol, vitamin D3, (VITAMIN D3 ORAL) Take 2,000 Units by mouth.    eletriptan (RELPAX) 40 MG tablet Take 1 tablet (40 mg total) by mouth as needed (migraine). may repeat in 2 hours if necessary    fluticasone propionate (FLONASE) 50 mcg/actuation nasal spray 2 sprays (100 mcg total) by Each Nostril route 2 (two) times daily.    [START ON 6/24/2025] methylphenidate HCl (RITALIN) 20 MG tablet Take 1 tablet (20 mg total) by mouth 2 (two) times daily.    [START ON 5/26/2025] methylphenidate HCl (RITALIN) 20 MG tablet Take 1 tablet (20 mg total) by mouth 2 (two) times daily.    methylphenidate HCl (RITALIN) 20 MG tablet Take 1 tablet (20 mg total) by mouth 2 (two) times daily.    mupirocin (BACTROBAN) 2 % ointment Apply topically 3 (three) times daily.    ondansetron (ZOFRAN-ODT) 4 MG TbDL Place 1 tablet 3 times a day by translingual route as needed for 30 days.    potassium citrate (UROCIT-K) 5 mEq (540 mg) TbSR Take  "2 tablets (10 mEq total) by mouth 2 (two) times daily with meals.    semaglutide (OZEMPIC) 2 mg/dose (8 mg/3 mL) PnIj Inject 2 mg into the skin every 7 days.    tamsulosin (FLOMAX) 0.4 mg Cap Take 1 capsule (0.4 mg total) by mouth once daily. for 7 days    tranexamic acid (LYSTEDA) 650 mg tablet Take 325 mg by mouth 2 (two) times daily.    UBRELVY 100 mg tablet SMARTSI Tablet(s) By Mouth Every 2-4 Hours PRN (Patient not taking: Reported on 2024)    valACYclovir (VALTREX) 1000 MG tablet TAKE 2 TABLETS BY MOUTH TWICE A DAY FOR 2-3 DAYS AS NEEDED   Last reviewed on 2025  3:53 PM by Samuel Cloud III, NP       Lab Results   Component Value Date    CREATININE 0.7 2024    URICACID 1.92 2021     eGFR   Date Value Ref Range Status   2024 >60.0 >60 mL/min/1.73 m^2 Final     Prot/Creat Ratio, Urine   Date Value Ref Range Status   2024 Unable to calculate 0.00 - 0.20 Final   2024 0.05 0.00 - 0.20 Final   2023 0.06 0.00 - 0.20 Final     No results found for: "MICALBCREAT"  Lab Results   Component Value Date     2024    K 4.4 2024    CO2 23 2024     2024     Lab Results   Component Value Date    PTH 89.8 (H) 2024    CALCIUM 9.5 2024    PHOS 3.0 2024     Lab Results   Component Value Date    HGB 14.7 2024    WBC 8.25 2024    HCT 43.2 2024      Ferritin   Date Value Ref Range Status   2022 273 20.0 - 300.0 ng/mL Final     Lab Results   Component Value Date    HGBA1C 5.0 2024     2024    BUN 10 2024     Lab Results   Component Value Date    LDLCALC 96.6 2024         Assessment:       1. Kidney stones        Plan:   Nephrolithiasis  - No recent issues. Had NM parathyroid scan without adenoma.   - Urine Mg low. Check serum.   - Recommend taking potassium citrate - discussed purpose and benefit in treating kidney stones. Discussed adding lemon to water and dietary " recommendations for kidney stones.  - repeat 24 hr urine supersaturation 6 months    All questions patient had were answered.  Asked if further questions. None. F/u in clinic 6 months  with labs and urine prior to next visit or sooner if needed.  ER for emergency concerns.        Summary of Plan:  - Mg level this week  - Refill K citrate  - RTC 6 months with 24 hr urine

## 2025-05-06 ENCOUNTER — TELEPHONE (OUTPATIENT)
Dept: PHARMACY | Facility: CLINIC | Age: 50
End: 2025-05-06
Payer: COMMERCIAL

## 2025-05-07 NOTE — TELEPHONE ENCOUNTER
[facility]/Population Health Chart Review & Patient Outreach Details For Medication Adherence Project    Reason for Outreach Encounter: 3rd Party payor non-compliance report ([employer], [employer], [employer], etc)  2.  Patient Outreach Method: Reviewed patient chart  and [facility] message  3.   Medication in question:    Diabetes Medications              semaglutide (OZEMPIC) 2 mg/dose (8 mg/3 mL) PnIj Inject 2 mg into the skin every 7 days.                 ozempic  last filled  11/11 for 28 day supply      4.  Reviewed and or Updates Made To: Patient Chart  5. Outreach Outcomes and/or actions taken: Sent inquiry to patient: Waiting for response  Additional Notes:

## 2025-06-18 ENCOUNTER — TELEPHONE (OUTPATIENT)
Dept: PHARMACY | Facility: CLINIC | Age: 50
End: 2025-06-18
Payer: COMMERCIAL

## 2025-06-18 NOTE — TELEPHONE ENCOUNTER
[facility]Population [facility] Chart Review & Patient Outreach Details For Medication Adherence Project    Reason for Outreach Encounter: 3rd Party payor non-compliance report ([employer], [employer], C, etc)  2.  Patient Outreach Method: Reviewed patient chart   3.   Medication in question:    Diabetes Medications              semaglutide (OZEMPIC) 2 mg/dose (8 mg/3 mL) PnIj Inject 2 mg into the skin every 7 days.                 Ozempic  last filled  11/11/24 for 28 day supply      4.  Reviewed and or Updates Made To: Patient Chart  5. Outreach Outcomes and/or actions taken: Medication discontinued  Additional Notes:   Pt's insurance no longer covers since she is pre-diabetic and not diabetic

## 2025-07-03 ENCOUNTER — HOSPITAL ENCOUNTER (OUTPATIENT)
Dept: RADIOLOGY | Facility: HOSPITAL | Age: 50
Discharge: HOME | End: 2025-07-03
Attending: STUDENT IN AN ORGANIZED HEALTH CARE EDUCATION/TRAINING PROGRAM
Payer: COMMERCIAL

## 2025-07-03 VITALS — HEIGHT: 64 IN | WEIGHT: 153 LBS | BODY MASS INDEX: 26.12 KG/M2

## 2025-07-03 DIAGNOSIS — Z12.31 VISIT FOR SCREENING MAMMOGRAM: ICD-10-CM

## 2025-07-03 PROCEDURE — 77063 BREAST TOMOSYNTHESIS BI: CPT | Mod: TC

## 2025-07-28 ENCOUNTER — OFFICE VISIT (OUTPATIENT)
Dept: PSYCHIATRY | Facility: CLINIC | Age: 50
End: 2025-07-28
Payer: COMMERCIAL

## 2025-07-28 DIAGNOSIS — F90.9 ATTENTION DEFICIT HYPERACTIVITY DISORDER (ADHD), UNSPECIFIED ADHD TYPE: ICD-10-CM

## 2025-07-28 PROCEDURE — 3066F NEPHROPATHY DOC TX: CPT | Mod: CPTII,95,, | Performed by: NURSE PRACTITIONER

## 2025-07-28 PROCEDURE — 98006 SYNCH AUDIO-VIDEO EST MOD 30: CPT | Mod: 95,,, | Performed by: NURSE PRACTITIONER

## 2025-07-28 RX ORDER — METHYLPHENIDATE HYDROCHLORIDE 20 MG/1
20 TABLET ORAL 2 TIMES DAILY
Qty: 60 TABLET | Refills: 0 | Status: SHIPPED | OUTPATIENT
Start: 2025-08-12

## 2025-07-28 RX ORDER — METHYLPHENIDATE HYDROCHLORIDE 20 MG/1
20 TABLET ORAL 2 TIMES DAILY
Qty: 60 TABLET | Refills: 0 | Status: SHIPPED | OUTPATIENT
Start: 2025-09-10

## 2025-07-28 RX ORDER — METHYLPHENIDATE HYDROCHLORIDE 20 MG/1
20 TABLET ORAL 2 TIMES DAILY
Qty: 60 TABLET | Refills: 0 | Status: SHIPPED | OUTPATIENT
Start: 2025-10-09

## 2025-07-28 NOTE — PROGRESS NOTES
"Outpatient Psychiatry Follow-Up Visit (MD/NP)    7/28/2025    Clinical Status of Patient:  Outpatient (Ambulatory)    Chief Complaint:  Kim Padilla is a 49 y.o. female who presents today for follow-up of attention problems.  Met with patient.      Last visit was: 4/22/25  The patient location is: home  The chief complaint leading to consultation is: attention problems    Visit type: audiovisual    Face to Face time with patient: 30 minutes  30 minutes of total time spent on the encounter, which includes face to face time and non-face to face time preparing to see the patient (eg, review of tests), Obtaining and/or reviewing separately obtained history, Documenting clinical information in the electronic or other health record, Independently interpreting results (not separately reported) and communicating results to the patient/family/caregiver, or Care coordination (not separately reported).     Each patient to whom he or she provides medical services by telemedicine is:  (1) informed of the relationship between the physician and patient and the respective role of any other health care provider with respect to management of the patient; and (2) notified that he or she may decline to receive medical services by telemedicine and may withdraw from such care at any time.    Interval History and Content of Current Session:  Current Psychiatric Medications/changes  Ritalin 20 mg twice daily      Virtual Visit: Pt presents bright affect and euthymic mood. Pt states, "I've been doing well" .Reports effective response to Ritalin increase.  Denies side effects. Denies any unmanaged mood, anxiety, or insomnia symptoms.  Denies SI/HI/AVH. Will continue current medications.     Psychotherapy:  Target symptoms: lack of focus  Why chosen therapy is appropriate versus another modality: relevant to diagnosis  Outcome monitoring methods: self-report  Therapeutic intervention type: insight oriented psychotherapy  Topics " discussed/themes: building skills sets for symptom management, symptom recognition  The patient's response to the intervention is accepting. The patient's progress toward treatment goals is good.   Duration of intervention: 15 minutes.    Review of Systems   PSYCHIATRIC: Pertinant items are noted in the narrative.  CONSTITUTIONAL: No weight gain or loss.   MUSCULOSKELETAL: No pain or stiffness of the joints.  NEUROLOGIC: No weakness, sensory changes, seizures, confusion, memory loss, tremor or other abnormal movements.  ENDOCRINE: No polydipsia or polyuria.  INTEGUMENTARY: No rashes or lacerations.  EYES: No exophthalmos, jaundice or blindness.  ENT: No dizziness, tinnitus or hearing loss.  RESPIRATORY: No shortness of breath.  CARDIOVASCULAR: No tachycardia or chest pain.  GASTROINTESTINAL: No nausea, vomiting, pain, constipation or diarrhea.  GENITOURINARY: No frequency, dysuria or sexual dysfunction.  HEMATOLOGIC/LYMPHATIC: No excessive bleeding, prolonged or excessive bleeding after dental extraction/injury.  ALLERGIC/IMMUNOLOGIC: No allergic response to materials, foods or animals at this time.    Past Medical, Family and Social History: The patient's past medical, family and social history have been reviewed and updated as appropriate within the electronic medical record - see encounter notes.    Compliance: yes    Side effects: None    Risk Parameters:  Patient reports no suicidal ideation  Patient reports no homicidal ideation  Patient reports no self-injurious behavior  Patient reports no violent behavior    Exam (detailed: at least 9 elements; comprehensive: all 15 elements)   Constitutional  Vitals:  Most recent vital signs, dated greater than 90 days prior to this appointment, were reviewed.   There were no vitals filed for this visit.     General:  unremarkable, age appropriate     Musculoskeletal  Muscle Strength/Tone:  not examined   Gait & Station:  non-ataxic     Psychiatric  Speech:  no latency; no  press   Mood & Affect:  steady  congruent and appropriate   Thought Process:  normal and logical   Associations:  intact   Thought Content:  normal, no suicidality, no homicidality, delusions, or paranoia   Insight:  intact   Judgement: behavior is adequate to circumstances   Orientation:  grossly intact   Memory: intact for content of interview   Language: grossly intact   Attention Span & Concentration:  able to focus   Fund of Knowledge:  intact and appropriate to age and level of education     Assessment and Diagnosis   Status/Progress: Based on the examination today, the patient's problem(s) is/are improved.  New problems have not been presented today.   Co-morbidities and Lack of compliance are not complicating management of the primary condition.  There are no active rule-out diagnoses for this patient at this time.     General Impression:       ICD-10-CM ICD-9-CM   1. Attention deficit hyperactivity disorder (ADHD), unspecified ADHD type  F90.9 314.01         Intervention/Counseling/Treatment Plan   Medication Management: The risks and benefits of medication were discussed with the patient.  Ritalin 20 mg twice daily    Return to Clinic: 3 months    Risks, benefits, side effects and alternative treatments discussed with patient. Patient agrees with the current plan as documented.  Encouraged Patient to keep future appointments.  Take medications as prescribed and abstain from substance abuse.  Pt to present to ED for thoughts to harm herself or others